# Patient Record
Sex: MALE | Race: OTHER | HISPANIC OR LATINO | Employment: OTHER | URBAN - METROPOLITAN AREA
[De-identification: names, ages, dates, MRNs, and addresses within clinical notes are randomized per-mention and may not be internally consistent; named-entity substitution may affect disease eponyms.]

---

## 2018-05-20 ENCOUNTER — HOSPITAL ENCOUNTER (EMERGENCY)
Facility: HOSPITAL | Age: 38
Discharge: HOME | End: 2018-05-20
Attending: EMERGENCY MEDICINE
Payer: COMMERCIAL

## 2018-05-20 VITALS
DIASTOLIC BLOOD PRESSURE: 113 MMHG | TEMPERATURE: 98.3 F | OXYGEN SATURATION: 98 % | RESPIRATION RATE: 18 BRPM | SYSTOLIC BLOOD PRESSURE: 222 MMHG | HEART RATE: 98 BPM
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
## 2018-05-20 DIAGNOSIS — S16.1XXA ACUTE STRAIN OF NECK MUSCLE, INITIAL ENCOUNTER: ICD-10-CM

## 2018-05-20 DIAGNOSIS — I10 HYPERTENSION: ICD-10-CM

## 2018-05-20 DIAGNOSIS — M54.50 LOW BACK PAIN: ICD-10-CM

## 2018-05-20 PROCEDURE — 99283 EMERGENCY DEPT VISIT LOW MDM: CPT

## 2018-05-20 RX ORDER — METHOCARBAMOL 750 MG/1
750 TABLET, FILM COATED ORAL EVERY 8 HOURS SCHEDULED
Qty: 15 TABLET | Refills: 0 | Status: SHIPPED | OUTPATIENT
Start: 2018-05-20 | End: 2019-12-29 | Stop reason: HOSPADM
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
## 2018-05-20 NOTE — DISCHARGE INSTRUCTIONS
Acute Low Back Pain   WHAT YOU NEED TO KNOW:   Acute low back pain is sudden discomfort in your lower back area that lasts for up to 6 weeks  The discomfort makes it difficult to tolerate activity  DISCHARGE INSTRUCTIONS:   Return to the emergency department if:   · You have severe pain  · You have sudden stiffness and heaviness on both buttocks down to both legs  · You have numbness or weakness in one leg, or pain in both legs  · You have numbness in your genital area or across your lower back  · You cannot control your urine or bowel movements  Contact your healthcare provider if:   · You have a fever  · You have pain at night or when you rest     · Your pain does not get better with treatment  · You have pain that worsens when you cough or sneeze  · You suddenly feel something pop or snap in your back  · You have questions or concerns about your condition or care  Medicines: The following medicines may be ordered by your healthcare provider:  · Acetaminophen  decreases pain  It is available without a doctor's order  Ask how much to take and how often to take it  Follow directions  Acetaminophen can cause liver damage if not taken correctly  · NSAIDs  help decrease swelling and pain  This medicine is available with or without a doctor's order  NSAIDs can cause stomach bleeding or kidney problems in certain people  If you take blood thinner medicine, always ask your healthcare provider if NSAIDs are safe for you  Always read the medicine label and follow directions  · Prescription pain medicine  may be given  Ask your healthcare provider how to take this medicine safely  · Muscle relaxers  decrease pain by relaxing the muscles in your lower spine  · Take your medicine as directed  Contact your healthcare provider if you think your medicine is not helping or if you have side effects  Tell him of her if you are allergic to any medicine   Keep a list of the medicines, vitamins, and herbs you take  Include the amounts, and when and why you take them  Bring the list or the pill bottles to follow-up visits  Carry your medicine list with you in case of an emergency  Self-care:   · Stay active  as much as you can without causing more pain  Bed rest could make your back pain worse  Start with some light exercises such as walking  Avoid heavy lifting until your pain is gone  Ask for more information about the activities or exercises that are right for you  · Ice  helps decrease swelling, pain, and muscle spams  Put crushed ice in a plastic bag  Cover it with a towel  Place it on your lower back for 20 to 30 minutes every 2 hours  Do this for about 2 to 3 days after your pain starts, or as directed  · Heat  helps decrease pain and muscle spasms  Start to use heat after treatment with ice has stopped  Use a small towel dampened with warm water or a heating pad, or sit in a warm bath  Apply heat on the area for 20 to 30 minutes every 2 hours for as many days as directed  Alternate heat and ice  Prevent acute low back pain:   · Use proper body mechanics  ¨ Bend at the hips and knees when you  objects  Do not bend from the waist  Use your leg muscles as you lift the load  Do not use your back  Keep the object close to your chest as you lift it  Try not to twist or lift anything above your waist     ¨ Change your position often when you stand for long periods of time  Rest one foot on a small box or footrest, and then switch to the other foot often  ¨ Try not to sit for long periods of time  When you do, sit in a straight-backed chair with your feet flat on the floor  Never reach, pull, or push while you are sitting  · Do exercises that strengthen your back muscles  Warm up before you exercise  Ask your healthcare provider the best exercises for you  · Maintain a healthy weight  Ask your healthcare provider how much you should weigh   Ask him to help you create a weight loss plan if you are overweight  Follow up with your healthcare provider as directed:  Return for a follow-up visit if you still have pain after 1 to 3 weeks of treatment  You may need to visit an orthopedist if your back pain lasts more than 12 weeks  Write down your questions so you remember to ask them during your visits  © 2017 [address]  Information is for End User's use only and may not be sold, redistributed or otherwise used for commercial purposes  All illustrations and images included in CareNotes® are the copyrighted property of [employer]  or [employer]  The above information is an  only  It is not intended as medical advice for individual conditions or treatments  Talk to your doctor, nurse or pharmacist before following any medical regimen to see if it is safe and effective for you

## 2018-05-20 NOTE — ED NOTES
Patient reports history of hypertension  Patient states he did not take his blood pressure medication today  Patient requesting not to be evaluated for blood pressure        Orma Goltz, RN  05/20/18 9866

## 2018-08-17 ENCOUNTER — APPOINTMENT (EMERGENCY)
Dept: RADIOLOGY | Facility: HOSPITAL | Age: 38
End: 2018-08-17
Payer: COMMERCIAL

## 2018-08-17 ENCOUNTER — HOSPITAL ENCOUNTER (EMERGENCY)
Facility: HOSPITAL | Age: 38
Discharge: HOME | End: 2018-08-17
Attending: EMERGENCY MEDICINE | Admitting: EMERGENCY MEDICINE
Payer: COMMERCIAL

## 2018-08-17 VITALS
RESPIRATION RATE: 18 BRPM | OXYGEN SATURATION: 97 % | HEART RATE: 104 BPM | WEIGHT: 294 LBS | TEMPERATURE: 97.6 F | DIASTOLIC BLOOD PRESSURE: 119 MMHG | SYSTOLIC BLOOD PRESSURE: 206 MMHG

## 2018-08-17 DIAGNOSIS — S91.114A LACERATION OF LESSER TOE OF RIGHT FOOT WITHOUT FOREIGN BODY PRESENT OR DAMAGE TO NAIL, INITIAL ENCOUNTER: Primary | ICD-10-CM

## 2018-08-17 PROCEDURE — 90715 TDAP VACCINE 7 YRS/> IM: CPT | Performed by: FAMILY MEDICINE

## 2018-08-17 PROCEDURE — 73660 X-RAY EXAM OF TOE(S): CPT

## 2018-08-17 PROCEDURE — 99283 EMERGENCY DEPT VISIT LOW MDM: CPT

## 2018-08-17 PROCEDURE — 90471 IMMUNIZATION ADMIN: CPT

## 2018-08-17 RX ADMIN — TETANUS TOXOID, REDUCED DIPHTHERIA TOXOID AND ACELLULAR PERTUSSIS VACCINE, ADSORBED 0.5 ML: 5; 2.5; 8; 8; 2.5 SUSPENSION INTRAMUSCULAR at 11:09

## 2018-08-17 NOTE — DISCHARGE INSTRUCTIONS
Laceration   WHAT YOU NEED TO KNOW:   A laceration is an injury to the skin and the soft tissue underneath it  Lacerations happen when you are cut or hit by something  They can happen anywhere on the body  DISCHARGE INSTRUCTIONS:   Return to the emergency department if:   · You have heavy bleeding or bleeding that does not stop after 10 minutes of holding firm, direct pressure over the wound  · Your wound opens up  Contact your healthcare provider if:   · You have a fever or chills  · Your laceration is red, warm, or swollen  · You have red streaks on your skin coming from your wound  · You have white or yellow drainage from the wound that smells bad  · You have pain that gets worse, even after treatment  · You have questions or concerns about your condition or care  Medicines:   · Prescription pain medicine  may be given  Ask how to take this medicine safely  · Antibiotics  help treat or prevent a bacterial infection  · Take your medicine as directed  Contact your healthcare provider if you think your medicine is not helping or if you have side effects  Tell him or her if you are allergic to any medicine  Keep a list of the medicines, vitamins, and herbs you take  Include the amounts, and when and why you take them  Bring the list or the pill bottles to follow-up visits  Carry your medicine list with you in case of an emergency  Care for your wound as directed:   · Do not get your wound wet  until your healthcare provider says it is okay  Do not soak your wound in water  Do not go swimming until your healthcare provider says it is okay  Carefully wash the wound with soap and water  Gently pat the area dry or allow it to air dry  · Change your bandages  when they get wet, dirty, or after washing  Apply new, clean bandages as directed  Do not apply elastic bandages or tape too tight  Do not put powders or lotions over your incision  · Apply antibiotic ointment as directed  Your healthcare provider may give you antibiotic ointment to put over your wound if you have stitches  If you have strips of tape over your incision, let them dry up and fall off on their own  If they do not fall off within 14 days, gently remove them  If you have glue over your wound, do not remove or pick at it  If your glue comes off, do not replace it with glue that you have at home  · Check your wound every day for signs of infection such as swelling, redness, or pus  Self-care:   · Apply ice  on your wound for 15 to 20 minutes every hour or as directed  Use an ice pack, or put crushed ice in a plastic bag  Cover it with a towel  Ice helps prevent tissue damage and decreases swelling and pain  · Use a splint as directed  A splint will decrease movement and stress on your wound  It may help it heal faster  A splint may be used for lacerations over joints or areas of your body that bend  Ask your healthcare provider how to apply and remove a splint  · Decrease scarring of your wound  by applying ointments as directed  Do not apply ointments until your healthcare provider says it is okay  You may need to wait until your wound is healed  Ask which ointment to buy and how often to use it  After your wound is healed, use sunscreen over the area when you are out in the sun  You should do this for at least 6 months to 1 year after your injury  Follow up with your healthcare provider as directed: You may need to follow up in 24 to 48 hours to have your wound checked for infection  You will need to return in 3 to 14 days if you have stitches or staples so they can be removed  Care for your wound as directed to prevent infection and help it heal  Write down your questions so you remember to ask them during your visits  © 2017 [employer] Information is for End User's use only and may not be sold, redistributed or otherwise used for commercial purposes   All illustrations and images included in [facility] are the copyrighted property of [employer] Inc  or [employer]  The above information is an  only  It is not intended as medical advice for individual conditions or treatments  Talk to your doctor, nurse or pharmacist before following any medical regimen to see if it is safe and effective for you

## 2018-08-17 NOTE — ED NOTES
Are irrigated with nss, pat dry  Bleeding controlled laceration noted to toe        Michelle Leon RN  08/17/18 2181

## 2018-08-17 NOTE — ED ATTENDING ATTESTATION
Lina Sheikh MD, saw and evaluated the patient  I have discussed the patient with the resident/non-physician practitioner and agree with the resident's/non-physician practitioner's findings, Plan of Care, and MDM as documented in the resident's/non-physician practitioner's note, except where noted  All available labs and Radiology studies were reviewed  At this point I agree with the current assessment done in the Emergency Department  I have conducted an independent evaluation of this patient a history and physical is as follows:  46 yo male sustained laceration on his 2nd toe as he was pulling a shower door closed that came off the track  He does not know last tetanus  Laceration closed with skin adhesive  No fracture  Tetanus updated          Critical Care Time  CritCare Time    Procedures

## 2018-08-17 NOTE — ED PROVIDER NOTES
History  Chief Complaint   Patient presents with    Toe Injury     pt injured right 2nd toe in shower this am with shower door  laceration noted with small amount of blood  45year old male with pmh of htn and dm presents with right 2nd toe laceration after the glass door of his shower came off the track and fell onto toes  Patient can't recall last tdap  Denies fever, chills, numbness, tingling, or decreased range of motion of his toes  Patient does not take blood thinners  History provided by:  Patient   used: No        Prior to Admission Medications   Prescriptions Last Dose Informant Patient Reported? Taking? methocarbamol (ROBAXIN) 750 mg tablet   No No   Sig: Take 1 tablet (750 mg total) by mouth every 8 (eight) hours for 5 days      Facility-Administered Medications: None       Past Medical History:   Diagnosis Date    [facility]     Hypertension        History reviewed  No pertinent surgical history  History reviewed  No pertinent family history  I have reviewed and agree with the history as documented  Social History   Substance Use Topics    Smoking status: Never Smoker    Smokeless tobacco: Never Used    Alcohol use No        Review of Systems   Constitutional: Negative for activity change, chills, diaphoresis and fever  Respiratory: Negative for cough, chest tightness and shortness of breath  Cardiovascular: Negative for chest pain, palpitations and leg swelling  Gastrointestinal: Negative for diarrhea, nausea and vomiting  Musculoskeletal: Negative for arthralgias, gait problem, joint swelling, myalgias, neck pain and neck stiffness  Skin: Positive for wound  Negative for pallor and rash  Neurological: Negative for dizziness, syncope, weakness, light-headedness, numbness and headaches  Psychiatric/Behavioral: Negative for confusion  The patient is not nervous/anxious          Physical Exam  ED Triage Vitals [08/17/18 1022] Temperature Pulse Respirations Blood Pressure SpO2   97 6 °F (36 4 °C) 104 18 (!) 206/119 97 %      Temp Source Heart Rate Source Patient Position - Orthostatic VS BP Location FiO2 (%)   Oral Monitor -- -- --      Pain Score       No Pain           Orthostatic Vital Signs  Vitals:    08/17/18 1022   BP: (!) 206/119   Pulse: 104       Physical Exam   Constitutional: He is oriented to person, place, and time  He appears well-developed and well-nourished  No distress  HENT:   Head: Normocephalic and atraumatic  Eyes: EOM are normal  Pupils are equal, round, and reactive to light  Neck: Normal range of motion  Neck supple  Cardiovascular: Normal rate, regular rhythm, normal heart sounds and intact distal pulses  Exam reveals no friction rub  No murmur heard  Pulmonary/Chest: Effort normal and breath sounds normal  No respiratory distress  He has no wheezes  Abdominal: Soft  Bowel sounds are normal  There is no tenderness  Musculoskeletal: Normal range of motion  He exhibits no edema or deformity  Neurological: He is alert and oriented to person, place, and time  He displays normal reflexes  No cranial nerve deficit or sensory deficit  Coordination normal    Skin: Laceration noted  No abrasion, no bruising and no ecchymosis noted  He is not diaphoretic  No erythema  ED Medications  Medications   tetanus-diphtheria-acellular pertussis (BOOSTRIX) IM injection 0 5 mL (0 5 mL Intramuscular Given 8/17/18 1109)       Diagnostic Studies  Results Reviewed     None                 XR toe second min 2 views RIGHT   ED Interpretation by Meir Winchester DO (08/17 1126)   No acute fracture  Final Result by Hermelinda Up MD (08/17 1151)      No fracture  No radiopaque foreign body identified              Workstation performed: [id number]               Procedures  Lac Repair  Date/Time: 8/17/2018 11:55 AM  Performed by: Linda Paniagua by: Howard White   Consent: Verbal consent obtained  Risks and benefits: risks, benefits and alternatives were discussed  Consent given by: patient  Patient understanding: patient states understanding of the procedure being performed  Patient identity confirmed: verbally with patient  Body area: lower extremity  Location details: right second toe  Laceration length: 1 cm  Foreign bodies: no foreign bodies  Tendon involvement: none  Nerve involvement: none  Vascular damage: no    Sedation:  Patient sedated: no    Wound Dehiscence:  Superficial Wound Dehiscence: simple closure      Procedure Details:  Irrigation solution: saline  Skin closure: glue  Patient tolerance: Patient tolerated the procedure well with no immediate complications            Phone Consults  ED Phone Contact    ED Course                               MDM  Number of Diagnoses or Management Options  Laceration of lesser toe of right foot without foreign body present or damage to nail, initial encounter: new and requires workup  Diagnosis management comments: 45year old male presents for evaluation of 1cm laceration to dorsum of the middle phalanx of his right 2nd toe  There is no acute fracture  No sensory loss or decreased range of motion  Laceration was repaired using glue  Continued to bleed following repair but slowly stopped once glue hardened  Counseled patient to return to ED for closure using stitches if glue fails and wound continues to bleed         Amount and/or Complexity of Data Reviewed  Tests in the radiology section of CPT®: ordered and reviewed  Decide to obtain previous medical records or to obtain history from someone other than the patient: yes  Review and summarize past medical records: yes  Discuss the patient with other providers: yes  Independent visualization of images, tracings, or specimens: yes      CritCare Time    Disposition  Final diagnoses:   Laceration of lesser toe of right foot without foreign body present or damage to nail, initial encounter - minor 1cm laceration to dorsal 2nd phalanx  no acute fracture  superglue used for closure  follow up if laceration fails to heal      Time reflects when diagnosis was documented in both MDM as applicable and the Disposition within this note     Time User Action Codes Description Comment    8/17/2018 11:43 AM Danny Beck Add [X44 321A] Laceration of lesser toe of right foot without foreign body present or damage to nail, initial encounter     8/17/2018 11:44 AM Danny Beck Modify [L68 114A] Laceration of lesser toe of right foot without foreign body present or damage to nail, initial encounter minor 1cm laceration to dorsal 2nd phalanx  no acute fracture  superglue used for closure  follow up if laceration fails to heal       ED Disposition     ED Disposition Condition Comment    Discharge  [facility]  discharge to home/self care  Condition at discharge: Stable        Follow-up Information     Follow up With Specialties Details Why [facility] Schedule an appointment as soon as possible for a visit in 3 days If symptoms worsen [address] [place] [address]  [id number] [phone]          Discharge Medication List as of 8/17/2018 11:47 AM      CONTINUE these medications which have NOT CHANGED    Details   methocarbamol (ROBAXIN) 750 mg tablet Take 1 tablet (750 mg total) by mouth every 8 (eight) hours for 5 days, Starting Sun 5/20/2018, Until Fri 5/25/2018, Print           No discharge procedures on file  ED Provider  Attending physically available and evaluated [facility]    I managed the patient along with the ED Attending      Electronically Signed by         Sylvia Kawasaki, DO  08/17/18 4439

## 2019-12-18 ENCOUNTER — HOSPITAL ENCOUNTER (INPATIENT)
Facility: HOSPITAL | Age: 39
LOS: 1 days | DRG: 281 | End: 2019-12-19
Attending: EMERGENCY MEDICINE | Admitting: HOSPITALIST
Payer: COMMERCIAL

## 2019-12-18 ENCOUNTER — APPOINTMENT (EMERGENCY)
Dept: RADIOLOGY | Facility: HOSPITAL | Age: 39
DRG: 281 | End: 2019-12-18
Payer: COMMERCIAL

## 2019-12-18 DIAGNOSIS — I21.4 NSTEMI (NON-ST ELEVATED MYOCARDIAL INFARCTION) (HCC): Primary | ICD-10-CM

## 2019-12-18 PROBLEM — E87.1 HYPONATREMIA: Status: ACTIVE | Noted: 2019-12-18

## 2019-12-18 PROBLEM — I10 ESSENTIAL HYPERTENSION: Status: ACTIVE | Noted: 2019-12-18

## 2019-12-18 PROBLEM — IMO0002 DIABETES MELLITUS TYPE 2, UNCONTROLLED: Status: ACTIVE | Noted: 2019-12-18

## 2019-12-18 PROBLEM — R06.83 SNORING: Status: ACTIVE | Noted: 2019-12-18

## 2019-12-18 LAB
ALBUMIN SERPL BCP-MCNC: 3.6 G/DL (ref 3.5–5)
ALP SERPL-CCNC: 89 U/L (ref 46–116)
ALT SERPL W P-5'-P-CCNC: 30 U/L (ref 12–78)
ANION GAP SERPL CALCULATED.3IONS-SCNC: 9 MMOL/L (ref 4–13)
APTT PPP: 30 SECONDS (ref 23–37)
AST SERPL W P-5'-P-CCNC: 19 U/L (ref 5–45)
BASOPHILS # BLD AUTO: 0.05 THOUSANDS/ΜL (ref 0–0.1)
BASOPHILS NFR BLD AUTO: 1 % (ref 0–1)
BILIRUB SERPL-MCNC: 0.62 MG/DL (ref 0.2–1)
BUN SERPL-MCNC: 15 MG/DL (ref 5–25)
CALCIUM SERPL-MCNC: 9.5 MG/DL (ref 8.3–10.1)
CHLORIDE SERPL-SCNC: 97 MMOL/L (ref 100–108)
CO2 SERPL-SCNC: 29 MMOL/L (ref 21–32)
CREAT SERPL-MCNC: 1.18 MG/DL (ref 0.6–1.3)
D DIMER PPP FEU-MCNC: 0.27 UG/ML FEU
EOSINOPHIL # BLD AUTO: 0.06 THOUSAND/ΜL (ref 0–0.61)
EOSINOPHIL NFR BLD AUTO: 1 % (ref 0–6)
ERYTHROCYTE [DISTWIDTH] IN BLOOD BY AUTOMATED COUNT: 11.3 % (ref 11.6–15.1)
EST. AVERAGE GLUCOSE BLD GHB EST-MCNC: 243 MG/DL
GFR SERPL CREATININE-BSD FRML MDRD: 77 ML/MIN/1.73SQ M
GLUCOSE SERPL-MCNC: 272 MG/DL (ref 65–140)
GLUCOSE SERPL-MCNC: 322 MG/DL (ref 65–140)
GLUCOSE SERPL-MCNC: 404 MG/DL (ref 65–140)
HBA1C MFR BLD: 10.1 % (ref 4.2–6.3)
HCT VFR BLD AUTO: 48.2 % (ref 36.5–49.3)
HGB BLD-MCNC: 16.3 G/DL (ref 12–17)
IMM GRANULOCYTES # BLD AUTO: 0.05 THOUSAND/UL (ref 0–0.2)
IMM GRANULOCYTES NFR BLD AUTO: 1 % (ref 0–2)
INR PPP: 1.03 (ref 0.84–1.19)
LIPASE SERPL-CCNC: 296 U/L (ref 73–393)
LYMPHOCYTES # BLD AUTO: 2.55 THOUSANDS/ΜL (ref 0.6–4.47)
LYMPHOCYTES NFR BLD AUTO: 24 % (ref 14–44)
MCH RBC QN AUTO: 30.2 PG (ref 26.8–34.3)
MCHC RBC AUTO-ENTMCNC: 33.8 G/DL (ref 31.4–37.4)
MCV RBC AUTO: 89 FL (ref 82–98)
MONOCYTES # BLD AUTO: 0.83 THOUSAND/ΜL (ref 0.17–1.22)
MONOCYTES NFR BLD AUTO: 8 % (ref 4–12)
NEUTROPHILS # BLD AUTO: 7.3 THOUSANDS/ΜL (ref 1.85–7.62)
NEUTS SEG NFR BLD AUTO: 65 % (ref 43–75)
NRBC BLD AUTO-RTO: 0 /100 WBCS
PLATELET # BLD AUTO: 273 THOUSANDS/UL (ref 149–390)
PMV BLD AUTO: 10.6 FL (ref 8.9–12.7)
POTASSIUM SERPL-SCNC: 4 MMOL/L (ref 3.5–5.3)
PROT SERPL-MCNC: 8 G/DL (ref 6.4–8.2)
PROTHROMBIN TIME: 13.6 SECONDS (ref 11.6–14.5)
RBC # BLD AUTO: 5.4 MILLION/UL (ref 3.88–5.62)
SODIUM SERPL-SCNC: 135 MMOL/L (ref 136–145)
TROPONIN I SERPL-MCNC: 1.05 NG/ML
TROPONIN I SERPL-MCNC: 2.08 NG/ML
TROPONIN I SERPL-MCNC: 2.57 NG/ML
WBC # BLD AUTO: 10.84 THOUSAND/UL (ref 4.31–10.16)

## 2019-12-18 PROCEDURE — 83036 HEMOGLOBIN GLYCOSYLATED A1C: CPT | Performed by: PHYSICIAN ASSISTANT

## 2019-12-18 PROCEDURE — 85379 FIBRIN DEGRADATION QUANT: CPT | Performed by: EMERGENCY MEDICINE

## 2019-12-18 PROCEDURE — 85610 PROTHROMBIN TIME: CPT | Performed by: EMERGENCY MEDICINE

## 2019-12-18 PROCEDURE — 96360 HYDRATION IV INFUSION INIT: CPT

## 2019-12-18 PROCEDURE — 99285 EMERGENCY DEPT VISIT HI MDM: CPT

## 2019-12-18 PROCEDURE — 83690 ASSAY OF LIPASE: CPT | Performed by: EMERGENCY MEDICINE

## 2019-12-18 PROCEDURE — 99285 EMERGENCY DEPT VISIT HI MDM: CPT | Performed by: EMERGENCY MEDICINE

## 2019-12-18 PROCEDURE — 85025 COMPLETE CBC W/AUTO DIFF WBC: CPT | Performed by: EMERGENCY MEDICINE

## 2019-12-18 PROCEDURE — 82948 REAGENT STRIP/BLOOD GLUCOSE: CPT

## 2019-12-18 PROCEDURE — 93005 ELECTROCARDIOGRAM TRACING: CPT

## 2019-12-18 PROCEDURE — 84484 ASSAY OF TROPONIN QUANT: CPT | Performed by: EMERGENCY MEDICINE

## 2019-12-18 PROCEDURE — 99223 1ST HOSP IP/OBS HIGH 75: CPT | Performed by: HOSPITALIST

## 2019-12-18 PROCEDURE — 85730 THROMBOPLASTIN TIME PARTIAL: CPT | Performed by: EMERGENCY MEDICINE

## 2019-12-18 PROCEDURE — 80053 COMPREHEN METABOLIC PANEL: CPT | Performed by: EMERGENCY MEDICINE

## 2019-12-18 PROCEDURE — 85730 THROMBOPLASTIN TIME PARTIAL: CPT | Performed by: PHYSICIAN ASSISTANT

## 2019-12-18 PROCEDURE — 71046 X-RAY EXAM CHEST 2 VIEWS: CPT

## 2019-12-18 PROCEDURE — 84484 ASSAY OF TROPONIN QUANT: CPT | Performed by: PHYSICIAN ASSISTANT

## 2019-12-18 PROCEDURE — 36415 COLL VENOUS BLD VENIPUNCTURE: CPT | Performed by: EMERGENCY MEDICINE

## 2019-12-18 RX ORDER — HYDROCHLOROTHIAZIDE 50 MG/1
50 TABLET ORAL EVERY 24 HOURS
COMMUNITY
End: 2019-12-29 | Stop reason: HOSPADM

## 2019-12-18 RX ORDER — HEPARIN SODIUM 10000 [USP'U]/100ML
3-20 INJECTION, SOLUTION INTRAVENOUS
Status: DISCONTINUED | OUTPATIENT
Start: 2019-12-18 | End: 2019-12-19

## 2019-12-18 RX ORDER — ASPIRIN 81 MG/1
324 TABLET, CHEWABLE ORAL ONCE
Status: COMPLETED | OUTPATIENT
Start: 2019-12-18 | End: 2019-12-18

## 2019-12-18 RX ORDER — INSULIN GLARGINE 100 [IU]/ML
30 INJECTION, SOLUTION SUBCUTANEOUS
Status: DISCONTINUED | OUTPATIENT
Start: 2019-12-18 | End: 2019-12-19 | Stop reason: HOSPADM

## 2019-12-18 RX ORDER — HEPARIN SODIUM 1000 [USP'U]/ML
4000 INJECTION, SOLUTION INTRAVENOUS; SUBCUTANEOUS ONCE
Status: COMPLETED | OUTPATIENT
Start: 2019-12-18 | End: 2019-12-18

## 2019-12-18 RX ORDER — AMLODIPINE BESYLATE 10 MG/1
10 TABLET ORAL DAILY
Status: DISCONTINUED | OUTPATIENT
Start: 2019-12-19 | End: 2019-12-19 | Stop reason: HOSPADM

## 2019-12-18 RX ORDER — LISINOPRIL 20 MG/1
40 TABLET ORAL DAILY
Status: DISCONTINUED | OUTPATIENT
Start: 2019-12-19 | End: 2019-12-19 | Stop reason: HOSPADM

## 2019-12-18 RX ORDER — ASPIRIN 81 MG/1
81 TABLET, CHEWABLE ORAL DAILY
Status: DISCONTINUED | OUTPATIENT
Start: 2019-12-19 | End: 2019-12-19 | Stop reason: HOSPADM

## 2019-12-18 RX ORDER — AMLODIPINE BESYLATE 10 MG/1
10 TABLET ORAL DAILY
Status: ON HOLD | COMMUNITY
Start: 2016-12-16 | End: 2019-12-29 | Stop reason: SDUPTHER

## 2019-12-18 RX ORDER — LISINOPRIL 40 MG/1
40 TABLET ORAL DAILY
Status: ON HOLD | COMMUNITY
End: 2019-12-29 | Stop reason: SDUPTHER

## 2019-12-18 RX ORDER — HEPARIN SODIUM 1000 [USP'U]/ML
2000 INJECTION, SOLUTION INTRAVENOUS; SUBCUTANEOUS AS NEEDED
Status: DISCONTINUED | OUTPATIENT
Start: 2019-12-18 | End: 2019-12-19

## 2019-12-18 RX ORDER — HYDROCHLOROTHIAZIDE 25 MG/1
50 TABLET ORAL EVERY 24 HOURS
Status: DISCONTINUED | OUTPATIENT
Start: 2019-12-18 | End: 2019-12-19

## 2019-12-18 RX ORDER — HEPARIN SODIUM 1000 [USP'U]/ML
4000 INJECTION, SOLUTION INTRAVENOUS; SUBCUTANEOUS AS NEEDED
Status: DISCONTINUED | OUTPATIENT
Start: 2019-12-18 | End: 2019-12-19

## 2019-12-18 RX ORDER — ONDANSETRON 2 MG/ML
4 INJECTION INTRAMUSCULAR; INTRAVENOUS EVERY 6 HOURS PRN
Status: DISCONTINUED | OUTPATIENT
Start: 2019-12-18 | End: 2019-12-19 | Stop reason: HOSPADM

## 2019-12-18 RX ADMIN — INSULIN GLARGINE 30 UNITS: 100 INJECTION, SOLUTION SUBCUTANEOUS at 22:23

## 2019-12-18 RX ADMIN — HEPARIN SODIUM AND DEXTROSE 11.1 UNITS/KG/HR: 10000; 5 INJECTION INTRAVENOUS at 17:57

## 2019-12-18 RX ADMIN — ASPIRIN 81 MG 324 MG: 81 TABLET ORAL at 17:01

## 2019-12-18 RX ADMIN — HEPARIN SODIUM 4000 UNITS: 1000 INJECTION INTRAVENOUS; SUBCUTANEOUS at 17:51

## 2019-12-18 RX ADMIN — INSULIN LISPRO 5 UNITS: 100 INJECTION, SOLUTION INTRAVENOUS; SUBCUTANEOUS at 19:48

## 2019-12-18 RX ADMIN — HYDROCHLOROTHIAZIDE 50 MG: 25 TABLET ORAL at 19:47

## 2019-12-18 RX ADMIN — SODIUM CHLORIDE 1000 ML: 0.9 INJECTION, SOLUTION INTRAVENOUS at 16:36

## 2019-12-18 NOTE — ASSESSMENT & PLAN NOTE
Elevated on admission    Pt has not been compliant w/his meds over the last several days  Maintained on norvasc, hctz, lisinopril although pt does not recall doses  Restart on oral meds now  Continue to monitor

## 2019-12-18 NOTE — ED PROVIDER NOTES
History  Chief Complaint   Patient presents with    Chest Pain     Intermittent chest pain x2 days  Denies radiation of pain  Denies taking anything for pain  Denies any other symptoms  51-year-old male presenting for evaluation of chest pain  Patient states that symptoms have been intermittent over the past 1 week  He states that he noticed it more so when he was outside and was attributing this to the cold air  He describes this as chest tightness that resolves without any intervention  He currently is pain-free  Never had any symptoms prior to the past week  No associated lightheadedness, dizziness, shortness of breath, abdominal pain, nausea, vomiting, back pain, leg pain or swelling  No known cardiac disease  History of HTN, NIDDM  Denies tobacco use  Admits to marijuana use, denies any other drug use  Social alcohol use  Denies any family history of early cardiac disease  No history of risk factors for DVT/PE  Patient does report chronic left foot swelling that is unchanged  A/P:  51-year-old male- will get cardiac workup          Prior to Admission Medications   Prescriptions Last Dose Informant Patient Reported? Taking?    amLODIPine (NORVASC) 10 mg tablet 12/18/2019 at Unknown time  Yes Yes   Sig: Take 10 mg by mouth daily   hydrochlorothiazide (HYDRODIURIL) 50 mg tablet Not Taking at Unknown time  Yes No   Sig: Take 50 mg by mouth every 24 hours   lisinopril (ZESTRIL) 40 mg tablet 12/18/2019 at Unknown time  Yes Yes   Sig: Take 40 mg by mouth daily   metFORMIN (GLUCOPHAGE) 1000 MG tablet More than a month at Unknown time  Yes No   Sig: Take 1,000 mg by mouth Every 12 hours   methocarbamol (ROBAXIN) 750 mg tablet   No No   Sig: Take 1 tablet (750 mg total) by mouth every 8 (eight) hours for 5 days      Facility-Administered Medications: None       Past Medical History:   Diagnosis Date    Diabetes mellitus ([facility] [address] )     Hypertension        Past Surgical History:   Procedure Laterality Date    ANKLE SURGERY Right        Family History   Problem Relation Age of Onset    Cancer Mother         unknown     I have reviewed and agree with the history as documented  Social History     Tobacco Use    Smoking status: Never Smoker    Smokeless tobacco: Never Used   Substance Use Topics    Alcohol use: No    Drug use: Yes     Types: Marijuana        Review of Systems   Constitutional: Negative for chills, fever and unexpected weight change  HENT: Negative for ear pain, rhinorrhea and sore throat  Eyes: Negative for pain and visual disturbance  Respiratory: Negative for cough and shortness of breath  Cardiovascular: Positive for chest pain  Negative for palpitations and leg swelling  Gastrointestinal: Negative for abdominal pain, constipation, diarrhea, nausea and vomiting  Endocrine: Negative for polydipsia, polyphagia and polyuria  Genitourinary: Negative for dysuria, frequency, hematuria and urgency  Musculoskeletal: Negative for back pain, myalgias and neck pain  Skin: Negative for color change and rash  Allergic/Immunologic: Negative for environmental allergies and immunocompromised state  Neurological: Negative for dizziness, weakness, light-headedness, numbness and headaches  Hematological: Negative for adenopathy  Does not bruise/bleed easily  Psychiatric/Behavioral: Negative for agitation and confusion  All other systems reviewed and are negative  Physical Exam  Physical Exam   Constitutional: He is oriented to person, place, and time  He appears well-developed and well-nourished  HENT:   Head: Normocephalic and atraumatic  Nose: Nose normal    Mouth/Throat: Oropharynx is clear and moist    Eyes: Conjunctivae and EOM are normal    Neck: Normal range of motion  Neck supple  Cardiovascular: Normal rate, regular rhythm, normal heart sounds and intact distal pulses     No chest wall tenderness to palpation, no overlying skin changes/rash   Pulmonary/Chest: Effort normal and breath sounds normal  No stridor  No respiratory distress  He has no wheezes  He has no rales  He exhibits no tenderness  Abdominal: Soft  He exhibits no distension  There is no tenderness  There is no rebound and no guarding  Musculoskeletal: He exhibits no edema or deformity  No calf swelling/tenderness, no peripheral edema   Neurological: He is alert and oriented to person, place, and time  He exhibits normal muscle tone  Coordination normal    Skin: Skin is warm and dry  No rash noted  Psychiatric: He has a normal mood and affect  Judgment and thought content normal    Nursing note and vitals reviewed        Vital Signs  ED Triage Vitals [12/18/19 1529]   Temperature Pulse Respirations Blood Pressure SpO2   98 3 °F (36 8 °C) (!) 109 20 169/97 98 %      Temp Source Heart Rate Source Patient Position - Orthostatic VS BP Location FiO2 (%)   Oral Monitor Sitting Right arm --      Pain Score       5           Vitals:    12/18/19 1529 12/18/19 1606 12/18/19 1807 12/18/19 2335   BP: 169/97 (!) 177/87 166/100 146/74   Pulse: (!) 109 97 98 98   Patient Position - Orthostatic VS: Sitting Lying Lying Lying         Visual Acuity      ED Medications  Medications   heparin (porcine) 25,000 units in 250 mL infusion (premix) (11 1 Units/kg/hr × 90 kg (Order-Specific) Intravenous New Bag 12/18/19 1757)   heparin (porcine) injection 4,000 Units (has no administration in time range)   heparin (porcine) injection 2,000 Units (has no administration in time range)   ondansetron (ZOFRAN) injection 4 mg (has no administration in time range)   aspirin chewable tablet 81 mg (has no administration in time range)   insulin lispro (HumaLOG) 100 units/mL subcutaneous injection 1-6 Units (1 Units Subcutaneous Refused 12/18/19 2206)   amLODIPine (NORVASC) tablet 10 mg (has no administration in time range)   hydrochlorothiazide (HYDRODIURIL) tablet 50 mg (50 mg Oral Given 12/18/19 1947)   lisinopril (ZESTRIL) tablet 40 mg (has no administration in time range)   insulin glargine (LANTUS) subcutaneous injection 30 Units 0 3 mL (30 Units Subcutaneous Given 12/18/19 2223)   sodium chloride 0 9 % bolus 1,000 mL (1,000 mL Intravenous New Bag 12/18/19 1636)   aspirin chewable tablet 324 mg (324 mg Oral Given 12/18/19 1701)   heparin (porcine) injection 4,000 Units (4,000 Units Intravenous Given 12/18/19 1751)       Diagnostic Studies  Results Reviewed     Procedure Component Value Units Date/Time    APTT [[id number]]  (Normal) Collected:  12/18/19 2356    Lab Status:  Final result Specimen:  Blood from Hand, Left Updated:  12/19/19 0023     PTT 25 seconds     Troponin I [[id number]]  (Abnormal) Collected:  12/18/19 2314    Lab Status:  Final result Specimen:  Blood from Arm, Left Updated:  12/18/19 2346     Troponin I 2 57 ng/mL     Hemoglobin A1c w/EAG Estimation (Orders if not completed within the last 90 days) [[id number]]  (Abnormal) Collected:  12/18/19 1629    Lab Status:  Final result Specimen:  Blood from Arm, Right Updated:  12/18/19 2225     Hemoglobin A1C 10 1 %       mg/dl     Troponin I [[id number]]  (Abnormal) Collected:  12/18/19 1958    Lab Status:  Final result Specimen:  Blood from Arm, Left Updated:  12/18/19 2024     Troponin I 2 08 ng/mL     APTT [[id number]]  (Normal) Collected:  12/18/19 1629    Lab Status:  Final result Specimen:  Blood from Arm, Right Updated:  12/18/19 1756     PTT 30 seconds     Protime-INR [[id number]]  (Normal) Collected:  12/18/19 1629    Lab Status:  Final result Specimen:  Blood from Arm, Right Updated:  12/18/19 1756     Protime 13 6 seconds      INR 1 03    APTT six (6) hours after Heparin bolus/drip initiation or dosing change [[id number]]     Lab Status:  No result Specimen:  Blood     Lipase [[id number]]  (Normal) Collected:  12/18/19 1629    Lab Status:  Final result Specimen:  Blood from Arm, Right Updated:  12/18/19 1659     Lipase 296 u/L     Comprehensive metabolic panel [[id number]] (Abnormal) Collected:  12/18/19 1629    Lab Status:  Final result Specimen:  Blood from Arm, Right Updated:  12/18/19 1659     Sodium 135 mmol/L      Potassium 4 0 mmol/L      Chloride 97 mmol/L      CO2 29 mmol/L      ANION GAP 9 mmol/L      BUN 15 mg/dL      Creatinine 1 18 mg/dL      Glucose 404 mg/dL      Calcium 9 5 mg/dL      AST 19 U/L      ALT 30 U/L      Alkaline Phosphatase 89 U/L      Total Protein 8 0 g/dL      Albumin 3 6 g/dL      Total Bilirubin 0 62 mg/dL      eGFR 77 ml/min/1 73sq m     Narrative:       [facility] guidelines for Chronic Kidney Disease (CKD):     Stage 1 with normal or high GFR (GFR > 90 mL/min/1 73 square meters)    Stage 2 Mild CKD (GFR = 60-89 mL/min/1 73 square meters)    Stage 3A Moderate CKD (GFR = 45-59 mL/min/1 73 square meters)    Stage 3B Moderate CKD (GFR = 30-44 mL/min/1 73 square meters)    Stage 4 Severe CKD (GFR = 15-29 mL/min/1 73 square meters)    Stage 5 End Stage CKD (GFR <15 mL/min/1 73 square meters)  Note: GFR calculation is accurate only with a steady state creatinine    Troponin I [[id number]]  (Abnormal) Collected:  12/18/19 1629    Lab Status:  Final result Specimen:  Blood from Arm, Right Updated:  12/18/19 1657     Troponin I 1 05 ng/mL     D-dimer, quantitative [[id number]]  (Normal) Collected:  12/18/19 1629    Lab Status:  Final result Specimen:  Blood from Arm, Right Updated:  12/18/19 1649     D-Dimer, Quant 0 27 ug/ml FEU     CBC and differential [[id number]]  (Abnormal) Collected:  12/18/19 1629    Lab Status:  Final result Specimen:  Blood from Arm, Right Updated:  12/18/19 1638     WBC 10 84 Thousand/uL      RBC 5 40 Million/uL      Hemoglobin 16 3 g/dL      Hematocrit 48 2 %      MCV 89 fL      MCH 30 2 pg      MCHC 33 8 g/dL      RDW 11 3 %      MPV 10 6 fL      Platelets 004 Thousands/uL      nRBC 0 /100 WBCs      Neutrophils Relative 65 %      Immat GRANS % 1 %      Lymphocytes Relative 24 %      Monocytes Relative 8 % Eosinophils Relative 1 %      Basophils Relative 1 %      Neutrophils Absolute 7 30 Thousands/µL      Immature Grans Absolute 0 05 Thousand/uL      Lymphocytes Absolute 2 55 Thousands/µL      Monocytes Absolute 0 83 Thousand/µL      Eosinophils Absolute 0 06 Thousand/µL      Basophils Absolute 0 05 Thousands/µL                  XR chest 2 views   Final Result by Kevin Tafyoa MD (12/18 2116)      No active pulmonary disease  Workstation performed: [id number]                    Procedures  Procedures         ED Course  ED Course as of Dec 19 0057   Wed Dec 18, 2019   1624 EKG: sinus tachycardia @ 109 bpm, RAD, ST depressions with t wave inversions II, III, avF, v5, v6      1658 Troponin I(!): 1 05   1659 Discussed results with pt who is agreeable to admission, messaged SLIM      1703 Glucose, Random(!): 404         HEART Risk Score      Most Recent Value   History  1 Filed at: 12/18/2019 1625   ECG  2 Filed at: 12/18/2019 1625   Age  0 Filed at: 12/18/2019 1625   Risk Factors  1 Filed at: 12/18/2019 1625   Troponin  2 Filed at: 12/18/2019 1625   Heart Score Risk Calculator   History  1 Filed at: 12/18/2019 1625   ECG  2 Filed at: 12/18/2019 1625   Age  0 Filed at: 12/18/2019 1625   Risk Factors  1 Filed at: 12/18/2019 1625   Troponin  2 Filed at: 12/18/2019 1625   HEART Score  6 Filed at: 12/18/2019 1625   HEART Score  6 Filed at: 12/18/2019 1625                            MDM  Number of Diagnoses or Management Options  NSTEMI (non-ST elevated myocardial infarction) [facility]):   Diagnosis management comments: 40-year-old male presenting with intermittent chest pain, found to have abnormal EKG with ST depressions/T-wave inversions inferiorly and laterally with elevated troponin  Patient was chest pain-free throughout ED course    Given aspirin and started on heparin drip, admitted to [facility] for further management       Amount and/or Complexity of Data Reviewed  Clinical lab tests: ordered and reviewed  Tests in the radiology section of CPT®: reviewed  Tests in the medicine section of CPT®: ordered and reviewed  Review and summarize past medical records: yes  Independent visualization of images, tracings, or specimens: yes          Disposition  Final diagnoses:   NSTEMI (non-ST elevated myocardial infarction) ([facility] [address] )     Time reflects when diagnosis was documented in both MDM as applicable and the Disposition within this note     Time User Action Codes Description Comment    12/18/2019  5:14 PM Juni Jackson Add [I21 4] NSTEMI (non-ST elevated myocardial infarction) [facility])       ED Disposition     ED Disposition Condition Date/Time Comment    Admit Stable Wed Dec 18, 2019  5:14 PM Case was discussed with REGGIE and the patient's admission status was agreed to be Admission Status: inpatient status to the service of Dr Mary Mayes   Follow-up Information    None         Current Discharge Medication List      CONTINUE these medications which have NOT CHANGED    Details   amLODIPine (NORVASC) 10 mg tablet Take 10 mg by mouth daily      lisinopril (ZESTRIL) 40 mg tablet Take 40 mg by mouth daily      hydrochlorothiazide (HYDRODIURIL) 50 mg tablet Take 50 mg by mouth every 24 hours      metFORMIN (GLUCOPHAGE) 1000 MG tablet Take 1,000 mg by mouth Every 12 hours      methocarbamol (ROBAXIN) 750 mg tablet Take 1 tablet (750 mg total) by mouth every 8 (eight) hours for 5 days  Qty: 15 tablet, Refills: 0    Associated Diagnoses: Acute strain of neck muscle, initial encounter; Low back pain           No discharge procedures on file      ED Provider  Electronically Signed by           Yudi Malave DO  12/19/19 0724

## 2019-12-18 NOTE — H&P
H&P- Davon Alvareztan  1980, 44 y o  male MRN: [id number]    Unit/Bed#: ED 15 Encounter: [id number]    Primary Care Provider: No primary care provider on file  Date and time admitted to hospital: 12/18/2019  3:33 PM        * NSTEMI (non-ST elevated myocardial infarction) [facility])  Assessment & Plan  W/exertional CP over last week  Pt has morbid obesity, poorly controlled DM/HTN  BP modestly elevated at 160s-170s however EKG demonstrates TWI in lateral leads and ST segment/TWI in inferior leads w/o prior  cxr w/o widened mediastinum or other acute cardiopulmonary disease pending formal read  rec'd asa 325mg in ed, started on heparin gtt per ED  Will admit to IP setting, trend ekgs/troponins, check flp hgb a1c, consult cardiology  Tentative NPO for possible catheterization pending cardiology recommendation    Snoring  Assessment & Plan  Likely w/undiagnosed JANKI  Op referral to ambulatory sleep medicine upon d/c    Hyponatremia  Assessment & Plan  Mild likely 2* pseudohyponatremia  Monitor w/correction of hyperglycemia    Diabetes mellitus type 2, uncontrolled (HCC)  Assessment & Plan  No results found for: HGBA1C    No results for input(s): POCGLU in the last 72 hours  Blood Sugar Average: Last 72 hrs:   remote a1c was >10% one year prior   Random bs at 404  Repeat poc bs after 1L NS  Will likely need one time dose of insulin lispro  Start lantus 30 iu qhs, start ssi and poc bs  If acceptable would benefit from lantus 30 iuqhs and humalog 10 iu tid ac  At least for tight hyperglycemic control      Essential hypertension  Assessment & Plan  Elevated on admission    Pt has not been compliant w/his meds over the last several days  Maintained on norvasc, hctz, lisinopril although pt does not recall doses  Restart on oral meds now  Continue to monitor         VTE Prophylaxis: Heparin Drip  / sequential compression device   Code Status:  fc  POLST: There is no POLST form on file for this patient (pre-hospital)  Discussion with family:     Anticipated Length of Stay:  Patient will be admitted on an Inpatient basis with an anticipated length of stay of  Greater than 2 midnights  Justification for Hospital Stay: nstemi    Total Time for Visit, including Counseling / Coordination of Care: 45 minutes  Greater than 50% of this total time spent on direct patient counseling and coordination of care  Chief Complaint:   Chest pain x 1 week    History of Present Illness:    Garth Bond  is a 44 y o  male who presents with pmh of htn, dm, morbid obesity, medical noncompliance coming to hospital for cp x 1 week  Pt reports over the last week he has intermittent chest pain  This is high and substernal at level of ICS 2/3  It is nonradiating  It is a pressure like/tightness in sensation  It is variable in duration lasting from 5-10 minutes upwards of 10-20  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  It is alleviated when he rests and breaths deeply through his nose  It did happen occasionally w/smoking marijuana at home  It does not happen at rest, after oral intake, or in the middle of the night and is not worse w/lying down or w/palpation  He has no sore throat  He did note 2-3 days into his s/sx a day of vomiting and diarrhea but this was self limiting and started well after onset of his s/sx  He notes no rash/headache, recent travel or hospitalizations  No recent URI or GI s/sx preceding this  Denies ilicit drug use or alcohol use more than 1x/week otherwise  He hasn't been taking his BP meds due to the cost without insurance  In the ED the pt was evaluated and was CP free  He was started on asa/heparin gtt after his ddimer was negative and ekg and troponin resulted  We are asked to admit pt for nstemi  Review of Systems:    Review of Systems   Constitutional: Negative for appetite change, chills and fever  HENT: Negative for sore throat  Respiratory: Positive for chest tightness  Negative for cough and shortness of breath  Cardiovascular: Positive for chest pain  Gastrointestinal: Negative for abdominal pain, diarrhea, nausea and vomiting  Neurological: Negative for light-headedness  All other systems reviewed and are negative  Past Medical and Surgical History:     Past Medical History:   Diagnosis Date    Diabetes mellitus ([address] )     Hypertension        Past Surgical History:   Procedure Laterality Date    ANKLE SURGERY Right        Meds/Allergies:    Prior to Admission medications    Medication Sig Start Date End Date Taking? Authorizing Provider   amLODIPine (NORVASC) 10 mg tablet Take 10 mg by mouth daily 12/16/16  Yes Historical Provider, MD   hydrochlorothiazide (HYDRODIURIL) 50 mg tablet Take 50 mg by mouth every 24 hours    Historical Provider, MD   lisinopril (ZESTRIL) 40 mg tablet Take 40 mg by mouth daily    Historical Provider, MD   metFORMIN (GLUCOPHAGE) 1000 MG tablet Take 1,000 mg by mouth Every 12 hours    Historical Provider, MD   methocarbamol (ROBAXIN) 750 mg tablet Take 1 tablet (750 mg total) by mouth every 8 (eight) hours for 5 days 5/20/18 5/25/18  Brannon Hurley PA-C     I have reviewed home medications with patient personally      Allergies: No Known Allergies    Social History:     Marital Status: Single   Occupation:   Patient Pre-hospital Living Situation:   Patient Pre-hospital Level of Mobility:   Patient Pre-hospital Diet Restrictions:   Substance Use History:   Social History     Substance and Sexual Activity   Alcohol Use No     Social History     Tobacco Use   Smoking Status Never Smoker   Smokeless Tobacco Never Used     Social History     Substance and Sexual Activity   Drug Use Yes    Types: Marijuana       Family History:    no 1* family hx of MI    Physical Exam:     Vitals:   Blood Pressure: (!) 177/87 (12/18/19 1606)  Pulse: 97 (12/18/19 1606)  Temperature: 98 3 °F (36 8 °C) (12/18/19 1529)  Temp Source: Oral (12/18/19 1529)  Respirations: 20 (12/18/19 1606)  Weight - Scale: 127 kg (279 lb 9 6 oz) (12/18/19 1739)  SpO2: 96 % (12/18/19 1606)    Physical Exam    (  Be Sure to Include Physical Exam: Delete this entire line when you have entered your exam)    Additional Data:     Lab Results: I have personally reviewed pertinent reports  Results from last 7 days   Lab Units 12/18/19  1629   WBC Thousand/uL 10 84*   HEMOGLOBIN g/dL 16 3   HEMATOCRIT % 48 2   PLATELETS Thousands/uL 273   NEUTROS PCT % 65   LYMPHS PCT % 24   MONOS PCT % 8   EOS PCT % 1     Results from last 7 days   Lab Units 12/18/19  1629   SODIUM mmol/L 135*   POTASSIUM mmol/L 4 0   CHLORIDE mmol/L 97*   CO2 mmol/L 29   BUN mg/dL 15   CREATININE mg/dL 1 18   ANION GAP mmol/L 9   CALCIUM mg/dL 9 5   ALBUMIN g/dL 3 6   TOTAL BILIRUBIN mg/dL 0 62   ALK PHOS U/L 89   ALT U/L 30   AST U/L 19   GLUCOSE RANDOM mg/dL 404*     Results from last 7 days   Lab Units 12/18/19  1629   INR  1 03                   Imaging: I have personally reviewed pertinent films in PACS  Images reviewed by myself  No widened mediastinum or cardiopulmonary dz or cardiomegaly/    XR chest 2 views    (Results Pending)       EKG, Pathology, and Other Studies Reviewed on Admission:   · EKG: twi and st segment depression in inferolateral leads  No prior comparison  Allscripts / Epic Records Reviewed: Yes     ** Please Note: This note has been constructed using a voice recognition system   **

## 2019-12-18 NOTE — ASSESSMENT & PLAN NOTE
W/exertional CP over last week  Pt has morbid obesity, poorly controlled DM/HTN  BP modestly elevated at 160s-170s however EKG demonstrates TWI in lateral leads and ST segment/TWI in inferior leads w/o prior      cxr w/o widened mediastinum or other acute cardiopulmonary disease pending formal read  rec'd asa 325mg in ed, started on heparin gtt per ED  Will admit to IP setting, trend ekgs/troponins, check flp hgb a1c, consult cardiology  Tentative NPO for possible catheterization pending cardiology recommendation

## 2019-12-18 NOTE — ASSESSMENT & PLAN NOTE
No results found for: HGBA1C    No results for input(s): POCGLU in the last 72 hours  Blood Sugar Average: Last 72 hrs:   remote a1c was >10% one year prior   Random bs at 404  Repeat poc bs after 1L NS  Will likely need one time dose of insulin lispro  Start lantus 30 iu qhs, start ssi and poc bs  If acceptable would benefit from lantus 30 iuqhs and humalog 10 iu tid ac    At least for tight hyperglycemic control

## 2019-12-19 ENCOUNTER — APPOINTMENT (INPATIENT)
Dept: NON INVASIVE DIAGNOSTICS | Facility: HOSPITAL | Age: 39
DRG: 281 | End: 2019-12-19
Payer: COMMERCIAL

## 2019-12-19 ENCOUNTER — APPOINTMENT (INPATIENT)
Dept: NON INVASIVE DIAGNOSTICS | Facility: HOSPITAL | Age: 39
DRG: 281 | End: 2019-12-19
Attending: INTERNAL MEDICINE
Payer: COMMERCIAL

## 2019-12-19 ENCOUNTER — HOSPITAL ENCOUNTER (INPATIENT)
Facility: HOSPITAL | Age: 39
LOS: 10 days | Discharge: HOME HEALTH | DRG: 235 | End: 2019-12-29
Attending: INTERNAL MEDICINE | Admitting: INTERNAL MEDICINE
Payer: COMMERCIAL

## 2019-12-19 VITALS
BODY MASS INDEX: 40.91 KG/M2 | SYSTOLIC BLOOD PRESSURE: 151 MMHG | HEART RATE: 92 BPM | DIASTOLIC BLOOD PRESSURE: 72 MMHG | RESPIRATION RATE: 18 BRPM | OXYGEN SATURATION: 98 % | WEIGHT: 276.24 LBS | HEIGHT: 69 IN | TEMPERATURE: 98.3 F

## 2019-12-19 DIAGNOSIS — I21.4 NSTEMI (NON-ST ELEVATED MYOCARDIAL INFARCTION) (HCC): ICD-10-CM

## 2019-12-19 DIAGNOSIS — I10 ESSENTIAL HYPERTENSION: Primary | ICD-10-CM

## 2019-12-19 DIAGNOSIS — Z95.1 S/P CABG X 3: ICD-10-CM

## 2019-12-19 DIAGNOSIS — E11.65 UNCONTROLLED TYPE 2 DIABETES MELLITUS WITH HYPERGLYCEMIA (HCC): ICD-10-CM

## 2019-12-19 PROBLEM — E78.5 HYPERLIPIDEMIA: Status: ACTIVE | Noted: 2019-12-19

## 2019-12-19 PROBLEM — E87.1 HYPONATREMIA: Status: RESOLVED | Noted: 2019-12-18 | Resolved: 2019-12-19

## 2019-12-19 LAB
APTT PPP: 25 SECONDS (ref 23–37)
APTT PPP: 37 SECONDS (ref 23–37)
ATRIAL RATE: 109 BPM
ATRIAL RATE: 94 BPM
CHOLEST SERPL-MCNC: 166 MG/DL (ref 50–200)
GLUCOSE SERPL-MCNC: 212 MG/DL (ref 65–140)
GLUCOSE SERPL-MCNC: 248 MG/DL (ref 65–140)
GLUCOSE SERPL-MCNC: 252 MG/DL (ref 65–140)
GLUCOSE SERPL-MCNC: 253 MG/DL (ref 65–140)
HDLC SERPL-MCNC: 27 MG/DL
LDLC SERPL CALC-MCNC: 113 MG/DL (ref 0–100)
P AXIS: 48 DEGREES
P AXIS: 51 DEGREES
PR INTERVAL: 142 MS
PR INTERVAL: 146 MS
QRS AXIS: 102 DEGREES
QRS AXIS: 129 DEGREES
QRSD INTERVAL: 102 MS
QRSD INTERVAL: 106 MS
QT INTERVAL: 360 MS
QT INTERVAL: 370 MS
QTC INTERVAL: 462 MS
QTC INTERVAL: 484 MS
T WAVE AXIS: -45 DEGREES
T WAVE AXIS: 100 DEGREES
TRIGL SERPL-MCNC: 132 MG/DL
TROPONIN I SERPL-MCNC: 2.83 NG/ML
TROPONIN I SERPL-MCNC: 3.11 NG/ML
VENTRICULAR RATE: 109 BPM
VENTRICULAR RATE: 94 BPM

## 2019-12-19 PROCEDURE — 93571 IV DOP VEL&/PRESS C FLO 1ST: CPT | Performed by: INTERNAL MEDICINE

## 2019-12-19 PROCEDURE — 87340 HEPATITIS B SURFACE AG IA: CPT | Performed by: STUDENT IN AN ORGANIZED HEALTH CARE EDUCATION/TRAINING PROGRAM

## 2019-12-19 PROCEDURE — 4A033BC MEASUREMENT OF ARTERIAL PRESSURE, CORONARY, PERCUTANEOUS APPROACH: ICD-10-PCS | Performed by: INTERNAL MEDICINE

## 2019-12-19 PROCEDURE — 93458 L HRT ARTERY/VENTRICLE ANGIO: CPT | Performed by: INTERNAL MEDICINE

## 2019-12-19 PROCEDURE — 93306 TTE W/DOPPLER COMPLETE: CPT

## 2019-12-19 PROCEDURE — 99152 MOD SED SAME PHYS/QHP 5/>YRS: CPT | Performed by: INTERNAL MEDICINE

## 2019-12-19 PROCEDURE — 99153 MOD SED SAME PHYS/QHP EA: CPT | Performed by: INTERNAL MEDICINE

## 2019-12-19 PROCEDURE — 93306 TTE W/DOPPLER COMPLETE: CPT | Performed by: INTERNAL MEDICINE

## 2019-12-19 PROCEDURE — 86803 HEPATITIS C AB TEST: CPT | Performed by: STUDENT IN AN ORGANIZED HEALTH CARE EDUCATION/TRAINING PROGRAM

## 2019-12-19 PROCEDURE — 80061 LIPID PANEL: CPT | Performed by: PHYSICIAN ASSISTANT

## 2019-12-19 PROCEDURE — 93010 ELECTROCARDIOGRAM REPORT: CPT | Performed by: INTERNAL MEDICINE

## 2019-12-19 PROCEDURE — 99239 HOSP IP/OBS DSCHRG MGMT >30: CPT | Performed by: PHYSICIAN ASSISTANT

## 2019-12-19 PROCEDURE — 82948 REAGENT STRIP/BLOOD GLUCOSE: CPT

## 2019-12-19 PROCEDURE — 85730 THROMBOPLASTIN TIME PARTIAL: CPT | Performed by: PHYSICIAN ASSISTANT

## 2019-12-19 PROCEDURE — C1894 INTRO/SHEATH, NON-LASER: HCPCS | Performed by: INTERNAL MEDICINE

## 2019-12-19 PROCEDURE — 4A023N7 MEASUREMENT OF CARDIAC SAMPLING AND PRESSURE, LEFT HEART, PERCUTANEOUS APPROACH: ICD-10-PCS | Performed by: INTERNAL MEDICINE

## 2019-12-19 PROCEDURE — C1887 CATHETER, GUIDING: HCPCS | Performed by: INTERNAL MEDICINE

## 2019-12-19 PROCEDURE — B2151ZZ FLUOROSCOPY OF LEFT HEART USING LOW OSMOLAR CONTRAST: ICD-10-PCS | Performed by: INTERNAL MEDICINE

## 2019-12-19 PROCEDURE — 86704 HEP B CORE ANTIBODY TOTAL: CPT | Performed by: STUDENT IN AN ORGANIZED HEALTH CARE EDUCATION/TRAINING PROGRAM

## 2019-12-19 PROCEDURE — C1769 GUIDE WIRE: HCPCS | Performed by: INTERNAL MEDICINE

## 2019-12-19 PROCEDURE — NC001 PR NO CHARGE: Performed by: INTERNAL MEDICINE

## 2019-12-19 PROCEDURE — 84484 ASSAY OF TROPONIN QUANT: CPT | Performed by: PHYSICIAN ASSISTANT

## 2019-12-19 PROCEDURE — 86705 HEP B CORE ANTIBODY IGM: CPT | Performed by: STUDENT IN AN ORGANIZED HEALTH CARE EDUCATION/TRAINING PROGRAM

## 2019-12-19 PROCEDURE — 87389 HIV-1 AG W/HIV-1&-2 AB AG IA: CPT | Performed by: STUDENT IN AN ORGANIZED HEALTH CARE EDUCATION/TRAINING PROGRAM

## 2019-12-19 PROCEDURE — B2111ZZ FLUOROSCOPY OF MULTIPLE CORONARY ARTERIES USING LOW OSMOLAR CONTRAST: ICD-10-PCS | Performed by: INTERNAL MEDICINE

## 2019-12-19 PROCEDURE — 99254 IP/OBS CNSLTJ NEW/EST MOD 60: CPT | Performed by: INTERNAL MEDICINE

## 2019-12-19 RX ORDER — AMLODIPINE BESYLATE 10 MG/1
10 TABLET ORAL DAILY
Status: DISCONTINUED | OUTPATIENT
Start: 2019-12-20 | End: 2019-12-23

## 2019-12-19 RX ORDER — LIDOCAINE HYDROCHLORIDE 10 MG/ML
INJECTION, SOLUTION EPIDURAL; INFILTRATION; INTRACAUDAL; PERINEURAL CODE/TRAUMA/SEDATION MEDICATION
Status: COMPLETED | OUTPATIENT
Start: 2019-12-19 | End: 2019-12-19

## 2019-12-19 RX ORDER — LISINOPRIL 20 MG/1
40 TABLET ORAL DAILY
Status: CANCELLED | OUTPATIENT
Start: 2019-12-20

## 2019-12-19 RX ORDER — INSULIN GLARGINE 100 [IU]/ML
30 INJECTION, SOLUTION SUBCUTANEOUS
Status: CANCELLED | OUTPATIENT
Start: 2019-12-19

## 2019-12-19 RX ORDER — ONDANSETRON 2 MG/ML
4 INJECTION INTRAMUSCULAR; INTRAVENOUS EVERY 6 HOURS PRN
Status: CANCELLED | OUTPATIENT
Start: 2019-12-19

## 2019-12-19 RX ORDER — VERAPAMIL HYDROCHLORIDE 2.5 MG/ML
INJECTION, SOLUTION INTRAVENOUS CODE/TRAUMA/SEDATION MEDICATION
Status: COMPLETED | OUTPATIENT
Start: 2019-12-19 | End: 2019-12-19

## 2019-12-19 RX ORDER — ATORVASTATIN CALCIUM 80 MG/1
80 TABLET, FILM COATED ORAL
Status: DISCONTINUED | OUTPATIENT
Start: 2019-12-19 | End: 2019-12-19 | Stop reason: HOSPADM

## 2019-12-19 RX ORDER — AMLODIPINE BESYLATE 10 MG/1
10 TABLET ORAL DAILY
Status: CANCELLED | OUTPATIENT
Start: 2019-12-20

## 2019-12-19 RX ORDER — NITROGLYCERIN 20 MG/100ML
INJECTION INTRAVENOUS CODE/TRAUMA/SEDATION MEDICATION
Status: COMPLETED | OUTPATIENT
Start: 2019-12-19 | End: 2019-12-19

## 2019-12-19 RX ORDER — LISINOPRIL 20 MG/1
40 TABLET ORAL DAILY
Status: DISCONTINUED | OUTPATIENT
Start: 2019-12-20 | End: 2019-12-23

## 2019-12-19 RX ORDER — ONDANSETRON 2 MG/ML
4 INJECTION INTRAMUSCULAR; INTRAVENOUS EVERY 6 HOURS PRN
Status: DISCONTINUED | OUTPATIENT
Start: 2019-12-19 | End: 2019-12-23 | Stop reason: HOSPADM

## 2019-12-19 RX ORDER — HEPARIN SODIUM 1000 [USP'U]/ML
INJECTION, SOLUTION INTRAVENOUS; SUBCUTANEOUS CODE/TRAUMA/SEDATION MEDICATION
Status: COMPLETED | OUTPATIENT
Start: 2019-12-19 | End: 2019-12-19

## 2019-12-19 RX ORDER — MIDAZOLAM HYDROCHLORIDE 2 MG/2ML
INJECTION, SOLUTION INTRAMUSCULAR; INTRAVENOUS CODE/TRAUMA/SEDATION MEDICATION
Status: COMPLETED | OUTPATIENT
Start: 2019-12-19 | End: 2019-12-19

## 2019-12-19 RX ORDER — ATORVASTATIN CALCIUM 80 MG/1
80 TABLET, FILM COATED ORAL
Status: CANCELLED | OUTPATIENT
Start: 2019-12-20

## 2019-12-19 RX ORDER — INSULIN GLARGINE 100 [IU]/ML
30 INJECTION, SOLUTION SUBCUTANEOUS
Status: DISCONTINUED | OUTPATIENT
Start: 2019-12-19 | End: 2019-12-20

## 2019-12-19 RX ORDER — SODIUM CHLORIDE 9 MG/ML
INJECTION, SOLUTION INTRAVENOUS
Status: COMPLETED | OUTPATIENT
Start: 2019-12-19 | End: 2019-12-19

## 2019-12-19 RX ORDER — ASPIRIN 81 MG/1
81 TABLET, CHEWABLE ORAL DAILY
Status: CANCELLED | OUTPATIENT
Start: 2019-12-20

## 2019-12-19 RX ORDER — ASPIRIN 81 MG/1
81 TABLET, CHEWABLE ORAL DAILY
Status: DISCONTINUED | OUTPATIENT
Start: 2019-12-20 | End: 2019-12-23 | Stop reason: HOSPADM

## 2019-12-19 RX ORDER — ATORVASTATIN CALCIUM 80 MG/1
80 TABLET, FILM COATED ORAL
Status: DISCONTINUED | OUTPATIENT
Start: 2019-12-20 | End: 2019-12-23 | Stop reason: HOSPADM

## 2019-12-19 RX ORDER — FENTANYL CITRATE 50 UG/ML
INJECTION, SOLUTION INTRAMUSCULAR; INTRAVENOUS CODE/TRAUMA/SEDATION MEDICATION
Status: COMPLETED | OUTPATIENT
Start: 2019-12-19 | End: 2019-12-19

## 2019-12-19 RX ADMIN — NITROGLYCERIN 200 MCG: 20 INJECTION INTRAVENOUS at 10:46

## 2019-12-19 RX ADMIN — IOHEXOL 100 ML: 350 INJECTION, SOLUTION INTRAVENOUS at 10:59

## 2019-12-19 RX ADMIN — HEPARIN SODIUM 4000 UNITS: 1000 INJECTION INTRAVENOUS; SUBCUTANEOUS at 01:23

## 2019-12-19 RX ADMIN — HEPARIN SODIUM 3000 UNITS: 1000 INJECTION INTRAVENOUS; SUBCUTANEOUS at 10:54

## 2019-12-19 RX ADMIN — FENTANYL CITRATE 50 MCG: 50 INJECTION, SOLUTION INTRAMUSCULAR; INTRAVENOUS at 10:35

## 2019-12-19 RX ADMIN — INSULIN LISPRO 3 UNITS: 100 INJECTION, SOLUTION INTRAVENOUS; SUBCUTANEOUS at 16:56

## 2019-12-19 RX ADMIN — INSULIN LISPRO 10 UNITS: 100 INJECTION, SOLUTION INTRAVENOUS; SUBCUTANEOUS at 16:57

## 2019-12-19 RX ADMIN — LIDOCAINE HYDROCHLORIDE 1 ML: 10 INJECTION, SOLUTION EPIDURAL; INFILTRATION; INTRACAUDAL; PERINEURAL at 10:45

## 2019-12-19 RX ADMIN — TICAGRELOR 180 MG: 90 TABLET ORAL at 10:34

## 2019-12-19 RX ADMIN — HEPARIN SODIUM 4000 UNITS: 1000 INJECTION INTRAVENOUS; SUBCUTANEOUS at 10:04

## 2019-12-19 RX ADMIN — VERAPAMIL HYDROCHLORIDE 2.5 MG: 2.5 INJECTION INTRAVENOUS at 10:46

## 2019-12-19 RX ADMIN — SODIUM CHLORIDE 100 ML/HR: 0.9 INJECTION, SOLUTION INTRAVENOUS at 10:37

## 2019-12-19 RX ADMIN — MIDAZOLAM 2 MG: 1 INJECTION INTRAMUSCULAR; INTRAVENOUS at 10:34

## 2019-12-19 RX ADMIN — IOHEXOL 30 ML: 350 INJECTION, SOLUTION INTRAVENOUS at 10:53

## 2019-12-19 RX ADMIN — HEPARIN SODIUM 4000 UNITS: 1000 INJECTION INTRAVENOUS; SUBCUTANEOUS at 10:46

## 2019-12-19 RX ADMIN — INSULIN GLARGINE 30 UNITS: 100 INJECTION, SOLUTION SUBCUTANEOUS at 22:59

## 2019-12-19 RX ADMIN — AMLODIPINE BESYLATE 10 MG: 10 TABLET ORAL at 08:09

## 2019-12-19 RX ADMIN — INSULIN LISPRO 10 UNITS: 100 INJECTION, SOLUTION INTRAVENOUS; SUBCUTANEOUS at 12:25

## 2019-12-19 RX ADMIN — ASPIRIN 81 MG 81 MG: 81 TABLET ORAL at 08:09

## 2019-12-19 RX ADMIN — PERFLUTREN 0.6 ML/MIN: 6.52 INJECTION, SUSPENSION INTRAVENOUS at 15:30

## 2019-12-19 RX ADMIN — METOPROLOL TARTRATE 25 MG: 25 TABLET ORAL at 10:06

## 2019-12-19 RX ADMIN — INSULIN LISPRO 3 UNITS: 100 INJECTION, SOLUTION INTRAVENOUS; SUBCUTANEOUS at 08:10

## 2019-12-19 RX ADMIN — METOPROLOL TARTRATE 25 MG: 25 TABLET, FILM COATED ORAL at 21:18

## 2019-12-19 RX ADMIN — ATORVASTATIN CALCIUM 80 MG: 80 TABLET, FILM COATED ORAL at 16:57

## 2019-12-19 RX ADMIN — LISINOPRIL 40 MG: 20 TABLET ORAL at 08:09

## 2019-12-19 RX ADMIN — INSULIN LISPRO 2 UNITS: 100 INJECTION, SOLUTION INTRAVENOUS; SUBCUTANEOUS at 22:59

## 2019-12-19 NOTE — PLAN OF CARE
Problem: CARDIOVASCULAR - ADULT  Goal: Maintains optimal cardiac output and hemodynamic stability  Description  INTERVENTIONS:  - Monitor I/O, vital signs and rhythm  - Monitor for S/S and trends of decreased cardiac output  - Administer and titrate ordered vasoactive medications to optimize hemodynamic stability  - Assess quality of pulses, skin color and temperature  - Assess for signs of decreased coronary artery perfusion  - Instruct patient to report change in severity of symptoms  Outcome: Progressing  Goal: Absence of cardiac dysrhythmias or at baseline rhythm  Description  INTERVENTIONS:  - Continuous cardiac monitoring, vital signs, obtain 12 lead EKG if ordered  - Administer antiarrhythmic and heart rate control medications as ordered  - Monitor electrolytes and administer replacement therapy as ordered  Outcome: Progressing     Problem: METABOLIC, FLUID AND ELECTROLYTES - ADULT  Goal: Glucose maintained within target range  Description  INTERVENTIONS:  - Monitor Blood Glucose as ordered  - Assess for signs and symptoms of hyperglycemia and hypoglycemia  - Administer ordered medications to maintain glucose within target range  - Assess nutritional intake and initiate nutrition service referral as needed  Outcome: Progressing     Problem: PAIN - ADULT  Goal: Verbalizes/displays adequate comfort level or baseline comfort level  Description  Interventions:  - Encourage patient to monitor pain and request assistance  - Assess pain using appropriate pain scale  - Administer analgesics based on type and severity of pain and evaluate response  - Implement non-pharmacological measures as appropriate and evaluate response  - Consider cultural and social influences on pain and pain management  - Notify physician/advanced practitioner if interventions unsuccessful or patient reports new pain  Outcome: Progressing     Problem: DISCHARGE PLANNING  Goal: Discharge to home or other facility with appropriate resources  Description  INTERVENTIONS:  - Identify barriers to discharge w/patient and caregiver  - Arrange for needed discharge resources and transportation as appropriate  - Identify discharge learning needs (meds, wound care, etc )  - Arrange for interpretive services to assist at discharge as needed  - Refer to Case Management Department for coordinating discharge planning if the patient needs post-hospital services based on physician/advanced practitioner order or complex needs related to functional status, cognitive ability, or social support system  Outcome: Progressing     Problem: Knowledge Deficit  Goal: Patient/family/caregiver demonstrates understanding of disease process, treatment plan, medications, and discharge instructions  Description  Complete learning assessment and assess knowledge base    Interventions:  - Provide teaching at level of understanding  - Provide teaching via preferred learning methods  Outcome: Progressing

## 2019-12-19 NOTE — ASSESSMENT & PLAN NOTE
W/exertional CP over last week  Pt has morbid obesity, poorly controlled DM/HTN  BP modestly elevated at 160s-170s however EKG demonstrates -TWI in lateral leads and ST segment/TWI in inferior leads w/o prior  Troponin peak at 3 1 s/p cardiac cathterization significant for triple vessel disease and cardiomyopathy w/ef 30% which was felt to be out of proportion to vascular disease  -rec'd asa 325mg in ed, started on heparin gtt which has been d/c'd by cardiology    No plavix at this time either prior to CT surgery evaluation  -pt to be transferred to [facility] for higher level of care-CT surgery evaluation     -continue asa 81mg, started on lopressor 25mg bid, high dose lipitor 80mg po daily  -f/u echo for cardiomyopathy, tentative npo at midnight

## 2019-12-19 NOTE — PROGRESS NOTES
Report called to Medhat King RN in [facility]  Transport ([facility]) picked up patient for transfer  No drips running  IV intact  On cardiac monitoring

## 2019-12-19 NOTE — DISCHARGE SUMMARY
Discharge- Poly  1980, 44 y o  male MRN: [id number]    Unit/Bed#: E4 -01 Encounter: [id number]    Primary Care Provider: No primary care provider on file  Date and time admitted to hospital: 12/18/2019  3:33 PM      Discharging Physician / Practitioner: Myra Brambila PA-C  PCP: No primary care provider on file  Admission Date:   Admission Orders (From admission, onward)     Ordered        12/18/19 1720  Inpatient Admission  Once                   Discharge Date: 12/19/19    Resolved Problems  Date Reviewed: 12/18/2019          Resolved    Hyponatremia 12/19/2019     Resolved by  Myra Brambila PA-C          Consultations During Hospital Stay:  · cardiology    Procedures Performed:   · Cardiac cath    Significant Findings / Test Results:   Cardiac cath demonstrated LVEF 30-35%, dilated     LMCA: nonobstructive     LAD: 60-70% diffuse mid with abnormal iFR suggesting hemodynamic significance     LCX; Codominant, 75% distal at OM2 which is  90% ostial angulated, lower LPLV branch free of significant disease     · RCA: Codominant, tortuous, diffuse 50-60%, distal 90-95% diffuse into rPDA      CXR pa/lateral no acute cardiopulmonary disease    Troponin peak of 3 11  Hgb a1c 10 1%  Lipid panel   Trigs 132  HDL 27      Incidental Findings:   ·      Test Results Pending at Discharge (will require follow up):   · 2d echo     Outpatient Tests Requested:  · Possible MRI heart pending echo results  · CT surgery evaluation for possible cabg    Complications:  none    Reason for Admission: nstemi    Hospital Course:     Poly  is a 44 y o  male patient who originally presented to the hospital on 12/18/2019  With pmh of htn, dm, morbid obesity, medical noncompliance coming to hospital for cp x 1 week  Pt reports over the last week he has intermittent chest pain  This is high and substernal at level of ICS 2/3  It is nonradiating    It is a pressure like/tightness in sensation  It is variable in duration lasting from 5-10 minutes upwards of 10-20  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  It is alleviated when he rests and breaths deeply through his nose  It did happen occasionally w/smoking marijuana at home  It does not happen at rest, after oral intake, or in the middle of the night and is not worse w/lying down or w/palpation  He has no sore throat  He did note 2-3 days into his s/sx a day of vomiting and diarrhea but this was self limiting and started well after onset of his s/sx  He notes no rash/headache, recent travel or hospitalizations  No recent URI or GI s/sx preceding this  Denies ilicit drug use or alcohol use more than 1x/week otherwise        He hasn't been taking his BP meds due to the cost without insurance      In the ED the pt was evaluated and was CP free  He was started on asa/heparin gtt after his ddimer was negative and ekg and troponin resulted  We are asked to admit pt for nstemi  Hospital stay by problem list below  * NSTEMI (non-ST elevated myocardial infarction) [facility])  Assessment & Plan  W/exertional CP over last week  Pt has morbid obesity, poorly controlled DM/HTN  BP modestly elevated at 160s-170s however EKG demonstrates -TWI in lateral leads and ST segment/TWI in inferior leads w/o prior  Troponin peak at 3 1 s/p cardiac cathterization significant for triple vessel disease and cardiomyopathy w/ef 30% which was felt to be out of proportion to vascular disease  -rec'd asa 325mg in ed, started on heparin gtt which has been d/c'd by cardiology    No plavix at this time either prior to CT surgery evaluation  -pt to be transferred to [facility] for higher level of care-CT surgery evaluation     -continue asa 81mg, started on lopressor 25mg bid, high dose lipitor 80mg po daily  -f/u echo for cardiomyopathy, tentative npo at midnight    Snoring  Assessment & Plan  Likely w/undiagnosed JANKI  Op referral to ambulatory sleep medicine upon d/c    Diabetes mellitus type 2, uncontrolled [facility])  Assessment & Plan  Lab Results   Component Value Date    HGBA1C 10 1 (H) 12/18/2019       Recent Labs     12/18/19  1949 12/18/19  2159 12/19/19  0803 12/19/19  1154   POCGLU 322* 272* 253* 248*       Blood Sugar Average: Last 72 hrs:  (P) 273 75 remote a1c was >10% one year prior   Random bs at 404  Repeat poc bs after 1L NS  Will likely need one time dose of insulin lispro  Start lantus 30 iu qhs, start ssi and poc bs  BS this AM while npo for procedure were still in 200s  Continue lantus 30 iu qhs and add humalog 10 iu tid ac w/ ssi and poc bs  Hold scheduled humalog while npo    Essential hypertension  Assessment & Plan  Elevated on admission  Pt has not been compliant w/his meds over the last several days  Maintained on norvasc, hctz, lisinopril although pt does not recall doses  hctz has been held by  Cardiology in favor of lopressor  bp now is normotensive  Continue to monitor     Hyponatremia-resolved as of 12/19/2019  Assessment & Plan  Mild likely 2* pseudohyponatremia  improved            Please see above list of diagnoses and related plan for additional information  Condition at Discharge: good     Discharge Day Visit / Exam:     Subjective:  Pt seen/examined  No further cp  No sob/lightheadedness/palpitations  Anxious about evaluation for CT surgery  D/w pt goals of treatment and benefits of transfer including evaluation by CT surgery in respect to appropriately managing his CAD  He is acceptable to transfer and evaluation for Ct surgery    Vitals: Blood Pressure: 132/76 (12/19/19 1315)  Pulse: 91 (12/19/19 1315)  Temperature: 98 3 °F (36 8 °C) (12/19/19 0800)  Temp Source: Tympanic (12/19/19 0800)  Respirations: 18 (12/19/19 0800)  Height: 5' 9" (175 3 cm) (12/18/19 2300)  Weight - Scale: 125 kg (276 lb 3 8 oz) (12/18/19 2300)  SpO2: 98 % (12/19/19 1200)  Exam: Physical Exam  (  Be Sure to Include Physical Exam: Delete this entire line when you have entered your exam)  Discussion with Family: dispo w/SO Margaret    Discharge instructions/Information to patient and family:   See after visit summary for information provided to patient and family  Provisions for Follow-Up Care:  See after visit summary for information related to follow-up care and any pertinent home health orders  Disposition:     Home    For Discharges to [facility] SNF:   · Not Applicable to this Patient - Not Applicable to this Patient    Planned Readmission: none     Discharge Statement:  I spent 45 minutes discharging the patient  This time was spent on the day of discharge  I had direct contact with the patient on the day of discharge  Greater than 50% of the total time was spent examining patient, answering all patient questions, arranging and discussing plan of care with patient as well as directly providing post-discharge instructions  Additional time then spent on discharge activities  Discharge Medications:  See after visit summary for reconciled discharge medications provided to patient and family        ** Please Note: This note has been constructed using a voice recognition system **

## 2019-12-19 NOTE — ASSESSMENT & PLAN NOTE
Lab Results   Component Value Date    HGBA1C 10 1 (H) 12/18/2019       Recent Labs     12/18/19  1949 12/18/19  2159 12/19/19  0803 12/19/19  1154   POCGLU 322* 272* 253* 248*       Blood Sugar Average: Last 72 hrs:  (P) 273 75 remote a1c was >10% one year prior   Random bs at 404  Repeat poc bs after 1L NS  Will likely need one time dose of insulin lispro  Start lantus 30 iu qhs, start ssi and poc bs  BS this AM while npo for procedure were still in 200s    Continue lantus 30 iu qhs and add humalog 10 iu tid ac w/ ssi and poc bs  Hold scheduled humalog while npo

## 2019-12-19 NOTE — QUICK NOTE
Repeat ekg troponin reviewed  D/w cardiology on call Dr Anamaria Still who reviewed 2nd EKG  Recommendations at this time are to continue w/asa/heparin  Defer plavix load given poor DM status in case of possible need for CTsx  Will continue to monitor ekg/troponin

## 2019-12-19 NOTE — CONSULTS
Consult - Cardiology   Poly  44 y o  male MRN: [id number]  Unit/Bed#: E4 -01 Encounter: [id number]        Reason For Consult: NSTEMI                 ASSESSMENT:  1  Chest pain/ NSTEMI:   -peak troponin 3 11 (1 05 on arrival)   -EKGs w/ inverted T-waves in inferolateral leads   -slight ST elevation in V1-V3   -CXR appears clear    2  Hypertension:   -poorly controlled on admission, not taking medications    3  Dyslipidemia:   -cholesterol 166, triglycerides 132, HDL 27,     4  Type 2 diabetes mellitus:   -hemoglobin A1c 10 1    5  Obesity    6  Probable sleep apnea    PLAN/ DISCUSSION:     1  History, EKGs, and lab work all suggestive of ACS --> advised early invasive approach with coronary angiography  Patient agreeable and is on the schedule for cardiac catheterization at 10:00 a m     2  Plavix load held last evening on the chance that he has significant multivessel disease requiring surgery --> will d/w attending    3  Continue amlodipine and lisinopril, will discontinue HCTZ prior to dye load  He may be fine on 2 BP drugs    4  Begin Lopressor 25 b i d  and high-dose statin (Lipitor 80)    5  Continue aspirin    6  Check echocardiogram      7  NPO until cath    History Of Present Illness: This is a very kind 77-year-old gentleman with a past medical history of hypertension, type 2 diabetes, obesity, and probable sleep apnea  He has not routinely seen any doctors for probably 2 years  He was previously treated for hypertension but due to insurance reasons stopped taking all of his medications at least 1 year ago  With the exception of high blood pressure he has no problems with his heart that he knows of  He has never had any cardiac testing in the past   He does not have a strong family history of heart disease  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 and does a lot of heavy lifting and moving around with his job    He does not use tobacco or drink heavily  He does occasionally use marijuana but no other street drugs  For at least the last 5 days he has been experiencing exertional chest discomfort multiple times per day  He describes it as a squeezing pressure  It does not radiate  It lasts 5-10 minutes  It is brought on by lifting something heavy or walking up a flight of stairs  Though somewhat unclear on the details it seems that it is relieved with rest   Yesterday he experienced several times at work and after becoming concerned with his symptoms came to the emergency department for evaluation  Since his arrival he has been chest pain-free and with the exception of feeling nervous has no physical complaints  His troponin on arrival was 1 05 and thus far peaked at 3 11  This is the 1st time in his life he has ever experienced chest discomfort like this  Past Medical History:        Past Medical History:   Diagnosis Date    Diabetes mellitus ([facility] [address] )     Hypertension       Past Surgical History:   Procedure Laterality Date    ANKLE SURGERY Right         Allergy:        No Known Allergies    Medications:       Prior to Admission medications    Medication Sig Start Date End Date Taking?  Authorizing Provider   amLODIPine (NORVASC) 10 mg tablet Take 10 mg by mouth daily 12/16/16  Yes Historical Provider, MD   lisinopril (ZESTRIL) 40 mg tablet Take 40 mg by mouth daily   Yes Historical Provider, MD   hydrochlorothiazide (HYDRODIURIL) 50 mg tablet Take 50 mg by mouth every 24 hours    Historical Provider, MD   metFORMIN (GLUCOPHAGE) 1000 MG tablet Take 1,000 mg by mouth Every 12 hours    Historical Provider, MD   methocarbamol (ROBAXIN) 750 mg tablet Take 1 tablet (750 mg total) by mouth every 8 (eight) hours for 5 days 5/20/18 5/25/18  Gigi Key PA-C       Family History:     Family History   Problem Relation Age of Onset    Cancer Mother         unknown        Social History:       Social History     Socioeconomic History    Marital status: Single     Spouse name: None    Number of children: None    Years of education: None    Highest education level: None   Occupational History    None   Social Needs    Financial resource strain: None    Food insecurity:     Worry: None     Inability: None    Transportation needs:     Medical: None     Non-medical: None   Tobacco Use    Smoking status: Never Smoker    Smokeless tobacco: Never Used   Substance and Sexual Activity    Alcohol use: No    Drug use: Yes     Types: Marijuana    Sexual activity: None   Lifestyle    Physical activity:     Days per week: None     Minutes per session: None    Stress: None   Relationships    Social connections:     Talks on phone: None     Gets together: None     Attends [employer]ze service: None     Active member of club or organization: None     Attends meetings of clubs or organizations: None     Relationship status: None    Intimate partner violence:     Fear of current or ex partner: None     Emotionally abused: None     Physically abused: None     Forced sexual activity: None   Other Topics Concern    None   Social History Narrative    None       ROS:  Symptoms per HPI  Remainder review of systems is negative    Exam:  General:  alert, oriented and in no distress, cooperative   Obese  Head: Normocephalic, atraumatic  Eyes:  EOMI  Pupils - equal, round, reactive to accomodation  No icterus  Normal Conjunctiva     Oropharynx: moist and normal-appearing mucosa  Neck: supple, symmetrical, trachea midline and no JVD  Heart:  Tachycardic but regular, no murmurs   Respiratory effort / Chest Inspection: unlabored  Lungs:  normal air entry, lungs clear to auscultation and no rales, rhonchi or wheezing   Abdomen: flat, normal findings: bowel sounds normal and soft, non-tender  Lower Limbs:  no pitting edema  Pulses[de-identified]  RLE - DP: present 2+                 LLE - DP: present 2+  Musculoskeletal: ROM grossly normal      DATA:      ECG:     Sinus tachycardia  Normal axis  Possible inferolateral ischemia with inverted T-waves and mild ST depression  Possible subendocardial injury with minimal ST elevation in V1-V3  No STEMI                  Telemetry: tachycardic  HR           Echocardiogram:  Pending          Ischemic Testing:  Pending         Weights: Wt Readings from Last 3 Encounters:   12/18/19 125 kg (276 lb 3 8 oz)   08/17/18 133 kg (294 lb)   , Body mass index is 40 79 kg/m²           Lab Studies:    Results from last 7 days   Lab Units 12/19/19  0544 12/19/19  0216 12/18/19  2314   TROPONIN I ng/mL 2 83* 3 11* 2 57*        Results from last 7 days   Lab Units 12/19/19  0544   TRIGLYCERIDES mg/dL 132   HDL mg/dL 27*     Results from last 7 days   Lab Units 12/18/19  1629   WBC Thousand/uL 10 84*   HEMOGLOBIN g/dL 16 3   HEMATOCRIT % 48 2   PLATELETS Thousands/uL 273   ,   Results from last 7 days   Lab Units 12/18/19  1629   POTASSIUM mmol/L 4 0   CHLORIDE mmol/L 97*   CO2 mmol/L 29   BUN mg/dL 15   CREATININE mg/dL 1 18   CALCIUM mg/dL 9 5   ALK PHOS U/L 89   ALT U/L 30   AST U/L 19

## 2019-12-19 NOTE — BRIEF OP NOTE (RAD/CATH)
Right Radial Cath:    Findings:    LVEF 30-35%, dilated    LMCA: nonobstructive    LAD: 60-70% diffuse mid with abnormal iFR suggesting hemodynamic significance    LCX; Codominant, 75% distal at OM2 which is  90% ostial angulated, lower LPLV branch free of significant disease    RCA: Codominant, tortuous, diffuse 50-60%, distal 90-95% diffuse into rPDA    Suggest    1) Cardiac MRI, his cardiomyopathy is not explained by CAD    2) Culprit distal RCA likely for elev troponin, however technically with 3V CAD and suggest cardiac sugery eval    From interventional cardiology standpoint percutaneous revascularization of  the RCA and OM branch with PCI are problematic due to tortuousity  The distal RCA is possible for PCI unless upstream tortuousity poses difficulty advancing wires/balloons/stents as it is quite tortuous and moderately diseased  The distal LCX lesion appears stentable  In the distal AV grove, however ostial disease in the OM branch may not be approachable with PCI due to acute right angle takeoff from AV grove at distal lesion  The LAD is small in mid segment, can likely stent with small but lengthy ERICKA      3) will need cardiomyopathy meds, statin etc in meantime, can stop heparin and would avoid plavix/ticagrelor until seen by cardiac surgery    4) would make arrangements transfer to \[facility]\"" for the above

## 2019-12-19 NOTE — ASSESSMENT & PLAN NOTE
Elevated on admission  Pt has not been compliant w/his meds over the last several days  Maintained on norvasc, hctz, lisinopril although pt does not recall doses  hctz has been held by  Cardiology in favor of lopressor    bp now is normotensive  Continue to monitor

## 2019-12-19 NOTE — UTILIZATION REVIEW
Initial Clinical Review    Admission: Date/Time/Statement: Inpatient Admission Orders (From admission, onward)     Ordered        12/18/19 1720  Inpatient Admission  Once                   Orders Placed This Encounter   Procedures    Inpatient Admission     Standing Status:   Standing     Number of Occurrences:   1     Order Specific Question:   Admitting Physician     Answer:   Armani Zazueta [[id number]]     Order Specific Question:   Level of Care     Answer:   Med Surg [[id number]]     Order Specific Question:   Estimated length of stay     Answer:   More than 2 Midnights     Order Specific Question:   Certification     Answer:   I certify that inpatient services are medically necessary for this patient for a duration of greater than two midnights  See H&P and MD Progress Notes for additional information about the patient's course of treatment  ED Arrival Information     Expected Arrival Acuity Means of Arrival Escorted By Service Admission Type    - 12/18/2019 15:23 Urgent Walk-In Self Hospitalist Urgent    Arrival Complaint    chest pain        Chief Complaint   Patient presents with    Chest Pain     Intermittent chest pain x2 days  Denies radiation of pain  Denies taking anything for pain  Denies any other symptoms  Assessment/Plan:  43 YO male presented to er from home as inpatient admission for NSTEMI  Patient that he has has chest pain off and on x 1 week  increased when outside and expose to cold air  He states the pain is like tightness  PMH HTN NIDDM,dyslipidemia  Defer plavix load given poor DM status in case of possible need for CTsx  PLAN  Consult cardiology npo cardiac cath 12-19-19 @ 1000 telemetry and supportive care  * NSTEMI (non-ST elevated myocardial infarction) [facility])  Assessment & Plan  W/exertional CP over last week  Pt has morbid obesity, poorly controlled DM/HTN    BP modestly elevated at 160s-170s however EKG demonstrates TWI in lateral leads and ST segment/TWI in inferior leads w/o prior     cxr w/o widened mediastinum or other acute cardiopulmonary disease pending formal read  rec'd asa 325mg in ed, started on heparin gtt per ED  Will admit to IP setting, trend ekgs/troponins, check flp hgb a1c, consult cardiology  Tentative NPO for possible catheterization pending cardiology recommendation      ED Triage Vitals [12/18/19 1529]   Temperature Pulse Respirations Blood Pressure SpO2   98 3 °F (36 8 °C) (!) 109 20 169/97 98 %      Temp Source Heart Rate Source Patient Position - Orthostatic VS BP Location FiO2 (%)   Oral Monitor Sitting Right arm --      Pain Score       5        Wt Readings from Last 1 Encounters:   12/18/19 125 kg (276 lb 3 8 oz)     Additional Vital Signs:   Date/Time  Temp  Pulse  Resp  BP  SpO2  O2 Device  Patient Position - Orthostatic VS   12/19/19 0800  98 3 °F (36 8 °C)  101  18  152/93  97 %  None (Room air)  Sitting   12/19/19 0307  98 2 °F (36 8 °C)  98  16  170/51  97 %  None (Room air)  Lying   12/18/19 2335  98 6 °F (37 °C)  98  16  146/74  97 %  None (Room air)  Lying   12/18/19 1807  --  98  14  166/100  97 %  None (Room air)  Lying   12/18/19 1608  --  --  --  --  --  None (Room air)  --   12/18/19 1606  --  97  20  177/87Abnormal   96 %  None (Room air)  Lying       Pertinent Labs/Diagnostic Test Results:   Results from last 7 days   Lab Units 12/18/19  1629   WBC Thousand/uL 10 84*   HEMOGLOBIN g/dL 16 3   HEMATOCRIT % 48 2   PLATELETS Thousands/uL 273   NEUTROS ABS Thousands/µL 7 30     Results from last 7 days   Lab Units 12/18/19  1629   SODIUM mmol/L 135*   POTASSIUM mmol/L 4 0   CHLORIDE mmol/L 97*   CO2 mmol/L 29   ANION GAP mmol/L 9   BUN mg/dL 15   CREATININE mg/dL 1 18   EGFR ml/min/1 73sq m 77   CALCIUM mg/dL 9 5     Results from last 7 days   Lab Units 12/18/19  1629   AST U/L 19   ALT U/L 30   ALK PHOS U/L 89   TOTAL PROTEIN g/dL 8 0   ALBUMIN g/dL 3 6   TOTAL BILIRUBIN mg/dL 0 62     Results from last 7 days   Lab Units 12/19/19  2951 12/18/19  2159 12/18/19  1949   POC GLUCOSE mg/dl 253* 272* 322*     Results from last 7 days   Lab Units 12/18/19  1629   GLUCOSE RANDOM mg/dL 404*     Results from last 7 days   Lab Units 12/18/19  1629   HEMOGLOBIN A1C % 10 1*   EAG mg/dl 243       Results from last 7 days   Lab Units 12/19/19  0544 12/19/19  0216 12/18/19  2314 12/18/19  1958 12/18/19  1629   TROPONIN I ng/mL 2 83* 3 11* 2 57* 2 08* 1 05*     Results from last 7 days   Lab Units 12/18/19  1629   D-DIMER QUANTITATIVE ug/ml FEU 0 27     Results from last 7 days   Lab Units 12/19/19  0823 12/18/19  2356 12/18/19  1629   PROTIME seconds  --   --  13 6   INR   --   --  1 03   PTT seconds 37 25 30       Results from last 7 days   Lab Units 12/18/19  1629   LIPASE u/L 296     cxr 12-18-19  nad    Wed Dec 18, 2019   1624 EKG: sinus tachycardia @ 109 bpm, RAD, ST depressions with t wave inversions II, III, avF, v5, v6      EKG 12-18-19 @ 2309  Normal sinus rhythm  Rightward axis  Nonspecific T wave abnormality  Prolonged QT  Abnormal ECG        ED Treatment:   Medication Administration from 12/18/2019 1523 to 12/18/2019 1849       Date/Time Order Dose Route Action Action by Comments     12/18/2019 1636 sodium chloride 0 9 % bolus 1,000 mL 1,000 mL Intravenous New Bag Janine Goltz, RN      12/18/2019 1701 aspirin chewable tablet 324 mg 324 mg Oral Given Janine Goltz, RN      12/18/2019 1751 heparin (porcine) injection 4,000 Units 4,000 Units Intravenous Given Janine Goltz, RN      12/18/2019 1757 heparin (porcine) 25,000 units in 250 mL infusion (premix) 11 1 Units/kg/hr Intravenous New Bag Janine Goltz, RN         Past Medical History:   Diagnosis Date    [facility]     Hypertension      Present on Admission:  **None**      Admitting Diagnosis: Chest pain [R07 9]  NSTEMI (non-ST elevated myocardial infarction) ([facility] [address] ) [I21 4]  Age/Sex: 44 y o  male  Admission Orders:  Scheduled Medications:    Medications:  amLODIPine 10 mg Oral Daily aspirin 81 mg Oral Daily   atorvastatin 80 mg Oral Daily With Dinner   insulin glargine 30 Units Subcutaneous HS   insulin lispro 1-6 Units Subcutaneous 4x Daily (AC & HS)   insulin lispro 10 Units Subcutaneous TID With Meals   lisinopril 40 mg Oral Daily   metoprolol tartrate 25 mg Oral Q12H [address]     Continuous IV Infusions:    heparin (porcine) 3-20 Units/kg/hr (Order-Specific) Intravenous Titrated     PRN Meds:    heparin (porcine) 2,000 Units Intravenous PRN   heparin (porcine) 4,000 Units Intravenous PRN   ondansetron 4 mg Intravenous Q6H PRN       IP CONSULT TO CARDIOLOGY   BLOOD SUGAR AC AND HS  TELEMETRY  SCD  NPO  echo  CARDIAC CATH Right Radial Cath:     Findings:  LVEF 30-35%, dilated  LMCA: nonobstructive  LAD: 60-70% diffuse mid with abnormal iFR suggesting hemodynamic significance  LCX; Codominant, 75% distal at OM2 which is  90% ostial angulated, lower LPLV branch free of significant disease  RCA: Codominant, tortuous, diffuse 50-60%, distal 90-95% diffuse into rPDA     Suggest  1) Cardiac MRI, his cardiomyopathy is not explained by CAD  2) Culprit distal RCA likely for elev troponin, however technically with 3V CAD and suggest cardiac sugery eval    From interventional cardiology standpoint percutaneous revascularization of  the RCA and OM branch with PCI are problematic due to tortuousity  The distal RCA is possible for PCI unless upstream tortuousity poses difficulty advancing wires/balloons/stents as it is quite tortuous and moderately diseased  The distal LCX lesion appears stentable  In the distal AV grove, however ostial disease in the OM branch may not be approachable with PCI due to acute right angle takeoff from AV grove at distal lesion  The LAD is small in mid segment, can likely stent with small but lengthy ERICKA    3) will need cardiomyopathy meds, statin etc in meantime, can stop heparin and would avoid plavix/ticagrelor until seen by cardiac surgery      Network Utilization Review Department  [email or web address]  ATTENTION: Please call with any questions or concerns to [phone] and carefully listen to the prompts so that you are directed to the right person  All voicemails are confidential   Marleny Prado all requests for admission clinical reviews, approved or denied determinations and any other requests to dedicated fax number below belonging to the campus where the patient is receiving treatment   List of dedicated fax numbers for the Facilities:  [address] DENIALS (Administrative/Medical Necessity) [phone]   [address] (Maternity/NICU/Pediatrics) [phone]   Clarisa Comas [phone]   Jessica Poke [phone]   Sarah Evens [phone]   [address] [facility]  [phone]   [address] [place] [address] [phone]   Demar Villalba [phone]   [address] [facility] [address]  [address] [facility] [address] [facility] [phone]

## 2019-12-19 NOTE — SOCIAL WORK
Attempted to compete initial assessment, pt was out for a cath  Met with RN and was informed that cath is positive and pt is being transferred to [facility] for further interventions  Cm completed medical necessity and add it in pt's chart

## 2019-12-19 NOTE — TRANSPORTATION MEDICAL NECESSITY
Section I - General Information    Name of Patient: Liudmila Huizar  : 1980    Medicare #: [id number]  Transport Date: 19 (PCS is valid for round trips on this date and for all repetitive trips in the 60-day range as noted below )  Origin: [address]                                                         Destination: [address]  Is the pt's stay covered under Medicare Part A (PPS/DRG)   []     Closest appropriate facility? If no, why is transport to more distant facility required? Yes  If hospice pt, is this transport related to pt's terminal illness? No       Section II - Medical Necessity Questionnaire  Ambulance transportation is medically necessary only if other means of transport are contraindicated or would be potentially harmful to the patient  To meet this requirement, the patient must either be "bed confined" or suffer from a condition such that transport by means other than ambulance is contraindicated by the patient's condition  The following questions must be answered by the medical professional signing below for this form to be valid:    1)  Describe the MEDICAL CONDITION (physical and/or mental) of this patient AT [address] [place] [address] that requires the patient to be transported in an ambulance and why transport by other means is contraindicated by the patient's condition:NSTEMI    2) Is the patient "bed confined" as defined below? No  To be "be confined" the patient must satisfy all three of the following conditions: (1) unable to get up from bed without Assistance; AND (2) unable to ambulate; AND (3) unable to sit in a chair or wheelchair  3) Can this patient safely be transported by car or wheelchair van (i e , seated during transport without a medical attendant or monitoring)?    No    4) In addition to completing questions 1-3 above, please check any of the following conditions that apply*:   *Note: supporting documentation for any boxes checked must be maintained in the patient's medical records  If hosp-hosp transfer, describe services needed at 2nd facility not available at 1st facility? IV meds/fluids required   Cardiac monitoring required en route       Section III - Signature of Physician or Healthcare Professional  I certify that the above information is true and correct based on my evaluation of this patient, and represent that the patient requires transport by ambulance and that other forms of transport are contraindicated  I understand that this information will be used by the Centers for Medicare and Medicaid Services (CMS) to support the determination of medical necessity for ambulance services, and I represent that I have personal knowledge of the patient's condition at time of transport  []  If this box is checked, I also certify that the patient is physically or mentally incapable of signing the ambulance service's claim and that the institution with which I am affiliated has furnished care, services, or assistance to the patient  My signature below is made on behalf of the patient pursuant to 42 CFR §424 36(b)(4)  In accordance with 42 CFR §424 37, the specific reason(s) that the patient is physically or mentally incapable of signing the claim form is as follows:       Signature of Physician* or Healthcare Professional______________________________________________________________  Signature Date 12/19/19 (For scheduled repetitive transports, this form is not valid for transports performed more than 60 days after this date)    Printed Name & Credentials of Physician or Healthcare Professional (MD, DO, RN, etc )________________________________  *Form must be signed by patient's attending physician for scheduled, repetitive transports   For non-repetitive, unscheduled ambulance transports, if unable to obtain the signature of the attending physician, any of the following may sign (choose appropriate option below)  [] Physician Assistant []  Clinical Nurse Specialist []  Registered Nurse  []  Nurse Practitioner  [x] Discharge Planner

## 2019-12-19 NOTE — LETTER
[address] [facility]  [address] [address]  Dept: [phone]    December 22, 2019     Patient: Edilberto Diaz  YOB: 1980   Date of Visit: 12/19/2019       To Whom it May Concern:    Donna Zamora is under my professional care  He was seen in the hospital starting 12/19/2019  Please excuse any work absences involving the patient or his wife  If you have any questions or concerns, please don't hesitate to call           Sincerely,          Jolene Goodwin MD

## 2019-12-20 ENCOUNTER — APPOINTMENT (INPATIENT)
Dept: RADIOLOGY | Facility: HOSPITAL | Age: 39
DRG: 235 | End: 2019-12-20
Payer: COMMERCIAL

## 2019-12-20 ENCOUNTER — APPOINTMENT (INPATIENT)
Dept: NON INVASIVE DIAGNOSTICS | Facility: HOSPITAL | Age: 39
DRG: 235 | End: 2019-12-20
Payer: COMMERCIAL

## 2019-12-20 LAB
BACTERIA UR QL AUTO: ABNORMAL /HPF
BILIRUB UR QL STRIP: NEGATIVE
CLARITY UR: CLEAR
COLOR UR: ABNORMAL
GLUCOSE SERPL-MCNC: 198 MG/DL (ref 65–140)
GLUCOSE SERPL-MCNC: 219 MG/DL (ref 65–140)
GLUCOSE SERPL-MCNC: 247 MG/DL (ref 65–140)
GLUCOSE SERPL-MCNC: 346 MG/DL (ref 65–140)
GLUCOSE UR STRIP-MCNC: ABNORMAL MG/DL
HBV CORE AB SER QL: NORMAL
HBV CORE IGM SER QL: NORMAL
HBV SURFACE AG SER QL: NORMAL
HCV AB SER QL: NORMAL
HGB UR QL STRIP.AUTO: NEGATIVE
HYALINE CASTS #/AREA URNS LPF: ABNORMAL /LPF
KETONES UR STRIP-MCNC: NEGATIVE MG/DL
LEUKOCYTE ESTERASE UR QL STRIP: ABNORMAL
NITRITE UR QL STRIP: NEGATIVE
NON-SQ EPI CELLS URNS QL MICRO: ABNORMAL /HPF
PH UR STRIP.AUTO: 5.5 [PH]
PROT UR STRIP-MCNC: ABNORMAL MG/DL
RBC #/AREA URNS AUTO: ABNORMAL /HPF
SP GR UR STRIP.AUTO: 1.03 (ref 1–1.03)
UROBILINOGEN UR QL STRIP.AUTO: 0.2 E.U./DL
WBC #/AREA URNS AUTO: ABNORMAL /HPF

## 2019-12-20 PROCEDURE — 75561 CARDIAC MRI FOR MORPH W/DYE: CPT

## 2019-12-20 PROCEDURE — 82948 REAGENT STRIP/BLOOD GLUCOSE: CPT

## 2019-12-20 PROCEDURE — 93971 EXTREMITY STUDY: CPT

## 2019-12-20 PROCEDURE — 99255 IP/OBS CONSLTJ NEW/EST HI 80: CPT | Performed by: INTERNAL MEDICINE

## 2019-12-20 PROCEDURE — 99232 SBSQ HOSP IP/OBS MODERATE 35: CPT | Performed by: INTERNAL MEDICINE

## 2019-12-20 PROCEDURE — 93880 EXTRACRANIAL BILAT STUDY: CPT

## 2019-12-20 PROCEDURE — 99223 1ST HOSP IP/OBS HIGH 75: CPT | Performed by: INTERNAL MEDICINE

## 2019-12-20 PROCEDURE — NC001 PR NO CHARGE: Performed by: INTERNAL MEDICINE

## 2019-12-20 PROCEDURE — 99255 IP/OBS CONSLTJ NEW/EST HI 80: CPT | Performed by: THORACIC SURGERY (CARDIOTHORACIC VASCULAR SURGERY)

## 2019-12-20 PROCEDURE — 81001 URINALYSIS AUTO W/SCOPE: CPT | Performed by: PHYSICIAN ASSISTANT

## 2019-12-20 PROCEDURE — A9585 GADOBUTROL INJECTION: HCPCS | Performed by: INTERNAL MEDICINE

## 2019-12-20 RX ORDER — INSULIN GLARGINE 100 [IU]/ML
40 INJECTION, SOLUTION SUBCUTANEOUS
Status: DISCONTINUED | OUTPATIENT
Start: 2019-12-20 | End: 2019-12-21

## 2019-12-20 RX ORDER — LABETALOL 20 MG/4 ML (5 MG/ML) INTRAVENOUS SYRINGE
10 EVERY 4 HOURS PRN
Status: DISCONTINUED | OUTPATIENT
Start: 2019-12-20 | End: 2019-12-23 | Stop reason: HOSPADM

## 2019-12-20 RX ORDER — METOPROLOL SUCCINATE 25 MG/1
25 TABLET, EXTENDED RELEASE ORAL 2 TIMES DAILY
Status: DISCONTINUED | OUTPATIENT
Start: 2019-12-20 | End: 2019-12-22

## 2019-12-20 RX ORDER — ALPRAZOLAM 0.25 MG/1
0.25 TABLET ORAL 2 TIMES DAILY PRN
Status: DISCONTINUED | OUTPATIENT
Start: 2019-12-20 | End: 2019-12-23 | Stop reason: HOSPADM

## 2019-12-20 RX ORDER — CHLORHEXIDINE GLUCONATE 0.12 MG/ML
15 RINSE ORAL EVERY 12 HOURS SCHEDULED
Status: DISCONTINUED | OUTPATIENT
Start: 2019-12-20 | End: 2019-12-23 | Stop reason: HOSPADM

## 2019-12-20 RX ADMIN — ALPRAZOLAM 0.25 MG: 0.25 TABLET ORAL at 22:54

## 2019-12-20 RX ADMIN — ALPRAZOLAM 0.25 MG: 0.25 TABLET ORAL at 14:15

## 2019-12-20 RX ADMIN — METOPROLOL SUCCINATE 25 MG: 25 TABLET, EXTENDED RELEASE ORAL at 14:12

## 2019-12-20 RX ADMIN — ENOXAPARIN SODIUM 40 MG: 40 INJECTION SUBCUTANEOUS at 21:17

## 2019-12-20 RX ADMIN — INSULIN LISPRO 10 UNITS: 100 INJECTION, SOLUTION INTRAVENOUS; SUBCUTANEOUS at 11:35

## 2019-12-20 RX ADMIN — AMLODIPINE BESYLATE 10 MG: 10 TABLET ORAL at 08:38

## 2019-12-20 RX ADMIN — INSULIN LISPRO 2 UNITS: 100 INJECTION, SOLUTION INTRAVENOUS; SUBCUTANEOUS at 16:39

## 2019-12-20 RX ADMIN — ATORVASTATIN CALCIUM 80 MG: 80 TABLET, FILM COATED ORAL at 16:39

## 2019-12-20 RX ADMIN — Medication 1 APPLICATION: at 21:17

## 2019-12-20 RX ADMIN — INSULIN LISPRO 2 UNITS: 100 INJECTION, SOLUTION INTRAVENOUS; SUBCUTANEOUS at 21:18

## 2019-12-20 RX ADMIN — METOPROLOL TARTRATE 25 MG: 25 TABLET, FILM COATED ORAL at 08:38

## 2019-12-20 RX ADMIN — GADOBUTROL 24 ML: 604.72 INJECTION INTRAVENOUS at 23:34

## 2019-12-20 RX ADMIN — INSULIN LISPRO 10 UNITS: 100 INJECTION, SOLUTION INTRAVENOUS; SUBCUTANEOUS at 16:39

## 2019-12-20 RX ADMIN — ASPIRIN 81 MG 81 MG: 81 TABLET ORAL at 08:38

## 2019-12-20 RX ADMIN — INSULIN LISPRO 5 UNITS: 100 INJECTION, SOLUTION INTRAVENOUS; SUBCUTANEOUS at 11:35

## 2019-12-20 RX ADMIN — LISINOPRIL 40 MG: 20 TABLET ORAL at 08:37

## 2019-12-20 RX ADMIN — INSULIN LISPRO 3 UNITS: 100 INJECTION, SOLUTION INTRAVENOUS; SUBCUTANEOUS at 06:31

## 2019-12-20 RX ADMIN — CHLORHEXIDINE GLUCONATE 0.12% ORAL RINSE 15 ML: 1.2 LIQUID ORAL at 21:17

## 2019-12-20 RX ADMIN — INSULIN GLARGINE 40 UNITS: 100 INJECTION, SOLUTION SUBCUTANEOUS at 21:18

## 2019-12-20 NOTE — UTILIZATION REVIEW
Initial Clinical Review    Admission: Date/Time/Statement: Inpatient Admission Orders (From admission, onward)     Ordered        12/19/19 1917  Inpatient Admission  Once                   Orders Placed This Encounter   Procedures    Inpatient Admission     Standing Status:   Standing     Number of Occurrences:   1     Order Specific Question:   Admitting Physician     Answer:   Frank Dennis [[id number]]     Order Specific Question:   Level of Care     Answer:   Med Surg [16]     Order Specific Question:   Estimated length of stay     Answer:   More than 2 Midnights     Order Specific Question:   Certification     Answer:   I certify that inpatient services are medically necessary for this patient for a duration of greater than two midnights  See H&P and MD Progress Notes for additional information about the patient's course of treatment  Assessment/Plan:     44 Y O male  Initially  Presented to  ED  From home  At [facility] and work up revealed     NSTEMI  Cardiac  Cath  Showed multivessel  Disease   PMH  Is    essential hypertension, noncompliance  and  DM  Patient  Transferred to  [address] [place] [address] for  CABG  Evaluation  Echocardiogram revealed a mildly dilated left ventricular cavity moderate concentric left ventricular hypertrophy, severely reduced left ventricular systolic function with mild global hypokinesis with some regional wall-motion variation  Although there is no definite evidence of left ventricular mural thrombus cannot exclude early Krzysztof thrombus in the distal inferior, inferior lateral wall  Ejection fraction is 29% with grade 2 diastolic dysfunction  Admit   IP  With NSTEMI and  Plan is  Cardiac  And  Cardiothoracic  Consults,  Cardiac  MRI,  And  monitor  Labs  12/20:     IV  Heparin stopped until seen  By surgery  Waiting  Cardiac  MRI  CT/cardiology  Consults  Pending  Wait  Further  Surgery plans          ED Triage Vitals   Temperature Pulse Respirations Blood Pressure SpO2   12/19/19 1954 12/19/19 1954 12/19/19 1954 12/19/19 1954 12/19/19 1954   98 7 °F (37 1 °C) 101 18 140/96 98 %      Temp Source Heart Rate Source Patient Position - Orthostatic VS BP Location FiO2 (%)   12/19/19 1954 12/19/19 1954 12/19/19 1954 12/19/19 2330 --   Oral Monitor Sitting Left arm       Pain Score       12/19/19 2000       No Pain        Wt Readings from Last 1 Encounters:   12/19/19 125 kg (275 lb)     Additional Vital Signs:   12/20/19 1111  --  95  18  --  --  95 %  None (Room air)  --   12/20/19 0800  --  --  --  --  --  --  None (Room air)  --   12/20/19 0730  97 7 °F (36 5 °C)  91  18  139/80  101  96 %  None (Room air)  Lying   12/20/19 0333  98 3 °F (36 8 °C)  97  18  142/90  --  95 %  None (Room air)  Sitting   12/19/19 2330  98 1 °F (36 7 °C)  89  18  143/94  115  99 %  None (Room air)  Sitting   12/19/19 2000  --  --  --  --  --  --  None (Room air)  --   12/19/19 1954  98 7 °F (37 1 °C)  101  18  140/96  --  98 %  None (Room air)  Sitting         Pertinent Labs/Diagnostic Test Results:   CXR  (   12/18)   NAD  Results from last 7 days   Lab Units 12/18/19  1629   WBC Thousand/uL 10 84*   HEMOGLOBIN g/dL 16 3   HEMATOCRIT % 48 2   PLATELETS Thousands/uL 273   NEUTROS ABS Thousands/µL 7 30         Results from last 7 days   Lab Units 12/18/19  1629   SODIUM mmol/L 135*   POTASSIUM mmol/L 4 0   CHLORIDE mmol/L 97*   CO2 mmol/L 29   ANION GAP mmol/L 9   BUN mg/dL 15   CREATININE mg/dL 1 18   EGFR ml/min/1 73sq m 77   CALCIUM mg/dL 9 5     Results from last 7 days   Lab Units 12/18/19  1629   AST U/L 19   ALT U/L 30   ALK PHOS U/L 89   TOTAL PROTEIN g/dL 8 0   ALBUMIN g/dL 3 6   TOTAL BILIRUBIN mg/dL 0 62     Results from last 7 days   Lab Units 12/20/19  1107 12/20/19  0557 12/19/19  2207 12/19/19  1656 12/19/19  1154 12/19/19  0803 12/18/19  2159 12/18/19  1949   POC GLUCOSE mg/dl 346* 247* 212* 252* 248* 253* 272* 322*     Results from last 7 days   Lab Units 12/18/19  1629 GLUCOSE RANDOM mg/dL 404*         Results from last 7 days   Lab Units 12/18/19  1629   HEMOGLOBIN A1C % 10 1*   EAG mg/dl 243       Results from last 7 days   Lab Units 12/19/19  0544 12/19/19  0216 12/18/19  2314 12/18/19  1958 12/18/19  1629   TROPONIN I ng/mL 2 83* 3 11* 2 57* 2 08* 1 05*     Results from last 7 days   Lab Units 12/18/19  1629   D-DIMER QUANTITATIVE ug/ml FEU 0 27     Results from last 7 days   Lab Units 12/19/19  0823 12/18/19  2356 12/18/19  1629   PROTIME seconds  --   --  13 6   INR   --   --  1 03   PTT seconds 37 25 30           Results from last 7 days   Lab Units 12/19/19  2253   HEP B S AG  Non-reactive   HEP C AB  Non-reactive   HEP B C IGM  Non-reactive   HEP B C TOTAL AB  Non-reactive     Results from last 7 days   Lab Units 12/18/19  1629   LIPASE u/L 296           Present on Admission:   Essential hypertension   NSTEMI (non-ST elevated myocardial infarction) ([facility] )   Diabetes mellitus type 2, uncontrolled ([place] [address] )   Snoring      Admitting Diagnosis: NSTEMI (non-ST elevated myocardial infarction) ([place] [address] ) [I21 4]  Age/Sex: 44 y o  male  Admission Orders:  Scheduled Medications:    Medications:  amLODIPine 10 mg Oral Daily   aspirin 81 mg Oral Daily   atorvastatin 80 mg Oral Daily With Dinner   insulin glargine 30 Units Subcutaneous HS   insulin lispro 1-6 Units Subcutaneous 4x Daily (AC & HS)   insulin lispro 10 Units Subcutaneous TID With Meals   lisinopril 40 mg Oral Daily   metoprolol tartrate 25 mg Oral Q12H [address]     Continuous IV Infusions:     PRN Meds:    ondansetron 4 mg Intravenous Q6H PRN       IP CONSULT TO CARDIOTHORACIC SURGERY  IP CONSULT TO CARDIOLOGY    Network Utilization Review Department  [email or web address]  ATTENTION: Please call with any questions or concerns to [phone] and carefully listen to the prompts so that you are directed to the right person   All voicemails are confidential   Mahad Clark all requests for admission clinical reviews, approved or denied determinations and any other requests to dedicated fax number below belonging to the campus where the patient is receiving treatment   List of dedicated fax numbers for the Facilities:  [address] [place] [address] DENIALS (Administrative/Medical Necessity) [phone]   [address]  (Maternity/NICU/Pediatrics) [phone]   Artis Iglesias [phone]   Marlin Moses [phone]   [address] [place] [address] [phone]   [address] [place] [address]  [phone]   [address] [place] [address] [phone]   [facility]  [phone]   [address] [facility], S   [address] [place] [address] [phone]

## 2019-12-20 NOTE — SOCIAL WORK
Pt is transferred to [facility] from [facility] where pt was originally admitted on 12/18/19  CM obtained all the following info about the pt  HOME: Pt resides in a 3-story rowhouse w/ 13 steps between floors and 5 steps to enter house at front door  LIVES W/: Multiple roommates too numerous to list   : Pt's friend Jose Daniel Ross / [phone]  INDEPENDENCE: Pt reported he is normally fully independent at baseline w/ ambulating and performing his ADLS  DME: None reported  HHC: No hx reported  I/P REHAB: No hx reported  MENTAL HEALTH ISSUES: No hx reported  D&A ISSUES: No hx reported  PCP: Pt has no PCP  PHARMACY: [facility] located at corner of [facility] in [facility]  INCOME SOURCE: Full-time employment  INSURANCE: [employer] through his employer  MEDICAL POA: No one is pre-designated  TRANSPORTATION AT D/C: Pt's friend can provide transport  CM reviewed d/c planning process including the following: identifying help at home, patient preference for d/c planning needs, Discharge Lounge, [facility] Meds to Bed program, availability of treatment team to discuss questions or concerns patient and/or family may have regarding understanding medications and recognizing signs and symptoms once discharged  CM also encouraged patient to follow up with all recommended appointments after discharge  Patient advised of importance for patient and family to participate in managing patients medical well being  Patient/caregiver received discharge checklist  Content reviewed  Patient/caregiver encouraged to participate in discharge plan of care prior to discharge home

## 2019-12-20 NOTE — ASSESSMENT & PLAN NOTE
- Initially presented to [facility] with one-week history of chest pain  - Initial EKG inverted T-waves in inferolateral leads    - Troponin peaked at 3 1   - ECHO: Mildly dilated left ventricular cavity, moderate concentric left ventricular hypertrophy, severely reduced left ventricular systolic function with mild global hypokinesis with some regional wall motion variation ejection fraction 17%, grade 2 diastolic dysfunction  Although there is no definite evidence of left ventricular mural thrombus cannot exclude early layering thrombus in the distal inferior, inferior lateral wall  - Cardiac catheterization: Mid LAD with diffuse 70 % stenosis, with   Mid circumflex 75 % stenosis, 2nd obtuse marginal 90 % stenosis, Mid RCA diffuse 50 % stenosis, Distal RCA 90 % stenosis, Right PDA 90 % stenosis  Global left ventricular function was moderately depressed  EF was 35 %  - Was started on aspirin, IV heparin, high-dose atorvastatin, loaded with ticagrelor  Heparin stopped, avoidance of any antiplatelets until seen by CT surgery for evaluation  - Transferred to [facility] for further evaluation for CABG by CT surgery  - Currently chest pain-free  Few episodes of PVC on telemetry  Potassium 3 7, magnesium pending     Plan:  · Cardiac MRI:  Enlarged left ventricle, decreased left ventricular systolic function, EF 11%  Increased wall thickness  Hypokinesis of inferior and inferior septal region  Ischemic cardiomyopathy in RCA distribution  Mitral regurg noted, moderate left atrial enlargement  · Continue Atorvastatin 80 mg q h s  ·  Aspirin 81 mg  · Toprol XL 37 5 mg b i d  · Lisinopril 40 mg daily  · CABG and possible mitral valve replaced today, scheduled for 11:15 a m    · Per Cardiology, possible LifeVest prior to discharge, greatly appreciate recommendations  · Further management per cardiothoracic surgery postop

## 2019-12-20 NOTE — ASSESSMENT & PLAN NOTE
Lab Results   Component Value Date    HGBA1C 10 1 (H) 12/18/2019       Recent Labs     12/23/19  0444 12/23/19  0557 12/23/19  0755 12/23/19  1004   POCGLU 113 114 136 120       Blood Sugar Average: Last 72 hrs:  (P) 237 [id number]     Hemoglobin A1c from 12/18/2019 10 1%  Diagnosed in 2013, was placed on metformin patient admits to noncompliance secondary to financial issues     · Continue to monitor with POCT glucose checks  · Cardiac/carbohydrate level 2 diet  · Currently on insulin drip, 2 units/hour  · Correctional insulin algorithm 3  · Endocrinology following, greatly appreciate recommendations

## 2019-12-20 NOTE — ASSESSMENT & PLAN NOTE
History of snoring, wakes up multiple times throughout the night, sometimes with headache       Stop Bang score 7, high risk for sleep apnea   Overnight pulse oximetry  ·  Recommend outpatient sleep study

## 2019-12-20 NOTE — PROGRESS NOTES
[facility] Admission Note   Unit/Bed # @DBLINK (DARIANA,67073)@ Encounter: [id number]  SOD Team A          Chloe See  44 y o  male [id number]       Patient seen and examined  Reviewed H&P per Dr Consuelo Esteves  Agree with the assessment and plan except NA  Assessment/Plan: Principal Problem:    NSTEMI (non-ST elevated myocardial infarction) ([facility] 75 )  Active Problems:    Essential hypertension    Diabetes mellitus type 2, uncontrolled ([facility] )    Snoring    Hyperlipidemia     Patient with past medical history of hypertension and uncontrolled type 2 diabetes mellitus who was the noncompliant with his medications who initially presented to [facility] with chest pain  Patient underwent cardiac catheterization and was found to have multivessel disease and was transferred to [facility] for CABG evaluation  Echocardiogram revealed a mildly dilated left ventricular cavity moderate concentric left ventricular hypertrophy, severely reduced left ventricular systolic function with mild global hypokinesis with some regional wall-motion variation  Although there is no definite evidence of left ventricular mural thrombus cannot exclude early Krzysztof thrombus in the distal inferior, inferior lateral wall  Ejection fraction is 29% with grade 2 diastolic dysfunction  Per Cardiology patient will need a cardiac MRI as his cardiomyopathy is not explained by coronary artery disease  Will consult Cardiology and Cardiothoracic surgery      Disposition:  INPATIENT     Expected LOS: Palak Morrell MD

## 2019-12-20 NOTE — PROGRESS NOTES
INTERNAL MEDICINE RESIDENCY PROGRESS NOTE     Name: Tahir Alvarez  Age & Sex: 44 y o  male   MRN: [id number]  Unit/Bed#: [facility] 421-01   Encounter: [id number]  Team: SOD Team A    PATIENT INFORMATION     Name: Tahir Alvarez  Age & Sex: 44 y o  male   MRN: [id number] Stay Days: 1    ASSESSMENT/PLAN     Principal Problem:    NSTEMI (non-ST elevated myocardial infarction) [facility])  Active Problems:    Essential hypertension    Diabetes mellitus type 2, uncontrolled (HCC)    Snoring    Hyperlipidemia      * NSTEMI (non-ST elevated myocardial infarction) [facility])  Assessment & Plan  - Initially presented to [address] [place] [address] with one-week history of chest pain  - Initial EKG inverted T-waves in inferolateral leads    - Troponin peaked at 3 1   - ECHO: Mildly dilated left ventricular cavity, moderate concentric left ventricular hypertrophy, severely reduced left ventricular systolic function with mild global hypokinesis with some regional wall motion variation ejection fraction 16%, grade 2 diastolic dysfunction  Although there is no definite evidence of left ventricular mural thrombus cannot exclude early layering thrombus in the distal inferior, inferior lateral wall  - Cardiac catheterization: Mid LAD with diffuse 70 % stenosis, with   Mid circumflex 75 % stenosis, 2nd obtuse marginal 90 % stenosis, Mid RCA diffuse 50 % stenosis, Distal RCA 90 % stenosis, Right PDA 90 % stenosis  Global left ventricular function was moderately depressed  EF was 35 %  - Was started on aspirin, IV heparin, high-dose atorvastatin, loaded with ticagrelor  Heparin stopped, avoidance of any antiplatelets until seen by CT surgery for evaluation  - Transferred to [facility] for further evaluation for CABG by CT surgery  - Currently chest pain-free  No events telemetry  Plan:  - Cardiac MRI recommended as decreased EF not explained from CAD    - Continue Atorvastatin 80 mg q h s   - Aspirin 81 mg  - Metoprolol 25 mg b i d   - Lisinopril 40 mg daily  - Cardiology and CT surgery consult       Hyperlipidemia  Assessment & Plan  - Total cholesterol 166, triglycerides 132, HDL 27,   - Continue atorvastatin 80 mg q h s  Snoring  Assessment & Plan  - History of snoring, wakes up multiple times throughout the night, sometimes with headache     - Stop Bang score 7, high risk for sleep apnea  - Recommend outpatient sleep study      Diabetes mellitus type 2, uncontrolled ([facility] )  Assessment & Plan  Lab Results   Component Value Date    HGBA1C 10 1 (H) 2019       Recent Labs     19  1656 19  2207 19  0557 19  1107   POCGLU 252* 212* 247* 346*       Blood Sugar Average: Last 72 hrs:  (P) 346     - Hemoglobin A1c from 2019 10 1%  - Diagnosed in , was placed on metformin patient admits to noncompliance secondary to financial issues  - Continue to monitor with POCT glucose checks  - Cardiac/carbohydrate level 2 diet  - Lantus 30 units q h s   - Humalog 10 units t i d  With meals  - Correctional insulin algorithm 3    Essential hypertension  Assessment & Plan  - Blood pressure 141'D systolic, somewhat controlled  - Continue amlodipine 10 mg q d   - Lisinopril 40 mg q d   - Lopressor 25 mg b i d  Disposition:  Inpatient, 3 vessel disease for CT surgery evaluation for CABG  SUBJECTIVE     Patient seen and examined  No acute events overnight  Offered no complaints this morning  Denies chest pain since admission, also denies shortness of breath, palpitations, cough, lower limb swelling      OBJECTIVE     Vitals:    19 2330 19 0333 19 0730 19 1111   BP: 143/94 142/90 139/80    BP Location: Left arm Left arm Left arm    Pulse: 89 97 91 95   Resp:    Temp: 98 1 °F (36 7 °C) 98 3 °F (36 8 °C) 97 7 °F (36 5 °C)    TempSrc: Oral Oral Oral    SpO2: 99% 95% 96% 95%   Weight:       Height:          Temperature:   Temp (24hrs), Av 2 °F (36 8 °C), Min:97 7 °F (36 5 °C), Max:98 7 °F (37 1 °C)    Temperature: 97 7 °F (36 5 °C)  Intake & Output:  I/O       12/18 0701 - 12/19 0700 12/19 0701 - 12/20 0700 12/20 0701 - 12/21 0700    P  O   0     Total Intake(mL/kg)  0 (0)     Urine (mL/kg/hr)  500     Total Output  500     Net  -500                Weights:   IBW: 70 7 kg    Body mass index is 40 61 kg/m²  Weight (last 2 days)     Date/Time   Weight    12/19/19 1954   125 (275)            Physical Exam   Constitutional: He is oriented to person, place, and time  He appears well-developed and well-nourished  Obese patient   HENT:   Head: Normocephalic and atraumatic  Eyes: Pupils are equal, round, and reactive to light  EOM are normal    Neck: Normal range of motion  Neck supple  No JVD present  Cardiovascular: Normal rate, regular rhythm and normal heart sounds  Exam reveals no gallop and no friction rub  No murmur heard  Pulmonary/Chest: Effort normal and breath sounds normal  He has no wheezes  He has no rales  Abdominal: Soft  He exhibits no distension  There is no tenderness  Musculoskeletal: Normal range of motion  He exhibits no edema  Neurological: He is alert and oriented to person, place, and time  No cranial nerve deficit or sensory deficit  Skin: Skin is warm  Psychiatric: He has a normal mood and affect  His behavior is normal      LABORATORY DATA     Labs: I have personally reviewed pertinent reports    Results from last 7 days   Lab Units 12/18/19  1629   WBC Thousand/uL 10 84*   HEMOGLOBIN g/dL 16 3   HEMATOCRIT % 48 2   PLATELETS Thousands/uL 273   NEUTROS PCT % 65   MONOS PCT % 8      Results from last 7 days   Lab Units 12/18/19  1629   POTASSIUM mmol/L 4 0   CHLORIDE mmol/L 97*   CO2 mmol/L 29   BUN mg/dL 15   CREATININE mg/dL 1 18   CALCIUM mg/dL 9 5   ALK PHOS U/L 89   ALT U/L 30   AST U/L 19              Results from last 7 days   Lab Units 12/19/19  0823 12/18/19  2356 12/18/19  1629   INR   --   --  1 03   PTT seconds 37 25 30         Results from last 7 days   Lab Units 12/19/19  0544 12/19/19  0216 12/18/19  2314   TROPONIN I ng/mL 2 83* 3 11* 2 57*       IMAGING & DIAGNOSTIC TESTING     Radiology Results: I have personally reviewed pertinent reports  No results found  Other Diagnostic Testing: I have personally reviewed pertinent reports  ACTIVE MEDICATIONS     Current Facility-Administered Medications   Medication Dose Route Frequency    amLODIPine (NORVASC) tablet 10 mg  10 mg Oral Daily    aspirin chewable tablet 81 mg  81 mg Oral Daily    atorvastatin (LIPITOR) tablet 80 mg  80 mg Oral Daily With Dinner    insulin glargine (LANTUS) subcutaneous injection 30 Units 0 3 mL  30 Units Subcutaneous HS    insulin lispro (HumaLOG) 100 units/mL subcutaneous injection 1-6 Units  1-6 Units Subcutaneous 4x Daily (AC & HS)    insulin lispro (HumaLOG) 100 units/mL subcutaneous injection 10 Units  10 Units Subcutaneous TID With Meals    lisinopril (ZESTRIL) tablet 40 mg  40 mg Oral Daily    metoprolol tartrate (LOPRESSOR) tablet 25 mg  25 mg Oral Q12H KENJI    ondansetron (ZOFRAN) injection 4 mg  4 mg Intravenous Q6H PRN       VTE Pharmacologic Prophylaxis: Sequential compression device (Venodyne)   VTE Mechanical Prophylaxis: sequential compression device    Portions of the record may have been created with voice recognition software  Occasional wrong word or "sound a like" substitutions may have occurred due to the inherent limitations of voice recognition software    Read the chart carefully and recognize, using context, where substitutions have occurred   ==  Yosef Erazo MD  [facility] [facility]  Internal Medicine Residency PGY-1

## 2019-12-20 NOTE — PROGRESS NOTES
General Cardiology   Progress Note -  Team One   [facility]  44 y o  male MRN: [id number]    Unit/Bed#: [facility] 421-01 Encounter: [id number]    Assessment:    1  Type 1 NSTEMI/MVCAD:  Presented to [facility] heart has regular with for chest pain on exertion intermittently over the last 5 days  Peak troponin 3 11  EKGs w/ inverted T-waves in inferolateral leads  Cardiac catheterization 12/19/2019 with multi-vessel CAD involving mid LAD, mid circumflex, OM2, distal RCA and right PDA  Global hypokinesis with EF 35%  [facility] transferred [facility]  · Echocardiogram severely reduced LV systolic function EF of 60%, mild global hypokinesis with some regional wall motion variations, grade 2 diastolic dysfunction  · CT surgery consulted for CABG workup  · On aspirin, statin, beta-blocker  · Does not appear he was loaded with Plavix or Brilinta    2  Cardiomyopathy:  EF 29% with mild global hypokinesis with some regional wall motion variations, no definite evidence of LV mural thrombus cannot exclude laying thrombus in the distal inferior, inferolateral wall  Not felt to be ischemic  Cardiac MRI pending      3  Hypertension: poorly controlled on admission as patient was not taking medications at home  · Average /77 amlodipine 10 mg, lisinopril 40 mg, Lopressor 25 mg BID     4  Dyslipidemia: , , HDL 27,   Started on atorvastatin 80 mg initiated      5  Type 2 diabetes mellitus:  Poorly controlled with hemoglobin A1c 10 1  On insulin with management per primary team      6  Obesity     7  Probable sleep apnea    8  Medication noncompliance    Plan/Recommendation:  · Echocardiogram reviewed personally by Dr Amber Titus who does not feel there is an LV thrombus  Will get better evaluation with MRI    · Cardiac MRI further evaluation of cardiomyopathy/potential LV thrombus  · Switch Lopressor to Toprol-XL for GDT  · Eventually recommend initiating spironolactone 25 mg daily  · Continue lisinopril, aspirin and statin  · CABG workup per CT surgery    Subjective    Patient seen and examined  No acute events overnight  He denies any chest pain, shortness breath, palpitation lightheadedness or dizziness  He feels very overwhelmed with everything going on  He admits to not taking any of his medications including antihypertensive and diabetic medications for over 1 year  Review of Systems   Constitution: Negative  Negative for chills  Cardiovascular: Negative for chest pain, dyspnea on exertion, leg swelling, near-syncope, orthopnea, palpitations, paroxysmal nocturnal dyspnea and syncope  Respiratory: Negative  Negative for shortness of breath  Gastrointestinal: Negative for diarrhea, nausea and vomiting  Neurological: Negative for dizziness and weakness  Psychiatric/Behavioral: Negative for altered mental status  The patient is nervous/anxious  All other systems reviewed and are negative  Objective:   Vitals: Blood pressure 139/80, pulse 95, temperature 97 7 °F (36 5 °C), temperature source Oral, resp  rate 18, height 5' 9" (1 753 m), weight 125 kg (275 lb), SpO2 95 %  ,       Body mass index is 40 61 kg/m²  ,     Systolic (89OYR), MFX:973 , Min:115 , BSB:768     Diastolic (99CSH), VF, Min:59, Max:96      Intake/Output Summary (Last 24 hours) at 2019 1143  Last data filed at 2019 0500  Gross per 24 hour   Intake 0 ml   Output 500 ml   Net -500 ml     Weight (last 2 days)     Date/Time   Weight    19 1954   125 (275)            Telemetry Review: No significant arrhythmias seen on telemetry review  Physical Exam   Constitutional: He is oriented to person, place, and time  He appears well-developed and well-nourished  No distress  On room air in NAD   HENT:   Head: Normocephalic and atraumatic  Neck: Normal range of motion  Neck supple  No JVD present  Cardiovascular: Normal rate, regular rhythm and normal heart sounds     No murmur heard  Pulmonary/Chest: Effort normal and breath sounds normal  No respiratory distress  He has no wheezes  He has no rales  Abdominal: Soft  Bowel sounds are normal    Musculoskeletal: Normal range of motion  He exhibits no edema  Neurological: He is alert and oriented to person, place, and time  Skin: Skin is warm and dry  Psychiatric: He has a normal mood and affect  His behavior is normal  Judgment and thought content normal    Nursing note and vitals reviewed        LABORATORY RESULTS  Results from last 7 days   Lab Units 12/19/19  0544 12/19/19  0216 12/18/19  2314   TROPONIN I ng/mL 2 83* 3 11* 2 57*     CBC with diff:   Results from last 7 days   Lab Units 12/18/19  1629   WBC Thousand/uL 10 84*   HEMOGLOBIN g/dL 16 3   HEMATOCRIT % 48 2   MCV fL 89   PLATELETS Thousands/uL 273   MCH pg 30 2   MCHC g/dL 33 8   RDW % 11 3*   MPV fL 10 6   NRBC AUTO /100 WBCs 0       CMP:  Results from last 7 days   Lab Units 12/18/19  1629   POTASSIUM mmol/L 4 0   CHLORIDE mmol/L 97*   CO2 mmol/L 29   BUN mg/dL 15   CREATININE mg/dL 1 18   CALCIUM mg/dL 9 5   AST U/L 19   ALT U/L 30   ALK PHOS U/L 89   EGFR ml/min/1 73sq m 77       BMP:  Results from last 7 days   Lab Units 12/18/19  1629   POTASSIUM mmol/L 4 0   CHLORIDE mmol/L 97*   CO2 mmol/L 29   BUN mg/dL 15   CREATININE mg/dL 1 18   CALCIUM mg/dL 9 5       No results found for: NTBNP                 Results from last 7 days   Lab Units 12/18/19  1629   HEMOGLOBIN A1C % 10 1*              Results from last 7 days   Lab Units 12/18/19  1629   INR  1 03       Lipid Profile:   No results found for: CHOL  Lab Results   Component Value Date    HDL 27 (L) 12/19/2019     Lab Results   Component Value Date    LDLCALC 113 (H) 12/19/2019     Lab Results   Component Value Date    TRIG 132 12/19/2019       Cardiac testing:   Results for orders placed during the hospital encounter of 12/18/19   Echo complete with contrast if indicated    [facility]  [facility]  [place], [address]  [phone]    Transthoracic Echocardiogram  2D, M-mode, Doppler, and Color Doppler    Study date:  19-Dec-2019    Patient: Nabil Bradley  MR number: [id number]  Account number: [de-identified]  : 1980  Age: 44 years  Gender: Male  Status: Inpatient  Location: Bedside  Height: 69 in  Weight: 276 lb  BP: 170/ 51 mmHg    Indications: Acute MI  Diagnoses: I24 9 - Acute ischemic heart disease, unspecified    Sonographer:  Vanesa Hooker RDCS  Referring Physician:  Krupa More MD  Group:  [facility]  Interpreting Physician:  Kevin Seo MD    IMPRESSIONS:  Technical quality: Fair  Technically very difficult study due to body habitus and poor echo penetration  Definity contrast was used  Cardiac rhythm: Sinus with interventricular conduction delay  1  Mildly dilated left ventricular cavity, moderate concentric left ventricular hypertrophy, severely reduced left ventricular systolic function with mild global hypokinesis with some regional wall motion variation  Although there is no  definite evidence of left ventricular mural thrombus cannot exclude early layering thrombus in the distal inferior, inferior lateral wall  Ejection fraction is determined as 29%  Grade 2 diastolic dysfunction  2  Mild left atrial cavity enlargement  3  Aortic valve sclerosis with some focal calcification, trace aortic valve regurgitation  4  Mitral valve leaflet sclerosis with some focal calcification, trace mitral valve regurgitation  5  Trace tricuspid valve regurgitation  6  No obvious pulmonary hypertension  7  No pericardial effusion  There is no previous echocardiogram available for comparison      SUMMARY    LEFT VENTRICLE:  Mildly dilated left ventricular cavity, moderate concentric left ventricular hypertrophy, severely reduced left ventricular systolic function mild global hypokinesis with some regional wall motion variation  Although there is no definite  evidence of left ventricular thrombus, cannot definitively exclude layering thrombus formation in inferior-apical and distal inferior-lateral regions  Grade 2 diastolic dysfunction  Elevated left atrial pressure  RIGHT VENTRICLE:  Normal right ventricular size and systolic function  Right ventricular systolic pressure could not be estimated due to poor Doppler signals across the tricuspid valve  LEFT ATRIUM:  Mild left atrial cavity enlargement  Intact interatrial septum  RIGHT ATRIUM:  Normal right atrial cavity size  MITRAL VALVE:  Mitral valve leaflet sclerosis with some focal calcification, adequate leaflet mobility  Trace mitral valve regurgitation  AORTIC VALVE:  Tricuspid aortic valve sclerosis and focal calcification which is most pronounced on the right coronary cusp there is adequate cuspal separation  Trace aortic valve regurgitation noted  TRICUSPID VALVE:  Trace tricuspid valve regurgitation  PULMONIC VALVE:  No obvious pulmonic valve regurgitation  AORTA:  Normal aortic root size  Proximal ascending aorta was not well visualized  IVC, HEPATIC VEINS:  Tricuspid aortic valve with mild sclerosis  No aortic valve stenosis or regurgitation  PERICARDIUM:  No pericardial effusion  HISTORY: PRIOR HISTORY: Hypertension  DM2  Obesity  PROCEDURE: The procedure was performed at the bedside  This was a routine study  The transthoracic approach was used  The study included complete 2D imaging, M-mode, complete spectral Doppler, and color Doppler  The heart rate was 92 bpm,  at the start of the study  Intravenous contrast (  6 mL of definity in NSS) was administered to opacify the left ventricle  Echocardiographic views were limited due to decreased penetration and lung interference  This was a technically  difficult study      LEFT VENTRICLE: Mildly dilated left ventricular cavity, moderate concentric left ventricular hypertrophy, severely reduced left ventricular systolic function mild global hypokinesis with some regional wall motion variation  Although there  is no definite evidence of left ventricular thrombus, cannot definitively exclude layering thrombus formation in inferior-apical and distal inferior-lateral regions  Grade 2 diastolic dysfunction  Elevated left atrial pressure  RIGHT VENTRICLE: Normal right ventricular size and systolic function  Right ventricular systolic pressure could not be estimated due to poor Doppler signals across the tricuspid valve  LEFT ATRIUM: Mild left atrial cavity enlargement  Intact interatrial septum  RIGHT ATRIUM: Normal right atrial cavity size  MITRAL VALVE: Mitral valve leaflet sclerosis with some focal calcification, adequate leaflet mobility  Trace mitral valve regurgitation  AORTIC VALVE: Tricuspid aortic valve sclerosis and focal calcification which is most pronounced on the right coronary cusp there is adequate cuspal separation  Trace aortic valve regurgitation noted  TRICUSPID VALVE: Trace tricuspid valve regurgitation  PULMONIC VALVE: No obvious pulmonic valve regurgitation  PERICARDIUM: No pericardial effusion  AORTA: Normal aortic root size  Proximal ascending aorta was not well visualized  SYSTEMIC VEINS: IVC: Tricuspid aortic valve with mild sclerosis  No aortic valve stenosis or regurgitation      SYSTEM MEASUREMENT TABLES    2D  %FS: 14 6 %  Ao Diam: 3 2 cm  EDV(Teich): 160 ml  EF Biplane: 28 5 %  EF(Teich): 30 6 %  ESV(Teich): 111 ml  IVSd: 1 6 cm  LA Diam: 4 6 cm  LAAs A4C: 23 9 cm2  LAESV A-L A4C: 86 1 ml  LAESV MOD A4C: 83 5 ml  LALs A4C: 5 6 cm  LVEDV MOD A2C: 176 1 ml  LVEDV MOD A4C: 172 9 ml  LVEDV MOD BP: 180 5 ml  LVEF MOD A2C: 26 %  LVEF MOD A4C: 28 5 %  LVESV MOD A2C: 130 3 ml  LVESV MOD A4C: 123 6 ml  LVESV MOD BP: 129 ml  LVIDd: 5 7 cm  LVIDs: 4 9 cm  LVLd A2C: 9 3 cm  LVLd A4C: 8 7 cm  LVLs A2C: 8 6 cm  LVLs A4C: 8 3 cm  LVPWd: 1 6 cm  RA Area: 12 3 cm2  RVIDd: 3 4 cm  SV MOD A2C: 45 8 ml  SV MOD A4C: 49 3 ml  SV(Teich): 49 ml    MM  TAPSE: 2 1 cm    PW  E': 0 m/s  E/E': 15 6  MV A Milton: 0 7 m/s  MV Dec Bulloch: 5 9 m/s2  MV DecT: 125 4 ms  MV E [place]: 0 7 m/s  MV E/A Ratio: 1 1  MV PHT: 36 4 ms  MVA By PHT: 6 1 cm2    [employer] Accredited Echocardiography Laboratory    Prepared and electronically signed by    Sola Pak MD  Signed 19-Dec-2019 17:30:28       No results found for this or any previous visit  No results found for this or any previous visit  No procedure found  No results found for this or any previous visit  Meds/Allergies   all current active meds have been reviewed, current meds:   Current Facility-Administered Medications   Medication Dose Route Frequency    amLODIPine (NORVASC) tablet 10 mg  10 mg Oral Daily    aspirin chewable tablet 81 mg  81 mg Oral Daily    atorvastatin (LIPITOR) tablet 80 mg  80 mg Oral Daily With Dinner    insulin glargine (LANTUS) subcutaneous injection 30 Units 0 3 mL  30 Units Subcutaneous HS    insulin lispro (HumaLOG) 100 units/mL subcutaneous injection 1-6 Units  1-6 Units Subcutaneous 4x Daily (AC & HS)    insulin lispro (HumaLOG) 100 units/mL subcutaneous injection 10 Units  10 Units Subcutaneous TID With Meals    lisinopril (ZESTRIL) tablet 40 mg  40 mg Oral Daily    metoprolol tartrate (LOPRESSOR) tablet 25 mg  25 mg Oral Q12H [address]    ondansetron (ZOFRAN) injection 4 mg  4 mg Intravenous Q6H PRN    and PTA meds:   Prior to Admission Medications   Prescriptions Last Dose Informant Patient Reported? Taking?    amLODIPine (NORVASC) 10 mg tablet Unknown at Unknown time  Yes No   Sig: Take 10 mg by mouth daily   hydrochlorothiazide (HYDRODIURIL) 50 mg tablet Unknown at Unknown time  Yes No   Sig: Take 50 mg by mouth every 24 hours   lisinopril (ZESTRIL) 40 mg tablet Unknown at Unknown time  Yes No   Sig: Take 40 mg by mouth daily   metFORMIN (GLUCOPHAGE) 1000 MG tablet Unknown at Unknown time  Yes No   Sig: Take 1,000 mg by mouth Every 12 hours   methocarbamol (ROBAXIN) 750 mg tablet   No No   Sig: Take 1 tablet (750 mg total) by mouth every 8 (eight) hours for 5 days      Facility-Administered Medications: None     Medications Prior to Admission   Medication    amLODIPine (NORVASC) 10 mg tablet    hydrochlorothiazide (HYDRODIURIL) 50 mg tablet    lisinopril (ZESTRIL) 40 mg tablet    metFORMIN (GLUCOPHAGE) 1000 MG tablet    methocarbamol (ROBAXIN) 750 mg tablet       Counseling / Coordination of Care  Total floor / unit time spent today 20 minutes  Greater than 50% of total time was spent with the patient and / or family counseling and / or coordination of care  ** Please Note: Dragon 360 Dictation voice to text software may have been used in the creation of this document   **

## 2019-12-20 NOTE — H&P (VIEW-ONLY)
Consultation - Cardiothoracic Surgery   Poly  44 y o  male MRN: [id number]  Unit/Bed#: [facility] 421-01 Encounter: [id number]    Physician Requesting Consult: Miriam Salazar DO    Reason for Consult / Principal Problem: Type 1 NSTEMI, 3vCAD, cardiomyopathy  Inpatient consult to Cardiothoracic Surgery  Consult performed by: Emre Peters PA-C  Consult ordered by: Monika Guido DO        History of Present Illness: Poly  is a 44y o  year old male with a PMH significant for uncontrolled DM2, HTN, HLD, obesity and medical non-compliance  Presented to [facility] ER for exertional chest pain/squeezing for past 5 days  Peak troponin 3 11, EKG with inverted T waves in inferolateral leads and slight ST elevations in V1-V3  Diagnosed with acute NSTEMI  Cardiac cath revealed MV CAD and EF 30-35%  For [facility] to [facility] for cardiac surgical evaluation  TTE performed showed EF 30%, global hypokinesis, grade 2 DD, and possible LV thrombus  As I sit with the patient now, he relates this above story to me   he currently denies and syncope, presyncope, CP, palpitations, SOB, dyspnea, PND, orthopnea or symptoms of fluid retention         Past Medical History:  Past Medical History:   Diagnosis Date    CAD (coronary artery disease)     Diabetes mellitus ([address] )     HLD (hyperlipidemia)     Hypertension          Past Surgical History:   Past Surgical History:   Procedure Laterality Date    ANKLE SURGERY Right     FRACTURE SURGERY           Family History:  Family History   Problem Relation Age of Onset    Cancer Mother         unknown         Social History:  Social History     Substance and Sexual Activity   Alcohol Use Never    Frequency: Never    Binge frequency: Never     Social History     Substance and Sexual Activity   Drug Use Yes    Types: Marijuana     Social History     Tobacco Use   Smoking Status Never Smoker   Smokeless Tobacco Never Used     Marital Status: Single    Currently working as a granite counter-  Home Medications:   Prior to Admission medications    Medication Sig Start Date End Date Taking? Authorizing Provider   amLODIPine (NORVASC) 10 mg tablet Take 10 mg by mouth daily 12/16/16   Historical Provider, MD   hydrochlorothiazide (HYDRODIURIL) 50 mg tablet Take 50 mg by mouth every 24 hours    Historical Provider, MD   lisinopril (ZESTRIL) 40 mg tablet Take 40 mg by mouth daily    Historical Provider, MD   metFORMIN (GLUCOPHAGE) 1000 MG tablet Take 1,000 mg by mouth Every 12 hours    Historical Provider, MD   methocarbamol (ROBAXIN) 750 mg tablet Take 1 tablet (750 mg total) by mouth every 8 (eight) hours for 5 days 5/20/18 5/25/18  Janeth Garcia PA-C       Inpatient Medications:  Scheduled Meds:   Current Facility-Administered Medications:  amLODIPine 10 mg Oral Daily Nikki Arnold MD   aspirin 81 mg Oral Daily Nikki Arnold MD   atorvastatin 80 mg Oral Daily With Courtney Monsivais MD   insulin glargine 30 Units Subcutaneous HS Nikki Arnold MD   insulin lispro 1-6 Units Subcutaneous 4x Daily (AC & HS) Nikki Arnold MD   insulin lispro 10 Units Subcutaneous TID With Meals Nikki Arnold MD   lisinopril 40 mg Oral Daily Nikki Arnold MD   metoprolol tartrate 25 mg Oral Q12H [address] Nikki Arnold MD   ondansetron 4 mg Intravenous Q6H PRN Nikki Arnold MD     Continuous Infusions:    PRN Meds:    ondansetron 4 mg Q6H PRN       Allergies:  No Known Allergies    Review of Systems:  Review of Systems   Constitutional: Negative for activity change, fatigue and fever  HENT: Negative for congestion, dental problem and sore throat  Respiratory: Positive for chest tightness  Negative for apnea, cough, choking, shortness of breath and wheezing  Cardiovascular: Positive for chest pain  Negative for palpitations and leg swelling  Gastrointestinal: Negative for abdominal distention, abdominal pain, anal bleeding, blood in stool, constipation, diarrhea and nausea  Genitourinary: Negative for difficulty urinating, dysuria and hematuria  Musculoskeletal: Negative for arthralgias, back pain, joint swelling and myalgias  Skin: Negative for color change, pallor, rash and wound  Neurological: Negative for dizziness, seizures, syncope, speech difficulty and weakness  Psychiatric/Behavioral: Negative for agitation and behavioral problems  The patient is not nervous/anxious  Vital Signs:     Vitals:    12/19/19 1954 12/19/19 2330 12/20/19 0333 12/20/19 0730   BP: 140/96 143/94 142/90 139/80   BP Location:  Left arm Left arm Left arm   Pulse: 101 89 97 91   Resp: 18 18 18 18   Temp: 98 7 °F (37 1 °C) 98 1 °F (36 7 °C) 98 3 °F (36 8 °C) 97 7 °F (36 5 °C)   TempSrc: Oral Oral Oral Oral   SpO2: 98% 99% 95% 96%   Weight: 125 kg (275 lb)      Height: 5' 9" (1 753 m)        Invasive Devices     Peripheral Intravenous Line            Peripheral IV 12/18/19 Right Antecubital 1 day                Physical Exam:  Physical Exam   Constitutional: He is oriented to person, place, and time  He appears well-developed and well-nourished  No distress  HENT:   Head: Normocephalic and atraumatic  Mouth/Throat: No oropharyngeal exudate  Eyes: Pupils are equal, round, and reactive to light  EOM are normal  Right eye exhibits no discharge  Left eye exhibits no discharge  No scleral icterus  Neck: Normal range of motion  Neck supple  No JVD present  Cardiovascular: Normal rate, regular rhythm, normal heart sounds and intact distal pulses  Exam reveals no friction rub  No murmur heard  Pulmonary/Chest: Effort normal and breath sounds normal  No respiratory distress  He has no wheezes  He has no rales  Abdominal: Soft  Bowel sounds are normal  He exhibits no distension and no mass  There is no guarding  Musculoskeletal: Normal range of motion  He exhibits no edema or deformity  Lymphadenopathy:     He has no cervical adenopathy     Neurological: He is alert and oriented to person, place, and time  No cranial nerve deficit  He exhibits normal muscle tone  Coordination normal    Skin: Skin is warm and dry  No rash noted  He is not diaphoretic  No erythema  No pallor  Psychiatric: He has a normal mood and affect  His behavior is normal  Judgment and thought content normal        Lab Results:     Results from last 7 days   Lab Units 12/18/19  1629   WBC Thousand/uL 10 84*   HEMOGLOBIN g/dL 16 3   HEMATOCRIT % 48 2   PLATELETS Thousands/uL 273     Results from last 7 days   Lab Units 12/18/19  1629   POTASSIUM mmol/L 4 0   CHLORIDE mmol/L 97*   CO2 mmol/L 29   BUN mg/dL 15   CREATININE mg/dL 1 18   CALCIUM mg/dL 9 5     Results from last 7 days   Lab Units 12/19/19  0823 12/18/19  2356 12/18/19  1629   INR   --   --  1 03   PTT seconds 37 25 30     Lab Results   Component Value Date    HGBA1C 10 1 (H) 12/18/2019     Lab Results   Component Value Date    TROPONINI 2 83 (H) 12/19/2019       Imaging Studies:     Cardiac Catheterization:EF 30-35%, dilated  LAD: 60-70%; Cx: OM2 90%; RCA: 50-60% diffuse, 90% RPDA    Echocardiogram: EF 30%, global hypokinesis, Possible LV thrombus  Mild AI, MR, TR  Grade 2 DD  I have personally reviewed pertinent reports  and I have shared these findings with the patient  Assessment:  Principal Diagnosis:   Severe coronary artery disease; Ongoing CABG workup  Severe Cardiomyopathy, cause undetermined  Hospital Problems:  Present on Admission:   Essential hypertension   [facility]STEMI (non-ST elevated myocardial infarction) [facility] [address] )   Diabetes mellitus type 2, uncontrolled ([facility] )   Snoring      Plan: Will need eventual surgical revascularization  Concerned with degree of CM compared to disease  ? LV thrombus  Consider consulting HF Cardiology  Will need cardiac MRI  Surgical plan TBD based on studies       SIGNATURE: Mary Tran PA-C  DATE: December 20, 2019  TIME: 10:16 AM

## 2019-12-20 NOTE — CONSULTS
Consultation - Poly  44 y o  male MRN: [id number]    Unit/Bed#: [facility] 421-01 Encounter: [id number]      Assessment/Plan     Assessment: This is a 44y o -year-old male with diabetes with hyperglycemia, non ST elevation myocardial infarction, coronary disease and obstructive sleep apnea  Plan:  1  Type 2 diabetes with hyperglycemia-since his diabetes is poorly controlled, increase Lantus to 40 units  Continue to monitor blood sugars over time and make adjustments to his regimen if necessary  2  Non ST elevation myocardial infarction with three vessel coronary disease-he is being evaluated for cardiac surgery  3  Obstructive sleep apnea-recommend doing nocturnal pulse oximetry  If he has desaturation, he will need CPAP while he is hospitalized  CC: Diabetes Consult    History of Present Illness     HPI: Poly  is a 44y o  year old male with type 2 diabetes for 4 years  He is on oral agents at home which he has not been taking  He admits to polyuria, polydipsia, nocturia and blurry vision  He denies neuropathy, nephropathy, retinopathy, stroke and claudication but does admit to heart attack  He denies any hypoglycemia  Inpatient consult to Endocrinology  Consult performed by: Marlon Goldberg, MD  Consult ordered by: Isaias Paredes DO          Review of Systems   Constitutional: Negative for chills and fever  Respiratory: Negative for shortness of breath  Cardiovascular: Negative for chest pain  Gastrointestinal: Negative for constipation, diarrhea, nausea and vomiting  Endocrine: Positive for polydipsia and polyuria  Neurological: Negative for numbness  All other systems reviewed and are negative        Historical Information   Past Medical History:   Diagnosis Date    CAD (coronary artery disease)     Diabetes mellitus ([address] )     HLD (hyperlipidemia)     Hypertension      Past Surgical History:   Procedure Laterality Date    ANKLE SURGERY Right     FRACTURE SURGERY       Social History   Social History     Substance and Sexual Activity   Alcohol Use Never    Frequency: Never    Binge frequency: Never     Social History     Substance and Sexual Activity   Drug Use Yes    Types: Marijuana     Social History     Tobacco Use   Smoking Status Never Smoker   Smokeless Tobacco Never Used     Family History:   Family History   Problem Relation Age of Onset    Cancer Mother         unknown       Meds/Allergies   Current Facility-Administered Medications   Medication Dose Route Frequency Provider Last Rate Last Dose    ALPRAZolam (XANAX) tablet 0 25 mg  0 25 mg Oral BID PRN Sherlyn De Los Santos DO   0 25 mg at 12/20/19 1415    amLODIPine (NORVASC) tablet 10 mg  10 mg Oral Daily Waqas Monk MD   10 mg at 12/20/19 5237    aspirin chewable tablet 81 mg  81 mg Oral Daily Waqas Monk MD   81 mg at 12/20/19 0838    atorvastatin (LIPITOR) tablet 80 mg  80 mg Oral Daily With Lucian Rodriguez MD        enoxaparin (LOVENOX) subcutaneous injection 40 mg  40 mg Subcutaneous Q12H [address] Kvng oChen MD        insulin glargine (LANTUS) subcutaneous injection 30 Units 0 3 mL  30 Units Subcutaneous HS Waqas Monk MD   30 Units at 12/19/19 2259    insulin lispro (HumaLOG) 100 units/mL subcutaneous injection 1-6 Units  1-6 Units Subcutaneous 4x Daily (AC & HS) Waqas Monk MD   5 Units at 12/20/19 1135    insulin lispro (HumaLOG) 100 units/mL subcutaneous injection 10 Units  10 Units Subcutaneous TID With Meals Waqas Monk MD   10 Units at 12/20/19 1135    Labetalol HCl (NORMODYNE) injection 10 mg  10 mg Intravenous Q4H PRN Kvng Cohen MD        lisinopril (ZESTRIL) tablet 40 mg  40 mg Oral Daily Waqas Monk MD   40 mg at 12/20/19 0837    metoprolol succinate (TOPROL-XL) 24 hr tablet 25 mg  25 mg Oral BID Arnold Dial PA-C   25 mg at 12/20/19 1412    ondansetron (ZOFRAN) injection 4 mg  4 mg Intravenous Q6H PRN Waqas Monk MD         No Known Allergies    Objective   Vitals: Blood pressure 123/62, pulse 85, temperature 97 9 °F (36 6 °C), temperature source Oral, resp  rate 18, height 5' 9" (1 753 m), weight 125 kg (275 lb), SpO2 97 %  Intake/Output Summary (Last 24 hours) at 12/20/2019 1626  Last data filed at 12/20/2019 1515  Gross per 24 hour   Intake 480 ml   Output 500 ml   Net -20 ml     Invasive Devices     Peripheral Intravenous Line            Peripheral IV 12/18/19 Right Antecubital 1 day                Physical Exam   Constitutional: He is oriented to person, place, and time  He appears well-developed and well-nourished  No distress  HENT:   Head: Normocephalic and atraumatic  Mouth/Throat: Oropharynx is clear and moist and mucous membranes are normal  No oropharyngeal exudate  Eyes: Conjunctivae, EOM and lids are normal  Right eye exhibits no discharge  Left eye exhibits no discharge  No scleral icterus  Neck: Neck supple  No thyromegaly present  Cardiovascular: Normal rate, regular rhythm and normal heart sounds  Exam reveals no gallop and no friction rub  No murmur heard  Pulmonary/Chest: Effort normal and breath sounds normal  No respiratory distress  He has no wheezes  Abdominal: Soft  Bowel sounds are normal  He exhibits no distension  There is no tenderness  Musculoskeletal: Normal range of motion  He exhibits no edema, tenderness or deformity  Lymphadenopathy:        Head (right side): No occipital adenopathy present  Head (left side): No occipital adenopathy present  Right: No supraclavicular adenopathy present  Left: No supraclavicular adenopathy present  Neurological: He is alert and oriented to person, place, and time  No cranial nerve deficit  Coordination normal    Skin: Skin is warm and intact  No rash noted  He is not diaphoretic  No erythema  Psychiatric: He has a normal mood and affect  His behavior is normal    Vitals reviewed        The history was obtained from the review of the chart, patient  Lab Results:   Results from last 7 days   Lab Units 12/18/19  1629   HEMOGLOBIN A1C % 10 1*     Lab Results   Component Value Date    WBC 10 84 (H) 12/18/2019    HGB 16 3 12/18/2019    HCT 48 2 12/18/2019    MCV 89 12/18/2019     12/18/2019     Lab Results   Component Value Date/Time    BUN 15 12/18/2019 04:29 PM    K 4 0 12/18/2019 04:29 PM    CL 97 (L) 12/18/2019 04:29 PM    CO2 29 12/18/2019 04:29 PM    CREATININE 1 18 12/18/2019 04:29 PM    AST 19 12/18/2019 04:29 PM    ALT 30 12/18/2019 04:29 PM    ALB 3 6 12/18/2019 04:29 PM     Recent Labs     12/19/19  0544   HDL 27*   TRIG 132     No results found for: MICROALBUR, [id number]  POC Glucose (mg/dl)   Date Value   12/20/2019 198 (H)   12/20/2019 346 (H)   12/20/2019 247 (H)   12/19/2019 212 (H)   12/19/2019 252 (H)   12/19/2019 248 (H)   12/19/2019 253 (H)   12/18/2019 272 (H)   12/18/2019 322 (H)       Imaging Studies: I have personally reviewed pertinent reports  Portions of the record may have been created with voice recognition software

## 2019-12-20 NOTE — ASSESSMENT & PLAN NOTE
·  Blood pressure 925-677 systolic, somewhat controlled  · Continue amlodipine 10 mg q d  · Lisinopril 40 mg q d    · Toprol XL 37 5 mg b i d   · Continue to monitor vitals closely

## 2019-12-20 NOTE — SOCIAL WORK
Pt has no PCP  CM provided [facility] miranda[facility] for purpose of calling and establishing himself w/ a PCP  CM to follow

## 2019-12-21 ENCOUNTER — APPOINTMENT (INPATIENT)
Dept: RADIOLOGY | Facility: HOSPITAL | Age: 39
DRG: 235 | End: 2019-12-21
Payer: COMMERCIAL

## 2019-12-21 LAB
ANION GAP SERPL CALCULATED.3IONS-SCNC: 3 MMOL/L (ref 4–13)
BASOPHILS # BLD AUTO: 0.06 THOUSANDS/ΜL (ref 0–0.1)
BASOPHILS NFR BLD AUTO: 1 % (ref 0–1)
BUN SERPL-MCNC: 21 MG/DL (ref 5–25)
CALCIUM SERPL-MCNC: 9.5 MG/DL (ref 8.3–10.1)
CHLORIDE SERPL-SCNC: 104 MMOL/L (ref 100–108)
CO2 SERPL-SCNC: 29 MMOL/L (ref 21–32)
CREAT SERPL-MCNC: 0.91 MG/DL (ref 0.6–1.3)
EOSINOPHIL # BLD AUTO: 0.19 THOUSAND/ΜL (ref 0–0.61)
EOSINOPHIL NFR BLD AUTO: 2 % (ref 0–6)
ERYTHROCYTE [DISTWIDTH] IN BLOOD BY AUTOMATED COUNT: 11.3 % (ref 11.6–15.1)
GFR SERPL CREATININE-BSD FRML MDRD: 106 ML/MIN/1.73SQ M
GLUCOSE SERPL-MCNC: 171 MG/DL (ref 65–140)
GLUCOSE SERPL-MCNC: 232 MG/DL (ref 65–140)
GLUCOSE SERPL-MCNC: 236 MG/DL (ref 65–140)
GLUCOSE SERPL-MCNC: 256 MG/DL (ref 65–140)
GLUCOSE SERPL-MCNC: 272 MG/DL (ref 65–140)
HCT VFR BLD AUTO: 49.9 % (ref 36.5–49.3)
HGB BLD-MCNC: 16.3 G/DL (ref 12–17)
IMM GRANULOCYTES # BLD AUTO: 0.06 THOUSAND/UL (ref 0–0.2)
IMM GRANULOCYTES NFR BLD AUTO: 1 % (ref 0–2)
LYMPHOCYTES # BLD AUTO: 3.12 THOUSANDS/ΜL (ref 0.6–4.47)
LYMPHOCYTES NFR BLD AUTO: 30 % (ref 14–44)
MCH RBC QN AUTO: 29.6 PG (ref 26.8–34.3)
MCHC RBC AUTO-ENTMCNC: 32.7 G/DL (ref 31.4–37.4)
MCV RBC AUTO: 91 FL (ref 82–98)
MONOCYTES # BLD AUTO: 0.9 THOUSAND/ΜL (ref 0.17–1.22)
MONOCYTES NFR BLD AUTO: 9 % (ref 4–12)
NEUTROPHILS # BLD AUTO: 5.99 THOUSANDS/ΜL (ref 1.85–7.62)
NEUTS SEG NFR BLD AUTO: 57 % (ref 43–75)
NRBC BLD AUTO-RTO: 0 /100 WBCS
PLATELET # BLD AUTO: 283 THOUSANDS/UL (ref 149–390)
PMV BLD AUTO: 11.1 FL (ref 8.9–12.7)
POTASSIUM SERPL-SCNC: 3.9 MMOL/L (ref 3.5–5.3)
RBC # BLD AUTO: 5.51 MILLION/UL (ref 3.88–5.62)
SODIUM SERPL-SCNC: 136 MMOL/L (ref 136–145)
WBC # BLD AUTO: 10.32 THOUSAND/UL (ref 4.31–10.16)

## 2019-12-21 PROCEDURE — 93971 EXTREMITY STUDY: CPT | Performed by: SURGERY

## 2019-12-21 PROCEDURE — 93880 EXTRACRANIAL BILAT STUDY: CPT | Performed by: SURGERY

## 2019-12-21 PROCEDURE — 74176 CT ABD & PELVIS W/O CONTRAST: CPT

## 2019-12-21 PROCEDURE — 71250 CT THORAX DX C-: CPT

## 2019-12-21 PROCEDURE — 82948 REAGENT STRIP/BLOOD GLUCOSE: CPT

## 2019-12-21 PROCEDURE — 99232 SBSQ HOSP IP/OBS MODERATE 35: CPT | Performed by: INTERNAL MEDICINE

## 2019-12-21 PROCEDURE — 99232 SBSQ HOSP IP/OBS MODERATE 35: CPT | Performed by: THORACIC SURGERY (CARDIOTHORACIC VASCULAR SURGERY)

## 2019-12-21 PROCEDURE — 80048 BASIC METABOLIC PNL TOTAL CA: CPT | Performed by: STUDENT IN AN ORGANIZED HEALTH CARE EDUCATION/TRAINING PROGRAM

## 2019-12-21 PROCEDURE — 85025 COMPLETE CBC W/AUTO DIFF WBC: CPT | Performed by: STUDENT IN AN ORGANIZED HEALTH CARE EDUCATION/TRAINING PROGRAM

## 2019-12-21 PROCEDURE — 87081 CULTURE SCREEN ONLY: CPT | Performed by: PHYSICIAN ASSISTANT

## 2019-12-21 PROCEDURE — 93005 ELECTROCARDIOGRAM TRACING: CPT

## 2019-12-21 PROCEDURE — 94762 N-INVAS EAR/PLS OXIMTRY CONT: CPT

## 2019-12-21 RX ORDER — INSULIN GLARGINE 100 [IU]/ML
50 INJECTION, SOLUTION SUBCUTANEOUS
Status: DISCONTINUED | OUTPATIENT
Start: 2019-12-21 | End: 2019-12-22

## 2019-12-21 RX ORDER — INSULIN GLARGINE 100 [IU]/ML
45 INJECTION, SOLUTION SUBCUTANEOUS
Status: DISCONTINUED | OUTPATIENT
Start: 2019-12-21 | End: 2019-12-21

## 2019-12-21 RX ADMIN — METOPROLOL SUCCINATE 25 MG: 25 TABLET, EXTENDED RELEASE ORAL at 08:22

## 2019-12-21 RX ADMIN — INSULIN LISPRO 10 UNITS: 100 INJECTION, SOLUTION INTRAVENOUS; SUBCUTANEOUS at 08:00

## 2019-12-21 RX ADMIN — INSULIN LISPRO 3 UNITS: 100 INJECTION, SOLUTION INTRAVENOUS; SUBCUTANEOUS at 11:53

## 2019-12-21 RX ADMIN — INSULIN LISPRO 3 UNITS: 100 INJECTION, SOLUTION INTRAVENOUS; SUBCUTANEOUS at 06:16

## 2019-12-21 RX ADMIN — ENOXAPARIN SODIUM 40 MG: 40 INJECTION SUBCUTANEOUS at 21:02

## 2019-12-21 RX ADMIN — INSULIN GLARGINE 50 UNITS: 100 INJECTION, SOLUTION SUBCUTANEOUS at 21:32

## 2019-12-21 RX ADMIN — ATORVASTATIN CALCIUM 80 MG: 80 TABLET, FILM COATED ORAL at 17:25

## 2019-12-21 RX ADMIN — INSULIN LISPRO 4 UNITS: 100 INJECTION, SOLUTION INTRAVENOUS; SUBCUTANEOUS at 21:33

## 2019-12-21 RX ADMIN — AMLODIPINE BESYLATE 10 MG: 10 TABLET ORAL at 08:22

## 2019-12-21 RX ADMIN — Medication 1 APPLICATION: at 21:02

## 2019-12-21 RX ADMIN — INSULIN LISPRO 2 UNITS: 100 INJECTION, SOLUTION INTRAVENOUS; SUBCUTANEOUS at 17:38

## 2019-12-21 RX ADMIN — METOPROLOL SUCCINATE 25 MG: 25 TABLET, EXTENDED RELEASE ORAL at 17:25

## 2019-12-21 RX ADMIN — CHLORHEXIDINE GLUCONATE 0.12% ORAL RINSE 15 ML: 1.2 LIQUID ORAL at 08:22

## 2019-12-21 RX ADMIN — CHLORHEXIDINE GLUCONATE 0.12% ORAL RINSE 15 ML: 1.2 LIQUID ORAL at 21:02

## 2019-12-21 RX ADMIN — Medication 1 APPLICATION: at 08:22

## 2019-12-21 RX ADMIN — ENOXAPARIN SODIUM 40 MG: 40 INJECTION SUBCUTANEOUS at 08:22

## 2019-12-21 RX ADMIN — LISINOPRIL 40 MG: 20 TABLET ORAL at 08:22

## 2019-12-21 RX ADMIN — LABETALOL HYDROCHLORIDE 10 MG: 5 INJECTION INTRAVENOUS at 19:29

## 2019-12-21 RX ADMIN — ASPIRIN 81 MG 81 MG: 81 TABLET ORAL at 08:22

## 2019-12-21 NOTE — PROGRESS NOTES
This evening pt was scheduled for cardiac MRI and CT scan of abdomen and pelvis  Pt was adament to walk down with staff  No events occurred  Pt safe back in bed  Will continue to monitor

## 2019-12-21 NOTE — PROGRESS NOTES
INTERNAL MEDICINE RESIDENCY PROGRESS NOTE     Name: Denise Jiang  Age & Sex: 44 y o  male   MRN: [id number]  Unit/Bed#: [facility] 421-01   Encounter: [id number]  Team: SOD Team A    PATIENT INFORMATION     Name: Denise Jiang  Age & Sex: 44 y o  male   MRN: [id number] Stay Days: 2    ASSESSMENT/PLAN     Principal Problem:    NSTEMI (non-ST elevated myocardial infarction) [facility])  Active Problems:    Essential hypertension    Diabetes mellitus type 2, uncontrolled (HCC)    Snoring    Hyperlipidemia      * NSTEMI (non-ST elevated myocardial infarction) [facility])  Assessment & Plan  - Initially presented to [address] [place] [address] with one-week history of chest pain  - Initial EKG inverted T-waves in inferolateral leads    - Troponin peaked at 3 1   - ECHO: Mildly dilated left ventricular cavity, moderate concentric left ventricular hypertrophy, severely reduced left ventricular systolic function with mild global hypokinesis with some regional wall motion variation ejection fraction 45%, grade 2 diastolic dysfunction  Although there is no definite evidence of left ventricular mural thrombus cannot exclude early layering thrombus in the distal inferior, inferior lateral wall  - Cardiac catheterization: Mid LAD with diffuse 70 % stenosis, with   Mid circumflex 75 % stenosis, 2nd obtuse marginal 90 % stenosis, Mid RCA diffuse 50 % stenosis, Distal RCA 90 % stenosis, Right PDA 90 % stenosis  Global left ventricular function was moderately depressed  EF was 35 %  - Was started on aspirin, IV heparin, high-dose atorvastatin, loaded with ticagrelor  Heparin stopped, avoidance of any antiplatelets until seen by CT surgery for evaluation  - Transferred to [facility] for further evaluation for CABG by CT surgery  - Currently chest pain-free  No events telemetry  Plan:  - Cardiac MRI pending official read    - Continue Atorvastatin 80 mg q h s   - Aspirin 81 mg  - Toprol XL 25 mg b i d   - Lisinopril 40 mg daily  - Undergoing evaluation for surgical revascularization per CT surgery, likely Monday 12/23         Hyperlipidemia  Assessment & Plan  - Total cholesterol 166, triglycerides 132, HDL 27,   - Continue atorvastatin 80 mg q h s  Snoring  Assessment & Plan  - History of snoring, wakes up multiple times throughout the night, sometimes with headache     - Stop Bang score 7, high risk for sleep apnea  - Overnight pulse oximetry  - Recommend outpatient sleep study      Diabetes mellitus type 2, uncontrolled (HCC)  Assessment & Plan  Lab Results   Component Value Date    HGBA1C 10 1 (H) 12/18/2019       Recent Labs     12/19/19  1656 12/19/19  2207 12/20/19  0557 12/20/19  1107   POCGLU 252* 212* 247* 346*       Blood Sugar Average: Last 72 hrs:  (P) 346     - Hemoglobin A1c from 12/18/2019 10 1%  - Diagnosed in 2013, was placed on metformin patient admits to noncompliance secondary to financial issues  - Continue to monitor with POCT glucose checks  - Cardiac/carbohydrate level 2 diet  - Lantus 40 units q h s   - Humalog 10 units t i d  With meals  - Correctional insulin algorithm 3    Essential hypertension  Assessment & Plan  - Blood pressure 962'Y systolic, somewhat controlled  - Continue amlodipine 10 mg q d   - Lisinopril 40 mg q d   - Toprol XL 25 mg b i d  Disposition:  Inpatient undergoing evaluation for surgical revascularization  Cardiac MRI to rule out LV thrombus pending official read  SUBJECTIVE     Patient seen and examined  No acute events overnight  Denies any chest pain, shortness of breath, palpitations, cough, lightheadedness or dizziness  Patient is anxious, p r n  Xanax was added yesterday       OBJECTIVE     Vitals:    12/21/19 0045 12/21/19 0440 12/21/19 0736 12/21/19 1026   BP:  138/99 137/93 160/98   BP Location:  Left arm Left arm Left arm   Pulse:  89 85 87   Resp:  18 18 18   Temp:  98 3 °F (36 8 °C) 98 °F (36 7 °C) 97 6 °F (36 4 °C) TempSrc:  Oral Oral Oral   SpO2: 98% 98% 97% 97%   Weight:       Height:          Temperature:   Temp (24hrs), Av 9 °F (36 6 °C), Min:97 6 °F (36 4 °C), Max:98 3 °F (36 8 °C)    Temperature: 97 6 °F (36 4 °C)  Intake & Output:  I/O        07 -  0700  07 -  0700  07 -  0700    P  O  0 880 120    Total Intake(mL/kg) 0 (0) 880 (7) 120 (1)    Urine (mL/kg/hr) 500 350 (0 1)     Total Output 500 350     Net -500 +530 +120               Weights:   IBW: 70 7 kg    Body mass index is 40 61 kg/m²  Weight (last 2 days)     Date/Time   Weight    19   125 (275)            Physical Exam   Constitutional: He is oriented to person, place, and time  He appears well-developed and well-nourished  HENT:   Head: Normocephalic and atraumatic  Eyes: Pupils are equal, round, and reactive to light  EOM are normal    Neck: Normal range of motion  Neck supple  No JVD present  Cardiovascular: Normal rate and regular rhythm  Exam reveals no friction rub  No murmur heard  Pulmonary/Chest: Effort normal  No respiratory distress  He has no wheezes  He has no rales  Abdominal: Soft  He exhibits no distension  There is no tenderness  Musculoskeletal: Normal range of motion  He exhibits no edema  Neurological: He is alert and oriented to person, place, and time  Skin: Skin is warm  LABORATORY DATA     Labs: I have personally reviewed pertinent reports    Results from last 7 days   Lab Units 19  0440 19  1629   WBC Thousand/uL 10 32* 10 84*   HEMOGLOBIN g/dL 16 3 16 3   HEMATOCRIT % 49 9* 48 2   PLATELETS Thousands/uL 283 273   NEUTROS PCT % 57 65   MONOS PCT % 9 8      Results from last 7 days   Lab Units 19  0440 19  1629   POTASSIUM mmol/L 3 9 4 0   CHLORIDE mmol/L 104 97*   CO2 mmol/L 29 29   BUN mg/dL 21 15   CREATININE mg/dL 0 91 1 18   CALCIUM mg/dL 9 5 9 5   ALK PHOS U/L  --  89   ALT U/L  --  30   AST U/L  --  19              Results from last 7 days   Lab Units 12/19/19  0823 12/18/19  2356 12/18/19  1629   INR   --   --  1 03   PTT seconds 37 25 30         Results from last 7 days   Lab Units 12/19/19  0544 12/19/19  0216 12/18/19  2314   TROPONIN I ng/mL 2 83* 3 11* 2 57*       IMAGING & DIAGNOSTIC TESTING     Radiology Results: I have personally reviewed pertinent reports  Ct Chest Abdomen Pelvis Wo Contrast    Result Date: 12/21/2019  Impression: 1  No acute intrathoracic abnormality  2   Mild hepatic steatosis  3   Thick-walled bladder likely sequela of underdistention  No perivesical inflammation  Workstation performed: [id number]     Other Diagnostic Testing: I have personally reviewed pertinent reports      ACTIVE MEDICATIONS     Current Facility-Administered Medications   Medication Dose Route Frequency    ALPRAZolam (XANAX) tablet 0 25 mg  0 25 mg Oral BID PRN    amLODIPine (NORVASC) tablet 10 mg  10 mg Oral Daily    aspirin chewable tablet 81 mg  81 mg Oral Daily    atorvastatin (LIPITOR) tablet 80 mg  80 mg Oral Daily With Dinner    chlorhexidine (PERIDEX) 0 12 % oral rinse 15 mL  15 mL Swish & Spit Q12H [address]    enoxaparin (LOVENOX) subcutaneous injection 40 mg  40 mg Subcutaneous Q12H KENJI    insulin glargine (LANTUS) subcutaneous injection 45 Units 0 45 mL  45 Units Subcutaneous HS    insulin lispro (HumaLOG) 100 units/mL subcutaneous injection 1-6 Units  1-6 Units Subcutaneous 4x Daily (AC & HS)    insulin lispro (HumaLOG) 100 units/mL subcutaneous injection 15 Units  15 Units Subcutaneous TID With Meals    Labetalol HCl (NORMODYNE) injection 10 mg  10 mg Intravenous Q4H PRN    lisinopril (ZESTRIL) tablet 40 mg  40 mg Oral Daily    metoprolol succinate (TOPROL-XL) 24 hr tablet 25 mg  25 mg Oral BID    mupirocin (BACTROBAN) 2 % nasal ointment 1 application  1 application Nasal E17O [address]    ondansetron (ZOFRAN) injection 4 mg  4 mg Intravenous Q6H PRN       VTE Pharmacologic Prophylaxis: Enoxaparin (Lovenox)  VTE Mechanical Prophylaxis: sequential compression device    Portions of the record may have been created with voice recognition software  Occasional wrong word or "sound a like" substitutions may have occurred due to the inherent limitations of voice recognition software    Read the chart carefully and recognize, using context, where substitutions have occurred   ==  Lance Aviles MD  [address]  Internal Medicine Residency PGY-1

## 2019-12-21 NOTE — PROGRESS NOTES
Progress Note - Cardiothoracic Surgery   [facility]  44 y o  male MRN: [id number]  Unit/Bed#: [facility] 421-01 Encounter: [id number]      24 Hour Events: Preop imaging complete  Cardiac MRI results pending  Medications:   Scheduled Meds:  Current Facility-Administered Medications:  ALPRAZolam 0 25 mg Oral BID PRN Ольга Matthews DO   amLODIPine 10 mg Oral Daily Paul Zaragoza MD   aspirin 81 mg Oral Daily Paul Zaragoza MD   atorvastatin 80 mg Oral Daily With Charlette Mata MD   chlorhexidine 15 mL Swish & Spit Q12H [address] Taz Atkins PA-C   enoxaparin 40 mg Subcutaneous Q12H [address] Latesha Sims MD   insulin glargine 45 Units Subcutaneous HS Italo Dumas MD   insulin lispro 1-6 Units Subcutaneous 4x Daily (AC & HS) Paul Zaragoza MD   insulin lispro 15 Units Subcutaneous TID With Meals Italo Dumas MD   Labetalol HCl 10 mg Intravenous Q4H PRN Latesha Sims MD   lisinopril 40 mg Oral Daily Paul Zaragoza MD   metoprolol succinate 25 mg Oral BID Analy Walker PA-C   mupirocin 1 application Nasal P40D [address] Taz Atkins PA-C   ondansetron 4 mg Intravenous Q6H PRN Paul Zaragoza MD     Continuous Infusions:     Results:   Results from last 7 days   Lab Units 12/21/19  0440 12/18/19  1629   WBC Thousand/uL 10 32* 10 84*   HEMOGLOBIN g/dL 16 3 16 3   HEMATOCRIT % 49 9* 48 2   PLATELETS Thousands/uL 283 273     Results from last 7 days   Lab Units 12/21/19  0440 12/18/19  1629   POTASSIUM mmol/L 3 9 4 0   CHLORIDE mmol/L 104 97*   CO2 mmol/L 29 29   BUN mg/dL 21 15   CREATININE mg/dL 0 91 1 18   CALCIUM mg/dL 9 5 9 5     Results from last 7 days   Lab Units 12/19/19  0823 12/18/19  2356 12/18/19  1629   INR   --   --  1 03   PTT seconds 37 25 30       Studies:     Cardiac Catheterization:   CORONARY CIRCULATION:  Mid LAD: There was a diffuse 70 % stenosis, with hemodynamically significant iFR  Mid circumflex: There was a 75 % stenosis  2nd obtuse marginal: There was a 90 % stenosis    Mid RCA: There was a diffuse 50 % stenosis  Distal RCA: There was a 90 % stenosis  Right PDA: There was a 90 % stenosis  Echocardiogram:   SUMMARY     LEFT VENTRICLE:  Mildly dilated left ventricular cavity, moderate concentric left ventricular hypertrophy, severely reduced left ventricular systolic function mild global hypokinesis with some regional wall motion variation  Although there is no definite  evidence of left ventricular thrombus, cannot definitively exclude layering thrombus formation in inferior-apical and distal inferior-lateral regions  Grade 2 diastolic dysfunction  Elevated left atrial pressure      RIGHT VENTRICLE:  Normal right ventricular size and systolic function  Right ventricular systolic pressure could not be estimated due to poor Doppler signals across the tricuspid valve      LEFT ATRIUM:  Mild left atrial cavity enlargement  Intact interatrial septum      RIGHT ATRIUM:  Normal right atrial cavity size      MITRAL VALVE:  Mitral valve leaflet sclerosis with some focal calcification, adequate leaflet mobility  Trace mitral valve regurgitation      AORTIC VALVE:  Tricuspid aortic valve sclerosis and focal calcification which is most pronounced on the right coronary cusp there is adequate cuspal separation  Trace aortic valve regurgitation noted      TRICUSPID VALVE:  Trace tricuspid valve regurgitation      PULMONIC VALVE:  No obvious pulmonic valve regurgitation      AORTA:  Normal aortic root size  Proximal ascending aorta was not well visualized      IVC, HEPATIC VEINS:  Tricuspid aortic valve with mild sclerosis  No aortic valve stenosis or regurgitation      PERICARDIUM:  No pericardial effusion  Carotid artery duplex:   < 50% right carotid stenosis  Vertebral artery flow is antegrade and There is no significant subclavian artery   < 50% left carotid stenosis   Vertebral artery flow is antegrade and There is no significant subclavian artery    Greater saphenous vein mapping: Adequate conduit for CABG, bilaterally    CT c/a/p:  IMPRESSION:  1  No acute intrathoracic abnormality  2   Mild hepatic steatosis  3   Thick-walled bladder likely sequela of underdistention  No perivesical inflammation  Vitals:   Vitals:    12/21/19 0045 12/21/19 0440 12/21/19 0736 12/21/19 1026   BP:  138/99 137/93 160/98   BP Location:  Left arm Left arm Left arm   Pulse:  89 85 87   Resp:  18 18 18   Temp:  98 3 °F (36 8 °C) 98 °F (36 7 °C) 97 6 °F (36 4 °C)   TempSrc:  Oral Oral Oral   SpO2: 98% 98% 97% 97%   Weight:       Height:           Physical Exam:    HEENT/NECK:  PERRLA  No jugular venous distention  Cardiac: Regular rate and rhythm and No murmurs/rubs/gallops  Pulmonary:  Breath sounds clear bilaterally and No rales/rhonchi/wheezes  Abdomen:  Non-tender, Non-distended and Normal bowel sounds  Extremities: Extremities warm/dry and No edema B/L  Neuro: Alert and oriented X 3, Sensation is grossly intact and No focal deficits  Skin: Warm/Dry, without rashes or lesions  Assessment:    Severe coronary artery disease;  Ongoing CABG workup    Plan:  Pre-op work up underway   Cardiac MRI results pending  Continue Mupirocin 2% nasal ointment q 12 hrs  Continue topical chlorhexidine bath and mouth rise  coronary artery bypass grafting timing TBD with ISI Groves Josh: Errol Gleason PA-C  DATE: December 21, 2019  TIME: 2:19 PM

## 2019-12-21 NOTE — PROGRESS NOTES
Progress Note - Nelson Holman  44 y o  male MRN: [id number]    Unit/Bed#: [facility] 421-01 Encounter: [id number]      CC: diabetes f/u    Subjective:   Nelson Holman  is a 44y o  year old male with type 2 diabetes  Feels well  No complaints  No hypoglycemia  Objective:     Vitals: Blood pressure 160/98, pulse 87, temperature 97 6 °F (36 4 °C), temperature source Oral, resp  rate 18, height 5' 9" (1 753 m), weight 125 kg (275 lb), SpO2 97 %  ,Body mass index is 40 61 kg/m²  Intake/Output Summary (Last 24 hours) at 12/21/2019 1104  Last data filed at 12/21/2019 0806  Gross per 24 hour   Intake 1000 ml   Output 350 ml   Net 650 ml       Physical Exam:  General Appearance: awake, appears stated age and cooperative  Head: Normocephalic, without obvious abnormality, atraumatic  Extremities: moves all extremities  Skin: Skin color and temperature normal    Pulm: no labored breathing    Lab, Imaging and other studies: I have personally reviewed pertinent reports  POC Glucose (mg/dl)   Date Value   12/21/2019 236 (H)   12/21/2019 232 (H)   12/20/2019 219 (H)   12/20/2019 198 (H)   12/20/2019 346 (H)   12/20/2019 247 (H)   12/19/2019 212 (H)   12/19/2019 252 (H)   12/19/2019 248 (H)   12/19/2019 253 (H)       Assessment:  diabetes with hyperglycemia    Plan:  Type 2 diabetes with hyperglycemia - most recent A1c 10 1  I will increase Lantus to 45 units in the evening and Humalog to 15 units with meals  I will continue insulin sliding scale and diabetic diet  Non STEMI MI - patient is undergoing evaluation by cardiac surgery  Portions of the record may have been created with voice recognition software

## 2019-12-22 ENCOUNTER — ANESTHESIA EVENT (INPATIENT)
Dept: PERIOP | Facility: HOSPITAL | Age: 39
DRG: 235 | End: 2019-12-22
Payer: COMMERCIAL

## 2019-12-22 LAB
ABO GROUP BLD: NORMAL
ATRIAL RATE: 91 BPM
BLD GP AB SCN SERPL QL: NEGATIVE
GLUCOSE SERPL-MCNC: 128 MG/DL (ref 65–140)
GLUCOSE SERPL-MCNC: 130 MG/DL (ref 65–140)
GLUCOSE SERPL-MCNC: 146 MG/DL (ref 65–140)
GLUCOSE SERPL-MCNC: 200 MG/DL (ref 65–140)
GLUCOSE SERPL-MCNC: 219 MG/DL (ref 65–140)
HIV 1+2 AB+HIV1 P24 AG SERPL QL IA: NORMAL
MRSA NOSE QL CULT: NORMAL
P AXIS: -2 DEGREES
PR INTERVAL: 134 MS
QRS AXIS: 92 DEGREES
QRSD INTERVAL: 104 MS
QT INTERVAL: 370 MS
QTC INTERVAL: 455 MS
RH BLD: POSITIVE
SPECIMEN EXPIRATION DATE: NORMAL
T WAVE AXIS: 223 DEGREES
VENTRICULAR RATE: 91 BPM

## 2019-12-22 PROCEDURE — 93010 ELECTROCARDIOGRAM REPORT: CPT | Performed by: INTERNAL MEDICINE

## 2019-12-22 PROCEDURE — 86850 RBC ANTIBODY SCREEN: CPT | Performed by: PHYSICIAN ASSISTANT

## 2019-12-22 PROCEDURE — NC001 PR NO CHARGE: Performed by: PHYSICIAN ASSISTANT

## 2019-12-22 PROCEDURE — 86920 COMPATIBILITY TEST SPIN: CPT

## 2019-12-22 PROCEDURE — 86900 BLOOD TYPING SEROLOGIC ABO: CPT | Performed by: PHYSICIAN ASSISTANT

## 2019-12-22 PROCEDURE — 99232 SBSQ HOSP IP/OBS MODERATE 35: CPT | Performed by: INTERNAL MEDICINE

## 2019-12-22 PROCEDURE — 86901 BLOOD TYPING SEROLOGIC RH(D): CPT | Performed by: PHYSICIAN ASSISTANT

## 2019-12-22 PROCEDURE — 82948 REAGENT STRIP/BLOOD GLUCOSE: CPT

## 2019-12-22 PROCEDURE — 99231 SBSQ HOSP IP/OBS SF/LOW 25: CPT | Performed by: INTERNAL MEDICINE

## 2019-12-22 RX ORDER — HEPARIN SODIUM 1000 [USP'U]/ML
400 INJECTION, SOLUTION INTRAVENOUS; SUBCUTANEOUS ONCE
Status: CANCELLED | OUTPATIENT
Start: 2019-12-23

## 2019-12-22 RX ORDER — HEPARIN SODIUM 5000 [USP'U]/ML
5000 INJECTION, SOLUTION INTRAVENOUS; SUBCUTANEOUS EVERY 12 HOURS SCHEDULED
Status: DISCONTINUED | OUTPATIENT
Start: 2019-12-22 | End: 2019-12-23 | Stop reason: HOSPADM

## 2019-12-22 RX ORDER — METOPROLOL SUCCINATE 25 MG/1
37.5 TABLET, EXTENDED RELEASE ORAL 2 TIMES DAILY
Status: DISCONTINUED | OUTPATIENT
Start: 2019-12-22 | End: 2019-12-23 | Stop reason: HOSPADM

## 2019-12-22 RX ORDER — HEPARIN SODIUM 1000 [USP'U]/ML
10000 INJECTION, SOLUTION INTRAVENOUS; SUBCUTANEOUS ONCE
Status: CANCELLED | OUTPATIENT
Start: 2019-12-23

## 2019-12-22 RX ORDER — CHLORHEXIDINE GLUCONATE 0.12 MG/ML
15 RINSE ORAL EVERY 12 HOURS SCHEDULED
Status: DISCONTINUED | OUTPATIENT
Start: 2019-12-22 | End: 2019-12-23 | Stop reason: HOSPADM

## 2019-12-22 RX ADMIN — Medication 1 APPLICATION: at 22:04

## 2019-12-22 RX ADMIN — ASPIRIN 81 MG 81 MG: 81 TABLET ORAL at 08:40

## 2019-12-22 RX ADMIN — METOPROLOL SUCCINATE 37.5 MG: 25 TABLET, EXTENDED RELEASE ORAL at 17:04

## 2019-12-22 RX ADMIN — INSULIN LISPRO 4 UNITS: 100 INJECTION, SOLUTION INTRAVENOUS; SUBCUTANEOUS at 11:32

## 2019-12-22 RX ADMIN — CHLORHEXIDINE GLUCONATE 0.12% ORAL RINSE 15 ML: 1.2 LIQUID ORAL at 22:04

## 2019-12-22 RX ADMIN — Medication 1 APPLICATION: at 08:42

## 2019-12-22 RX ADMIN — HEPARIN SODIUM 5000 UNITS: 5000 INJECTION INTRAVENOUS; SUBCUTANEOUS at 22:04

## 2019-12-22 RX ADMIN — CHLORHEXIDINE GLUCONATE 0.12% ORAL RINSE 15 ML: 1.2 LIQUID ORAL at 08:39

## 2019-12-22 RX ADMIN — METOPROLOL SUCCINATE 25 MG: 25 TABLET, EXTENDED RELEASE ORAL at 08:41

## 2019-12-22 RX ADMIN — ATORVASTATIN CALCIUM 80 MG: 80 TABLET, FILM COATED ORAL at 16:56

## 2019-12-22 RX ADMIN — LISINOPRIL 40 MG: 20 TABLET ORAL at 08:40

## 2019-12-22 RX ADMIN — INSULIN LISPRO 4 UNITS: 100 INJECTION, SOLUTION INTRAVENOUS; SUBCUTANEOUS at 06:00

## 2019-12-22 RX ADMIN — AMLODIPINE BESYLATE 10 MG: 10 TABLET ORAL at 08:40

## 2019-12-22 RX ADMIN — ALPRAZOLAM 0.25 MG: 0.25 TABLET ORAL at 22:04

## 2019-12-22 RX ADMIN — ENOXAPARIN SODIUM 40 MG: 40 INJECTION SUBCUTANEOUS at 08:40

## 2019-12-22 RX ADMIN — SODIUM CHLORIDE 2 UNITS/HR: 9 INJECTION, SOLUTION INTRAVENOUS at 22:29

## 2019-12-22 NOTE — PROGRESS NOTES
Progress Note - Chloe See  44 y o  male MRN: [id number]    Unit/Bed#: [facility] 421-01 Encounter: [id number]      CC: diabetes f/u    Subjective:   Chloe See  is a 44y o  year old male with type 2 diabetes  Feels well  No complaints  No hypoglycemia  Objective:     Vitals: Blood pressure 141/90, pulse 89, temperature 98 °F (36 7 °C), temperature source Oral, resp  rate 18, height 5' 9" (1 753 m), weight 125 kg (275 lb), SpO2 98 %  ,Body mass index is 40 61 kg/m²  Intake/Output Summary (Last 24 hours) at 12/22/2019 1142  Last data filed at 12/22/2019 5144  Gross per 24 hour   Intake 360 ml   Output --   Net 360 ml       Physical Exam:  General Appearance: awake, appears stated age and cooperative  Head: Normocephalic, without obvious abnormality, atraumatic  Extremities: moves all extremities  Skin: Skin color and temperature normal    Pulm: no labored breathing    Lab, Imaging and other studies: I have personally reviewed pertinent reports  POC Glucose (mg/dl)   Date Value   12/22/2019 219 (H)   12/22/2019 200 (H)   12/21/2019 272 (H)   12/21/2019 171 (H)   12/21/2019 236 (H)   12/21/2019 232 (H)   12/20/2019 219 (H)   12/20/2019 198 (H)   12/20/2019 346 (H)   12/20/2019 247 (H)       Assessment:  diabetes with hyperglycemia    Plan:  Type 2 diabetes with hyperglycemia - most recent A1c 10 1  I will continue Lantus 50 units and Humalog 12 units with meals  Patient is scheduled to have coronary artery bypass graft surgery tomorrow  He will be started on insulin drip perioperatively  I will follow up with patient after his surgery  Non STEMI MI - patient is scheduled for CABG with possible valve repair on 12/23      Portions of the record may have been created with voice recognition software

## 2019-12-22 NOTE — PROGRESS NOTES
Heart Failure/ Pulmonary Hypertension Progress Note - Artur Henry  44 y o  male MRN: [id number]    Unit/Bed#: [facility] 421-01 Encounter: [id number]      Assessment:    Principal Problem:    NSTEMI (non-ST elevated myocardial infarction) ([facility] [address] )  Active Problems:    Essential hypertension    Diabetes mellitus type 2, uncontrolled (HCC)    Snoring    Hyperlipidemia      Subjective:   Patient seen and examined  No significant events overnight  Objective:   CMR- no thrombus seen  Intake/ Output:  Not done  Weight: 275 lbs  Tele:     # NSTEMI type 1  pk trop 3 1  [facility] 12/19/19 - mLAD 70%, mLCx 75%, OM2 90%, mRCA 50%, dRCA 90%,rPDA 90%  - Plavix and Ticagrelor on hold in anticipation of surgery    # New HFrEF, Stage C, NYHA  Etiology: presumably iCM mixed with NICM- HTN on admit    Echocardiogram 12/19/19  LVEF: 30%, no thrombus on my view- apex contracts  LVIDd: 5 7 cm  RV: normal  MR:    Neurohormonal Blockade:  --Beta-Blocker: metoprolol succinate 25 mg BID  --ACEi, ARB or ARNi: lisinopril 40 mg daily    (or SVR reduction)  --Aldosterone Receptor Blocker:  --Diuretic:    Sudden Cardiac Death Risk Reduction:  --ICD: eventual LifeVest    Electrical Resynchronization:  Narrow QRS    # HTN- poorly controlled on admit  Meds: amlodipine 10 mg, lisinopril 40 mg, lopressor 25 mg BID    # Dyslipidemia  , , HDL 27,   Started on atorvastatin 80 mg initiated  # DM2- HgA1C 10 1%  # Obesity  # probable JANKI  # hx of med non compliance    Plan:  Recommend CABG  Up titrate metoprolol    Central Line (day, reason): Santamaria catheter (day, reason):    Vitals: Blood pressure 141/90, pulse 89, temperature 98 °F (36 7 °C), temperature source Oral, resp  rate 18, height 5' 9" (1 753 m), weight 125 kg (275 lb), SpO2 98 %  , Body mass index is 40 61 kg/m² , I/O last 3 completed shifts:   In: 36 [P O :760]  Out: 350 [Urine:350]  I/O this shift:  In: 240 [P O :240]  Out: -   Wt Readings from Last 3 Encounters: 12/19/19 125 kg (275 lb)   12/20/19 125 kg (275 lb)   12/18/19 125 kg (276 lb 3 8 oz)       Intake/Output Summary (Last 24 hours) at 12/22/2019 1225  Last data filed at 12/22/2019 1207  Gross per 24 hour   Intake 360 ml   Output --   Net 360 ml     I/O last 3 completed shifts: In: 36 [P O :760]  Out: 350 [Urine:350]    No significant arrhythmias seen on telemetry review  Physical Exam:  Vitals:    12/21/19 2229 12/22/19 0244 12/22/19 0700 12/22/19 1042   BP: 140/91 131/82 142/92 141/90   BP Location: Right arm Right arm Left arm Left arm   Pulse: 87 93 94 89   Resp: 18 18 18 18   Temp: 97 5 °F (36 4 °C) 97 6 °F (36 4 °C) 98 °F (36 7 °C) 98 °F (36 7 °C)   TempSrc: Oral Oral Oral Oral   SpO2: 97% 98% 98% 98%   Weight:       Height:           GEN: Vernon Mckeon   appears well, alert and oriented x 3, pleasant and cooperative   HEENT: pupils equal, round, and reactive to light; extraocular muscles intact  NECK: supple, no carotid bruits   HEART: regular rhythm, normal S1 and S2, no murmurs, clicks, gallops or rubs, JVP is    LUNGS: clear to auscultation bilaterally; no wheezes, rales, or rhonchi   ABDOMEN: normal bowel sounds, soft, no tenderness, no distention  EXTREMITIES: peripheral pulses normal; no clubbing, cyanosis, or edema  NEURO: no focal findings   SKIN: normal without suspicious lesions on exposed skin      Current Facility-Administered Medications:     ALPRAZolam (XANAX) tablet 0 25 mg, 0 25 mg, Oral, BID PRN, Oly Quiroz DO, 0 25 mg at 12/20/19 2974    amLODIPine (NORVASC) tablet 10 mg, 10 mg, Oral, Daily, Karis Fierro MD, 10 mg at 12/22/19 0840    aspirin chewable tablet 81 mg, 81 mg, Oral, Daily, Karis Fierro MD, 81 mg at 12/22/19 0840    atorvastatin (LIPITOR) tablet 80 mg, 80 mg, Oral, Daily With Xi Odilon, MD, 80 mg at 12/21/19 6628    chlorhexidine (PERIDEX) 0 12 % oral rinse 15 mL, 15 mL, [employer] [place], Q12H [address], Debbie Rollins PA-C, 15 mL at 12/22/19 9533   chlorhexidine (PERIDEX) 0 12 % oral rinse 15 mL, 15 mL, [employer], Q12H [address], Sabina Denis PA-C    heparin (porcine) subcutaneous injection 5,000 Units, 5,000 Units, Subcutaneous, Q12H KENJI, Sabina Denis PA-C    insulin glargine (LANTUS) subcutaneous injection 50 Units 0 5 mL, 50 Units, Subcutaneous, HS, Laureano Aleman MD, 50 Units at 12/21/19 2132    insulin lispro (HumaLOG) 100 units/mL subcutaneous injection 1-6 Units, 1-6 Units, Subcutaneous, HS, Laureano Aleman MD, 4 Units at 12/21/19 2133    insulin lispro (HumaLOG) 100 units/mL subcutaneous injection 12 Units, 12 Units, Subcutaneous, TID With Meals, Laureano Aleman MD, 12 Units at 12/22/19 1133    insulin lispro (HumaLOG) 100 units/mL subcutaneous injection 2-12 Units, 2-12 Units, Subcutaneous, TID AC, 4 Units at 12/22/19 1132 **AND** Fingerstick Glucose (POCT), , , TID AC, Laureano Aleman MD    Labetalol HCl (NORMODYNE) injection 10 mg, 10 mg, Intravenous, Q4H PRN, Kapil Ham MD, 10 mg at 12/21/19 1929    lisinopril (ZESTRIL) tablet 40 mg, 40 mg, Oral, Daily, Kerwin Bateman MD, 40 mg at 12/22/19 0840    metoprolol succinate (TOPROL-XL) 24 hr tablet 25 mg, 25 mg, Oral, BID, Jero Donaldson PA-C, 25 mg at 12/22/19 0841    [START ON 12/23/2019] metoprolol tartrate (LOPRESSOR) partial tablet 12 5 mg, 12 5 mg, Oral, Once, Crystal Lam PA-C    mupirocin (BACTROBAN) 2 % nasal ointment 1 application, 1 application, Nasal, D[address]G [address], Kristopher Teddy Atkins PA-C, 1 application at 71/88/15 0842    mupirocin (BACTROBAN) 2 % nasal ointment 1 application, 1 application, Nasal, Once, Sabina Denis PA-C    ondansetron (ZOFRAN) injection 4 mg, 4 mg, Intravenous, Q6H PRN, Kerwin Bateman MD      Labs & Results:    Results from last 7 days   Lab Units 12/19/19  0544 12/19/19  0216 12/18/19  2314   TROPONIN I ng/mL 2 83* 3 11* 2 57*     Results from last 7 days   Lab Units 12/21/19  0440 12/18/19  1629   WBC Thousand/uL 10 32* 10 84*   HEMOGLOBIN g/dL 16 3 16 3   HEMATOCRIT % 49 9* 48 2   PLATELETS Thousands/uL 283 273         Results from last 7 days   Lab Units 12/21/19  0440 12/18/19  1629   POTASSIUM mmol/L 3 9 4 0   CHLORIDE mmol/L 104 97*   CO2 mmol/L 29 29   BUN mg/dL 21 15   CREATININE mg/dL 0 91 1 18   CALCIUM mg/dL 9 5 9 5   ALK PHOS U/L  --  89   ALT U/L  --  30   AST U/L  --  19     Results from last 7 days   Lab Units 12/18/19  1629   INR  1 03       Counseling / Coordination of Care  Total floor / unit time spent today 20 minutes  Greater than 50% of total time was spent with the patient and / or family counseling and / or coordination of care  A description of the counseling / coordination of care: 10  Thank you for the opportunity to participate in the care of this patient  Joseph ROSARIO    Director Heart Failure/ Medical Director [facility]

## 2019-12-22 NOTE — RESPIRATORY THERAPY NOTE
RT Protocol Note  Gladys Polanco  44 y o  male MRN: [id number]  Unit/Bed#: [facility] 421-01 Encounter: [id number]    Assessment    Principal Problem:    NSTEMI (non-ST elevated myocardial infarction) ([place] [address] )  Active Problems:    Essential hypertension    Diabetes mellitus type 2, uncontrolled ([place] [address] )    Snoring    Hyperlipidemia      Home Pulmonary Medications:  none       Past Medical History:   Diagnosis Date    CAD (coronary artery disease)     Diabetes mellitus ([place] [address] )     HLD (hyperlipidemia)     Hypertension      Social History     Socioeconomic History    Marital status: Single     Spouse name: None    Number of children: None    Years of education: None    Highest education level: None   Occupational History    None   Social Needs    Financial resource strain: None    Food insecurity:     Worry: None     Inability: None    Transportation needs:     Medical: None     Non-medical: None   Tobacco Use    Smoking status: Never Smoker    Smokeless tobacco: Never Used   Substance and Sexual Activity    Alcohol use: Never     Frequency: Never     Binge frequency: Never    Drug use: Yes     Types: Marijuana    Sexual activity: Yes     Partners: Female   Lifestyle    Physical activity:     Days per week: None     Minutes per session: None    Stress: None   Relationships    Social connections:     Talks on phone: None     Gets together: None     Attends Spiritism service: None     Active member of club or organization: None     Attends meetings of clubs or organizations: None     Relationship status: None    Intimate partner violence:     Fear of current or ex partner: None     Emotionally abused: None     Physically abused: None     Forced sexual activity: None   Other Topics Concern    None   Social History Narrative    None       Subjective         Objective    Physical Exam:   Assessment Type: (P) Assess only  General Appearance: (P) Awake  Respiratory Pattern: (P) Normal  Chest Assessment: (P) Chest expansion symmetrical  Bilateral Breath Sounds: (P) Clear    Vitals:  Blood pressure 150/85, pulse 86, temperature 98 2 °F (36 8 °C), temperature source Oral, resp  rate 18, height 5' 9" (1 753 m), weight 125 kg (275 lb), SpO2 98 %  Imaging and other studies: I have personally reviewed pertinent reports  Plan    Respiratory Plan: (P) No distress/Pulmonary history        Resp Comments: (P) Pt admitted for bypass surgery tomorrow  Pt denies any pulmonary diseases nor takes any breathing medications at home  BS clear   Will remain pt on resp protocol for surgery in am

## 2019-12-22 NOTE — PROGRESS NOTES
Progress Note - Cardiothoracic Surgery   [facility]  44 y o  male MRN: [id number]  Unit/Bed#: [facility] 421-01 Encounter: [id number]      24 Hour Events: Cardiac MRI shows viability  Ready for OR tomorrow      Medications:   Scheduled Meds:    Current Facility-Administered Medications:  ALPRAZolam 0 25 mg Oral BID PRN Jose De Jseus Galindo DO   amLODIPine 10 mg Oral Daily Army Elisabeth MD   aspirin 81 mg Oral Daily Army Elisabeth MD   atorvastatin 80 mg Oral Daily With Tenzin Vasquez MD   chlorhexidine 15 mL Swish [employer] Spit Q12H [address] Poonam Granado   chlorhexidine 15 mL Swish [employer] Spit Q12H [address] Sabina Denis PA-C   heparin (porcine) 5,000 Units Subcutaneous Q12H [address] Sabina Denis PA-C   insulin glargine 50 Units Subcutaneous HS Casey Rocha MD   insulin lispro 1-6 Units Subcutaneous HS Casey Rocha MD   insulin lispro 12 Units Subcutaneous TID With Meals Casey Rocha MD   insulin lispro 2-12 Units Subcutaneous TID AC Casey Rocha MD   Labetalol HCl 10 mg Intravenous Q4H PRN Saundra Jama MD   lisinopril 40 mg Oral Daily Army Elisabeth MD   metoprolol succinate 37 5 mg Oral BID Rusty Delarosa DO   [START ON 12/23/2019] metoprolol tartrate 12 5 mg Oral Once M D MAX Wood PA-C   mupirocin 1 application Nasal K[address]M [address] Jed Atkins PA-C   mupirocin 1 application Nasal Once Sabina Denis PA-C   ondansetron 4 mg Intravenous Q6H PRN Army Elisabeth MD     Continuous Infusions:     Results:   Results from last 7 days   Lab Units 12/21/19  0440 12/18/19  1629   WBC Thousand/uL 10 32* 10 84*   HEMOGLOBIN g/dL 16 3 16 3   HEMATOCRIT % 49 9* 48 2   PLATELETS Thousands/uL 283 273     Results from last 7 days   Lab Units 12/21/19  0440 12/18/19  1629   POTASSIUM mmol/L 3 9 4 0   CHLORIDE mmol/L 104 97*   CO2 mmol/L 29 29   BUN mg/dL 21 15   CREATININE mg/dL 0 91 1 18   CALCIUM mg/dL 9 5 9 5     Results from last 7 days   Lab Units 12/19/19  0823 12/18/19  6566 12/18/19  1629   INR   --   -- 1 03   PTT seconds 37 25 30       Studies:     Cardiac Catheterization:   CORONARY CIRCULATION:  Mid LAD: There was a diffuse 70 % stenosis, with hemodynamically significant iFR  Mid circumflex: There was a 75 % stenosis  2nd obtuse marginal: There was a 90 % stenosis  Mid RCA: There was a diffuse 50 % stenosis  Distal RCA: There was a 90 % stenosis  Right PDA: There was a 90 % stenosis  Echocardiogram:   SUMMARY     LEFT VENTRICLE:  Mildly dilated left ventricular cavity, moderate concentric left ventricular hypertrophy, severely reduced left ventricular systolic function mild global hypokinesis with some regional wall motion variation  Although there is no definite  evidence of left ventricular thrombus, cannot definitively exclude layering thrombus formation in inferior-apical and distal inferior-lateral regions  Grade 2 diastolic dysfunction  Elevated left atrial pressure      RIGHT VENTRICLE:  Normal right ventricular size and systolic function  Right ventricular systolic pressure could not be estimated due to poor Doppler signals across the tricuspid valve      LEFT ATRIUM:  Mild left atrial cavity enlargement  Intact interatrial septum      RIGHT ATRIUM:  Normal right atrial cavity size      MITRAL VALVE:  Mitral valve leaflet sclerosis with some focal calcification, adequate leaflet mobility  Trace mitral valve regurgitation      AORTIC VALVE:  Tricuspid aortic valve sclerosis and focal calcification which is most pronounced on the right coronary cusp there is adequate cuspal separation  Trace aortic valve regurgitation noted      TRICUSPID VALVE:  Trace tricuspid valve regurgitation      PULMONIC VALVE:  No obvious pulmonic valve regurgitation      AORTA:  Normal aortic root size  Proximal ascending aorta was not well visualized      IVC, HEPATIC VEINS:  Tricuspid aortic valve with mild sclerosis  No aortic valve stenosis or regurgitation      PERICARDIUM:  No pericardial effusion      Carotid artery duplex:   < 50% right carotid stenosis  Vertebral artery flow is antegrade and There is no significant subclavian artery   < 50% left carotid stenosis  Vertebral artery flow is antegrade and There is no significant subclavian artery    Greater saphenous vein mapping: Adequate conduit for CABG, bilaterally    CT c/a/p:  IMPRESSION:  1  No acute intrathoracic abnormality  2   Mild hepatic steatosis  3   Thick-walled bladder likely sequela of underdistention  No perivesical inflammation  Vitals:   Vitals:    12/21/19 2229 12/22/19 0244 12/22/19 0700 12/22/19 1042   BP: 140/91 131/82 142/92 141/90   BP Location: Right arm Right arm Left arm Left arm   Pulse: 87 93 94 89   Resp: 18 18 18 18   Temp: 97 5 °F (36 4 °C) 97 6 °F (36 4 °C) 98 °F (36 7 °C) 98 °F (36 7 °C)   TempSrc: Oral Oral Oral Oral   SpO2: 97% 98% 98% 98%   Weight:       Height:           Physical Exam:    HEENT/NECK:  PERRLA  No jugular venous distention  Cardiac: Regular rate and rhythm  Pulmonary:  Breath sounds clear bilaterally  Abdomen:  Normal bowel sounds  Extremities: Extremities warm/dry and No edema B/L  Neuro: Alert and oriented X 3, Sensation is grossly intact and No focal deficits  Skin: Warm/Dry, without rashes or lesions  Assessment:    Severe coronary artery disease; Ongoing CABG workup    Plan:  Pre-op work up complete, consent signed and on chart  Continue Mupirocin 2% nasal ointment q 12 hrs  Continue topical chlorhexidine bath and mouth rise  coronary artery bypass grafting and possible MVr tomorrow with ISI Miramontes  Pre-op orders done      SIGNATURE: Maliha Hui PA-C  DATE: December 22, 2019  TIME: 1:04 PM

## 2019-12-23 ENCOUNTER — ANESTHESIA (INPATIENT)
Dept: PERIOP | Facility: HOSPITAL | Age: 39
DRG: 235 | End: 2019-12-23
Payer: COMMERCIAL

## 2019-12-23 ENCOUNTER — APPOINTMENT (INPATIENT)
Dept: RADIOLOGY | Facility: HOSPITAL | Age: 39
DRG: 235 | End: 2019-12-23
Payer: COMMERCIAL

## 2019-12-23 ENCOUNTER — APPOINTMENT (INPATIENT)
Dept: NON INVASIVE DIAGNOSTICS | Facility: HOSPITAL | Age: 39
DRG: 235 | End: 2019-12-23
Attending: THORACIC SURGERY (CARDIOTHORACIC VASCULAR SURGERY)
Payer: COMMERCIAL

## 2019-12-23 PROBLEM — N17.9 AKI (ACUTE KIDNEY INJURY) (HCC): Status: ACTIVE | Noted: 2019-12-23

## 2019-12-23 PROBLEM — E87.8 HYPERCHLOREMIA: Status: ACTIVE | Noted: 2019-12-23

## 2019-12-23 LAB
ABO GROUP BLD: NORMAL
ANION GAP SERPL CALCULATED.3IONS-SCNC: 5 MMOL/L (ref 4–13)
ANION GAP SERPL CALCULATED.3IONS-SCNC: 7 MMOL/L (ref 4–13)
ARTERIAL PATENCY WRIST A: NO
ATRIAL RATE: 96 BPM
BASE EXCESS BLDA CALC-SCNC: -1 MMOL/L (ref -2–3)
BASE EXCESS BLDA CALC-SCNC: -2 MMOL/L (ref -2–3)
BASE EXCESS BLDA CALC-SCNC: -2 MMOL/L (ref -2–3)
BASE EXCESS BLDA CALC-SCNC: -2.2 MMOL/L
BASE EXCESS BLDA CALC-SCNC: -3 MMOL/L (ref -2–3)
BASE EXCESS BLDA CALC-SCNC: 1 MMOL/L (ref -2–3)
BASE EXCESS BLDA CALC-SCNC: 1 MMOL/L (ref -2–3)
BASE EXCESS BLDA CALC-SCNC: 2 MMOL/L (ref -2–3)
BUN SERPL-MCNC: 18 MG/DL (ref 5–25)
BUN SERPL-MCNC: 19 MG/DL (ref 5–25)
CA-I BLD-SCNC: 1.03 MMOL/L (ref 1.12–1.32)
CA-I BLD-SCNC: 1.04 MMOL/L (ref 1.12–1.32)
CA-I BLD-SCNC: 1.05 MMOL/L (ref 1.12–1.32)
CA-I BLD-SCNC: 1.17 MMOL/L (ref 1.12–1.32)
CA-I BLD-SCNC: 1.24 MMOL/L (ref 1.12–1.32)
CA-I BLD-SCNC: 1.26 MMOL/L (ref 1.12–1.32)
CA-I BLD-SCNC: 1.26 MMOL/L (ref 1.12–1.32)
CALCIUM SERPL-MCNC: 8.9 MG/DL (ref 8.3–10.1)
CALCIUM SERPL-MCNC: 9.5 MG/DL (ref 8.3–10.1)
CHLORIDE SERPL-SCNC: 109 MMOL/L (ref 100–108)
CHLORIDE SERPL-SCNC: 112 MMOL/L (ref 100–108)
CO2 SERPL-SCNC: 23 MMOL/L (ref 21–32)
CO2 SERPL-SCNC: 25 MMOL/L (ref 21–32)
CREAT SERPL-MCNC: 0.82 MG/DL (ref 0.6–1.3)
CREAT SERPL-MCNC: 1.32 MG/DL (ref 0.6–1.3)
DS:DELIVERY SYSTEM: AC
ERYTHROCYTE [DISTWIDTH] IN BLOOD BY AUTOMATED COUNT: 11.4 % (ref 11.6–15.1)
FIO2 GAS DIL.REBREATH: 60 L
GFR SERPL CREATININE-BSD FRML MDRD: 111 ML/MIN/1.73SQ M
GFR SERPL CREATININE-BSD FRML MDRD: 67 ML/MIN/1.73SQ M
GLUCOSE SERPL-MCNC: 113 MG/DL (ref 65–140)
GLUCOSE SERPL-MCNC: 113 MG/DL (ref 65–140)
GLUCOSE SERPL-MCNC: 114 MG/DL (ref 65–140)
GLUCOSE SERPL-MCNC: 120 MG/DL (ref 65–140)
GLUCOSE SERPL-MCNC: 122 MG/DL (ref 65–140)
GLUCOSE SERPL-MCNC: 136 MG/DL (ref 65–140)
GLUCOSE SERPL-MCNC: 146 MG/DL (ref 65–140)
GLUCOSE SERPL-MCNC: 166 MG/DL (ref 65–140)
GLUCOSE SERPL-MCNC: 181 MG/DL (ref 65–140)
GLUCOSE SERPL-MCNC: 182 MG/DL (ref 65–140)
GLUCOSE SERPL-MCNC: 187 MG/DL (ref 65–140)
GLUCOSE SERPL-MCNC: 188 MG/DL (ref 65–140)
GLUCOSE SERPL-MCNC: 191 MG/DL (ref 65–140)
GLUCOSE SERPL-MCNC: 193 MG/DL (ref 65–140)
GLUCOSE SERPL-MCNC: 193 MG/DL (ref 65–140)
GLUCOSE SERPL-MCNC: 195 MG/DL (ref 65–140)
GLUCOSE SERPL-MCNC: 196 MG/DL (ref 65–140)
GLUCOSE SERPL-MCNC: 197 MG/DL (ref 65–140)
GLUCOSE SERPL-MCNC: 198 MG/DL (ref 65–140)
GLUCOSE SERPL-MCNC: 207 MG/DL (ref 65–140)
HCO3 BLDA-SCNC: 22.6 MMOL/L (ref 22–28)
HCO3 BLDA-SCNC: 22.7 MMOL/L (ref 22–28)
HCO3 BLDA-SCNC: 23.2 MMOL/L (ref 22–28)
HCO3 BLDA-SCNC: 24.9 MMOL/L (ref 22–28)
HCO3 BLDA-SCNC: 26 MMOL/L (ref 22–28)
HCO3 BLDA-SCNC: 27.1 MMOL/L (ref 22–28)
HCO3 BLDA-SCNC: 27.3 MMOL/L (ref 24–30)
HCO3 BLDA-SCNC: 28.4 MMOL/L (ref 24–30)
HCT VFR BLD AUTO: 42.9 % (ref 36.5–49.3)
HCT VFR BLD AUTO: 47.2 % (ref 36.5–49.3)
HCT VFR BLD CALC: 32 % (ref 36.5–49.3)
HCT VFR BLD CALC: 33 % (ref 36.5–49.3)
HCT VFR BLD CALC: 33 % (ref 36.5–49.3)
HCT VFR BLD CALC: 37 % (ref 36.5–49.3)
HCT VFR BLD CALC: 40 % (ref 36.5–49.3)
HCT VFR BLD CALC: 43 % (ref 36.5–49.3)
HCT VFR BLD CALC: 45 % (ref 36.5–49.3)
HGB BLD-MCNC: 14.3 G/DL (ref 12–17)
HGB BLD-MCNC: 15.6 G/DL (ref 12–17)
HGB BLDA-MCNC: 10.9 G/DL (ref 12–17)
HGB BLDA-MCNC: 11.2 G/DL (ref 12–17)
HGB BLDA-MCNC: 11.2 G/DL (ref 12–17)
HGB BLDA-MCNC: 12.6 G/DL (ref 12–17)
HGB BLDA-MCNC: 13.6 G/DL (ref 12–17)
HGB BLDA-MCNC: 14.6 G/DL (ref 12–17)
HGB BLDA-MCNC: 15.3 G/DL (ref 12–17)
KCT BLD-ACNC: 115 SEC (ref 89–137)
KCT BLD-ACNC: 115 SEC (ref 89–137)
KCT BLD-ACNC: 479 SEC (ref 89–137)
KCT BLD-ACNC: 503 SEC (ref 89–137)
KCT BLD-ACNC: 508 SEC (ref 89–137)
KCT BLD-ACNC: 632 SEC (ref 89–137)
KCT BLD-ACNC: 633 SEC (ref 89–137)
MAGNESIUM SERPL-MCNC: 2 MG/DL (ref 1.6–2.6)
MCH RBC QN AUTO: 29.7 PG (ref 26.8–34.3)
MCHC RBC AUTO-ENTMCNC: 33.1 G/DL (ref 31.4–37.4)
MCV RBC AUTO: 90 FL (ref 82–98)
O2 CT BLDA-SCNC: 20.9 ML/DL (ref 16–23)
OXYHGB MFR BLDA: 97.5 % (ref 94–97)
P AXIS: 64 DEGREES
PCO2 BLD: 24 MMOL/L (ref 21–32)
PCO2 BLD: 24 MMOL/L (ref 21–32)
PCO2 BLD: 26 MMOL/L (ref 21–32)
PCO2 BLD: 28 MMOL/L (ref 21–32)
PCO2 BLD: 28 MMOL/L (ref 21–32)
PCO2 BLD: 29 MMOL/L (ref 21–32)
PCO2 BLD: 30 MMOL/L (ref 21–32)
PCO2 BLD: 41.9 MM HG (ref 36–44)
PCO2 BLD: 42.2 MM HG (ref 36–44)
PCO2 BLD: 44.1 MM HG (ref 36–44)
PCO2 BLD: 48.9 MM HG (ref 42–50)
PCO2 BLD: 49.7 MM HG (ref 36–44)
PCO2 BLD: 52.1 MM HG (ref 36–44)
PCO2 BLD: 55.7 MM HG (ref 42–50)
PCO2 BLDA: 39.2 MM HG (ref 36–44)
PH BLD: 7.31 [PH] (ref 7.35–7.45)
PH BLD: 7.31 [PH] (ref 7.35–7.45)
PH BLD: 7.32 [PH] (ref 7.3–7.4)
PH BLD: 7.34 [PH] (ref 7.35–7.45)
PH BLD: 7.35 [PH] (ref 7.35–7.45)
PH BLD: 7.35 [PH] (ref 7.3–7.4)
PH BLD: 7.4 [PH] (ref 7.35–7.45)
PH BLDA: 7.38 [PH] (ref 7.35–7.45)
PLATELET # BLD AUTO: 238 THOUSANDS/UL (ref 149–390)
PLATELET # BLD AUTO: 240 THOUSANDS/UL (ref 149–390)
PLATELET # BLD AUTO: 305 THOUSANDS/UL (ref 149–390)
PMV BLD AUTO: 10.6 FL (ref 8.9–12.7)
PMV BLD AUTO: 10.7 FL (ref 8.9–12.7)
PMV BLD AUTO: 11.1 FL (ref 8.9–12.7)
PO2 BLD: 185 MM HG (ref 75–129)
PO2 BLD: 30 MM HG (ref 35–45)
PO2 BLD: 320 MM HG (ref 75–129)
PO2 BLD: 329 MM HG (ref 75–129)
PO2 BLD: 49 MM HG (ref 35–45)
PO2 BLD: 79 MM HG (ref 75–129)
PO2 BLD: 86 MM HG (ref 75–129)
PO2 BLDA: 133.7 MM HG (ref 75–129)
POTASSIUM BLD-SCNC: 3.6 MMOL/L (ref 3.5–5.3)
POTASSIUM BLD-SCNC: 4.1 MMOL/L (ref 3.5–5.3)
POTASSIUM BLD-SCNC: 5 MMOL/L (ref 3.5–5.3)
POTASSIUM BLD-SCNC: 5.5 MMOL/L (ref 3.5–5.3)
POTASSIUM BLD-SCNC: 5.5 MMOL/L (ref 3.5–5.3)
POTASSIUM BLD-SCNC: 5.6 MMOL/L (ref 3.5–5.3)
POTASSIUM BLD-SCNC: 5.8 MMOL/L (ref 3.5–5.3)
POTASSIUM SERPL-SCNC: 3.7 MMOL/L (ref 3.5–5.3)
POTASSIUM SERPL-SCNC: 3.7 MMOL/L (ref 3.5–5.3)
POTASSIUM SERPL-SCNC: 4.8 MMOL/L (ref 3.5–5.3)
PR INTERVAL: 150 MS
PS VENT FIO2: 40
PS VENT PEEP: ABNORMAL
QRS AXIS: 134 DEGREES
QRSD INTERVAL: 113 MS
QT INTERVAL: 321 MS
QTC INTERVAL: 406 MS
RBC # BLD AUTO: 5.26 MILLION/UL (ref 3.88–5.62)
RESPIRATORY RATE: 140
RH BLD: POSITIVE
SAO2 % BLD FROM PO2: 100 % (ref 60–85)
SAO2 % BLD FROM PO2: 100 % (ref 60–85)
SAO2 % BLD FROM PO2: 51 % (ref 60–85)
SAO2 % BLD FROM PO2: 82 % (ref 60–85)
SAO2 % BLD FROM PO2: 95 % (ref 60–85)
SAO2 % BLD FROM PO2: 96 % (ref 60–85)
SAO2 % BLD FROM PO2: 99 % (ref 60–85)
SODIUM BLD-SCNC: 134 MMOL/L (ref 136–145)
SODIUM BLD-SCNC: 136 MMOL/L (ref 136–145)
SODIUM BLD-SCNC: 137 MMOL/L (ref 136–145)
SODIUM BLD-SCNC: 139 MMOL/L (ref 136–145)
SODIUM BLD-SCNC: 140 MMOL/L (ref 136–145)
SODIUM BLD-SCNC: 141 MMOL/L (ref 136–145)
SODIUM BLD-SCNC: 141 MMOL/L (ref 136–145)
SODIUM SERPL-SCNC: 139 MMOL/L (ref 136–145)
SODIUM SERPL-SCNC: 142 MMOL/L (ref 136–145)
SPECIMEN SOURCE: ABNORMAL
SPECIMEN SOURCE: NORMAL
SPECIMEN SOURCE: NORMAL
T WAVE AXIS: -21 DEGREES
VENT - PS: ABNORMAL
VENT TYPE: 8
VENTRICULAR RATE: 96 BPM
WBC # BLD AUTO: 10.07 THOUSAND/UL (ref 4.31–10.16)

## 2019-12-23 PROCEDURE — 82330 ASSAY OF CALCIUM: CPT

## 2019-12-23 PROCEDURE — 99232 SBSQ HOSP IP/OBS MODERATE 35: CPT | Performed by: HOSPITALIST

## 2019-12-23 PROCEDURE — 5A1223Z PERFORMANCE OF CARDIAC PACING, CONTINUOUS: ICD-10-PCS | Performed by: THORACIC SURGERY (CARDIOTHORACIC VASCULAR SURGERY)

## 2019-12-23 PROCEDURE — 84295 ASSAY OF SERUM SODIUM: CPT

## 2019-12-23 PROCEDURE — 85014 HEMATOCRIT: CPT | Performed by: PHYSICIAN ASSISTANT

## 2019-12-23 PROCEDURE — 82948 REAGENT STRIP/BLOOD GLUCOSE: CPT

## 2019-12-23 PROCEDURE — 94760 N-INVAS EAR/PLS OXIMETRY 1: CPT

## 2019-12-23 PROCEDURE — 85027 COMPLETE CBC AUTOMATED: CPT | Performed by: INTERNAL MEDICINE

## 2019-12-23 PROCEDURE — 33508 ENDOSCOPIC VEIN HARVEST: CPT | Performed by: PHYSICIAN ASSISTANT

## 2019-12-23 PROCEDURE — 99255 IP/OBS CONSLTJ NEW/EST HI 80: CPT | Performed by: INTERNAL MEDICINE

## 2019-12-23 PROCEDURE — 33518 CABG ARTERY-VEIN TWO: CPT | Performed by: PHYSICIAN ASSISTANT

## 2019-12-23 PROCEDURE — 5A1221Z PERFORMANCE OF CARDIAC OUTPUT, CONTINUOUS: ICD-10-PCS | Performed by: THORACIC SURGERY (CARDIOTHORACIC VASCULAR SURGERY)

## 2019-12-23 PROCEDURE — 94002 VENT MGMT INPAT INIT DAY: CPT

## 2019-12-23 PROCEDURE — 021109W BYPASS CORONARY ARTERY, TWO ARTERIES FROM AORTA WITH AUTOLOGOUS VENOUS TISSUE, OPEN APPROACH: ICD-10-PCS | Performed by: THORACIC SURGERY (CARDIOTHORACIC VASCULAR SURGERY)

## 2019-12-23 PROCEDURE — 85049 AUTOMATED PLATELET COUNT: CPT | Performed by: THORACIC SURGERY (CARDIOTHORACIC VASCULAR SURGERY)

## 2019-12-23 PROCEDURE — 84132 ASSAY OF SERUM POTASSIUM: CPT

## 2019-12-23 PROCEDURE — 33518 CABG ARTERY-VEIN TWO: CPT | Performed by: THORACIC SURGERY (CARDIOTHORACIC VASCULAR SURGERY)

## 2019-12-23 PROCEDURE — 85347 COAGULATION TIME ACTIVATED: CPT

## 2019-12-23 PROCEDURE — 82947 ASSAY GLUCOSE BLOOD QUANT: CPT

## 2019-12-23 PROCEDURE — 83735 ASSAY OF MAGNESIUM: CPT | Performed by: STUDENT IN AN ORGANIZED HEALTH CARE EDUCATION/TRAINING PROGRAM

## 2019-12-23 PROCEDURE — 99232 SBSQ HOSP IP/OBS MODERATE 35: CPT | Performed by: INTERNAL MEDICINE

## 2019-12-23 PROCEDURE — 33533 CABG ARTERIAL SINGLE: CPT | Performed by: PHYSICIAN ASSISTANT

## 2019-12-23 PROCEDURE — 82805 BLOOD GASES W/O2 SATURATION: CPT | Performed by: PHYSICIAN ASSISTANT

## 2019-12-23 PROCEDURE — 06BM4ZZ EXCISION OF RIGHT FEMORAL VEIN, PERCUTANEOUS ENDOSCOPIC APPROACH: ICD-10-PCS | Performed by: THORACIC SURGERY (CARDIOTHORACIC VASCULAR SURGERY)

## 2019-12-23 PROCEDURE — 71045 X-RAY EXAM CHEST 1 VIEW: CPT

## 2019-12-23 PROCEDURE — 80048 BASIC METABOLIC PNL TOTAL CA: CPT | Performed by: INTERNAL MEDICINE

## 2019-12-23 PROCEDURE — 93005 ELECTROCARDIOGRAM TRACING: CPT

## 2019-12-23 PROCEDURE — 30233N0 TRANSFUSION OF AUTOLOGOUS RED BLOOD CELLS INTO PERIPHERAL VEIN, PERCUTANEOUS APPROACH: ICD-10-PCS | Performed by: THORACIC SURGERY (CARDIOTHORACIC VASCULAR SURGERY)

## 2019-12-23 PROCEDURE — 02100Z9 BYPASS CORONARY ARTERY, ONE ARTERY FROM LEFT INTERNAL MAMMARY, OPEN APPROACH: ICD-10-PCS | Performed by: THORACIC SURGERY (CARDIOTHORACIC VASCULAR SURGERY)

## 2019-12-23 PROCEDURE — 93010 ELECTROCARDIOGRAM REPORT: CPT | Performed by: INTERNAL MEDICINE

## 2019-12-23 PROCEDURE — 84132 ASSAY OF SERUM POTASSIUM: CPT | Performed by: PHYSICIAN ASSISTANT

## 2019-12-23 PROCEDURE — 80048 BASIC METABOLIC PNL TOTAL CA: CPT | Performed by: PHYSICIAN ASSISTANT

## 2019-12-23 PROCEDURE — 93312 ECHO TRANSESOPHAGEAL: CPT

## 2019-12-23 PROCEDURE — 85014 HEMATOCRIT: CPT

## 2019-12-23 PROCEDURE — 85049 AUTOMATED PLATELET COUNT: CPT | Performed by: PHYSICIAN ASSISTANT

## 2019-12-23 PROCEDURE — 02H633Z INSERTION OF INFUSION DEVICE INTO RIGHT ATRIUM, PERCUTANEOUS APPROACH: ICD-10-PCS | Performed by: ANESTHESIOLOGY

## 2019-12-23 PROCEDURE — 82803 BLOOD GASES ANY COMBINATION: CPT

## 2019-12-23 PROCEDURE — 33533 CABG ARTERIAL SINGLE: CPT | Performed by: THORACIC SURGERY (CARDIOTHORACIC VASCULAR SURGERY)

## 2019-12-23 PROCEDURE — 33508 ENDOSCOPIC VEIN HARVEST: CPT | Performed by: THORACIC SURGERY (CARDIOTHORACIC VASCULAR SURGERY)

## 2019-12-23 PROCEDURE — 85018 HEMOGLOBIN: CPT | Performed by: PHYSICIAN ASSISTANT

## 2019-12-23 DEVICE — MARKER CORONARY BYPASS VOSS GRAFT: Type: IMPLANTABLE DEVICE | Site: AORTA | Status: FUNCTIONAL

## 2019-12-23 RX ORDER — EPHEDRINE SULFATE 50 MG/ML
INJECTION INTRAVENOUS AS NEEDED
Status: DISCONTINUED | OUTPATIENT
Start: 2019-12-23 | End: 2019-12-23 | Stop reason: SURG

## 2019-12-23 RX ORDER — HEPARIN SODIUM 1000 [USP'U]/ML
INJECTION, SOLUTION INTRAVENOUS; SUBCUTANEOUS AS NEEDED
Status: DISCONTINUED | OUTPATIENT
Start: 2019-12-23 | End: 2019-12-23 | Stop reason: SURG

## 2019-12-23 RX ORDER — MAGNESIUM HYDROXIDE 1200 MG/15ML
LIQUID ORAL AS NEEDED
Status: DISCONTINUED | OUTPATIENT
Start: 2019-12-23 | End: 2019-12-23 | Stop reason: HOSPADM

## 2019-12-23 RX ORDER — FENTANYL CITRATE 50 UG/ML
50 INJECTION, SOLUTION INTRAMUSCULAR; INTRAVENOUS
Status: DISCONTINUED | OUTPATIENT
Start: 2019-12-23 | End: 2019-12-24

## 2019-12-23 RX ORDER — KETAMINE HCL IN NACL, ISO-OSM 100MG/10ML
SYRINGE (ML) INJECTION AS NEEDED
Status: DISCONTINUED | OUTPATIENT
Start: 2019-12-23 | End: 2019-12-23 | Stop reason: SURG

## 2019-12-23 RX ORDER — CEFAZOLIN SODIUM 2 G/50ML
2000 SOLUTION INTRAVENOUS ONCE
Status: DISCONTINUED | OUTPATIENT
Start: 2019-12-23 | End: 2019-12-23 | Stop reason: HOSPADM

## 2019-12-23 RX ORDER — BISACODYL 10 MG
10 SUPPOSITORY, RECTAL RECTAL DAILY PRN
Status: DISCONTINUED | OUTPATIENT
Start: 2019-12-23 | End: 2019-12-29 | Stop reason: HOSPADM

## 2019-12-23 RX ORDER — NEOSTIGMINE METHYLSULFATE 1 MG/ML
INJECTION INTRAVENOUS AS NEEDED
Status: DISCONTINUED | OUTPATIENT
Start: 2019-12-23 | End: 2019-12-23 | Stop reason: SURG

## 2019-12-23 RX ORDER — ONDANSETRON 2 MG/ML
4 INJECTION INTRAMUSCULAR; INTRAVENOUS EVERY 6 HOURS PRN
Status: DISCONTINUED | OUTPATIENT
Start: 2019-12-23 | End: 2019-12-29 | Stop reason: HOSPADM

## 2019-12-23 RX ORDER — POLYETHYLENE GLYCOL 3350 17 G/17G
17 POWDER, FOR SOLUTION ORAL DAILY
Status: DISCONTINUED | OUTPATIENT
Start: 2019-12-23 | End: 2019-12-29 | Stop reason: HOSPADM

## 2019-12-23 RX ORDER — ACETAMINOPHEN 325 MG/1
650 TABLET ORAL EVERY 4 HOURS PRN
Status: DISCONTINUED | OUTPATIENT
Start: 2019-12-23 | End: 2019-12-24

## 2019-12-23 RX ORDER — FENTANYL CITRATE 50 UG/ML
INJECTION, SOLUTION INTRAMUSCULAR; INTRAVENOUS AS NEEDED
Status: DISCONTINUED | OUTPATIENT
Start: 2019-12-23 | End: 2019-12-23 | Stop reason: SURG

## 2019-12-23 RX ORDER — ALBUMIN, HUMAN INJ 5% 5 %
12.5 SOLUTION INTRAVENOUS ONCE
Status: COMPLETED | OUTPATIENT
Start: 2019-12-23 | End: 2019-12-23

## 2019-12-23 RX ORDER — GABAPENTIN 300 MG/1
300 CAPSULE ORAL ONCE
Status: COMPLETED | OUTPATIENT
Start: 2019-12-23 | End: 2019-12-23

## 2019-12-23 RX ORDER — GLYCOPYRROLATE 0.2 MG/ML
INJECTION INTRAMUSCULAR; INTRAVENOUS AS NEEDED
Status: DISCONTINUED | OUTPATIENT
Start: 2019-12-23 | End: 2019-12-23 | Stop reason: SURG

## 2019-12-23 RX ORDER — HYDROMORPHONE HCL/PF 1 MG/ML
0.5 SYRINGE (ML) INJECTION
Status: DISCONTINUED | OUTPATIENT
Start: 2019-12-23 | End: 2019-12-24

## 2019-12-23 RX ORDER — POTASSIUM CHLORIDE 14.9 MG/ML
20 INJECTION INTRAVENOUS ONCE AS NEEDED
Status: DISCONTINUED | OUTPATIENT
Start: 2019-12-23 | End: 2019-12-24

## 2019-12-23 RX ORDER — ACETAMINOPHEN 325 MG/1
975 TABLET ORAL ONCE
Status: COMPLETED | OUTPATIENT
Start: 2019-12-23 | End: 2019-12-23

## 2019-12-23 RX ORDER — MIDAZOLAM HYDROCHLORIDE 2 MG/2ML
INJECTION, SOLUTION INTRAMUSCULAR; INTRAVENOUS AS NEEDED
Status: DISCONTINUED | OUTPATIENT
Start: 2019-12-23 | End: 2019-12-23 | Stop reason: SURG

## 2019-12-23 RX ORDER — CALCIUM CHLORIDE 100 MG/ML
1 INJECTION INTRAVENOUS; INTRAVENTRICULAR ONCE
Status: DISCONTINUED | OUTPATIENT
Start: 2019-12-23 | End: 2019-12-24

## 2019-12-23 RX ORDER — VANCOMYCIN HYDROCHLORIDE 1 G/20ML
INJECTION, POWDER, LYOPHILIZED, FOR SOLUTION INTRAVENOUS AS NEEDED
Status: DISCONTINUED | OUTPATIENT
Start: 2019-12-23 | End: 2019-12-23 | Stop reason: HOSPADM

## 2019-12-23 RX ORDER — SODIUM CHLORIDE 9 MG/ML
INJECTION, SOLUTION INTRAVENOUS CONTINUOUS PRN
Status: DISCONTINUED | OUTPATIENT
Start: 2019-12-23 | End: 2019-12-23 | Stop reason: SURG

## 2019-12-23 RX ORDER — FENTANYL CITRATE 50 UG/ML
50 INJECTION, SOLUTION INTRAMUSCULAR; INTRAVENOUS ONCE
Status: DISCONTINUED | OUTPATIENT
Start: 2019-12-23 | End: 2019-12-24

## 2019-12-23 RX ORDER — MILRINONE LACTATE 0.2 MG/ML
INJECTION, SOLUTION INTRAVENOUS CONTINUOUS PRN
Status: DISCONTINUED | OUTPATIENT
Start: 2019-12-23 | End: 2019-12-23 | Stop reason: SURG

## 2019-12-23 RX ORDER — ACETAMINOPHEN 325 MG/1
975 TABLET ORAL EVERY 8 HOURS
Status: DISCONTINUED | OUTPATIENT
Start: 2019-12-23 | End: 2019-12-29 | Stop reason: HOSPADM

## 2019-12-23 RX ORDER — CEFAZOLIN SODIUM 2 G/50ML
2000 SOLUTION INTRAVENOUS ONCE
Status: DISCONTINUED | OUTPATIENT
Start: 2019-12-23 | End: 2019-12-23

## 2019-12-23 RX ORDER — POTASSIUM CHLORIDE 14.9 MG/ML
20 INJECTION INTRAVENOUS
Status: DISCONTINUED | OUTPATIENT
Start: 2019-12-23 | End: 2019-12-24

## 2019-12-23 RX ORDER — CHLORHEXIDINE GLUCONATE 0.12 MG/ML
15 RINSE ORAL 2 TIMES DAILY
Status: DISCONTINUED | OUTPATIENT
Start: 2019-12-23 | End: 2019-12-24

## 2019-12-23 RX ORDER — ASPIRIN 325 MG
325 TABLET ORAL DAILY
Status: DISCONTINUED | OUTPATIENT
Start: 2019-12-24 | End: 2019-12-29 | Stop reason: HOSPADM

## 2019-12-23 RX ORDER — FONDAPARINUX SODIUM 2.5 MG/.5ML
2.5 INJECTION SUBCUTANEOUS DAILY
Status: DISCONTINUED | OUTPATIENT
Start: 2019-12-24 | End: 2019-12-29 | Stop reason: HOSPADM

## 2019-12-23 RX ORDER — DEXAMETHASONE SODIUM PHOSPHATE 10 MG/ML
INJECTION, SOLUTION INTRAMUSCULAR; INTRAVENOUS AS NEEDED
Status: DISCONTINUED | OUTPATIENT
Start: 2019-12-23 | End: 2019-12-23 | Stop reason: SURG

## 2019-12-23 RX ORDER — SODIUM CHLORIDE 450 MG/100ML
20 INJECTION, SOLUTION INTRAVENOUS CONTINUOUS
Status: DISCONTINUED | OUTPATIENT
Start: 2019-12-23 | End: 2019-12-28

## 2019-12-23 RX ORDER — HYDROMORPHONE HCL/PF 1 MG/ML
1 SYRINGE (ML) INJECTION
Status: DISCONTINUED | OUTPATIENT
Start: 2019-12-23 | End: 2019-12-24

## 2019-12-23 RX ORDER — PROTAMINE SULFATE 10 MG/ML
INJECTION, SOLUTION INTRAVENOUS AS NEEDED
Status: DISCONTINUED | OUTPATIENT
Start: 2019-12-23 | End: 2019-12-23 | Stop reason: SURG

## 2019-12-23 RX ORDER — LIDOCAINE HYDROCHLORIDE 10 MG/ML
INJECTION, SOLUTION EPIDURAL; INFILTRATION; INTRACAUDAL; PERINEURAL AS NEEDED
Status: DISCONTINUED | OUTPATIENT
Start: 2019-12-23 | End: 2019-12-23 | Stop reason: SURG

## 2019-12-23 RX ORDER — PANTOPRAZOLE SODIUM 40 MG/1
40 TABLET, DELAYED RELEASE ORAL
Status: DISCONTINUED | OUTPATIENT
Start: 2019-12-24 | End: 2019-12-29 | Stop reason: HOSPADM

## 2019-12-23 RX ORDER — OXYCODONE HYDROCHLORIDE 5 MG/1
5 TABLET ORAL EVERY 4 HOURS PRN
Status: DISCONTINUED | OUTPATIENT
Start: 2019-12-23 | End: 2019-12-29 | Stop reason: HOSPADM

## 2019-12-23 RX ORDER — FUROSEMIDE 10 MG/ML
40 INJECTION INTRAMUSCULAR; INTRAVENOUS ONCE
Status: COMPLETED | OUTPATIENT
Start: 2019-12-23 | End: 2019-12-23

## 2019-12-23 RX ORDER — SUCCINYLCHOLINE/SOD CL,ISO/PF 100 MG/5ML
SYRINGE (ML) INTRAVENOUS AS NEEDED
Status: DISCONTINUED | OUTPATIENT
Start: 2019-12-23 | End: 2019-12-23 | Stop reason: SURG

## 2019-12-23 RX ORDER — ATORVASTATIN CALCIUM 80 MG/1
80 TABLET, FILM COATED ORAL
Status: DISCONTINUED | OUTPATIENT
Start: 2019-12-23 | End: 2019-12-29 | Stop reason: HOSPADM

## 2019-12-23 RX ORDER — LIDOCAINE HYDROCHLORIDE 10 MG/ML
INJECTION, SOLUTION EPIDURAL; INFILTRATION; INTRACAUDAL; PERINEURAL
Status: COMPLETED | OUTPATIENT
Start: 2019-12-23 | End: 2019-12-23

## 2019-12-23 RX ORDER — OXYCODONE HYDROCHLORIDE 10 MG/1
10 TABLET ORAL EVERY 6 HOURS PRN
Status: DISCONTINUED | OUTPATIENT
Start: 2019-12-23 | End: 2019-12-29 | Stop reason: HOSPADM

## 2019-12-23 RX ORDER — MAGNESIUM SULFATE HEPTAHYDRATE 40 MG/ML
2 INJECTION, SOLUTION INTRAVENOUS ONCE
Status: COMPLETED | OUTPATIENT
Start: 2019-12-23 | End: 2019-12-23

## 2019-12-23 RX ORDER — AMIODARONE HYDROCHLORIDE 200 MG/1
200 TABLET ORAL EVERY 8 HOURS SCHEDULED
Status: DISCONTINUED | OUTPATIENT
Start: 2019-12-23 | End: 2019-12-29 | Stop reason: HOSPADM

## 2019-12-23 RX ORDER — ROCURONIUM BROMIDE 10 MG/ML
INJECTION, SOLUTION INTRAVENOUS AS NEEDED
Status: DISCONTINUED | OUTPATIENT
Start: 2019-12-23 | End: 2019-12-23 | Stop reason: SURG

## 2019-12-23 RX ORDER — CEFAZOLIN SODIUM 1 G/3ML
INJECTION, POWDER, FOR SOLUTION INTRAMUSCULAR; INTRAVENOUS AS NEEDED
Status: DISCONTINUED | OUTPATIENT
Start: 2019-12-23 | End: 2019-12-23 | Stop reason: SURG

## 2019-12-23 RX ADMIN — NOREPINEPHRINE BITARTRATE 2 MCG/MIN: 1 INJECTION INTRAVENOUS at 11:40

## 2019-12-23 RX ADMIN — HEPARIN SODIUM 50000 UNITS: 1000 INJECTION INTRAVENOUS; SUBCUTANEOUS at 13:06

## 2019-12-23 RX ADMIN — Medication 1 APPLICATION: at 09:42

## 2019-12-23 RX ADMIN — MAGNESIUM SULFATE HEPTAHYDRATE 2 G: 40 INJECTION, SOLUTION INTRAVENOUS at 16:12

## 2019-12-23 RX ADMIN — SODIUM CHLORIDE 4 UNITS/HR: 9 INJECTION, SOLUTION INTRAVENOUS at 15:45

## 2019-12-23 RX ADMIN — LIDOCAINE HYDROCHLORIDE 0.5 ML: 10 INJECTION, SOLUTION EPIDURAL; INFILTRATION; INTRACAUDAL; PERINEURAL at 11:06

## 2019-12-23 RX ADMIN — MIDAZOLAM 4 MG: 1 INJECTION INTRAMUSCULAR; INTRAVENOUS at 14:36

## 2019-12-23 RX ADMIN — ROCURONIUM BROMIDE 100 MG: 50 INJECTION, SOLUTION INTRAVENOUS at 11:18

## 2019-12-23 RX ADMIN — PROTAMINE SULFATE 360 MG: 10 INJECTION, SOLUTION INTRAVENOUS at 14:44

## 2019-12-23 RX ADMIN — NEOSTIGMINE METHYLSULFATE 4 MG: 1 INJECTION INTRAVENOUS at 15:51

## 2019-12-23 RX ADMIN — SODIUM CHLORIDE 1 UNITS/HR: 9 INJECTION, SOLUTION INTRAVENOUS at 11:40

## 2019-12-23 RX ADMIN — DEXMEDETOMIDINE 0.3 MCG/KG/HR: 100 INJECTION, SOLUTION, CONCENTRATE INTRAVENOUS at 11:40

## 2019-12-23 RX ADMIN — GABAPENTIN 300 MG: 300 CAPSULE ORAL at 10:56

## 2019-12-23 RX ADMIN — MIDAZOLAM 4 MG: 1 INJECTION INTRAMUSCULAR; INTRAVENOUS at 13:47

## 2019-12-23 RX ADMIN — ASPIRIN 81 MG 81 MG: 81 TABLET ORAL at 09:41

## 2019-12-23 RX ADMIN — DEXAMETHASONE SODIUM PHOSPHATE 5 MG: 10 INJECTION, SOLUTION INTRAMUSCULAR; INTRAVENOUS at 12:46

## 2019-12-23 RX ADMIN — AMINOCAPROIC ACID 5 G: 250 INJECTION, SOLUTION INTRAVENOUS at 11:40

## 2019-12-23 RX ADMIN — PHENYLEPHRINE HYDROCHLORIDE 200 MCG: 10 INJECTION INTRAVENOUS at 11:41

## 2019-12-23 RX ADMIN — ALBUMIN (HUMAN) 12.5 G: 12.5 INJECTION, SOLUTION INTRAVENOUS at 17:27

## 2019-12-23 RX ADMIN — FENTANYL CITRATE 1000 MCG: 50 INJECTION, SOLUTION INTRAMUSCULAR; INTRAVENOUS at 11:10

## 2019-12-23 RX ADMIN — EPINEPHRINE 4 MCG/MIN: 1 INJECTION, SOLUTION, CONCENTRATE INTRAVENOUS at 16:00

## 2019-12-23 RX ADMIN — ROCURONIUM BROMIDE 50 MG: 50 INJECTION, SOLUTION INTRAVENOUS at 12:55

## 2019-12-23 RX ADMIN — OXYCODONE HYDROCHLORIDE 10 MG: 10 TABLET ORAL at 23:52

## 2019-12-23 RX ADMIN — EPHEDRINE SULFATE 5 MG: 50 INJECTION, SOLUTION INTRAVENOUS at 11:16

## 2019-12-23 RX ADMIN — Medication 50 MG: at 13:47

## 2019-12-23 RX ADMIN — EPHEDRINE SULFATE 5 MG: 50 INJECTION, SOLUTION INTRAVENOUS at 11:39

## 2019-12-23 RX ADMIN — CEFAZOLIN 3000 MG: 1 INJECTION, POWDER, FOR SOLUTION INTRAVENOUS at 11:40

## 2019-12-23 RX ADMIN — Medication 50 MG: at 11:10

## 2019-12-23 RX ADMIN — GLYCOPYRROLATE 0.4 MG: 0.2 INJECTION, SOLUTION INTRAMUSCULAR; INTRAVENOUS at 15:51

## 2019-12-23 RX ADMIN — Medication 180 MG: at 11:10

## 2019-12-23 RX ADMIN — LIDOCAINE HYDROCHLORIDE 10 ML: 10 INJECTION, SOLUTION EPIDURAL; INFILTRATION; INTRACAUDAL; PERINEURAL at 11:10

## 2019-12-23 RX ADMIN — SODIUM CHLORIDE: 9 INJECTION, SOLUTION INTRAVENOUS at 11:00

## 2019-12-23 RX ADMIN — CEFAZOLIN 3000 MG: 1 INJECTION, POWDER, FOR SOLUTION INTRAVENOUS at 14:36

## 2019-12-23 RX ADMIN — ROCURONIUM BROMIDE 50 MG: 50 INJECTION, SOLUTION INTRAVENOUS at 13:48

## 2019-12-23 RX ADMIN — NOREPINEPHRINE BITARTRATE 2 MCG/MIN: 1 INJECTION INTRAVENOUS at 15:55

## 2019-12-23 RX ADMIN — SODIUM CHLORIDE 20 ML/HR: 0.45 INJECTION, SOLUTION INTRAVENOUS at 16:15

## 2019-12-23 RX ADMIN — PHENYLEPHRINE HYDROCHLORIDE 200 MCG: 10 INJECTION INTRAVENOUS at 11:18

## 2019-12-23 RX ADMIN — POTASSIUM CHLORIDE 20 MEQ: 14.9 INJECTION, SOLUTION INTRAVENOUS at 16:53

## 2019-12-23 RX ADMIN — SODIUM CHLORIDE: 0.9 INJECTION, SOLUTION INTRAVENOUS at 11:40

## 2019-12-23 RX ADMIN — Medication 1 APPLICATION: at 21:57

## 2019-12-23 RX ADMIN — METOPROLOL TARTRATE 12.5 MG: 25 TABLET ORAL at 09:41

## 2019-12-23 RX ADMIN — SODIUM CHLORIDE 2.5 MG/HR: 0.9 INJECTION, SOLUTION INTRAVENOUS at 22:00

## 2019-12-23 RX ADMIN — MILRINONE LACTATE IN DEXTROSE 0.25 MCG/KG/MIN: 200 INJECTION, SOLUTION INTRAVENOUS at 11:40

## 2019-12-23 RX ADMIN — AMINOCAPROIC ACID 1000 MG/HR: 250 INJECTION, SOLUTION INTRAVENOUS at 11:55

## 2019-12-23 RX ADMIN — Medication 2 MCG/MIN: at 14:36

## 2019-12-23 RX ADMIN — CEFAZOLIN 3000 MG: 1 INJECTION, POWDER, FOR SOLUTION INTRAMUSCULAR; INTRAVENOUS at 21:57

## 2019-12-23 RX ADMIN — MIDAZOLAM 4 MG: 1 INJECTION INTRAMUSCULAR; INTRAVENOUS at 11:04

## 2019-12-23 RX ADMIN — FUROSEMIDE 40 MG: 10 INJECTION, SOLUTION INTRAMUSCULAR; INTRAVENOUS at 23:49

## 2019-12-23 RX ADMIN — PHENYLEPHRINE HYDROCHLORIDE 200 MCG: 10 INJECTION INTRAVENOUS at 12:18

## 2019-12-23 RX ADMIN — HYDROMORPHONE HYDROCHLORIDE 0.5 MG: 1 INJECTION, SOLUTION INTRAMUSCULAR; INTRAVENOUS; SUBCUTANEOUS at 21:57

## 2019-12-23 RX ADMIN — PHENYLEPHRINE HYDROCHLORIDE 40 MCG/MIN: 10 INJECTION INTRAVENOUS at 17:27

## 2019-12-23 RX ADMIN — CHLORHEXIDINE GLUCONATE 0.12% ORAL RINSE 15 ML: 1.2 LIQUID ORAL at 09:42

## 2019-12-23 RX ADMIN — ACETAMINOPHEN 975 MG: 325 TABLET ORAL at 10:55

## 2019-12-23 NOTE — INTERVAL H&P NOTE
Vitals:    12/23/19 0652   BP: 141/94   Pulse: 87   Resp: 18   Temp: 98 3 °F (36 8 °C)   SpO2: 96%         H&P reviewed  After examining the patient I find no changes in the patients condition since the H&P was completed  Plan for CABG  Preoperative Beta Blocker: indicated  Anticipated Length of Stay: Patient will be admitted on an inpatient basis with an anticipated length of stay of grater than 2 midnights  Justification for Hospital StaY: Post surgical recovery following open heart surgery        Delano Zafar MD  12/23/19  7:27 AM

## 2019-12-23 NOTE — ANESTHESIA PROCEDURE NOTES
Central Line Insertion  Performed by: Shawn Lackey MD  Authorized by: Shawn Lackey MD   Date/Time: 12/23/2019 11:35 AM  Catheter Type:  triple lumen  Consent: Verbal consent obtained  Written consent obtained  Risks and benefits: risks, benefits and alternatives were discussed  Consent given by: patient  Patient understanding: patient states understanding of the procedure being performed  Patient consent: the patient's understanding of the procedure matches consent given  Required items: required blood products, implants, devices, and special equipment available  Patient identity confirmed: verbally with patient and arm band  Time out: Immediately prior to procedure a "time out" was called to verify the correct patient, procedure, equipment, support staff and site/side marked as required    Indications: vascular access and central pressure monitoring  Catheter size: 7 Fr  Patient position: Trendelenburg  Assessment: blood return through all ports and free fluid flow  Preparation: skin prepped with 2% chlorhexidine and Chloraprep  Skin prep agent dried: skin prep agent completely dried prior to procedure  Sterile barriers: all five maximum sterile barriers used - cap, mask, sterile gown, sterile gloves, and large sterile sheet  Hand hygiene: hand hygiene performed prior to central venous catheter insertion  Ultrasound guidance: yes  sterile gel and probe cover used in ultrasound-guided central venous catheter insertionultrasound permanent image saved  Vessel of Catheter Tip End: svc  Number of attempts: 1  Successful placement: yes  Post-procedure: line sutured,  dressing applied and chlorhexidine patch applied  Patient tolerance: Patient tolerated the procedure well with no immediate complications

## 2019-12-23 NOTE — CONSULTS
Sarika Lima 61  44 y o  male MRN: [id number]  Unit/Bed#: [facility] 413-01 Encounter: [id number]      Physician Requesting Consult: Shirley Santiago MD    Reason for Consult / Principal Problem: NSTEMI (non-ST elevated myocardial infarction) [facility])    HPI: Mohamud Ruby  is a 44 y o  male who presented to [address] [place] [id number] with exertional chest pain for 5 days  EKG showed inverted T waves in inferolateral leads and slight ST elevations in V1-V3 with troponin elevation as high as 3  Echo revealed EF of 30% with global hypokinesis and grade 2 diastolic dysfunction  He was transferred to [place] [address] for CT surgery evaluation  PMHx: Uncontrolled DM2 (A1C 10 1), HTN, HLD, medical non-compliance    History obtained from chart review due to patient being intubated and sedated  ---------------------------------------------------------------------------------------------------------------------------------------------------------------------  Impressions:  1  CAD s/p CABG x 3  2  Uncontrolled DM2 (A1C 10 1)  3  HTN  4  HLD  5  Medical non-compliance  6  Marijuana use    Plan:    Neuro: Discontinue continuous sedation  Tylenol/percocet prn for pain  Trend neuro exam   Delirium precautions  CV: MAP goal >65  SBP goal <130  CI>2 2  Post-op medications: Epinephrine, 4 mcg/min, Levophed, 2 mcg/min  Wean Levophed as able  Volume resuscitation as needed  Monitor rhythm on telemetry  Epicardial pacing wires present  Intra-op PAOLA LVEF 20-25%  Lung: Check STAT post-op ABG and CXR  Wean vent with spontaneous breathing trial with goal to extubate  GI: GI prophylaxis with PPI  Bowel regimen  Zofran PRN for nausea  FEN: NPO  Replete K >4 0, mag >2 0 and calcium >7 0  : Check STAT post-op BMP  Santamaria in place  Monitor UOP with goal >0 5cc/kg/hour  Lasix versus volume resuscitate as needed depending on hemodynamics and volume status  ID: Prophylactic post-op abx  Maintain normothermia  Trend temps  Heme: Check STAT post-op H/H and platelets  Monitor incision site, invasive lines, and chest tube outputs for bleeding  Send coag panel if needed  Endo: Insulin gtt for blood sugar control  Results from last 6 Months   Lab Units 12/18/19  1629   HEMOGLOBIN A1C % 10 1*     Disposition: ICU Care   ---------------------------------------------------------------------------------------------------------------------------------------------------------------------  Historical Information   Past Medical History:   Diagnosis Date    JELENA (acute kidney injury) ([facility] ) 12/23/2019    CAD (coronary artery disease)     Diabetes mellitus ([facility] )     HLD (hyperlipidemia)     Hypertension      Past Surgical History:   Procedure Laterality Date    ANKLE SURGERY Right     FRACTURE SURGERY       Social History   Social History     Substance and Sexual Activity   Alcohol Use Never    Frequency: Never    Binge frequency: Never     Social History     Substance and Sexual Activity   Drug Use Yes    Types: Marijuana     Social History     Tobacco Use   Smoking Status Never Smoker   Smokeless Tobacco Never Used     Family History   Problem Relation Age of Onset    Cancer Mother         unknown     I have reviewed this patient's family history and commented on sigificant items within the HPI    ROS: ROS unable to be obtained due to patient being intubated and sedated  Allergies: No Known Allergies    Home Medications:   Prior to Admission medications    Medication Sig Start Date End Date Taking?  Authorizing Provider   amLODIPine (NORVASC) 10 mg tablet Take 10 mg by mouth daily 12/16/16   Historical Provider, MD   hydrochlorothiazide (HYDRODIURIL) 50 mg tablet Take 50 mg by mouth every 24 hours    Historical Provider, MD   lisinopril (ZESTRIL) 40 mg tablet Take 40 mg by mouth daily    Historical Provider, MD   metFORMIN (GLUCOPHAGE) 1000 MG tablet Take 1,000 mg by mouth Every 12 hours Historical Provider, MD   methocarbamol (ROBAXIN) 750 mg tablet Take 1 tablet (750 mg total) by mouth every 8 (eight) hours for 5 days 5/20/18 5/25/18  Benjamin Kevin PA-C     Inpatient Medications:  Scheduled Meds:    Current Facility-Administered Medications:  acetaminophen 650 mg Rectal Q4H PRN Janeth Lino PA-C    acetaminophen 650 mg Oral Q4H PRN Janeth Lino PA-C    acetaminophen 975 mg Oral Q8H Janeth Lino PA-C    amiodarone 200 mg Oral Q8H Re Hodges PA-C    [START ON 12/24/2019] aspirin 325 mg Oral Daily Janeth Lino PA-C    atorvastatin 80 mg Oral Daily With Dinner Janeth Lino PA-C    bisacodyl 10 mg Rectal Daily PRN Janeth Lino PA-C    calcium chloride 1 g Intravenous Once Janeth Lino PA-C    cefazolin 3,000 mg Intravenous Q8H Janeth Lino PA-C    chlorhexidine 15 mL Mouth/Throat BID Janeth Lino PA-C    epinephrine 1-10 mcg/min Intravenous Titrated Janeth Lino PA-C Last Rate: 4 mcg/min (12/23/19 1600)   fentanyl citrate (PF) 50 mcg Intravenous Once Janeth Lino PA-C    fentanyl citrate (PF) 50 mcg Intravenous Q1H PRN Janeth Lino PA-C    [START ON 12/24/2019] fondaparinux 2 5 mg Subcutaneous Daily Janeth Lino PA-C    HYDROmorphone 0 5 mg Intravenous Q1H PRN Janeth Lino PA-C    HYDROmorphone 1 mg Intravenous Q1H PRN Janeth Lino PA-C    insulin regular (HumuLIN R,NovoLIN R) infusion 0 3-21 Units/hr Intravenous Titrated Janeth Lino PA-C Last Rate: 4 Units/hr (12/23/19 1545)   lidocaine (cardiac) 100 mg Intravenous Q30 Min PRN Janeth Lino PA-C    magnesium sulfate 2 g Intravenous Once Janeth Lino PA-C    mupirocin 1 application Nasal C[address]H [address] Kaylyn Finn Chino PA-C    niCARdipine 2 5-15 mg/hr Intravenous Titrated Janeth Lino PA-C    norepinephrine 1-30 mcg/min Intravenous Titrated Janeth Lino PA-C Last Rate: 4 mcg/min (12/23/19 1624)   ondansetron 4 mg Intravenous Q6H PRN Janeth Lino PA-C oxyCODONE 10 mg Oral Q6H PRN Victorina Hopper, PA-C    oxyCODONE 5 mg Oral Q4H PRN Victorina Hopper, PA-C    [START ON 2019] pantoprazole 40 mg Oral Early Morning Victorina Hopper, PA-C    polyethylene glycol 17 g Oral Daily Victorina Hopper, PA-C    potassium chloride 20 mEq Intravenous Once PRN Victorina Hopper, PA-C    potassium chloride 20 mEq Intravenous Q1H PRN Victorina Hopper, PA-C    potassium chloride 20 mEq Intravenous Q30 Min PRN Victorina Hopper, PA-C    sodium chloride 20 mL/hr Intravenous Continuous Victorina Hopper, PA-C Last Rate: 20 mL/hr (19 1615)     Continuous Infusions:    epinephrine 1-10 mcg/min Last Rate: 4 mcg/min (19 1600)   insulin regular (HumuLIN R,NovoLIN R) infusion 0 3-21 Units/hr Last Rate: 4 Units/hr (19 1545)   niCARdipine 2 5-15 mg/hr    norepinephrine 1-30 mcg/min Last Rate: 4 mcg/min (19 1624)   sodium chloride 20 mL/hr Last Rate: 20 mL/hr (19 161)     PRN Meds:    acetaminophen 650 mg Q4H PRN   acetaminophen 650 mg Q4H PRN   bisacodyl 10 mg Daily PRN   fentanyl citrate (PF) 50 mcg Q1H PRN   HYDROmorphone 0 5 mg Q1H PRN   HYDROmorphone 1 mg Q1H PRN   lidocaine (cardiac) 100 mg Q30 Min PRN   ondansetron 4 mg Q6H PRN   oxyCODONE 10 mg Q6H PRN   oxyCODONE 5 mg Q4H PRN   potassium chloride 20 mEq Once PRN   potassium chloride 20 mEq Q1H PRN   potassium chloride 20 mEq Q30 Min PRN     ---------------------------------------------------------------------------------------------------------------------------------------------------------------------  Vitals:   Vitals:    19 0235 19 0451 19 0652 19 1550   BP: 124/79 129/74 141/94    BP Location: Left arm Right arm Left arm    Pulse: 86 81 87    Resp: 18  18    Temp: 98 °F (36 7 °C)  98 3 °F (36 8 °C)    TempSrc: Oral  Oral    SpO2: 97%  96% 97%   Weight:       Height:         Arterial Line:          Temperature: Temp (24hrs), Av °F (36 7 °C), Min:97 8 °F (36 6 °C), Max:98 3 °F (36 8 °C)  Current: Temperature: 98 3 °F (36 8 °C)    Weights: IBW: 70 7 kg  Body mass index is 40 61 kg/m²  Hemodynamic Monitoring:  PAP:  , CVP:  , CO:  , CI:  , SVR:      Ventilator Settings:  Respiratory    Lab Data (Last 4 hours)    None         O2/Vent Data (Last 4 hours)      12/23 1550 12/23 1555         Vent Mode AC/VC       Resp Rate (BPM) (BPM) 14       Vt (mL) (mL) 600       FIO2 (%) (%) 60       PEEP (cmH2O) (cmH2O) 5 8      MV 14 9                 Labs:   Results from last 7 days   Lab Units 12/23/19  1559 12/23/19  1557 12/23/19  1508  12/23/19  1412  12/23/19  0444 12/21/19 0440 12/18/19  1629   WBC Thousand/uL  --   --   --   --   --   --  10 07 10 32* 10 84*   HEMOGLOBIN g/dL  --  14 3  --   --   --   --  15 6 16 3 16 3   I STAT HEMOGLOBIN g/dl 13 6  --  12 6   < >  --    < >  --   --   --    HEMATOCRIT %  --  42 9  --   --   --   --  47 2 49 9* 48 2   HEMATOCRIT, ISTAT % 40  --  37   < >  --    < >  --   --   --    PLATELETS Thousands/uL  --  238  --   --  240  --  305 283 273   NEUTROS PCT %  --   --   --   --   --   --   --  57 65   MONOS PCT %  --   --   --   --   --   --   --  9 8    < > = values in this interval not displayed       Results from last 7 days   Lab Units 12/23/19  1559 12/23/19  1557 12/23/19  1508 12/23/19  1420  12/23/19  0444 12/21/19  0440 12/18/19  1629   SODIUM mmol/L  --  142  --   --   --  139 136 135*   POTASSIUM mmol/L  --  3 7  --   --   --  3 7 3 9 4 0   CHLORIDE mmol/L  --  112*  --   --   --  109* 104 97*   CO2 mmol/L  --  23  --   --   --  25 29 29   CO2, I-STAT mmol/L 24  --  24 28   < >  --   --   --    BUN mg/dL  --  19  --   --   --  18 21 15   CREATININE mg/dL  --  1 32*  --   --   --  0 82 0 91 1 18   CALCIUM mg/dL  --  8 9  --   --   --  9 5 9 5 9 5   ALK PHOS U/L  --   --   --   --   --   --   --  89   ALT U/L  --   --   --   --   --   --   --  30   AST U/L  --   --   --   --   --   --   --  19   GLUCOSE, ISTAT mg/dl 181*  --  188* 198*   < >  -- --   --     < > = values in this interval not displayed  Post-op AB 33/42 2/86 0/22 7/-3/96%  Post-op CXR: Low lung volumes  Cardiomegaly  Lines and tubes intact  I have personally reviewed pertinent films in PACS  Post-op EKG: NSR rate 96 This was personally reviewed by myself  Physical Exam   Constitutional: No distress  He is sedated, intubated and restrained  Jacquelyn Cowden, obese male  HENT:   Head: Normocephalic and atraumatic  Eyes: Pupils are equal, round, and reactive to light  Neck: Neck supple  Cardiovascular: Normal rate and regular rhythm  Exam reveals friction rub  Exam reveals no gallop and no distant heart sounds  No murmur heard  Pulses:       Radial pulses are 2+ on the right side, and 2+ on the left side  Dorsalis pedis pulses are 2+ on the right side, and 2+ on the left side  Sternum with dressing C/D/I   Pulmonary/Chest: No stridor  No tachypnea  He is intubated  No respiratory distress  He has no decreased breath sounds  He has no wheezes  He has no rhonchi  He has no rales  Abdominal: Soft  Normal appearance and bowel sounds are normal  He exhibits no distension  Genitourinary:   Genitourinary Comments: Santamaria present   Skin: Skin is warm, dry and intact  He is not diaphoretic  Invasive lines and devices:   Invasive Devices     Central Venous Catheter Line            CVC Central Lines 19 Triple less than 1 day    Introducer 19 less than 1 day          Peripheral Intravenous Line            Peripheral IV 19 Left Forearm 2 days    Peripheral IV 19 Right Wrist less than 1 day          Arterial Line            Arterial Line 19 less than 1 day          Line            Pacer Wires less than 1 day    Pacer Wires less than 1 day          Drain            Chest Tube 1 Left Pleural 32 Fr  less than 1 day    Chest Tube 2 Posterior Mediastinal 32 Fr  less than 1 day    Chest Tube 3 Anterior Mediastinal 32 Fr  less than 1 day    Urethral Catheter Temperature probe;Non-latex 16 Fr  less than 1 day          Airway            ETT  Hi-Lo; Cuffed;Oral 8 mm less than 1 day              ---------------------------------------------------------------------------------------------------------------------------------------------------------------------  Counseling / Coordination of Care  Total Critical Care time spent 0 minutes excluding procedures, teaching and family updates        SIGNATURE: Sj Jasso PA-C  DATE: December 23, 2019  TIME: 4:34 PM

## 2019-12-23 NOTE — ANESTHESIA PREPROCEDURE EVALUATION
Review of Systems/Medical History  Patient summary reviewed  Chart reviewed  No history of anesthetic complications (No family h/o anesthetic complications)     Cardiovascular  EKG reviewed, Exercise tolerance (METS): <4,  Hyperlipidemia, Hypertension on > 1 medication, Past MI 0-3 months, CAD , CAD status: 3VD and obstructive, No orthopnea, No PND, ANTONIO,   Comment: 12/19/2019  1  Mildly dilated left ventricular cavity, moderate concentric left ventricular hypertrophy, severely reduced left ventricular systolic function with mild global hypokinesis with some regional wall motion variation  Although there is no  definite evidence of left ventricular mural thrombus cannot exclude early layering thrombus in the distal inferior, inferior lateral wall  Ejection fraction is determined as 29%  Grade 2 diastolic dysfunction  2  Mild left atrial cavity enlargement  3  Aortic valve sclerosis with some focal calcification, trace aortic valve regurgitation  4  Mitral valve leaflet sclerosis with some focal calcification, trace mitral valve regurgitation  5  Trace tricuspid valve regurgitation  6  No obvious pulmonary hypertension  7  No pericardial effusion  CORONARY CIRCULATION:  Mid LAD: There was a diffuse 70 % stenosis, with hemodynamically significant iFR  Mid circumflex: There was a 75 % stenosis  2nd obtuse marginal: There was a 90 % stenosis  Mid RCA: There was a diffuse 50 % stenosis  Distal RCA: There was a 90 % stenosis    Right PDA: There was a 90 % stenosis    ,  Pulmonary  Sleep apnea ,        GI/Hepatic  No dysphagia,   GERD well controlled,        Negative  ROS        Endo/Other  Diabetes poorly controlled type 2 Insulin,   Obesity  morbid obesity   GYN       Hematology  Negative hematology ROS      Musculoskeletal  Negative musculoskeletal ROS        Neurology  Negative neurology ROS      Psychology   Anxiety,              Physical Exam    Airway    Mallampati score: II  TM Distance: >3 FB  Neck ROM: full     Dental       Cardiovascular  Rhythm: regular, Rate: normal, Murmur, Cardiovascular exam normal    Pulmonary  Pulmonary exam normal Breath sounds clear to auscultation,     Other Findings        Anesthesia Plan  ASA Score- 4     Anesthesia Type- general with ASA Monitors  Additional Monitors: arterial line, central venous line and pulmonary artery catheter  Airway Plan: ETT  Comment: PAOLA  Plan Factors-    Induction- intravenous  Postoperative Plan-     Informed Consent- Anesthetic plan and risks discussed with patient

## 2019-12-23 NOTE — PROGRESS NOTES
INTERNAL MEDICINE RESIDENCY PROGRESS NOTE     Name: Mohamud Ruby  Age & Sex: 44 y o  male   MRN: [id number]  Unit/Bed#: OR POOL   Encounter: [id number]  Team: SOD Team A    PATIENT INFORMATION     Name: Mohamud Ruby  Age & Sex: 44 y o  male   MRN: [id number] Stay Days: 4    ASSESSMENT/PLAN     Principal Problem:    NSTEMI (non-ST elevated myocardial infarction) [facility])  Active Problems:    Essential hypertension    Diabetes mellitus type 2, uncontrolled ([facility])    Snoring    Hyperlipidemia      Hyperlipidemia  Assessment & Plan  Total cholesterol 166, triglycerides 132, HDL 27,   ·  Continue atorvastatin 80 mg q h s  Snoring  Assessment & Plan  History of snoring, wakes up multiple times throughout the night, sometimes with headache  Stop Bang score 7, high risk for sleep apnea   Overnight pulse oximetry  ·  Recommend outpatient sleep study      Diabetes mellitus type 2, uncontrolled ([facility] [address] )  Assessment & Plan  Lab Results   Component Value Date    HGBA1C 10 1 (H) 12/18/2019       Recent Labs     12/23/19  0444 12/23/19  0557 12/23/19  0755 12/23/19  1004   POCGLU 113 114 136 120       Blood Sugar Average: Last 72 hrs:  (P) 617 [id number]     Hemoglobin A1c from 12/18/2019 10 1%  Diagnosed in 2013, was placed on metformin patient admits to noncompliance secondary to financial issues  · Continue to monitor with POCT glucose checks  · Cardiac/carbohydrate level 2 diet  · Currently on insulin drip, 2 units/hour  · Correctional insulin algorithm 3  · Endocrinology following, greatly appreciate recommendations    Essential hypertension  Assessment & Plan  ·  Blood pressure 987-018 systolic, somewhat controlled  · Continue amlodipine 10 mg q d  · Lisinopril 40 mg q d    · Toprol XL 37 5 mg b i d   · Continue to monitor vitals closely      * NSTEMI (non-ST elevated myocardial infarction) [facility])  Assessment & Plan  - Initially presented to [address] [place] [address] with one-week history of chest pain  - Initial EKG inverted T-waves in inferolateral leads    - Troponin peaked at 3 1   - ECHO: Mildly dilated left ventricular cavity, moderate concentric left ventricular hypertrophy, severely reduced left ventricular systolic function with mild global hypokinesis with some regional wall motion variation ejection fraction 75%, grade 2 diastolic dysfunction  Although there is no definite evidence of left ventricular mural thrombus cannot exclude early layering thrombus in the distal inferior, inferior lateral wall  - Cardiac catheterization: Mid LAD with diffuse 70 % stenosis, with   Mid circumflex 75 % stenosis, 2nd obtuse marginal 90 % stenosis, Mid RCA diffuse 50 % stenosis, Distal RCA 90 % stenosis, Right PDA 90 % stenosis  Global left ventricular function was moderately depressed  EF was 35 %  - Was started on aspirin, IV heparin, high-dose atorvastatin, loaded with ticagrelor  Heparin stopped, avoidance of any antiplatelets until seen by CT surgery for evaluation  - Transferred to [facility] for further evaluation for CABG by CT surgery  - Currently chest pain-free  Few episodes of PVC on telemetry  Potassium 3 7, magnesium pending     Plan:  · Cardiac MRI:  Enlarged left ventricle, decreased left ventricular systolic function, EF 13%  Increased wall thickness  Hypokinesis of inferior and inferior septal region  Ischemic cardiomyopathy in RCA distribution  Mitral regurg noted, moderate left atrial enlargement  · Continue Atorvastatin 80 mg q h s  ·  Aspirin 81 mg  · Toprol XL 37 5 mg b i d  · Lisinopril 40 mg daily  · CABG and possible mitral valve replaced today, scheduled for 11:15 a m  · Further management per cardiothoracic surgery postop       Disposition: CABG Today, further management per cardiothoracic surgery     SUBJECTIVE     Patient seen and examined  No acute events overnight  Patient had a few episodes of PVCs overnight, magnesium and a m   BMP checked  States he is feeling well overall  Denies any chest pain, shortness of breath, dyspnea on exertion currently overnight  Verbalizes understanding of procedure scheduled for today  All questions and concerns were answered at this time  Patient denies any nausea, vomiting, diarrhea, constipation fevers or chills overnight  All other review of systems negative at this time  OBJECTIVE     Vitals:    19 0009 19 0235 19 0451 19 0652   BP: 127/78 124/79 129/74 141/94   BP Location: Left arm Left arm Right arm Left arm   Pulse: 88 86 81 87   Resp:    Temp: 97 9 °F (36 6 °C) 98 °F (36 7 °C)  98 3 °F (36 8 °C)   TempSrc: Oral Oral  Oral   SpO2: 98% 97%  96%   Weight:       Height:          Temperature:   Temp (24hrs), Av °F (36 7 °C), Min:97 8 °F (36 6 °C), Max:98 3 °F (36 8 °C)    Temperature: 98 3 °F (36 8 °C)  Intake & Output:  I/O        07 -  0700  07 -  0700  07 -  0700    P  O  360 480     I V  (mL/kg)  25 8 (0 2)     Total Intake(mL/kg) 360 (2 9) 505 8 (4)     Urine (mL/kg/hr)       Total Output       Net +360 +505 8            Unmeasured Urine Occurrence 5 x          Weights:   IBW: 70 7 kg    Body mass index is 40 61 kg/m²  Weight (last 2 days)     None        Physical Exam   Constitutional: He is oriented to person, place, and time  No distress  Obese   HENT:   Head: Normocephalic and atraumatic  Mouth/Throat: No oropharyngeal exudate  Eyes: No scleral icterus  Neck: No JVD present  Cardiovascular: Normal rate and regular rhythm  Exam reveals no gallop and no friction rub  No murmur heard  Pulmonary/Chest: Effort normal  He has no wheezes  He exhibits no tenderness  Abdominal: There is no tenderness  Obese   Musculoskeletal: Normal range of motion  He exhibits no edema or tenderness  Neurological: He is alert and oriented to person, place, and time  Skin: No rash noted  No pallor     Psychiatric: Thought content normal    Vitals reviewed  LABORATORY DATA     Labs: I have personally reviewed pertinent reports  Results from last 7 days   Lab Units 12/23/19  0444 12/21/19  0440 12/18/19  1629   WBC Thousand/uL 10 07 10 32* 10 84*   HEMOGLOBIN g/dL 15 6 16 3 16 3   HEMATOCRIT % 47 2 49 9* 48 2   PLATELETS Thousands/uL 305 283 273   NEUTROS PCT %  --  57 65   MONOS PCT %  --  9 8      Results from last 7 days   Lab Units 12/23/19  0444 12/21/19  0440 12/18/19  1629   POTASSIUM mmol/L 3 7 3 9 4 0   CHLORIDE mmol/L 109* 104 97*   CO2 mmol/L 25 29 29   BUN mg/dL 18 21 15   CREATININE mg/dL 0 82 0 91 1 18   CALCIUM mg/dL 9 5 9 5 9 5   ALK PHOS U/L  --   --  89   ALT U/L  --   --  30   AST U/L  --   --  19     Results from last 7 days   Lab Units 12/23/19  0444   MAGNESIUM mg/dL 2 0          Results from last 7 days   Lab Units 12/19/19  0823 12/18/19  2356 12/18/19  1629   INR   --   --  1 03   PTT seconds 37 25 30         Results from last 7 days   Lab Units 12/19/19  0544 12/19/19  0216 12/18/19  2314   TROPONIN I ng/mL 2 83* 3 11* 2 57*       IMAGING & DIAGNOSTIC TESTING     Radiology Results: I have personally reviewed pertinent reports  Ct Chest Abdomen Pelvis Wo Contrast    Result Date: 12/21/2019  Impression: 1  No acute intrathoracic abnormality  2   Mild hepatic steatosis  3   Thick-walled bladder likely sequela of underdistention  No perivesical inflammation  Workstation performed: [id number]     Mri Cardiac  W Wo Contrast    Result Date: 12/22/2019  Impression: Impression: 1  Moderately enlarged left ventricle size with moderately reduced left ventricle systolic function  Ejection fraction measures 33%  Mild diffuse increased myocardial wall thickness  Severe hypokinesis/akinesis at the inferior and inferoseptal wall    Subendocardial delayed myocardial enhancement up to 50% transmural thickness at the basal inferior and inferoseptal wall and mid inferior wall, consistent with ischemic cardiomyopathy in the RCA/PDA distribution, with preserved viability  2  Normal right ventricle size  Mildly reduced right ventricle systolic function with ejection fraction measuring 41%  3  Mitral regurgitation 4  Moderate left atrial enlargement Workstation performed: [id number]     Other Diagnostic Testing: I have personally reviewed pertinent reports  ACTIVE MEDICATIONS     Current Facility-Administered Medications   Medication Dose Route Frequency    [MAR Hold] ALPRAZolam (XANAX) tablet 0 25 mg  0 25 mg Oral BID PRN    [MAR Hold] aspirin chewable tablet 81 mg  81 mg Oral Daily    [MAR Hold] atorvastatin (LIPITOR) tablet 80 mg  80 mg Oral Daily With Dinner    [MAR Hold] chlorhexidine (PERIDEX) 0 12 % oral rinse 15 mL  15 mL Swish & Spit Q12H [address]    [MAR Hold] heparin (porcine) subcutaneous injection 5,000 Units  5,000 Units Subcutaneous Q12H [address]    [MAR Hold] insulin lispro (HumaLOG) 100 units/mL subcutaneous injection 1-6 Units  1-6 Units Subcutaneous HS    insulin regular (HumuLIN R,NovoLIN R) 1 Units/mL in sodium chloride 0 9 % 100 mL infusion  0 3-21 Units/hr Intravenous Titrated    [MAR Hold] Labetalol HCl (NORMODYNE) injection 10 mg  10 mg Intravenous Q4H PRN    [MAR Hold] metoprolol succinate (TOPROL-XL) 24 hr tablet 37 5 mg  37 5 mg Oral BID    [MAR Hold] mupirocin (BACTROBAN) 2 % nasal ointment 1 application  1 application Nasal M25S [address]    [MAR Hold] ondansetron (ZOFRAN) injection 4 mg  4 mg Intravenous Q6H PRN       VTE Pharmacologic Prophylaxis: Sequential compression device (Venodyne)   VTE Mechanical Prophylaxis: sequential compression device    Portions of the record may have been created with voice recognition software  Occasional wrong word or "sound a like" substitutions may have occurred due to the inherent limitations of voice recognition software    Read the chart carefully and recognize, using context, where substitutions have occurred   ==  Puneet Love, [address] [place] [address] [facility]  Internal Medicine Residency PGY-1

## 2019-12-23 NOTE — PROGRESS NOTES
Progress Note - Cardiology   [facility]  44 y o  male MRN: [id number]  Unit/Bed#: [facility] 421-01 Encounter: [id number]        Principal Problem:    NSTEMI (non-ST elevated myocardial infarction) [facility])  Active Problems:    Essential hypertension    Diabetes mellitus type 2, uncontrolled ([address] )    Snoring    Hyperlipidemia      Assessment/Plan    1  NSTEMI type 1  Peak troponin 3 1  2D echo mildly dilated LV moderate concentric LVH and EF 66%, grade 2 diastolic dysfunction  Cardiac catheterization findings-triple-vessel CAD with significant IFR to the LAD    2  Cardiomyopathy-not all explained by CAD thus cardiac MRI ordered  Evaluated by Dr Gabriele Machado, presumed within on mixed ischemic with nonischemic  Cardiac MRI EF 33% with subendocardial delayed myocardial enhancement of to 50% transient thickness at the basal inferior and inferoseptal wall and inferior wall, consistent with ischemic cardiomyopathy in the RCA/PDA distribution with preserved viability  Severe hypokinesis/akinesis of the inferior and inferoseptal wall  Life vest prior to d/c  Toprol 37 5 mg daily  Ace or entresto post OHS  2  Poorly-controlled diabetes mellitus type 2  Hemoglobin A1c 10 1%  On IV insulin   Endo following    3  JANKI suspected- outpatient sleep study    3  HTN-poorly controlled on admission, not taking the medication  Stress importance of compliance    4  HLD- high intensity statin      Subjective/Objective   Chief Complaint/Subjective  Patient going for surgery today  Denies chest pain shortness of breath    Importance of medication compliance and  follow-up stressed with the patient        Vitals: /94 (BP Location: Left arm)   Pulse 87   Temp 98 3 °F (36 8 °C) (Oral)   Resp 18   Ht 5' 9" (1 753 m)   Wt 125 kg (275 lb)   SpO2 96%   BMI 40 61 kg/m²     Vitals:    12/19/19 1954   Weight: 125 kg (275 lb)     Orthostatic Blood Pressures      Most Recent Value   Blood Pressure  141/94 filed at 12/23/2019 8306 Patient Position - Orthostatic VS  Lying filed at 12/23/2019 6815            Intake/Output Summary (Last 24 hours) at 12/23/2019 1018  Last data filed at 12/23/2019 0651  Gross per 24 hour   Intake 385 8 ml   Output --   Net 385 8 ml       Invasive Devices     Peripheral Intravenous Line            Peripheral IV 12/21/19 Left Forearm 1 day                Current Facility-Administered Medications   Medication Dose Route Frequency    ALPRAZolam (XANAX) tablet 0 25 mg  0 25 mg Oral BID PRN    aspirin chewable tablet 81 mg  81 mg Oral Daily    atorvastatin (LIPITOR) tablet 80 mg  80 mg Oral Daily With Dinner    ceFAZolin (ANCEF) IVPB (premix) 2,000 mg  2,000 mg Intravenous Once    chlorhexidine (PERIDEX) 0 12 % oral rinse 15 mL  15 mL Swish & Spit Q12H [address]    chlorhexidine (PERIDEX) 0 12 % oral rinse 15 mL  15 mL Swish & Spit Q12H [address]    heparin (porcine) subcutaneous injection 5,000 Units  5,000 Units Subcutaneous Q12H KENJI    insulin lispro (HumaLOG) 100 units/mL subcutaneous injection 1-6 Units  1-6 Units Subcutaneous HS    insulin regular (HumuLIN R,NovoLIN R) 1 Units/mL in sodium chloride 0 9 % 100 mL infusion  0 3-21 Units/hr Intravenous Titrated    Labetalol HCl (NORMODYNE) injection 10 mg  10 mg Intravenous Q4H PRN    metoprolol succinate (TOPROL-XL) 24 hr tablet 37 5 mg  37 5 mg Oral BID    metoprolol tartrate (LOPRESSOR) partial tablet 12 5 mg  12 5 mg Oral Once    mupirocin (BACTROBAN) 2 % nasal ointment 1 application  1 application Nasal M59B KENJI    mupirocin (BACTROBAN) 2 % nasal ointment 1 application  1 application Nasal Once    ondansetron (ZOFRAN) injection 4 mg  4 mg Intravenous Q6H PRN         Physical Exam: /94 (BP Location: Left arm)   Pulse 87   Temp 98 3 °F (36 8 °C) (Oral)   Resp 18   Ht 5' 9" (1 753 m)   Wt 125 kg (275 lb)   SpO2 96%   BMI 40 61 kg/m²     General Appearance:    Alert, cooperative, no distress, appears stated age   Head:    Normocephalic, no scleral icterus   Eyes:    PERRL   Nose:   Nares normal, septum midline, no drainage    Throat:   Lips, mucosa, and tongue normal   Neck:   Supple, symmetrical, trachea midline,       no JVD       Lungs:     Clear to auscultation bilaterally, respirations unlabored   Chest Wall:    No tenderness or deformity    Heart:    Regular rate and rhythm, S1 and S2 normal, no murmur, rub   or gallop   Abdomen:     Soft, non-tender, bowel sounds active all four quadrants,     no masses, no organomegaly   Extremities:   Extremities normal, atraumatic, no cyanosis or edema       Skin:   Skin color, texture, turgor normal, no rashes or lesions   Neurologic:   Alert and oriented to person place and time, no focal deficits                 Lab Results:   Recent Results (from the past 72 hour(s))   Fingerstick Glucose (POCT)    Collection Time: 12/20/19 11:07 AM   Result Value Ref Range    POC Glucose 346 (H) 65 - 140 mg/dl   Fingerstick Glucose (POCT)    Collection Time: 12/20/19  4:12 PM   Result Value Ref Range    POC Glucose 198 (H) 65 - 140 mg/dl   UA w Reflex to Microscopic w Reflex to Culture    Collection Time: 12/20/19  7:30 PM   Result Value Ref Range    Color, UA Dk Yellow     Clarity, UA Clear     Specific Gravity, UA 1 035 (H) 1 003 - 1 030    pH, UA 5 5 4 5, 5 0, 5 5, 6 0, 6 5, 7 0, 7 5, 8 0    Leukocytes, UA (A) Negative     Elevated glucose may cause decreased leukocyte values   See urine microscopic for [facility] result/    Nitrite, UA Negative Negative    Protein, UA Trace (A) Negative mg/dl    Glucose, UA >=1000 (1%) (A) Negative mg/dl    Ketones, UA Negative Negative mg/dl    Urobilinogen, UA 0 2 0 2, 1 0 E U /dl E U /dl    Bilirubin, UA Negative Negative    Blood, UA Negative Negative   Urine Microscopic    Collection Time: 12/20/19  7:30 PM   Result Value Ref Range    RBC, UA None Seen None Seen, 0-5 /hpf    WBC, UA 4-10 (A) None Seen, 0-5, 5-55, 5-65 /hpf    Epithelial Cells Moderate (A) None Seen, Occasional /hpf Bacteria, UA Occasional None Seen, Occasional /hpf    Hyaline Casts, UA 3-5 (A) None Seen /lpf   Fingerstick Glucose (POCT)    Collection Time: 12/20/19  9:03 PM   Result Value Ref Range    POC Glucose 219 (H) 65 - 140 mg/dl   Basic metabolic panel    Collection Time: 12/21/19  4:40 AM   Result Value Ref Range    Sodium 136 136 - 145 mmol/L    Potassium 3 9 3 5 - 5 3 mmol/L    Chloride 104 100 - 108 mmol/L    CO2 29 21 - 32 mmol/L    ANION GAP 3 (L) 4 - 13 mmol/L    BUN 21 5 - 25 mg/dL    Creatinine 0 91 0 60 - 1 30 mg/dL    Glucose 256 (H) 65 - 140 mg/dL    Calcium 9 5 8 3 - 10 1 mg/dL    eGFR 106 ml/min/1 73sq m   CBC and differential    Collection Time: 12/21/19  4:40 AM   Result Value Ref Range    WBC 10 32 (H) 4 31 - 10 16 Thousand/uL    RBC 5 51 3 88 - 5 62 Million/uL    Hemoglobin 16 3 12 0 - 17 0 g/dL    Hematocrit 49 9 (H) 36 5 - 49 3 %    MCV 91 82 - 98 fL    MCH 29 6 26 8 - 34 3 pg    MCHC 32 7 31 4 - 37 4 g/dL    RDW 11 3 (L) 11 6 - 15 1 %    MPV 11 1 8 9 - 12 7 fL    Platelets 401 450 - 119 Thousands/uL    nRBC 0 /100 WBCs    Neutrophils Relative 57 43 - 75 %    Immat GRANS % 1 0 - 2 %    Lymphocytes Relative 30 14 - 44 %    Monocytes Relative 9 4 - 12 %    Eosinophils Relative 2 0 - 6 %    Basophils Relative 1 0 - 1 %    Neutrophils Absolute 5 99 1 85 - 7 62 Thousands/µL    Immature Grans Absolute 0 06 0 00 - 0 20 Thousand/uL    Lymphocytes Absolute 3 12 0 60 - 4 47 Thousands/µL    Monocytes Absolute 0 90 0 17 - 1 22 Thousand/µL    Eosinophils Absolute 0 19 0 00 - 0 61 Thousand/µL    Basophils Absolute 0 06 0 00 - 0 10 Thousands/µL   Fingerstick Glucose (POCT)    Collection Time: 12/21/19  6:12 AM   Result Value Ref Range    POC Glucose 232 (H) 65 - 140 mg/dl   MRSA culture    Collection Time: 12/21/19  8:25 AM   Result Value Ref Range    MRSA Culture Only       No Methicillin Resistant Stapylococcus aureus (MRSA) after 24 hours   Fingerstick Glucose (POCT)    Collection Time: 12/21/19 10:55 AM Result Value Ref Range    POC Glucose 236 (H) 65 - 140 mg/dl   Fingerstick Glucose (POCT)    Collection Time: 12/21/19  3:46 PM   Result Value Ref Range    POC Glucose 171 (H) 65 - 140 mg/dl   ECG 12 lead    Collection Time: 12/21/19  7:37 PM   Result Value Ref Range    Ventricular Rate 91 BPM    Atrial Rate 91 BPM    MT Interval 134 ms    QRSD Interval 104 ms    QT Interval 370 ms    QTC Interval 455 ms    P Axis -2 degrees    QRS Axis 92 degrees    T Wave Axis 223 degrees   Fingerstick Glucose (POCT)    Collection Time: 12/21/19  9:19 PM   Result Value Ref Range    POC Glucose 272 (H) 65 - 140 mg/dl   Fingerstick Glucose (POCT)    Collection Time: 12/22/19  5:54 AM   Result Value Ref Range    POC Glucose 200 (H) 65 - 140 mg/dl   Fingerstick Glucose (POCT)    Collection Time: 12/22/19 11:06 AM   Result Value Ref Range    POC Glucose 219 (H) 65 - 140 mg/dl   Type and screen    Collection Time: 12/22/19 11:12 AM   Result Value Ref Range    ABO Grouping A     Rh Factor Positive     Antibody Screen Negative     Specimen Expiration Date [id number]    ABO/Rh    Collection Time: 12/22/19 11:12 AM   Result Value Ref Range    ABO Grouping A     Rh Factor Positive    Fingerstick Glucose (POCT)    Collection Time: 12/22/19  4:00 PM   Result Value Ref Range    POC Glucose 146 (H) 65 - 140 mg/dl   Fingerstick Glucose (POCT)    Collection Time: 12/22/19  8:36 PM   Result Value Ref Range    POC Glucose 128 65 - 140 mg/dl   Fingerstick Glucose (POCT)    Collection Time: 12/22/19  9:56 PM   Result Value Ref Range    POC Glucose 130 65 - 140 mg/dl   Fingerstick Glucose (POCT)    Collection Time: 12/23/19 12:11 AM   Result Value Ref Range    POC Glucose 166 (H) 65 - 140 mg/dl   Fingerstick Glucose (POCT)    Collection Time: 12/23/19  2:04 AM   Result Value Ref Range    POC Glucose 146 (H) 65 - 140 mg/dl   CBC    Collection Time: 12/23/19  4:44 AM   Result Value Ref Range    WBC 10 07 4 31 - 10 16 Thousand/uL    RBC 5 26 3 88 - 5 62 Million/uL    Hemoglobin 15 6 12 0 - 17 0 g/dL    Hematocrit 47 2 36 5 - 49 3 %    MCV 90 82 - 98 fL    MCH 29 7 26 8 - 34 3 pg    MCHC 33 1 31 4 - 37 4 g/dL    RDW 11 4 (L) 11 6 - 15 1 %    Platelets 904 263 - 038 Thousands/uL    MPV 11 1 8 9 - 12 7 fL   Basic metabolic panel    Collection Time: 12/23/19  4:44 AM   Result Value Ref Range    Sodium 139 136 - 145 mmol/L    Potassium 3 7 3 5 - 5 3 mmol/L    Chloride 109 (H) 100 - 108 mmol/L    CO2 25 21 - 32 mmol/L    ANION GAP 5 4 - 13 mmol/L    BUN 18 5 - 25 mg/dL    Creatinine 0 82 0 60 - 1 30 mg/dL    Glucose 113 65 - 140 mg/dL    Calcium 9 5 8 3 - 10 1 mg/dL    eGFR 111 ml/min/1 73sq m   Fingerstick Glucose (POCT)    Collection Time: 12/23/19  4:44 AM   Result Value Ref Range    POC Glucose 113 65 - 140 mg/dl   Magnesium    Collection Time: 12/23/19  4:44 AM   Result Value Ref Range    Magnesium 2 0 1 6 - 2 6 mg/dL   Fingerstick Glucose (POCT)    Collection Time: 12/23/19  5:57 AM   Result Value Ref Range    POC Glucose 114 65 - 140 mg/dl   Prepare Leukoreduced RBC:Has consent been obtained?  Yes, 4 Units, Leukoreduced    Collection Time: 12/23/19  7:38 AM   Result Value Ref Range    Unit Product Code T1826H90     Unit Number [id number]     Unit ABO A     Unit [facility] POS     Crossmatch Compatible     Unit Dispense Status Crossmatched     Unit Product Code W4676H50     Unit Number [id number]     Unit ABO A     Unit [facility] POS     Crossmatch Compatible     Unit Dispense Status Crossmatched     Unit Product Code O0269M66     Unit Number [id number]     Unit ABO A     Unit [facility] POS     Crossmatch Compatible     Unit Dispense Status Crossmatched     Unit Product Code N0488V74     Unit Number [id number]     Unit ABO A     Unit [facility] POS     Crossmatch Compatible     Unit Dispense Status Crossmatched    Fingerstick Glucose (POCT)    Collection Time: 12/23/19  7:55 AM   Result Value Ref Range    POC Glucose 136 65 - 140 mg/dl   Fingerstick Glucose (POCT) Collection Time: 19 10:04 AM   Result Value Ref Range    POC Glucose 120 65 - 140 mg/dl     [address]  [address]  [facility], [address]  [phone]     Transthoracic Echocardiogram  2D, M-mode, Doppler, and Color Doppler     Study date:  19-Dec-2019     Patient: Ezio Segura  MR number: [id number]  Account number: [de-identified]  : 1980  Age: 44 years  Gender: Male  Status: Inpatient  Location: Bedside  Height: 69 in  Weight: 276 lb  BP: 170/ 51 mmHg     Indications: Acute MI      Diagnoses: I24 9 - Acute ischemic heart disease, unspecified     Sonographer:  Ghassan Horvath RDCS  Referring Physician:  Goldie Walker MD  Group:  [facility]  Interpreting Physician:  Wally Wyatt MD     IMPRESSIONS:  Technical quality: Fair  Technically very difficult study due to body habitus and poor echo penetration  Definity contrast was used  Cardiac rhythm: Sinus with interventricular conduction delay      1  Mildly dilated left ventricular cavity, moderate concentric left ventricular hypertrophy, severely reduced left ventricular systolic function with mild global hypokinesis with some regional wall motion variation  Although there is no  definite evidence of left ventricular mural thrombus cannot exclude early layering thrombus in the distal inferior, inferior lateral wall  Ejection fraction is determined as 29%  Grade 2 diastolic dysfunction  2  Mild left atrial cavity enlargement  3  Aortic valve sclerosis with some focal calcification, trace aortic valve regurgitation  4  Mitral valve leaflet sclerosis with some focal calcification, trace mitral valve regurgitation  5  Trace tricuspid valve regurgitation  6  No obvious pulmonary hypertension    7  No pericardial effusion      There is no previous echocardiogram available for comparison      SUMMARY     LEFT VENTRICLE:  Mildly dilated left ventricular cavity, moderate concentric left ventricular hypertrophy, severely reduced left ventricular systolic function mild global hypokinesis with some regional wall motion variation  Although there is no definite  evidence of left ventricular thrombus, cannot definitively exclude layering thrombus formation in inferior-apical and distal inferior-lateral regions  Grade 2 diastolic dysfunction  Elevated left atrial pressure      RIGHT VENTRICLE:  Normal right ventricular size and systolic function  Right ventricular systolic pressure could not be estimated due to poor Doppler signals across the tricuspid valve      LEFT ATRIUM:  Mild left atrial cavity enlargement  Intact interatrial septum      RIGHT ATRIUM:  Normal right atrial cavity size      MITRAL VALVE:  Mitral valve leaflet sclerosis with some focal calcification, adequate leaflet mobility  Trace mitral valve regurgitation      AORTIC VALVE:  Tricuspid aortic valve sclerosis and focal calcification which is most pronounced on the right coronary cusp there is adequate cuspal separation  Trace aortic valve regurgitation noted      TRICUSPID VALVE:  Trace tricuspid valve regurgitation      PULMONIC VALVE:  No obvious pulmonic valve regurgitation      AORTA:  Normal aortic root size  Proximal ascending aorta was not well visualized      IVC, HEPATIC VEINS:  Tricuspid aortic valve with mild sclerosis  No aortic valve stenosis or regurgitation      PERICARDIUM:  No pericardial effusion      HISTORY: PRIOR HISTORY: Hypertension  DM2  Obesity      PROCEDURE: The procedure was performed at the bedside  This was a routine study  The transthoracic approach was used  The study included complete 2D imaging, M-mode, complete spectral Doppler, and color Doppler  The heart rate was 92 bpm,  at the start of the study  Intravenous contrast (  6 mL of definity in NSS) was administered to opacify the left ventricle  Echocardiographic views were limited due to decreased penetration and lung interference   This was a technically  difficult study      LEFT VENTRICLE: Mildly dilated left ventricular cavity, moderate concentric left ventricular hypertrophy, severely reduced left ventricular systolic function mild global hypokinesis with some regional wall motion variation  Although there  is no definite evidence of left ventricular thrombus, cannot definitively exclude layering thrombus formation in inferior-apical and distal inferior-lateral regions  Grade 2 diastolic dysfunction  Elevated left atrial pressure      RIGHT VENTRICLE: Normal right ventricular size and systolic function  Right ventricular systolic pressure could not be estimated due to poor Doppler signals across the tricuspid valve      LEFT ATRIUM: Mild left atrial cavity enlargement  Intact interatrial septum      RIGHT ATRIUM: Normal right atrial cavity size      MITRAL VALVE: Mitral valve leaflet sclerosis with some focal calcification, adequate leaflet mobility  Trace mitral valve regurgitation      AORTIC VALVE: Tricuspid aortic valve sclerosis and focal calcification which is most pronounced on the right coronary cusp there is adequate cuspal separation  Trace aortic valve regurgitation noted      TRICUSPID VALVE: Trace tricuspid valve regurgitation      PULMONIC VALVE: No obvious pulmonic valve regurgitation      PERICARDIUM: No pericardial effusion      AORTA: Normal aortic root size  Proximal ascending aorta was not well visualized      SYSTEMIC VEINS: IVC: Tricuspid aortic valve with mild sclerosis  No aortic valve stenosis or regurgitation      SYSTEM MEASUREMENT TABLES    Imaging: I have personally reviewed pertinent reports  Tele- nsr      Counseling / Coordination of Care  Total time spent today 20 minutes  Greater than 50% of total time was spent with the patient and / or family counseling and / or coordination of care

## 2019-12-23 NOTE — OP NOTE
OPERATIVE REPORT  PATIENT NAME: Davon Case  :  1980  MRN: [id number]  Pt Location: BE OR ROOM 16    SURGERY DATE: 2019    SURGEON: Heath Carvajal MD    ASSISTANT: Halle Hsieh PA-C    ADDITIONAL ASSISTANT: None    PREOPERATIVE DIAGNOSIS:  Multivessel coronary artery disease, s/p Acute NSTEMI, Ischemic Cardiomyopathy    POSTOPERATIVE DIAGNOSIS:  Multivessel coronary artery disease, s/p Acute NSTEMI, Ischemic Cardiomyopathy    NYHA Class: 4    CCS Class: 4    PROCEDURE: Coronary artery bypass grafting x 3 with left internal mammary artery to left anterior descending, saphenous vein graft to obtuse marginal 3, saphenous vein graft to right posterior descending artery  ANESTHESIA: General endotracheal anesthesia with transesophageal echocardiogram guidance, Dr Dominick Power: 56 minutes  CROSSCLAMP TIME: 47 minutes  PACKS/TUBES/DRAINS: Chest tubes x 3  MATERIALS: Pacing wires: A x1  V x1  TRANSFUSION: None  SPECIMENS: None  ESTIMATED BLOOD LOSS: 200 mL    OPERATIVE TECHNIQUE:    The patient was taken to the operating room and placed supine on the operating table  Following the satisfactory induction of general anesthesia and placement of monitoring lines, the patient was prepped and draped in the usual sterile fashion  A time-out procedure was performed  The patient underwent median sternotomy, LIMA harvest, endoscopic right greater saphenous vein harvest, systemic heparinization and conduit preparation  The patient underwent pericardiotomy and epiaortic ultrasound was used to evaluate the ascending aorta, which was found to be free of significant atheromatous disease  The patient underwent aortic and right atrial cannulation and was initiated on bypass  The ascending aorta was crossclamped   Antegrade [place] cardioplegia was delivered with an excellent arrest     The saphenous vein was anastomosed to the obtuse marginal 3 in end-to-side fashion using running 7-0 Prolene suture  The saphenous vein was anastomosed to the  right posterior descending arteryin end-to-side fashion using running 7-0 Prolene suture  The left internal mammary artery was anastomosed to the left anterior descending in end-to-side fashion using running 7-0 Prolene suture  The quality of all three grafts was excellent  A total of 2 proximal anastomoses were completed on the ascending aorta in end-to-side fashion using running 5-0 Prolene suture  The heart was de-aired and the crossclamp was removed  Atrial and ventricular pacing wires were placed  Following a period of reperfusion, the patient was weaned from cardiopulmonary bypass and decannulated  Protamine was administered with normalization of the ACT  Hemostasis was confirmed in all fields  Thoracostomy tubes were placed  The sternum was closed with stainless steel wires  The fascia, subdermis and skin were closed with multiple layers of running absorbable suture  As the attending surgeon I was present and scrubbed for all critical portions of this procedure  There was no qualified surgical resident available  Sponge, needle and instrument counts were reported as correct by the nursing staff  Final transesophageal echocardiogram demonstrated no change in the LVEF at 20-25%  The assistance of a PA was required to complete this case, specifically for assistance with endoscopic saphenous vein harvesting, cannulation, decannulation, and construction of the bypass grafts         Yoav Cm MD  DATE: December 23, 2019  TIME: 3:25 PM

## 2019-12-23 NOTE — ANESTHESIA PROCEDURE NOTES
Kim/Donna  Performed by: Miguel Johnson MD  Authorized by: Miguel Johnson MD   Date/Time: 12/23/2019 11:35 AM  Consent: Verbal consent obtained  Written consent obtained  Risks and benefits: risks, benefits and alternatives were discussed  Consent given by: patient  Patient understanding: patient states understanding of the procedure being performed  Patient consent: the patient's understanding of the procedure matches consent given  Required items: required blood products, implants, devices, and special equipment available  Patient identity confirmed: verbally with patient and arm band  Time out: Immediately prior to procedure a "time out" was called to verify the correct patient, procedure, equipment, support staff and site/side marked as required    Indications: vascular access and central pressure monitoring  Location details: right internal jugular  Catheter size: 9 Fr  Patient position: Trendelenburg  Assessment: blood return through all ports and free fluid flow  Preparation: skin prepped with 2% chlorhexidine and Chloraprep  Skin prep agent dried: skin prep agent completely dried prior to procedure  Sterile barriers: all five maximum sterile barriers used - cap, mask, sterile gown, sterile gloves, and large sterile sheet  Hand hygiene: hand hygiene performed prior to central venous catheter insertion  Ultrasound guidance: yes  ultrasound permanent image saved  Number of attempts: 1  Successful placement: yes  Post-procedure: line sutured and dressing applied  Patient tolerance: Patient tolerated the procedure well with no immediate complications

## 2019-12-23 NOTE — ANESTHESIA POSTPROCEDURE EVALUATION
Post-Op Assessment Note    CV Status:  Stable    Pain management: adequate     Mental Status:  Sleepy and somnolent   Hydration Status:  Stable   PONV Controlled:  None   Airway Patency:  Patent  Airway: intubated   Post Op Vitals Reviewed: Yes      Staff: Anesthesiologist           BP   106/60   Temp   98 4F   Pulse  95   Resp   14   SpO2 97 % (12/23/19 1550)

## 2019-12-23 NOTE — ANESTHESIA PROCEDURE NOTES
Arterial Line Insertion  Date/Time: 12/23/2019 11:06 AM  Performed by: Shelley Morales MD  Authorized by: Shelley Morales MD   Consent: Verbal consent obtained  Written consent obtained  Risks and benefits: risks, benefits and alternatives were discussed  Consent given by: patient  Patient understanding: patient states understanding of the procedure being performed  Patient consent: the patient's understanding of the procedure matches consent given  Required items: required blood products, implants, devices, and special equipment available  Patient identity confirmed: arm band and verbally with patient  Time out: Immediately prior to procedure a "time out" was called to verify the correct patient, procedure, equipment, support staff and site/side marked as required  Preparation: Patient was prepped and draped in the usual sterile fashion  Indications: hemodynamic monitoring  lidocaine (PF) (XYLOCAINE-MPF) 1 % infiltration, 0 5 mL  Sedation:  Patient sedated: yes  Sedation type: anxiolysis  Sedatives: midazolam and see MAR for details  Vitals: Vital signs were monitored during sedation      Procedure Details:  Needle gauge: 20  Seldinger technique: Seldinger technique used  Number of attempts: 1    Post-procedure:  Post-procedure: dressing applied  Waveform: good waveform  Post-procedure CNS: unchanged  Patient tolerance: Patient tolerated the procedure well with no immediate complications

## 2019-12-23 NOTE — RESPIRATORY THERAPY NOTE
resp care      12/23/19 1550   Respiratory Protocol   Protocol Initiated? Yes   Protocol Selection Respiratory   Language Barrier? No   Medical & Social History Reviewed? Yes   Diagnostic Studies Reviewed? Yes   Physical Assessment Performed? Yes   Respiratory Plan Vent/NIV/HFNC   Respiratory Assessment   Assessment Type Assess only   General Appearance Sedated   Respiratory Pattern Assisted   Chest Assessment Chest expansion symmetrical   Bilateral Breath Sounds Clear   Resp Comments Pt arrived post CABG and placed on pb840 ventilator  Pt on 14/600/60/5  Pt tolerating current settings, no resp distress noted  Bs clear  Will cont to follow via ventilator management   ETT  Hi-Lo; Cuffed;Oral 8 mm   Placement Date/Time: 12/23/19 1112   Mask Ventilation: Mask ventilation not attempted (0)  Preoxygenated: Yes  Technique: Direct laryngoscopy  Type: Hi-Lo; Cuffed;Oral  Tube Size: 8 mm  Laryngoscope: Mac  Blade Size: 3  Location: Oral  Grade View: Part       Secured at (cm) 25   Measured from Lips   Secured Location Right   Secured by Pink tape   Additional Assessments   SpO2 97 %

## 2019-12-23 NOTE — NURSING NOTE
Pt currently on insulin gtt alg 3, per endocrinology order  Checked signed and held orders for pre-op CABG and noticed CT Surgery placed an order (still held) for insulin gtt to start on alg 2  Paged SOD, discussed with Dr Kevan Vanegas if insulin gtt should stay on alg 3, or change to alg 2  He recommended staying with endocrinology's original order  Left CT Surgery order in the held orders  Pt currently on 4U insulin on alg 3

## 2019-12-23 NOTE — PROGRESS NOTES
SOD paged by RN at roughly 4:50 a m  For PVCs noted on tele  On review of telemetry monitoring, patient experienced episodes of increased frequency of PVCs at roughly 1045 last night and 315 a m  This morning  Frequency as high as 12 per minute  At time of my review, roughly 1 PVC every 10 seconds  Patient asleep  Vital signs stable  Follow up a m  BMP  Continue to monitor

## 2019-12-23 NOTE — ANESTHESIA PROCEDURE NOTES
Procedure Performed: PAOLA Anesthesia  Start Time:  12/23/2019 11:38 AM        Preanesthesia Checklist    Patient identified, IV assessed, risks and benefits discussed, monitors and equipment assessed, procedure being performed at surgeon's request and anesthesia consent obtained  Procedure    Type of PAOLA: complete PAOLA with interpretation  Images Saved: ultrasound permanent image saved  Physician Requesting Echo: Stella Samuel MD  Location performed: OR  Intubated  Heart visualized  Insertion of PAOLA Probe:  Easy  Probe Type:  Epiaortic and multiplane  Modalities:  Color flow mapping, 3D, pulse wave Doppler and continuous wave Doppler  Echocardiographic and Doppler Measurements    PREPROCEDURE    LEFT VENTRICLE:  Systolic Function: severely impaired  Ejection Fraction: 15-20%  Cavity size: dilated  Regional Wall Motion Abnormalities: global hypokinesis  RIGHT VENTRICLE:  Systolic Function: mildly impaired  Cavity size mildly dilated  No hypertrophy              AORTIC VALVE:  Leaflets: normal and trileaflet  Leaflet motions normal and normal  Stenosis: none  Regurgitation: trace  MITRAL VALVE:  Leaflets: normal  Leaflet Motions: normal  Regurgitation: trace  Stenosis: none  TRICUSPID VALVE:  Leaflets: normal  Leaflet Motions: normal  Stenosis: none  Regurgitation: mild  PULMONIC VALVE:  Leaflets: normal  Regurgitation: trace  Stenosis: none  ASCENDING AORTA:  Size:  normal  Dissection not present  AORTIC ARCH:  Size:  normal  dissection not present  Grade 2: severe intimal thickening without protruding atheroma  DESCENDING AORTA:  Size: normal  Dissection not present  Grade 2: severe intimal thickening without protruding atheroma          RIGHT ATRIUM:  Size:  normal  No spontaneous echo contrast     LEFT ATRIUM:  Size: normal  No spontaneous echo contrast     LEFT ATRIAL APPENDAGE:  Size: normal  No spontaneous echo contrast         ATRIAL SEPTUM:  Intra-atrial septal morphology: normal          VENTRICULAR SEPTUM:  Intra-ventricular septum morphology: normal          EPIAORTIC:  Plaque Thickness: 0-5 mm  OTHER FINDINGS:  Pericardium:  pericardial effusion and Trace  Pleural Effusion:  none  POSTPROCEDURE    LEFT VENTRICLE:   Systolic Function: severely depressed  Ejection Fraction: 20-25 %  Cavity Size: dilated  Regional Wall Motion Abnormalities: global hypokinesis  RIGHT VENTRICLE: Unchanged   AORTIC VALVE: Unchanged   MITRAL VALVE: Unchanged   TRICUSPID VALVE: Unchanged   PULMONIC VALVE: Unchanged           ATRIA: Unchanged   AORTA: Unchanged   REMOVAL:  Probe Removal: atraumatic

## 2019-12-24 ENCOUNTER — APPOINTMENT (INPATIENT)
Dept: RADIOLOGY | Facility: HOSPITAL | Age: 39
DRG: 235 | End: 2019-12-24
Payer: COMMERCIAL

## 2019-12-24 PROBLEM — N17.9 AKI (ACUTE KIDNEY INJURY) (HCC): Status: RESOLVED | Noted: 2019-12-23 | Resolved: 2019-12-24

## 2019-12-24 PROBLEM — Z95.1 S/P CABG X 3: Status: ACTIVE | Noted: 2019-12-24

## 2019-12-24 PROBLEM — E87.8 HYPERCHLOREMIA: Status: RESOLVED | Noted: 2019-12-23 | Resolved: 2019-12-24

## 2019-12-24 LAB
ABO GROUP BLD BPU: NORMAL
ANION GAP SERPL CALCULATED.3IONS-SCNC: 5 MMOL/L (ref 4–13)
ARTERIAL PATENCY WRIST A: NO
ATRIAL RATE: 105 BPM
ATRIAL RATE: 106 BPM
ATRIAL RATE: 108 BPM
ATRIAL RATE: 85 BPM
BASE EXCESS BLDA CALC-SCNC: -1 MMOL/L
BPU ID: NORMAL
BUN SERPL-MCNC: 18 MG/DL (ref 5–25)
CALCIUM SERPL-MCNC: 8.4 MG/DL (ref 8.3–10.1)
CHLORIDE SERPL-SCNC: 112 MMOL/L (ref 100–108)
CO2 SERPL-SCNC: 25 MMOL/L (ref 21–32)
CREAT SERPL-MCNC: 0.92 MG/DL (ref 0.6–1.3)
CROSSMATCH: NORMAL
ERYTHROCYTE [DISTWIDTH] IN BLOOD BY AUTOMATED COUNT: 11.5 % (ref 11.6–15.1)
GFR SERPL CREATININE-BSD FRML MDRD: 104 ML/MIN/1.73SQ M
GLUCOSE SERPL-MCNC: 139 MG/DL (ref 65–140)
GLUCOSE SERPL-MCNC: 142 MG/DL (ref 65–140)
GLUCOSE SERPL-MCNC: 146 MG/DL (ref 65–140)
GLUCOSE SERPL-MCNC: 152 MG/DL (ref 65–140)
GLUCOSE SERPL-MCNC: 163 MG/DL (ref 65–140)
GLUCOSE SERPL-MCNC: 180 MG/DL (ref 65–140)
GLUCOSE SERPL-MCNC: 191 MG/DL (ref 65–140)
GLUCOSE SERPL-MCNC: 200 MG/DL (ref 65–140)
GLUCOSE SERPL-MCNC: 225 MG/DL (ref 65–140)
GLUCOSE SERPL-MCNC: 237 MG/DL (ref 65–140)
GLUCOSE SERPL-MCNC: 283 MG/DL (ref 65–140)
GLUCOSE SERPL-MCNC: 292 MG/DL (ref 65–140)
GLUCOSE SERPL-MCNC: 350 MG/DL (ref 65–140)
GLUCOSE SERPL-MCNC: 353 MG/DL (ref 65–140)
GLUCOSE SERPL-MCNC: 372 MG/DL (ref 65–140)
GLUCOSE SERPL-MCNC: 405 MG/DL (ref 65–140)
HCO3 BLDA-SCNC: 24.2 MMOL/L (ref 22–28)
HCT VFR BLD AUTO: 43.5 % (ref 36.5–49.3)
HCT VFR BLD AUTO: 43.8 % (ref 36.5–49.3)
HGB BLD-MCNC: 14.3 G/DL (ref 12–17)
HGB BLD-MCNC: 14.4 G/DL (ref 12–17)
MAGNESIUM SERPL-MCNC: 2 MG/DL (ref 1.6–2.6)
MCH RBC QN AUTO: 29.9 PG (ref 26.8–34.3)
MCHC RBC AUTO-ENTMCNC: 32.9 G/DL (ref 31.4–37.4)
MCV RBC AUTO: 91 FL (ref 82–98)
NASAL CANNULA: 6
O2 CT BLDA-SCNC: 19.6 ML/DL (ref 16–23)
OXYHGB MFR BLDA: 91.6 % (ref 94–97)
P AXIS: 49 DEGREES
P AXIS: 57 DEGREES
P AXIS: 59 DEGREES
P AXIS: 61 DEGREES
PCO2 BLDA: 42.3 MM HG (ref 36–44)
PH BLDA: 7.38 [PH] (ref 7.35–7.45)
PLATELET # BLD AUTO: 269 THOUSANDS/UL (ref 149–390)
PMV BLD AUTO: 11.7 FL (ref 8.9–12.7)
PO2 BLDA: 65.2 MM HG (ref 75–129)
POTASSIUM SERPL-SCNC: 4.1 MMOL/L (ref 3.5–5.3)
PR INTERVAL: 138 MS
PR INTERVAL: 138 MS
PR INTERVAL: 146 MS
PR INTERVAL: 146 MS
QRS AXIS: -51 DEGREES
QRS AXIS: 130 DEGREES
QRS AXIS: 19 DEGREES
QRS AXIS: 81 DEGREES
QRSD INTERVAL: 104 MS
QRSD INTERVAL: 104 MS
QRSD INTERVAL: 108 MS
QRSD INTERVAL: 117 MS
QT INTERVAL: 350 MS
QT INTERVAL: 354 MS
QT INTERVAL: 367 MS
QT INTERVAL: 404 MS
QTC INTERVAL: 421 MS
QTC INTERVAL: 463 MS
QTC INTERVAL: 492 MS
QTC INTERVAL: 537 MS
RBC # BLD AUTO: 4.81 MILLION/UL (ref 3.88–5.62)
SODIUM SERPL-SCNC: 142 MMOL/L (ref 136–145)
T WAVE AXIS: -19 DEGREES
T WAVE AXIS: 11 DEGREES
T WAVE AXIS: 19 DEGREES
T WAVE AXIS: 57 DEGREES
UNIT DISPENSE STATUS: NORMAL
UNIT PRODUCT CODE: NORMAL
UNIT RH: NORMAL
VENTRICULAR RATE: 105 BPM
VENTRICULAR RATE: 106 BPM
VENTRICULAR RATE: 108 BPM
VENTRICULAR RATE: 85 BPM
WBC # BLD AUTO: 26.15 THOUSAND/UL (ref 4.31–10.16)

## 2019-12-24 PROCEDURE — 99233 SBSQ HOSP IP/OBS HIGH 50: CPT | Performed by: INTERNAL MEDICINE

## 2019-12-24 PROCEDURE — 93010 ELECTROCARDIOGRAM REPORT: CPT | Performed by: INTERNAL MEDICINE

## 2019-12-24 PROCEDURE — 99024 POSTOP FOLLOW-UP VISIT: CPT | Performed by: THORACIC SURGERY (CARDIOTHORACIC VASCULAR SURGERY)

## 2019-12-24 PROCEDURE — 93005 ELECTROCARDIOGRAM TRACING: CPT

## 2019-12-24 PROCEDURE — 80048 BASIC METABOLIC PNL TOTAL CA: CPT | Performed by: PHYSICIAN ASSISTANT

## 2019-12-24 PROCEDURE — G8979 MOBILITY GOAL STATUS: HCPCS

## 2019-12-24 PROCEDURE — 97530 THERAPEUTIC ACTIVITIES: CPT

## 2019-12-24 PROCEDURE — 99232 SBSQ HOSP IP/OBS MODERATE 35: CPT | Performed by: INTERNAL MEDICINE

## 2019-12-24 PROCEDURE — G8988 SELF CARE GOAL STATUS: HCPCS

## 2019-12-24 PROCEDURE — 82948 REAGENT STRIP/BLOOD GLUCOSE: CPT

## 2019-12-24 PROCEDURE — NC001 PR NO CHARGE: Performed by: INTERNAL MEDICINE

## 2019-12-24 PROCEDURE — 94660 CPAP INITIATION&MGMT: CPT

## 2019-12-24 PROCEDURE — 97167 OT EVAL HIGH COMPLEX 60 MIN: CPT

## 2019-12-24 PROCEDURE — 85014 HEMATOCRIT: CPT | Performed by: PHYSICIAN ASSISTANT

## 2019-12-24 PROCEDURE — 85027 COMPLETE CBC AUTOMATED: CPT | Performed by: PHYSICIAN ASSISTANT

## 2019-12-24 PROCEDURE — 71045 X-RAY EXAM CHEST 1 VIEW: CPT

## 2019-12-24 PROCEDURE — G8978 MOBILITY CURRENT STATUS: HCPCS

## 2019-12-24 PROCEDURE — 94668 MNPJ CHEST WALL SBSQ: CPT

## 2019-12-24 PROCEDURE — 94760 N-INVAS EAR/PLS OXIMETRY 1: CPT

## 2019-12-24 PROCEDURE — 97163 PT EVAL HIGH COMPLEX 45 MIN: CPT

## 2019-12-24 PROCEDURE — 83735 ASSAY OF MAGNESIUM: CPT | Performed by: PHYSICIAN ASSISTANT

## 2019-12-24 PROCEDURE — 85018 HEMOGLOBIN: CPT | Performed by: PHYSICIAN ASSISTANT

## 2019-12-24 PROCEDURE — 82805 BLOOD GASES W/O2 SATURATION: CPT | Performed by: THORACIC SURGERY (CARDIOTHORACIC VASCULAR SURGERY)

## 2019-12-24 PROCEDURE — G8987 SELF CARE CURRENT STATUS: HCPCS

## 2019-12-24 RX ORDER — METOPROLOL TARTRATE 50 MG/1
50 TABLET, FILM COATED ORAL EVERY 12 HOURS SCHEDULED
Status: DISCONTINUED | OUTPATIENT
Start: 2019-12-24 | End: 2019-12-25

## 2019-12-24 RX ORDER — FUROSEMIDE 10 MG/ML
40 INJECTION INTRAMUSCULAR; INTRAVENOUS
Status: DISCONTINUED | OUTPATIENT
Start: 2019-12-24 | End: 2019-12-24

## 2019-12-24 RX ORDER — LISINOPRIL 5 MG/1
5 TABLET ORAL ONCE
Status: COMPLETED | OUTPATIENT
Start: 2019-12-24 | End: 2019-12-24

## 2019-12-24 RX ORDER — DOCUSATE SODIUM 100 MG/1
100 CAPSULE, LIQUID FILLED ORAL 2 TIMES DAILY
Status: DISCONTINUED | OUTPATIENT
Start: 2019-12-24 | End: 2019-12-27

## 2019-12-24 RX ORDER — LISINOPRIL 10 MG/1
10 TABLET ORAL DAILY
Status: DISCONTINUED | OUTPATIENT
Start: 2019-12-25 | End: 2019-12-26

## 2019-12-24 RX ORDER — KETOROLAC TROMETHAMINE 30 MG/ML
15 INJECTION, SOLUTION INTRAMUSCULAR; INTRAVENOUS EVERY 8 HOURS
Status: COMPLETED | OUTPATIENT
Start: 2019-12-24 | End: 2019-12-25

## 2019-12-24 RX ORDER — LISINOPRIL 5 MG/1
5 TABLET ORAL DAILY
Status: DISCONTINUED | OUTPATIENT
Start: 2019-12-24 | End: 2019-12-24

## 2019-12-24 RX ORDER — METOPROLOL TARTRATE 5 MG/5ML
5 INJECTION INTRAVENOUS ONCE
Status: COMPLETED | OUTPATIENT
Start: 2019-12-24 | End: 2019-12-24

## 2019-12-24 RX ORDER — TEMAZEPAM 15 MG/1
15 CAPSULE ORAL
Status: DISCONTINUED | OUTPATIENT
Start: 2019-12-24 | End: 2019-12-29 | Stop reason: HOSPADM

## 2019-12-24 RX ORDER — FUROSEMIDE 10 MG/ML
40 INJECTION INTRAMUSCULAR; INTRAVENOUS
Status: DISCONTINUED | OUTPATIENT
Start: 2019-12-24 | End: 2019-12-25

## 2019-12-24 RX ORDER — KETOROLAC TROMETHAMINE 30 MG/ML
15 INJECTION, SOLUTION INTRAMUSCULAR; INTRAVENOUS EVERY 6 HOURS SCHEDULED
Status: DISCONTINUED | OUTPATIENT
Start: 2019-12-24 | End: 2019-12-24

## 2019-12-24 RX ORDER — CEFAZOLIN SODIUM 1 G/50ML
1000 SOLUTION INTRAVENOUS EVERY 8 HOURS
Status: COMPLETED | OUTPATIENT
Start: 2019-12-24 | End: 2019-12-24

## 2019-12-24 RX ORDER — POTASSIUM CHLORIDE 20 MEQ/1
20 TABLET, EXTENDED RELEASE ORAL DAILY
Status: DISCONTINUED | OUTPATIENT
Start: 2019-12-24 | End: 2019-12-28

## 2019-12-24 RX ADMIN — HYDROMORPHONE HYDROCHLORIDE 1 MG: 1 INJECTION, SOLUTION INTRAMUSCULAR; INTRAVENOUS; SUBCUTANEOUS at 01:08

## 2019-12-24 RX ADMIN — POLYETHYLENE GLYCOL 3350 17 G: 17 POWDER, FOR SOLUTION ORAL at 08:55

## 2019-12-24 RX ADMIN — Medication 1 APPLICATION: at 21:14

## 2019-12-24 RX ADMIN — SODIUM CHLORIDE 4 UNITS/HR: 9 INJECTION, SOLUTION INTRAVENOUS at 23:52

## 2019-12-24 RX ADMIN — POTASSIUM CHLORIDE 20 MEQ: 1500 TABLET, EXTENDED RELEASE ORAL at 08:55

## 2019-12-24 RX ADMIN — ACETAMINOPHEN 975 MG: 325 TABLET ORAL at 06:22

## 2019-12-24 RX ADMIN — OXYCODONE HYDROCHLORIDE 10 MG: 10 TABLET ORAL at 07:50

## 2019-12-24 RX ADMIN — SODIUM CHLORIDE 18 UNITS/HR: 9 INJECTION, SOLUTION INTRAVENOUS at 14:08

## 2019-12-24 RX ADMIN — SODIUM CHLORIDE 13 MG/HR: 0.9 INJECTION, SOLUTION INTRAVENOUS at 00:25

## 2019-12-24 RX ADMIN — DOCUSATE SODIUM 100 MG: 100 CAPSULE, LIQUID FILLED ORAL at 08:53

## 2019-12-24 RX ADMIN — HYDROMORPHONE HYDROCHLORIDE 1 MG: 1 INJECTION, SOLUTION INTRAMUSCULAR; INTRAVENOUS; SUBCUTANEOUS at 04:29

## 2019-12-24 RX ADMIN — TEMAZEPAM 15 MG: 15 CAPSULE ORAL at 23:55

## 2019-12-24 RX ADMIN — CEFAZOLIN 3000 MG: 1 INJECTION, POWDER, FOR SOLUTION INTRAMUSCULAR; INTRAVENOUS at 06:24

## 2019-12-24 RX ADMIN — CEFAZOLIN SODIUM 1000 MG: 1 SOLUTION INTRAVENOUS at 12:05

## 2019-12-24 RX ADMIN — METOPROLOL TARTRATE 25 MG: 25 TABLET ORAL at 08:53

## 2019-12-24 RX ADMIN — ASPIRIN 325 MG ORAL TABLET 325 MG: 325 PILL ORAL at 08:53

## 2019-12-24 RX ADMIN — SODIUM CHLORIDE 15 MG/HR: 0.9 INJECTION, SOLUTION INTRAVENOUS at 02:23

## 2019-12-24 RX ADMIN — KETOROLAC TROMETHAMINE 15 MG: 30 INJECTION, SOLUTION INTRAMUSCULAR at 17:09

## 2019-12-24 RX ADMIN — KETOROLAC TROMETHAMINE 15 MG: 30 INJECTION, SOLUTION INTRAMUSCULAR at 08:53

## 2019-12-24 RX ADMIN — ATORVASTATIN CALCIUM 80 MG: 80 TABLET, FILM COATED ORAL at 17:09

## 2019-12-24 RX ADMIN — Medication 1 APPLICATION: at 08:55

## 2019-12-24 RX ADMIN — SODIUM CHLORIDE 9 UNITS/HR: 9 INJECTION, SOLUTION INTRAVENOUS at 00:24

## 2019-12-24 RX ADMIN — METOPROLOL TARTRATE 50 MG: 50 TABLET, FILM COATED ORAL at 21:14

## 2019-12-24 RX ADMIN — METOPROLOL TARTRATE 5 MG: 5 INJECTION INTRAVENOUS at 01:32

## 2019-12-24 RX ADMIN — AMIODARONE HYDROCHLORIDE 200 MG: 200 TABLET ORAL at 13:11

## 2019-12-24 RX ADMIN — ACETAMINOPHEN 975 MG: 325 TABLET ORAL at 13:13

## 2019-12-24 RX ADMIN — ACETAMINOPHEN 975 MG: 325 TABLET ORAL at 21:14

## 2019-12-24 RX ADMIN — METOPROLOL TARTRATE 25 MG: 25 TABLET ORAL at 13:10

## 2019-12-24 RX ADMIN — LISINOPRIL 5 MG: 5 TABLET ORAL at 13:12

## 2019-12-24 RX ADMIN — LISINOPRIL 5 MG: 5 TABLET ORAL at 08:53

## 2019-12-24 RX ADMIN — HYDROMORPHONE HYDROCHLORIDE 1 MG: 1 INJECTION, SOLUTION INTRAMUSCULAR; INTRAVENOUS; SUBCUTANEOUS at 02:22

## 2019-12-24 RX ADMIN — FONDAPARINUX SODIUM 2.5 MG: 2.5 INJECTION, SOLUTION SUBCUTANEOUS at 08:55

## 2019-12-24 RX ADMIN — SODIUM CHLORIDE 15 MG/HR: 0.9 INJECTION, SOLUTION INTRAVENOUS at 04:10

## 2019-12-24 RX ADMIN — AMIODARONE HYDROCHLORIDE 200 MG: 200 TABLET ORAL at 21:14

## 2019-12-24 RX ADMIN — PANTOPRAZOLE SODIUM 40 MG: 40 TABLET, DELAYED RELEASE ORAL at 06:23

## 2019-12-24 RX ADMIN — FUROSEMIDE 40 MG: 10 INJECTION, SOLUTION INTRAMUSCULAR; INTRAVENOUS at 12:04

## 2019-12-24 RX ADMIN — AMIODARONE HYDROCHLORIDE 200 MG: 200 TABLET ORAL at 06:22

## 2019-12-24 RX ADMIN — DOCUSATE SODIUM 100 MG: 100 CAPSULE, LIQUID FILLED ORAL at 17:09

## 2019-12-24 NOTE — PROGRESS NOTES
Pastoral Care Progress Note    2019  Patient: Nolvia Lucas  : 1980  Admission Date & Time: 2019 1907  MRN: [id number] CSN: [id number]                     Chaplaincy Interventions Utilized:   Empowerment: Encouraged self-care and Provided chaplaincy education    Exploration: Explored emotional needs & resources, Explored relational needs & resources, Explored spiritual needs & resources and Facilitated story telling    Collaboration:     Relationship Building: Cultivated a relationship of care and support    Ritual: Provided Orthodox resources    Pt requested prayer cards and prayer book  Provided prayer book and card and rosary beads  Chaplaincy Outcomes Achieved:  Expressed gratitude, Expressed peace, Identified meaningful connections and Improved communication    Spiritual Coping Strategies Utilized:   Spiritual practices, Spiritual comfort and Spiritual struggle       19 1100   Spiritual Beliefs/Perceptions   Support Systems Children   Stress Factors   Patient Stress Factors Health changes   Coping Responses   Patient Coping Accepting; Sadness   Plan of Care   Assessment Completed by: Unit visit

## 2019-12-24 NOTE — PROGRESS NOTES
Progress Note - Carolina Wilburn  44 y o  male MRN: [id number]    Unit/Bed#: [facility] 413-01 Encounter: [id number]      CC: diabetes f/u    Subjective:   Carolina Wilburn  is a 44y o  year old male with type 2 diabetes  Patient admits to chest pain and nausea  No hypoglycemia  Objective:     Vitals: Blood pressure 137/70, pulse (!) 117, temperature 97 9 °F (36 6 °C), temperature source Probe, resp  rate 19, height 5' 9" (1 753 m), weight 125 kg (275 lb), SpO2 91 %  ,Body mass index is 40 61 kg/m²  Intake/Output Summary (Last 24 hours) at 12/24/2019 1000  Last data filed at 12/24/2019 0400  Gross per 24 hour   Intake 3555 33 ml   Output 3295 ml   Net 260 33 ml       Physical Exam:  General Appearance: awake, appears stated age and cooperative  Head: Normocephalic, without obvious abnormality, atraumatic  Extremities: moves all extremities  Skin: Skin color and temperature normal    Pulm: no labored breathing    Lab, Imaging and other studies: I have personally reviewed pertinent reports  POC Glucose (mg/dl)   Date Value   12/24/2019 292 (H)   12/24/2019 237 (H)   12/24/2019 152 (H)   12/24/2019 139   12/24/2019 146 (H)   12/23/2019 182 (H)   12/23/2019 187 (H)   12/23/2019 197 (H)   12/23/2019 207 (H)   12/23/2019 196 (H)       Assessment:  diabetes with hyperglycemia    Plan:  Type 2 diabetes with hyperglycemia - most recent A1c 10 1  Patient has minimal food intake  I will continue patient on insulin drip  Coronary artery disease - patient is currently postop day #1 after CABG      Portions of the record may have been created with voice recognition software

## 2019-12-24 NOTE — UTILIZATION REVIEW
Continued Stay Review    Date: 12/24/2019                        POD # 1 s/p CABG x 3   Current Patient Class: Inpatient  Current Level of Care: :leno; 1 stepdown    HPI:39 y o  male initially admitted on 12/19/2019   SURGERY DATE: 12/23/2019  PROCEDURE: Coronary artery bypass grafting x 3 with left internal mammary artery to left anterior descending, saphenous vein graft to obtuse marginal 3, saphenous vein graft to right posterior descending artery  ANESTHESIA: General endotracheal anesthesia with transesophageal echocardiogram guidance    Assessment/Plan: Triple CVC line  A Line   Epicardial pacing wires A/V  Chest Tubes #1,2,3 (-20cm suction)  Continue chest tubes - output remains persistently high  Incentive spirometry  Diuresis  PO ASA/Statin/B blocker  Pertinent Labs/Diagnostic Results:   Results from last 7 days   Lab Units 12/24/19  0623 12/24/19  0351 12/23/19  1559 12/23/19  1557 12/23/19  1508  12/23/19  1412  12/23/19  0444 12/21/19  0440 12/18/19  1629   WBC Thousand/uL  --  26 15*  --   --   --   --   --   --  10 07 10 32* 10 84*   HEMOGLOBIN g/dL 14 3 14 4  --  14 3  --   --   --   --  15 6 16 3 16 3   I STAT HEMOGLOBIN g/dl  --   --  13 6  --  12 6   < >  --    < >  --   --   --    HEMATOCRIT % 43 5 43 8  --  42 9  --   --   --   --  47 2 49 9* 48 2   HEMATOCRIT, ISTAT %  --   --  40  --  37   < >  --    < >  --   --   --    PLATELETS Thousands/uL  --  269  --  238  --   --  240  --  305 283 273   NEUTROS ABS Thousands/µL  --   --   --   --   --   --   --   --   --  5 99 7 30    < > = values in this interval not displayed       Results from last 7 days   Lab Units 12/24/19  0351 12/23/19 2014 12/23/19  1559 12/23/19  1557 12/23/19  1508 12/23/19  1420 12/23/19  1405 12/23/19  1355  12/23/19  0444 12/21/19  0440 12/18/19  1629   SODIUM mmol/L 142  --   --  142  --   --   --   --   --  139 136 135*   POTASSIUM mmol/L 4 1 4 8  --  3 7  --   --   --   --   --  3 7 3 9 4 0   CHLORIDE mmol/L 112*  --   --  112*  --   --   --   --   --  109* 104 97*   CO2 mmol/L 25  --   --  23  --   --   --   --   --  25 29 29   CO2, I-STAT mmol/L  --   --  24  --  24 28 29 26   < >  --   --   --    ANION GAP mmol/L 5  --   --  7  --   --   --   --   --  5 3* 9   BUN mg/dL 18  --   --  19  --   --   --   --   --  18 21 15   CREATININE mg/dL 0 92  --   --  1 32*  --   --   --   --   --  0 82 0 91 1 18   EGFR ml/min/1 73sq m 104  --   --  67  --   --   --   --   --  111 106 77   CALCIUM mg/dL 8 4  --   --  8 9  --   --   --   --   --  9 5 9 5 9 5   CALCIUM, IONIZED, ISTAT mmol/L  --   --  1 24  --  1 26 1 05* 1 03* 1 04*   < >  --   --   --    MAGNESIUM mg/dL 2 0  --   --   --   --   --   --   --   --  2 0  --   --     < > = values in this interval not displayed       Results from last 7 days   Lab Units 12/18/19  1629   AST U/L 19   ALT U/L 30   ALK PHOS U/L 89   TOTAL PROTEIN g/dL 8 0   ALBUMIN g/dL 3 6   TOTAL BILIRUBIN mg/dL 0 62     Results from last 7 days   Lab Units 12/24/19  1234 12/24/19  0957 12/24/19  0755 12/24/19  0609 12/24/19  0418 12/24/19  0156 12/23/19  2356 12/23/19  2215 12/23/19  2016 12/23/19  1803   POC GLUCOSE mg/dl 350* 292* 237* 152* 139 146* 182* 187* 197* 207*     Results from last 7 days   Lab Units 12/24/19  0351 12/23/19  1557 12/23/19  0444 12/21/19  0440 12/18/19  1629   GLUCOSE RANDOM mg/dL 142* 191* 113 256* 404*     Results from last 7 days   Lab Units 12/18/19  1629   HEMOGLOBIN A1C % 10 1*   EAG mg/dl 243      Results from last 7 days   Lab Units 12/24/19  0436 12/23/19 2016   PH ART  7 376 7 379   PCO2 ART mm Hg 42 3 39 2   PO2 ART mm Hg 65 2* 133 7*   HCO3 ART mmol/L 24 2 22 6   BASE EXC ART mmol/L -1 0 -2 2   O2 CONTENT ART mL/dL 19 6 20 9   O2 HGB, ARTERIAL % 91 6* 97 5*   ABG SOURCE   --  Line, Arterial     Results from last 7 days   Lab Units 12/23/19  1559 12/23/19  1508 12/23/19  1420 12/23/19  1405  12/23/19  1129   PH, FREDERICK I-STAT   --   --   --  7 354  --  7 315 PCO2, FREDERICK ISTAT mm HG  --   --   --  48 9  --  55 7*   PO2, FREDERICK ISTAT mm HG  --   --   --  49 0*  --  30 0*   HCO3, FREDERICK ISTAT mmol/L  --   --   --  27 3  --  28 4   I STAT BASE EXC mmol/L -3* -2 2 1   < > 1   I STAT O2 SAT % 96* 95* 100* 82   < > 51*   ISTAT PH ART  7 339* 7 352 7 397  --    < >  --    I STAT ART PCO2 mm HG 42 2 41 9 44 1*  --    < >  --    I STAT ART PO2 mm HG 86 0 79 0 320 0*  --    < >  --    I STAT ART HCO3 mmol/L 22 7 23 2 27 1  --    < >  --     < > = values in this interval not displayed  Results from last 7 days   Lab Units 12/19/19  0544 12/19/19  0216 12/18/19  2314 12/18/19  1958 12/18/19  1629   TROPONIN I ng/mL 2 83* 3 11* 2 57* 2 08* 1 05*     Results from last 7 days   Lab Units 12/18/19  1629   D-DIMER QUANTITATIVE ug/ml FEU 0 27     Results from last 7 days   Lab Units 12/19/19  0823 12/18/19  2356 12/18/19  1629   PROTIME seconds  --   --  13 6   INR   --   --  1 03   PTT seconds 37 25 30     Results from last 7 days   Lab Units 12/19/19  2253   HEP B S AG  Non-reactive   HEP C AB  Non-reactive   HEP B C IGM  Non-reactive   HEP B C TOTAL AB  Non-reactive     Results from last 7 days   Lab Units 12/18/19  1629   LIPASE u/L 296     Results from last 7 days   Lab Units 12/20/19  1930   CLARITY UA  Clear   COLOR UA  Dk Yellow   SPEC GRAV UA  1 035*   PH UA  5 5   GLUCOSE UA mg/dl >=1000 (1%)*   KETONES UA mg/dl Negative   BLOOD UA  Negative   PROTEIN UA mg/dl Trace*   NITRITE UA  Negative   BILIRUBIN UA  Negative   UROBILINOGEN UA E U /dl 0 2   LEUKOCYTES UA  Elevated glucose may cause decreased leukocyte values   See urine microscopic for [facility] result/*   WBC UA /hpf 4-10*   RBC UA /hpf None Seen   BACTERIA UA /hpf Occasional   EPITHELIAL CELLS WET PREP /hpf Moderate*     Vital Signs: BP 98/77   Pulse 89   Temp 97 6 °F (36 4 °C) (Probe)   Resp (!) 26   Ht 5' 9" (1 753 m)   Wt 125 kg (275 lb)   SpO2 93%   BMI 40 61 kg/m²     Medications:   Scheduled Medications:  Medications:  acetaminophen 975 mg Oral Q8H   amiodarone 200 mg Oral Q8H [address]   aspirin 325 mg Oral Daily   atorvastatin 80 mg Oral Daily With Dinner   docusate sodium 100 mg Oral BID   fondaparinux 2 5 mg Subcutaneous Daily   furosemide 40 mg Intravenous BID (diuretic)   ketorolac 15 mg Intravenous Q8H   [START ON 12/25/2019] lisinopril 10 mg Oral Daily   metoprolol tartrate 50 mg Oral Q12H [address]   mupirocin 1 application Nasal Z75B [address]   pantoprazole 40 mg Oral Early Morning   polyethylene glycol 17 g Oral Daily   potassium chloride 20 mEq Oral Daily   Continuous IV Infusions:  insulin regular (HumuLIN R,NovoLIN R) infusion 0 3-21 Units/hr Intravenous Titrated   niCARdipine 1-15 mg/hr Intravenous Titrated   sodium chloride 20 mL/hr Intravenous Continuous   PRN Meds:  bisacodyl 10 mg Rectal Daily PRN   ondansetron 4 mg Intravenous Q6H PRN   oxyCODONE 10 mg Oral Q6H PRN   oxyCODONE 5 mg Oral Q4H PRN   temazepam 15 mg Oral HS PRN     Discharge Plan: TBD    Network Utilization Review Department  [email or web address]  ATTENTION: Please call with any questions or concerns to [phone] and carefully listen to the prompts so that you are directed to the right person  All voicemails are confidential   Maira Hurst all requests for admission clinical reviews, approved or denied determinations and any other requests to dedicated fax number below belonging to the campus where the patient is receiving treatment   List of dedicated fax numbers for the Facilities:  [address] DENIALS (Administrative/Medical Necessity) [phone]   [address] (Maternity/NICU/Pediatrics) [phone]   Luz Nielsen [phone]   Bora Samuel [phone]   [facility] [phone]   [address] [facility] [phone]   [address] [facility]  [phone] [address] [address] [address] [facility] [phone]

## 2019-12-24 NOTE — PROGRESS NOTES
Nurse paged that patient is running 19 units on algorithm 5 - blood glucose 372  I have calculated ISF: to be 0 0818    I have asked nurse to use this equation in case patient is above glucose 389  (Current blood glucose - 140) / 0 0818 = amount of units of insulin/hour  I will continue to monitor patient's blood sugars

## 2019-12-24 NOTE — RESPIRATORY THERAPY NOTE
RT Ventilator Management Note  Chelo Coles  44 y o  male MRN: [id number]  Unit/Bed#: [facility] 413-01 Encounter: [id number]      Daily Screen     No data found in the last 10 encounters  Physical Exam:   Assessment Type: Assess only  General Appearance: Awake, Eyes open/responds to stimulus  Respiratory Pattern: Assisted, Spontaneous  Chest Assessment: Chest expansion symmetrical  Bilateral Breath Sounds: Clear      Resp Comments: (P) pt placed on cpap ps at this time, pt tolerating well  will continue to monitor per respiratory protocol  fio2 and peep decreased at this time

## 2019-12-24 NOTE — PLAN OF CARE
Problem: PHYSICAL THERAPY ADULT  Goal: Performs mobility at highest level of function for planned discharge setting  See evaluation for individualized goals  Description  Treatment/Interventions: Functional transfer training, LE strengthening/ROM, Elevations, Therapeutic exercise, Endurance training, Patient/family training, Equipment eval/education, Bed mobility, Gait training, Spoke to nursing, OT(PT spoke to CM)  Equipment Recommended: Walker(At the moment - will monitor progress to [facility] or no AD)       See flowsheet documentation for full assessment, interventions and recommendations  Note:   Prognosis: Good  Problem List: Decreased strength, Decreased endurance, Impaired balance, Decreased mobility, Obesity, Pain  Assessment: Pt is a 45 y/o male who presented to [address]  with exertional chest pain for 5 days w/ dx of NSTEMI  Pt transferred to [facility] for CT surgery evaluation on 12/18/19  Pt is now 1 day s/p CABG x3  PT now consulted for assessment of mobility and d/c needs  Pt's PMHx/co-morbidities affecting current course include uncontrolled DM, HTN, sleep apnea, obesity and anxiety w/ personal factors of steps to enter multilevel home and working PTA  Patient's clinical presentation is currenly unstable/unpredictable due to a-line w/ telemetry monitoring, CT present, supplemental O2 needs, abnormal labs and ongoing medical management and monitoring in the ICU  Pt presents with generalized weakness of the LEs, decreased functional endurance and activity tolerance compared to baseline w/ O2 sats down to 80s% during amb while on 6 L NC, and impaired balance and gait deviations requiring the use of a RW w/ mod(A)x1 for transfers/amb during today's evaluation  Will cont to follow pt in PT as medical status allows to progress tx targeting pt's mobility, functional endurance, level of (I), as well as pt confidence and safety in return to pt's PLOF   Otherwise, anticipate pt will return home w/ available support upon D/C pending progress and provided he cont improving w/ mobility, safety, and endurance and when medically cleared; home PT follow up is recommended at this time; will follow  Barriers to Discharge: Inaccessible home environment(? level of support available)     Recommendation: Home PT, Home with family support          See flowsheet documentation for full assessment

## 2019-12-24 NOTE — PROGRESS NOTES
Progress Note - Critical Care   [facility]  44 y o  male MRN: [id number]  Unit/Bed#: [facility] 413-01 Encounter: [id number]      Attending Physician: Claudia Crum MD    24 Hour Events/HPI: POD # 1 s/p CABG x 3  Post operatively received 1 L LR 250cc albumin for hypotension  Epi, levo and harvey weaned to off  Cardene initiated requiring 15mg/hr, became further hypertensive and was given lopressor 5mg x 1 IV with slight improvement  Also received lasix 40mg x 1 with good UOP  Delined this AM      Patient reports pain all over  Difficulty taking a deep breath  ROS: Review of Systems  Review of systems was reviewed and negative unless stated above in HPI/24-hour events   ---------------------------------------------------------------------------------------------------------------------------------------------------------------------  Impressions:  1  CAD s/p CABG x 3  2  Ischemic cardiomyopathy   3  DM2 uncontrolled  4  HTN  5  HLD  6  Medical non-compliance  7  Marijuana use    Plan:    Neuro:   · Pain controlled with: PRN percocet   · Regulate sleep/wake cycle  · Delirium precautions  · CAM-ICU daily  · Trend neuro exam      CV:   · Cardiac infusions: Cardene, 15 mg/hour  · Wean as able  · MAP goal > 65 and CI >2 2  · D/C [place] Sera catheter  · Keep Arterial line  · Rhythm: Sinus Tachycardia  · Follow rhythm on telemetry  · Lopressor 25 mg PO BID  · Maintain epicardial pacing wires for pacing needs  · Initiate Norvasc and lisinopril if needed for continued hypertension    Lung:   · Acute post-op pulmonary insufficiency; Requiring 6 liters via nasal cannula, secondary to pulmonary vascular congestion and poor inspiratory effort secondary to pain  Continue incentive spirometry/coughing/deep breathing exercises    Wean supplemental oxygen as tolerated for saturation > 90%  · Chest tube output remains persistently high; Continue chest tubes to suction today      GI:   · Continue PPI for stress ulcer prophylaxis  · Continue bowel regimen  · Trend abdominal exam and bowel function    FEN:   · Diuretic plan: Lasix 40 mg IV q BID  · K-dur 20 mEQ PO q BID  · Nutrition/diet plan: Cardiac/Dm diet   · Replete electrolytes with goals: K >4 0, Mag >2 0, and Phos >3 0    :   · Indwelling Santamaria present: yes   · D/C Santamaria  · Trend UOP and BUN/creat  · Strict I and O    ID:   · Trend temps and WBC count  · Maintain normothermia    Heme:   · Trend hgb and plts    Endo:   · Glycemic control plan: History of diabetes: Continue insulin infusion today   Consult Endocrinology for management    MSK/Skin:  · Mobility goal: OOB to chair   · PT consult: yes  · OT consult: yes  · Frequent turning and pressure off-loading  · Local wound care as needed    Disposition:  Transfer to SD level 1  ---------------------------------------------------------------------------------------------------------------------------------------------------------------------  Allergies: No Known Allergies  Medications:   Scheduled Meds:  Current Facility-Administered Medications:  acetaminophen 650 mg Rectal Q4H PRN Kathrine Vincent PA-C    acetaminophen 650 mg Oral Q4H PRN Kathrine Vincent PA-C    acetaminophen 975 mg Oral Q8H Kaylyn Hays PA-C    amiodarone 200 mg Oral Q8H [address] Kathrine Vincent PA-C    aspirin 325 mg Oral Daily Kathrine Vincent PA-C    atorvastatin 80 mg Oral Daily With Dinner Kathrine Vincent PA-C    bisacodyl 10 mg Rectal Daily PRN Kathrine Vincent PA-C    calcium chloride 1 g Intravenous Once Kathrine Vincent PA-C    cefazolin 3,000 mg Intravenous Q8H Kathrine Vincent PA-C Last Rate: 3,000 mg (12/24/19 0624)   chlorhexidine 15 mL Mouth/Throat BID Kathrine Vincent PA-C    epinephrine 1-10 mcg/min Intravenous Titrated Kathrine Vincent PA-C Last Rate: Stopped (12/23/19 1910)   fentanyl citrate (PF) 50 mcg Intravenous Once Kathrine Vincent PA-C    fentanyl citrate (PF) 50 mcg Intravenous Q1H PRN Kathrine Vincent PA-C    fondaparinux 2 5 mg Subcutaneous Daily Gaynelle Rist, PA-C    HYDROmorphone 0 5 mg Intravenous Q1H PRN Gaynelle Rist, PA-C    HYDROmorphone 1 mg Intravenous Q1H PRN Gaynelle Rist, PA-C    insulin regular (HumuLIN R,NovoLIN R) infusion 0 3-21 Units/hr Intravenous Titrated Gaynelle Rist, PA-C Last Rate: 6 Units/hr (12/24/19 0159)   lidocaine (cardiac) 100 mg Intravenous Q30 Min PRN Gaynelle Rist, PA-C    mupirocin 1 application Nasal R17W [address] Gaynelle Rist, PA-C    niCARdipine 2 5-15 mg/hr Intravenous Titrated Gaynelle Rist, PA-C Last Rate: 15 mg/hr (12/24/19 0410)   norepinephrine 1-30 mcg/min Intravenous Titrated Gaynelle Rist, PA-C Last Rate: Stopped (12/23/19 1730)   ondansetron 4 mg Intravenous Q6H PRN Gaynelle Rist, PA-C    oxyCODONE 10 mg Oral Q6H PRN Gaynelle Rist, PA-C    oxyCODONE 5 mg Oral Q4H PRN Gaynelle Rist, PA-C    pantoprazole 40 mg Oral Early Morning Gaynelle Rist, PA-C    phenylephine  mcg/min Intravenous Titrated Alray Faroese, PA-C Last Rate: Stopped (12/23/19 2155)   polyethylene glycol 17 g Oral Daily Gaynelle Rist, PA-C    potassium chloride 20 mEq Intravenous Once PRN Gaynelle Rist, PA-C    potassium chloride 20 mEq Intravenous Q1H PRN Gaynelle Rist, PA-C Last Rate: 20 mEq (12/23/19 1653)   potassium chloride 20 mEq Intravenous Q30 Min PRN Gaynelle Rist, PA-C    sodium chloride 20 mL/hr Intravenous Continuous Gaynelle Rist, PA-C Last Rate: 20 mL/hr (12/24/19 0100)     VTE Pharmacologic Prophylaxis: Fondaparinux (Arixtra)  VTE Mechanical Prophylaxis: sequential compression device    Continuous Infusions:  epinephrine 1-10 mcg/min Last Rate: Stopped (12/23/19 1910)   insulin regular (HumuLIN R,NovoLIN R) infusion 0 3-21 Units/hr Last Rate: 6 Units/hr (12/24/19 1101)   niCARdipine 2 5-15 mg/hr Last Rate: 15 mg/hr (12/24/19 7058)   norepinephrine 1-30 mcg/min Last Rate: Stopped (12/23/19 4977)   phenylephine  mcg/min Last Rate: Stopped (12/23/19 4504)   sodium chloride 20 mL/hr Last Rate: 20 mL/hr (12/24/19 0100)     PRN Meds:    acetaminophen 650 mg Q4H PRN   acetaminophen 650 mg Q4H PRN   bisacodyl 10 mg Daily PRN   fentanyl citrate (PF) 50 mcg Q1H PRN   HYDROmorphone 0 5 mg Q1H PRN   HYDROmorphone 1 mg Q1H PRN   lidocaine (cardiac) 100 mg Q30 Min PRN   ondansetron 4 mg Q6H PRN   oxyCODONE 10 mg Q6H PRN   oxyCODONE 5 mg Q4H PRN   potassium chloride 20 mEq Once PRN   potassium chloride 20 mEq Q1H PRN   potassium chloride 20 mEq Q30 Min PRN     Home Medications:   Prior to Admission medications    Medication Sig Start Date End Date Taking? Authorizing Provider   amLODIPine (NORVASC) 10 mg tablet Take 10 mg by mouth daily 12/16/16   Historical Provider, MD   hydrochlorothiazide (HYDRODIURIL) 50 mg tablet Take 50 mg by mouth every 24 hours    Historical Provider, MD   lisinopril (ZESTRIL) 40 mg tablet Take 40 mg by mouth daily    Historical Provider, MD   metFORMIN (GLUCOPHAGE) 1000 MG tablet Take 1,000 mg by mouth Every 12 hours    Historical Provider, MD   methocarbamol (ROBAXIN) 750 mg tablet Take 1 tablet (750 mg total) by mouth every 8 (eight) hours for 5 days 5/20/18 5/25/18  Vlad Hurley PA-C     ---------------------------------------------------------------------------------------------------------------------------------------------------------------------  Vitals:   Vitals:    12/24/19 0300 12/24/19 0400 12/24/19 0500 12/24/19 0600   BP: 129/74 134/77  156/94   BP Location:  Right arm     Pulse: 102 (!) 106 (!) 108 (!) 108   Resp: (!) 9 (!) 30 (!) 34 (!) 27   Temp:  97 9 °F (36 6 °C)     TempSrc:  Probe     SpO2: 91% 93% 91% 94%   Weight:       Height:         Arterial Line:  Arterial Line BP: 126/64  Arterial Line MAP (mmHg): 82 mmHg    Tele Rhythm: Sinus Tachycardia This was personally reviewed by myself      Respiratory:  SpO2: SpO2: 94 %  O2 Flow Rate (L/min): 6 L/min    Ventilator:  Respiratory    Lab Data (Last 4 hours)      12/24 0436            pH, Arterial       7 376           pCO2, Arterial       42 3           pO2, Arterial       65 2           HCO3, Arterial       24 2           Base Excess, Arterial       -1 0                O2/Vent Data       315   Most Recent        Non-Invasive Ventilation Mode CPAP  CPAP                Temperature: Temp (24hrs), Av °F (36 7 °C), Min:97 5 °F (36 4 °C), Max:99 °F (37 2 °C)  Current: Temperature: 97 9 °F (36 6 °C)    Weights:   Weight (last 2 days)     None        Body mass index is 40 61 kg/m²  Hemodynamic Monitoring:  PAP: PAP: , CVP:  , CO: CO (L/min): 9 L/min, CI: CI (L/min/m2): 3 7 L/min/m2, SVR: SVR (dyne*sec)/cm5: 710 (dyne*sec)/cm5  PAP: (21-29)/(5-17)   CVP:  [4 mmHg-9 mmHg] 9 mmHg  CO:  [1 3 L/min-9 3 L/min] 9 L/min  CI:  [2 8 L/min/m2-759 L/min/m2] 3 7 L/min/m2    Intake and Outputs:    Intake/Output Summary (Last 24 hours) at 2019 0633  Last data filed at 2019 0400  Gross per 24 hour   Intake 3581 13 ml   Output 3295 ml   Net 286 13 ml     I/O last 24 hours: In: 3581 1 [I V :2331 1;  IV Piggyback:500]  Out: 0976 [Urine:2320; Blood:750; Chest Tube:225]    UOP: 50-200cc/hour   BM: 0 in the last 24 hours    Chest tube Output:  Mediastinal tubes: 25 mL/8 hours  165 mL/24 hours   Pleural tubes: 50 mL/8 hours  60 mL/24 hours     Labs:  Results from last 7 days   Lab Units 19  0351 19  1559 19  1557  19  1412  19  0444 19  0440 19  1629   WBC Thousand/uL 26 15*  --   --   --   --   --  10 07 10 32* 10 84*   HEMOGLOBIN g/dL 14 4  --  14 3  --   --   --  15 6 16 3 16 3   I STAT HEMOGLOBIN g/dl  --  13 6  --    < >  --    < >  --   --   --    HEMATOCRIT % 43 8  --  42 9  --   --   --  47 2 49 9* 48 2   HEMATOCRIT, ISTAT %  --  40  --    < >  --    < >  --   --   --    PLATELETS Thousands/uL 269  --  238  --  240  --  305 283 273   NEUTROS PCT %  --   --   --   --   --   --   --  57 65   MONOS PCT %  --   --   --   --   --   --   --  9 8    < > = values in this interval not displayed  Results from last 7 days   Lab Units 12/24/19 0351 12/23/19 2014 12/23/19  1559 12/23/19  1557 12/23/19  1508 12/23/19  1420  12/23/19  0444  12/18/19  1629   SODIUM mmol/L 142  --   --  142  --   --   --  139   < > 135*   POTASSIUM mmol/L 4 1 4 8  --  3 7  --   --   --  3 7   < > 4 0   CHLORIDE mmol/L 112*  --   --  112*  --   --   --  109*   < > 97*   CO2 mmol/L 25  --   --  23  --   --   --  25   < > 29   CO2, I-STAT mmol/L  --   --  24  --  24 28   < >  --   --   --    BUN mg/dL 18  --   --  19  --   --   --  18   < > 15   CREATININE mg/dL 0 92  --   --  1 32*  --   --   --  0 82   < > 1 18   CALCIUM mg/dL 8 4  --   --  8 9  --   --   --  9 5   < > 9 5   ALK PHOS U/L  --   --   --   --   --   --   --   --   --  89   ALT U/L  --   --   --   --   --   --   --   --   --  30   AST U/L  --   --   --   --   --   --   --   --   --  19   GLUCOSE, ISTAT mg/dl  --   --  181*  --  188* 198*   < >  --   --   --     < > = values in this interval not displayed  Baseline Creat: 0 8-1 0    Results from last 7 days   Lab Units 12/24/19 0351 12/23/19 0444   MAGNESIUM mg/dL 2 0 2 0          Results from last 7 days   Lab Units 12/19/19  0823 12/18/19  2356 12/18/19  1629   INR   --   --  1 03   PTT seconds 37 25 30                  Results from last 7 days   Lab Units 12/24/19  0436 12/23/19  2016   PH ART  7 376 7 379   PCO2 ART mm Hg 42 3 39 2   PO2 ART mm Hg 65 2* 133 7*   HCO3 ART mmol/L 24 2 22 6   BASE EXC ART mmol/L -1 0 -2 2   ABG SOURCE   --  Line, Arterial             Micro:   Blood Culture: No results found for: BLOODCX  Urine Culture: No results found for: URINECX  Sputum Culture: No components found for: SPUTUMCX  Wound Culure: No results found for: WOUNDCULT    Diagnositic Studies:  12/24/19 CXR: Poor inspiratory effort, CVC slightly deep, pulmonary vascular congestion     This was personally reviewed by myself in PACS   12/24/19 EKG: Sinus tachycardia, ST elevation in V1/V2  This was personally reviewed by myself  Nutrition:        Diet Orders   (From admission, onward)             Start     Ordered    12/24/19 0000  Diet Cardiovascular; Cardiac; Fluid Restriction 1800 ML, Consistent Carbohydrate Diet Level 2 (5 carb servings/75 grams CHO/meal)  Diet effective 0500     Question Answer Comment   Diet Type Cardiovascular    Cardiac Cardiac    Other Restriction(s): Fluid Restriction 1800 ML    Other Restriction(s): Consistent Carbohydrate Diet Level 2 (5 carb servings/75 grams CHO/meal)    RD to adjust diet per protocol? No        12/23/19 8223              Physical Exam   Constitutional: He is oriented to person, place, and time  He appears well-developed and well-nourished  No distress  HENT:   Head: Normocephalic and atraumatic  Eyes: Pupils are equal, round, and reactive to light  Cardiovascular: Regular rhythm and intact distal pulses  Exam reveals no gallop  No murmur heard  Incision C/D/I  Sinus tachycardia    Pulmonary/Chest: No respiratory distress  He has no wheezes  He has no rales  Poor inspiratory effort  Diminished breath sounds bilaterally    Abdominal: Soft  Bowel sounds are normal  He exhibits no distension  There is no tenderness  Musculoskeletal: He exhibits edema  Neurological: He is alert and oriented to person, place, and time  Nonfocal    Skin: Skin is warm and dry  He is not diaphoretic  Vitals reviewed  Invasive lines and devices:   Invasive Devices     Central Venous Catheter Line            CVC Central Lines 12/23/19 Triple less than 1 day          Peripheral Intravenous Line            Peripheral IV 12/21/19 Left Forearm 2 days    Peripheral IV 12/23/19 Right Wrist less than 1 day          Arterial Line            Arterial Line 12/23/19 less than 1 day          Line            Pacer Wires less than 1 day    Pacer Wires less than 1 day          Drain            Chest Tube 1 Left Pleural 32 Fr  less than 1 day Chest Tube 2 Posterior Mediastinal 32 Fr  less than 1 day    Chest Tube 3 Anterior Mediastinal 32 Fr  less than 1 day    Urethral Catheter Temperature probe;Non-latex 16 Fr  less than 1 day              ---------------------------------------------------------------------------------------------------------------------------------------------------------------------  Code Status: Level 1 - Full Code    Counseling / Coordination of Care  Total Critical Care time spent 0 minutes excluding procedures, teaching and family updates  Collaborative bedside rounds performed with cardiac surgery attending and bedside RN      SIGNATURE: Carmine Cote PA-C  DATE: December 24, 2019  TIME: 6:33 AM

## 2019-12-24 NOTE — PHYSICAL THERAPY NOTE
Physical Therapy Evaluation     Patient's Name: Li Martin  Admitting Diagnosis  NSTEMI (non-ST elevated myocardial infarction) [facility]) [I21 4]    Problem List  Patient Active Problem List   Diagnosis    Essential hypertension    NSTEMI (non-ST elevated myocardial infarction) ([place] [address] )    Diabetes mellitus type 2, uncontrolled ([place] [address] )    Snoring    Hyperlipidemia    S/P CABG x 3       Past Medical History  Past Medical History:   Diagnosis Date    JELENA (acute kidney injury) ([place] [address] ) 12/23/2019    CAD (coronary artery disease)     Diabetes mellitus ([place] [address] )     HLD (hyperlipidemia)     Hypertension        Past Surgical History  Past Surgical History:   Procedure Laterality Date    ANKLE SURGERY Right     CORONARY ARTERY BYPASS GRAFT N/A 12/23/2019    Procedure: CORONARY ARTERY BYPASS GRAFT (CABG) x 3; LIMA to LAD; Right EVH/SVG to OM3 and PDA; Surgeon: Shayla Mulligan MD;  [facility];  Service: Cardiac Surgery    FRACTURE SURGERY          12/24/19 0830   Note Type   Note type Eval/Treat   Pain Assessment   Pain Assessment 0-10   Pain Score 9   Pain Type Acute pain;Surgical pain   Pain Location Chest;Incision   Pain Orientation Mid   Pain Descriptors Discomfort;Pressure; Aching   Pain Frequency Constant/continuous   Pain Onset Ongoing   Clinical Progression Not changed   Effect of Pain on Daily Activities guarding w/ movement & shallow breaths    Patient's Stated Pain Goal No pain   Hospital Pain Intervention(s) Repositioned; Ambulation/increased activity; Distraction; Emotional support;Relaxation technique   Response to Interventions tolerated   Multiple Pain Sites No   Home Living   Type of [address] Multi-level;1/2 bath on main level;Bed/bath upstairs;Stairs to enter with rails  (5 CRISSY w/ rail; 13 Steps to 2nd floor bedroom w/ rail)   Bathroom Shower/Tub Tub/shower unit   Bathroom Toilet Standard   Home Equipment Other (Comment)  (denies)   Prior Function   Level of [place] Independent with ADLs and functional mobility  ((I) w/ no AD PTA)   Lives With Family;Friend(s)  (Somewhat unclear who pt lives w/ (states "kids" "family"))   Receives Help From Family   Falls in the last 6 months 0   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 )   Comments may need additional clarification on support system and who pt lives with    Restrictions/Precautions   Weight Bearing Precautions Per Order No   Braces or Orthoses Other (Comment)  (pt denies)   Other Precautions Cardiac/sternal;Multiple lines;O2;Telemetry; Fall Risk;Pain  (A-line, CT, long, 6L NC)   General   Additional Pertinent History cleared for assessment (spoke w/ nsg)   Family/Caregiver Present No   Cognition   Overall Cognitive Status WFL   Arousal/Participation Responsive  (Somewhat somnolent )   Orientation Level Oriented to person;Oriented to place;Oriented to situation   Memory Decreased recall of biographical information;Decreased recall of precautions   Following Commands Follows one step commands without difficulty   Comments pt greeted in chair, in high levels of pain and somewhat somnolent,; answers most questions w/ few words and is unclear about who he lives w/ currently; otherwise pt is agreeable to work w/ therapy    RUE Assessment   RUE Assessment WFL  (AROM)   LUE Assessment   LUE Assessment WFL  (AROM)   RLE Assessment   RLE Assessment WFL  (AROM)   Strength RLE   RLE Overall Strength 3+/5  (grossly)   LLE Assessment   LLE Assessment WFL  (AROM)   Strength LLE   LLE Overall Strength 3+/5  (grossly)   Transfers   Sit to Stand 3  Moderate assistance   Additional items Assist x 1; Increased time required;Verbal cues   Stand to Sit 3  Moderate assistance   Additional items Assist x 1; Increased time required;Verbal cues   Ambulation/Elevation   Gait pattern Excessively slow; Short stride; Inconsistent vikram   Gait Assistance 3  Moderate assist   Additional items Assist x 1;Verbal cues  (SBA x 3 for multiple line management and chair follow )   Assistive Device Rolling walker   Distance 30 ft HR in 120s bpm; 02 sat in 80s % --> seated rest provided  (HR return to 110s bpm and O2 sat = 91% following rest)   Balance   Static Sitting Fair -   Static Standing Poor +   Ambulatory Poor   Endurance Deficit   Endurance Deficit Yes   Endurance Deficit Description pain, fatigue, and medical complexities   Activity Tolerance   Activity Tolerance Patient limited by fatigue;Patient limited by pain;Treatment limited secondary to medical complications (Comment)  (O2 sat in 80s% during mobilization, HR in 110s-120s bpm)   Nurse Made Aware Yes Marco Bauer RN aware and present during session   Assessment   Prognosis Good   Problem List Decreased strength;Decreased endurance; Impaired balance;Decreased mobility;Obesity;Pain   Assessment Pt is a 45 y/o male who presented to [facility] with exertional chest pain for 5 days w/ dx of NSTEMI  Pt [facility] to [facility] surgery evaluation on 12/18/19  Pt is now 1 day s/p CABG x3  PT now consulted for assessment of mobility and d/c needs  Pt's PMHx/co-morbidities affecting current course include uncontrolled DM, HTN, sleep apnea, obesity and anxiety w/ personal factors of steps to enter multilevel home and working PTA  Patient's clinical presentation is currenly unstable/unpredictable due to a-line w/ telemetry monitoring, CT present, supplemental O2 needs, abnormal labs and ongoing medical management and monitoring in the ICU  Pt presents with generalized weakness of the LEs, decreased functional endurance and activity tolerance compared to baseline w/ O2 sats down to 80s% during amb while on 6 L NC, and impaired balance and gait deviations requiring the use of a RW w/ mod(A)x1 for transfers/amb during today's evaluation   Will cont to follow pt in PT as medical status allows to progress tx targeting pt's mobility, functional endurance, level of (I), as well as pt confidence and safety in return to pt's PLOF  Otherwise, anticipate pt will return home w/ available support upon D/C pending progress and provided he cont improving w/ mobility, safety, and endurance and when medically cleared; home PT follow up is recommended at this time; will follow  Barriers to Discharge Inaccessible home environment  (? level of support available)   Goals   Patient Goals less pain   STG Expiration Date 01/03/20   Short Term Goal #1 7-10 days  Pt will ambulate 250 ft with least restrictive device PRN mod (I) to facilitate safe return to home environment and community ambulatory distances  Pt will navigate 5 steps plus an additional 13 steps w/ railing(s) and SPC PRN mod (I) to allow pt to enter/exit home environment safely and allow access to 2nd fl bedroom  Pt will be (I) for transfers to allow pt increased (I), ability to navigate home set up, and safely transfer to and from desired locations in home environment  Pt will be (I) for bed mobility to allow pt to transition into bed and OOB safely  Pt will progress to balance and therapeutic exercises in order to continue building upon pt's strength, mobility, safety and (I)  PT Treatment Day 1  (follow up tx)   Plan   Treatment/Interventions Functional transfer training;LE strengthening/ROM; Elevations; Therapeutic exercise; Endurance training;Patient/family training;Equipment eval/education; Bed mobility;Gait training;Spoke to nursing;OT  (PT spoke to CM)   PT Frequency Other (Comment)  (4-6x/week)   Recommendation   Recommendation Home PT; Home with family support   Equipment Recommended Walker  (At the moment - will monitor progress to [facility] or no AD)   Modified [place] Scale   Modified [place] Scale 4   Barthel Index   Feeding 10   Bathing 0   Grooming Score 0   Dressing Score 0   Bladder Score 0   Bowels Score 10   Toilet Use Score 5   Transfers (Bed/Chair) Score 5   Mobility (Level Surface) Score 0   Stairs Score 0   Barthel Index Score 30 [place]

## 2019-12-24 NOTE — PHYSICAL THERAPY NOTE
PHYSICAL THERAPY NOTE          Patient Name: Gustabo Iglesias  Today's Date: 12/24/2019      Time In: 08:30  Time Out: 08:45  Total Time: 15 min      S:  Pt is in the chair; agreeable to try further amb after regaining 90 % O2 sat after a period of seated rest period; denies dizziness  O:  Additional functional mobility training performed as following: sit <--> stand transfers w/ min (A)x1; amb 100 ft w/ rw, min (A)x1 and stand by (A) of 2 more staff for lines and chair follow; call bell placed w/in reach at the end of session;     A:  Additional follow up consecutive session performed to progress further w/ mobilization/ambulation distance to max overall strength and endurance and to facilitate progression w/ functional mobility skills and overall level of (I)  Pt was able to amb further distance w/ rw w/ (A)x1 and chair follow; decreased O2 sat to 80s % noted during amb while remaining on 6 L of O2; nsg aware; pt remained in NAD and regained 90s % O2 sat while sitting/resting post amb; overall, cont to anticipate pt will return home upon D/C pending progress (incl on the steps) and when medically cleared; home PT follow up may be considered depending on progress; will follow  P:  Cont to follow pt 4-6x/week for LE therex, functional mobility training, endurance training and pt education per POC to max functional (I) and safety          [place], PT

## 2019-12-24 NOTE — CONSULTS
Please see full consult note by Abigail Alcantara PA-C and Dr Charis Pallas on 12/19/19    [id number]

## 2019-12-24 NOTE — PROGRESS NOTES
Progress Note - Cardiothoracic Surgery   [facility]  44 y o  male MRN: [id number]  Unit/Bed#: [facility] 413-01 Encounter: [id number]      POD # 1 s/p CABG x 3     Pt seen/examined  Interval history and data reviewed with critical care team   Pt doing well  No specific complaints          Medications:   Scheduled Meds:  Current Facility-Administered Medications:  acetaminophen 650 mg Rectal Q4H PRN Jose Elias Oropeza PA-C    acetaminophen 650 mg Oral Q4H PRN Jose Elias Oropeza PA-C    acetaminophen 975 mg Oral Q8H Jose Elias Oropeza PA-C    amiodarone 200 mg Oral Q8H [facility] Jose Elias Oropeza PA-C    aspirin 325 mg Oral Daily Jose Elias Oropeza PA-C    atorvastatin 80 mg Oral Daily With Dinner Jose Elias Oropeza PA-C    bisacodyl 10 mg Rectal Daily PRN Jose Elias Oropeza PA-C    calcium chloride 1 g Intravenous Once Jose Elias Oropeza PA-C    cefazolin 3,000 mg Intravenous Q8H Jose Elias Oropzea PA-C Last Rate: 3,000 mg (12/24/19 0624)   chlorhexidine 15 mL Mouth/Throat BID Jose Elias Oropeza PA-C    epinephrine 1-10 mcg/min Intravenous Titrated Jose Elias Oropeza PA-C Last Rate: Stopped (12/23/19 1910)   fentanyl citrate (PF) 50 mcg Intravenous Once Jose Elias Oropeza PA-C    fentanyl citrate (PF) 50 mcg Intravenous Q1H PRN Jose Elias Oropeza PA-C    fondaparinux 2 5 mg Subcutaneous Daily Jose Elias Oropeza PA-C    HYDROmorphone 0 5 mg Intravenous Q1H PRN Jose Elias Oropeza PA-C    HYDROmorphone 1 mg Intravenous Q1H PRN Jose Elias Oropeza PA-C    insulin regular (HumuLIN R,NovoLIN R) infusion 0 3-21 Units/hr Intravenous Titrated Jose Elias Oropeza PA-C Last Rate: 6 Units/hr (12/24/19 0159)   lidocaine (cardiac) 100 mg Intravenous Q30 Min PRN Jose Elias Oropeza PA-C    mupirocin 1 application Nasal B25Y [facility] Jose Elias Oropeza PA-C    niCARdipine 2 5-15 mg/hr Intravenous Titrated Olam Johnna, PA-C Last Rate: 15 mg/hr (12/24/19 3354)   norepinephrine 1-30 mcg/min Intravenous Titrated Jose Elias Oropeza PA-C Last Rate: Stopped (12/23/19 6600) ondansetron 4 mg Intravenous Q6H PRN Mary Kay Hands, PA-C    oxyCODONE 10 mg Oral Q6H PRN Mary Kay Hands, PA-C    oxyCODONE 5 mg Oral Q4H PRN Mary Kay Hands, PA-C    pantoprazole 40 mg Oral Early Morning Mary Kay Hands, PA-C    phenylephine  mcg/min Intravenous Titrated Kavon Salt, PA-C Last Rate: Stopped (12/23/19 2155)   polyethylene glycol 17 g Oral Daily Mary Kay Hands, PA-C    potassium chloride 20 mEq Intravenous Once PRN Mary Kay Hands, PA-C    potassium chloride 20 mEq Intravenous Q1H PRN Mary Kay Hands, PA-C Last Rate: 20 mEq (12/23/19 1653)   potassium chloride 20 mEq Intravenous Q30 Min PRN Mary Kay Hands, PA-C    sodium chloride 20 mL/hr Intravenous Continuous Mary Kay Hands, PA-C Last Rate: 20 mL/hr (12/24/19 0100)     Continuous Infusions:  epinephrine 1-10 mcg/min Last Rate: Stopped (12/23/19 1910)   insulin regular (HumuLIN R,NovoLIN R) infusion 0 3-21 Units/hr Last Rate: 6 Units/hr (12/24/19 0159)   niCARdipine 2 5-15 mg/hr Last Rate: 15 mg/hr (12/24/19 0410)   norepinephrine 1-30 mcg/min Last Rate: Stopped (12/23/19 1730)   phenylephine  mcg/min Last Rate: Stopped (12/23/19 2155)   sodium chloride 20 mL/hr Last Rate: 20 mL/hr (12/24/19 0100)     PRN Meds:   acetaminophen    acetaminophen    bisacodyl    fentanyl citrate (PF)    HYDROmorphone    HYDROmorphone    lidocaine (cardiac)    ondansetron    oxyCODONE    oxyCODONE    potassium chloride    potassium chloride    potassium chloride    Vitals: Blood pressure 149/83, pulse (!) 108, temperature 97 9 °F (36 6 °C), temperature source Probe, resp  rate 19, height 5' 9" (1 753 m), weight 125 kg (275 lb), SpO2 93 %  ,Body mass index is 40 61 kg/m²  I/O last 24 hours:   In: 3555 3 [I V :2305 3; IV Piggyback:500]  Out: 7123 [Urine:2320; Blood:750; Chest Tube:225]  Invasive Devices     Central Venous Catheter Line            CVC Central Lines 12/23/19 Triple less than 1 day          Peripheral Intravenous Line Peripheral IV 12/21/19 Left Forearm 2 days    Peripheral IV 12/23/19 Right Wrist less than 1 day          Arterial Line            Arterial Line 12/23/19 less than 1 day          Line            Pacer Wires less than 1 day    Pacer Wires less than 1 day          Drain            Chest Tube 1 Left Pleural 32 Fr  less than 1 day    Chest Tube 2 Posterior Mediastinal 32 Fr  less than 1 day    Chest Tube 3 Anterior Mediastinal 32 Fr  less than 1 day    Urethral Catheter Temperature probe;Non-latex 16 Fr  less than 1 day                  Lab, Imaging and other studies:   Results from last 7 days   Lab Units 12/24/19 0623 12/24/19 0351 12/23/19 1559 12/23/19  1557  12/23/19  1412  12/23/19  0444 12/21/19  0440   WBC Thousand/uL  --  26 15*  --   --   --   --   --  10 07 10 32*   HEMOGLOBIN g/dL 14 3 14 4  --  14 3  --   --   --  15 6 16 3   I STAT HEMOGLOBIN g/dl  --   --  13 6  --    < >  --    < >  --   --    HEMATOCRIT % 43 5 43 8  --  42 9  --   --   --  47 2 49 9*   HEMATOCRIT, ISTAT %  --   --  40  --    < >  --    < >  --   --    PLATELETS Thousands/uL  --  269  --  238  --  240  --  305 283    < > = values in this interval not displayed  Results from last 7 days   Lab Units 12/24/19  0351 12/23/19 2014 12/23/19  1559 12/23/19  1557  12/23/19  0444   POTASSIUM mmol/L 4 1 4 8  --  3 7  --  3 7   CHLORIDE mmol/L 112*  --   --  112*  --  109*   CO2 mmol/L 25  --   --  23  --  25   CO2, I-STAT mmol/L  --   --  24  --    < >  --    BUN mg/dL 18  --   --  19  --  18   CREATININE mg/dL 0 92  --   --  1 32*  --  0 82   GLUCOSE, ISTAT mg/dl  --   --  181*  --    < >  --    CALCIUM mg/dL 8 4  --   --  8 9  --  9 5    < > = values in this interval not displayed       Results from last 7 days   Lab Units 12/19/19  0823 12/18/19  2356 12/18/19  1629   INR   --   --  1 03   PTT seconds 37 25 30     Recent Labs     12/24/19  0436   PHART 7 376   WEW5QZW 24 2   PO2ART 65 2*   GOZ0INO 42 3   BEART -1 0 Plan:    DC [place]  Continue chest tubes, pacing wires  Transfer to floor  Ambulate  Incentive spirometry  Diuresis  PO ASA/Statin/B blocker          SIGNATURE: Alcira Mohamud DO  DATE: December 24, 2019  TIME: 7:49 AM

## 2019-12-24 NOTE — OCCUPATIONAL THERAPY NOTE
[address] Evaluation     Patient Name: Denise Jiang  Today's Date: 12/24/2019  Problem List  Principal Problem:    NSTEMI (non-ST elevated myocardial infarction) [facility])  Active Problems:    Essential hypertension    Diabetes mellitus type 2, uncontrolled ([facility] )    Snoring    Hyperlipidemia    S/P CABG x 3    Past Medical History  Past Medical History:   Diagnosis Date    [facility] 12/23/2019    CAD (coronary artery disease)     Diabetes mellitus ([facility] )     HLD (hyperlipidemia)     Hypertension      Past Surgical History  Past Surgical History:   Procedure Laterality Date    ANKLE SURGERY Right     CORONARY ARTERY BYPASS GRAFT N/A 12/23/2019    Procedure: CORONARY ARTERY BYPASS GRAFT (CABG) x 3; LIMA to LAD; Right EVH/SVG to OM3 and PDA; Surgeon: Alessandra Moy MD;  [facility]: [facility];  Service: Cardiac Surgery    FRACTURE SURGERY             12/24/19 0846   Note Type   Note type Eval only   Restrictions/Precautions   Weight Bearing Precautions Per Order No   Braces or Orthoses   (denies)   Other Precautions Cardiac/sternal;Multiple lines;O2;Telemetry; Fall Risk;Pain  (a-line, 6 L NC)   Pain Assessment   Pain Assessment 0-10   Pain Score 9   Pain Type Acute pain;Surgical pain   Pain Location Chest;Incision   Hospital Pain Intervention(s) Repositioned; Ambulation/increased activity; Emotional support   Response to Interventions Tolerated    Home Living   Type of [address] Multi-level;Bed/bath upstairs;Stairs to enter with rails   Bathroom Shower/Tub Tub/shower unit   Bathroom Toilet Standard   Bathroom Equipment   (denies)   Home Equipment   (denies)   Additional Comments Pt lives in a 2 story home with 5 CRISSY with bed/bath on 2nd floor    Prior Function   Level of [place] Independent with ADLs and functional mobility   Lives With Family;Friend(s)   Receives Help From Family   ADL Assistance Independent   IADLs Independent   Falls in the last 6 months 0   Vocational Full time employment   Lifestyle   Autonomy Pt reports being I with ADLS, IADLS and functional mobility without device PTA   Reciprocal Relationships Further clarification required about available support  Per EMR, pt lives with several roommates, but when asked pt reports he lives with "kids" and some family  Service to Others Pt works installing kitchen countertops   Psychosocial   Psychosocial (WDL) WDL   ADL   Where Assessed Chair   Eating Assistance 7  Independent   Grooming Assistance 5  Supervision/Setup   UB [address] 3  Moderate Assistance   LB [address] 2  Maximal Assistance   [address] 3  Moderate Assistance   LB Dressing Assistance 2  Maximal [address]  3  Moderate Assistance   Bed Mobility   Additional Comments Unable to assess, pt seated OOB in chair upon OT arrival  After OT session pt seated in chair with all needs within reach  Transfers   Sit to Stand 4  Minimal assistance   Additional items Assist x 1; Increased time required;Verbal cues   Stand to Sit 4  Minimal assistance   Additional items Assist x 1; Increased time required;Verbal cues   Additional Comments Pt progressed from mod to min A throughout session  Functional Mobility   Functional Mobility 4  Minimal assistance   Additional Comments Pt demonstrated household mobility with 1 seated rest break  O2 stats dropping to the 80s during mobilization on 6 L of 02  Pt was educated about deep breathing strategies  RN aware and present throughout session      Additional items Rolling walker   Balance   Static Sitting Fair -   Static Standing Poor +   Ambulatory Poor   Activity Tolerance   Activity Tolerance Patient limited by fatigue;Patient limited by pain;Treatment limited secondary to medical complications (Comment)  (O2 stats in 80s during mobilization on 6 L)   Medical Staff Made Aware PT Dl    Nurse Made Aware RN confirmed okay to see pt and present throughout    Cognition   Overall Cognitive Status WFL   Arousal/Participation Cooperative;Lethargic;Arousable   Attention Within functional limits   Orientation Level Oriented X4   Memory Decreased recall of precautions   Following Commands Follows one step commands without difficulty   Comments Pt is cooperative to work with therapy but is somewhat somnolent and only answers with 1-2 word answers, likely due to extreme pain  Pt declining cardiac packet at this time 2* pain  RN aware of pain and present throughout session  Assessment   Limitation Decreased ADL status; Decreased endurance;Decreased high-level ADLs   Prognosis Good   Assessment Pt is a 44 y o  male admitted to [facility] on 12/19/2019 w/ NSTEMI s/p CABG x3 on 12/23/2019  Comorbidities include a h/o essential hypertension, uncontrolled type 2 DM, and hyperlipidemia  Pt with active OT orders and ambulate  orders  Pt lives in a 3 story home with 5 CRISSY with bed/bath on 2nd floor  Clarification required regarding available support upon d/c  Per EMR, pt lives with multiple roommates but when asked pt reports he lives with "kids"  Pt was I w/  ADLS and IADLS & required no use of DME PTA  Currently pt is min A for functional transfers and functional mobility, mod A for UB ADLS and max A for LB ADLS  Pt is limited at this time 2*: pain, endurance, activity tolerance, functional mobility, forward functional reach, functional standing tolerance, unsupportive home environment and decreased I w/ ADLS/IADLS  The following Occupational Performance Areas to address include: bathing/shower, toilet hygiene, dressing, functional mobility and clothing management  Pt scored overall  40/100 on the Barthel Index  Based on the aforementioned OT evaluation, functional performance deficits, and assessments, pt has been identified as a high complexity evaluation  From OT standpoint, anticipate d/c home with family support pending progress and clarification of available support   Pt to continue to benefit from acute immediate OT services to address the following goals 3-5x/week to  w/in 7-10 days: Venkata Espinoza Goals   Patient Goals To have less pain    LTG Time Frame 7-10   Long Term Goal #1 See goals below    Plan   Treatment Interventions ADL retraining;Functional transfer training; Endurance training;Patient/family training;Equipment evaluation/education; Compensatory technique education;Continued evaluation;Cardiac education; Energy conservation; Activityengagement   Goal Expiration Date 20   OT Frequency 3-5x/wk   Recommendation   OT Discharge Recommendation Home with family support  (pending clarification of support and progress)   OT - OK to Discharge Yes  (When medically appropriate )   Barthel Index   Feeding 10   Bathing 0   Grooming Score 5   Dressing Score 5   Bladder Score 0   Bowels Score 10   Toilet Use Score 5   Transfers (Bed/Chair) Score 5   Mobility (Level Surface) Score 0   Stairs Score 0   Barthel Index Score 40   Modified Olvin Scale   Modified Olvin Scale 4     GOALS    1) Pt will increase activity tolerance to G for 30 min txment sessions    2) Pt will complete UB/LB dressing/self care w/ mod I using adaptive device and DME as needed    3) Pt will complete bathing w/ Mod I w/ use of AE and DME as needed    4) Pt will complete toileting w/ mod I w/ G hygiene/thoroughness using DME as needed    5) Pt will improve functional transfers to Mod I on/off all surfaces using DME as needed w/ G balance/safety     6) Pt will improve functional mobility during ADL/IADL/leisure tasks to Mod I using DME as needed w/ G balance/safety     7) Pt will participate in simulated IADL management task with DME as needed to increase independence to  w/ G safety and endurance    8) Pt will demonstrate G carryover of pt/caregiver education and training as appropriate w/ mod I     9) Pt will engage in cardiac education using the Recovering After Cardiac Surgery packet w/ G participation and G carryover      10) Pt will demonstrate 100% carryover of precautions s/p review w/ mod I w/ G tolerance/participation t/o functional ADL/IADL/leisure tasks         Luz Rhodes, SAMM, OTR/L

## 2019-12-24 NOTE — PROGRESS NOTES
Cardiology Progress Note - William Levi  44 y o  male MRN: [id number]    Unit/Bed#: [facility] 413-01 Encounter: [id number]      Assessment:  Principal Problem:    NSTEMI (non-ST elevated myocardial infarction) [facility])  Active Problems:    Essential hypertension    Diabetes mellitus type 2, uncontrolled ([facility] [address] )    Snoring    Hyperlipidemia      Plan:  Patient is postop day one after coronary artery bypass graft surgery  He is out of bed to a chair  He is in sinus rhythm on telemetry  His postop course has been uneventful  Anticipation is transfer to the floor  He will continue on standard medical therapy  BMP today with potassium of 4 1 and creatinine of 0 92  Subjective:   Patient seen and examined  No significant events overnight   negative  Objective:     Vitals: Blood pressure 137/70, pulse (!) 117, temperature 97 9 °F (36 6 °C), temperature source Probe, resp  rate 19, height 5' 9" (1 753 m), weight 125 kg (275 lb), SpO2 91 % , Body mass index is 40 61 kg/m² ,   Orthostatic Blood Pressures      Most Recent Value   Blood Pressure  137/70 filed at 12/24/2019 0853   Patient Position - Orthostatic VS  Lying filed at 12/23/2019 0652      ,      Intake/Output Summary (Last 24 hours) at 12/24/2019 0907  Last data filed at 12/24/2019 0400  Gross per 24 hour   Intake 3555 33 ml   Output 3295 ml   Net 260 33 ml       No significant arrhythmias seen on telemetry review  Physical Exam:    GEN: William Levi   appears well, alert and oriented x 3, pleasant and cooperative   NECK: supple, no carotid bruits, no JVD or HJR  HEART: normal rate, regular rhythm, normal S1 and S2, no murmurs, clicks, gallops or rubs   LUNGS: clear to auscultation bilaterally; no wheezes, rales, or rhonchi   ABDOMEN: normal bowel sounds, soft, no tenderness, no distention  EXTREMITIES: peripheral pulses normal; no clubbing, cyanosis, or edema  SKIN: warm and well perfused, no suspicious lesions on exposed skin    Labs & Results:    No results displayed because visit has over 200 results  Ct Chest Abdomen Pelvis Wo Contrast    Result Date: 12/21/2019  Narrative: CT CHEST, ABDOMEN AND PELVIS WITHOUT IV CONTRAST INDICATION:   cardiomyopathy  COMPARISON:  Chest radiographic series 12/18/2019 TECHNIQUE: CT examination of the chest, abdomen and pelvis was performed without intravenous contrast   Axial, sagittal, and coronal 2D reformatted images were created from the source data and submitted for interpretation  Radiation dose length product (DLP) for this visit:  1615 9 mGy-cm   This examination, like all CT scans performed in the [facility], was performed utilizing techniques to minimize radiation dose exposure, including the use of iterative  reconstruction and automated exposure control  Enteric contrast was not administered  FINDINGS: CHEST LUNGS:  Lungs are clear  There is no tracheal or endobronchial lesion  PLEURA:  Unremarkable  HEART/GREAT VESSELS:  Unremarkable for patient's age  MEDIASTINUM AND CHEVY:  Unremarkable  CHEST WALL AND LOWER NECK:   Asymmetric gynecomastia, left greater than right  No axillary lymphadenopathy  ABDOMEN LIVER/BILIARY TREE:  Liver is mildly decreased in density consistent with fatty change  No CT evidence of suspicious hepatic mass  Normal hepatic contours  No biliary dilatation  GALLBLADDER:  No calcified gallstones  No pericholecystic inflammatory change  SPLEEN:  Unremarkable  Small splenule noted  PANCREAS:  Unremarkable  ADRENAL GLANDS:  Unremarkable  KIDNEYS/URETERS:  Residual intravenous contrast in the collecting systems from recent cardiac MRI  No hydronephrosis or perinephric collections  STOMACH AND BOWEL:  Unremarkable  APPENDIX:  No findings to suggest appendicitis  ABDOMINOPELVIC CAVITY:  No ascites or free intraperitoneal air  No lymphadenopathy  VESSELS:  Unremarkable for patient's age  PELVIS REPRODUCTIVE ORGANS:  Unremarkable for patient's age  URINARY BLADDER:  The bladder is diffusely thick-walled likely sequela of underdistention  No perivesical inflammation  ABDOMINAL WALL/INGUINAL REGIONS:  Focal soft tissue emphysema in the left ventral abdominal wall, likely iatrogenic  OSSEOUS STRUCTURES:  No acute fracture or destructive osseous lesion  Impression: 1  No acute intrathoracic abnormality  2   Mild hepatic steatosis  3   Thick-walled bladder likely sequela of underdistention  No perivesical inflammation  Workstation performed: [id number]     Xr Chest Portable    Result Date: 12/24/2019  Narrative: CHEST INDICATION:   Post Open Heart Surgey  COMPARISON:  12/23/2019 EXAM PERFORMED/VIEWS:  XR CHEST PORTABLE FINDINGS:  Median sternotomy wires are present  Right jugular central venous catheter tip overlies the right atrium  Mediastinal drains are present  Cardiomediastinal silhouette is stable  The lungs are clear  No pneumothorax or pleural effusion  Osseous structures appear within normal limits for patient age  Impression: No acute cardiopulmonary disease  Workstation performed: [id number]     Xr Chest 2 Views    Result Date: 12/18/2019  Narrative: CHEST INDICATION:   Chest pain  COMPARISON:  None EXAM PERFORMED/VIEWS:  XR CHEST PA & LATERAL FINDINGS: Cardiomediastinal silhouette appears unremarkable  The lungs are clear  No pneumothorax or pleural effusion  Osseous structures appear within normal limits for patient age  Impression: No active pulmonary disease  Workstation performed: [id number]     Mri Cardiac  W Wo Contrast    Result Date: 12/22/2019  Narrative: MRI CARDIAC  W WO CONTRAST cardiomyopathy Technique: 1  3 plane SSFP localizers  2  SSFP cine imaging in long and short axis planes  3  T2 weighted DIR FSE in short axis plane  4  24 ml gadolinium DTPA power injected  5  2D inversion recovery FGRE for delayed myocardial enhancement  6  The patient tolerated the procedure well without complication   Measurements: Mikey-septal wall 10 mm Postero-lateral wall 10 mm Global LV Assessment ----------------------------------------------------------------------------------------------------                Value                        Value / Height               Value / BSA                ---------------------------------------------------------------------------------------------------- LVEDV          269 ml                       153 24 ml/m (increased)      109 01 ml/m^2  (increased)                                               [60 - 120]                   [53 - 97]                  LVESV          181 ml                       103 33 ml/m (increased)      73 51 ml/m^2  (increased)                                                [12 - 44]                    [10 - 34]                  LVSV           87 ml                        49 9 ml/m                    35 5 ml/m^2  (reduced)                                                                                [37 - 69]                  LVEF           33 %                                                                                                                                                                                     LVCO           8 5 l/min                                                 3 4 l/min/m^2  (Normal)                                                                               [>= 2 5]                   LV Mass        285 g                        162 7 g/m (increased)        115 74 g/m^2  (increased)                                                [47 - 93]                    [42 - 78]                  Global RV Assessment ----------------------------------------------------------------------------------------------------                Value                        Value / Height               Value / BSA                ---------------------------------------------------------------------------------------------------- RVEDV          133 ml 75 77 ml/m (normal)          53 91 ml/m^2  (reduced)                                                  [73 - 141]                   [67 - 111]                 RVESV          78 ml                        44 46 ml/m (normal)          31 63 ml/m^2  (normal)                                                   [22 - 66]                    [20 - 48]                  RVSV           55 ml                        31 31 ml/m                   22 27 ml/m^2  (reduced)                                                                               [39 - 71]                  RVEF           41 %                                                                                                                                                                                     RVCO           5 3 l/min                                                 2 2 l/min/m^2        Left atrium 32 cm^2 Aortic Root 22 mm Findings:  1  Mildly enlarged left ventricle end-diastolic volume  Mildly reduced left ventricle systolic function with ejection fraction measuring 33%  Mild diffusely increased myocardial wall thickness  Severe hypokinesis/akinesis of the inferior and inferoseptal wall  2  Normal right ventricle end-diastolic volume  Mildly reduced right ventricle systolic function with ejection fraction measuring 41%  3  The aortic, mitral, and tricuspid valves open without restriction  There is mitral regurgitation, however cine MRI is inaccurate in the qualitative assessment of valvular regurgitation  4  The left atrium is moderately enlarged  The aortic root is normal in size  5  There is no evidence of myocardial edema  6  Delayed post-gadolinium imaging demonstrates subendocardial delayed myocardial enhancement up to 50% transmural thickness at the basal inferior and inferoseptal wall and mid inferior wall  Impression: Impression: 1  Moderately enlarged left ventricle size with moderately reduced left ventricle systolic function  Ejection fraction measures 33%  Mild diffuse increased myocardial wall thickness  Severe hypokinesis/akinesis at the inferior and inferoseptal wall  Subendocardial delayed myocardial enhancement up to 50% transmural thickness at the basal inferior and inferoseptal wall and mid inferior wall, consistent with ischemic cardiomyopathy in the RCA/PDA distribution, with preserved viability  2  Normal right ventricle size  Mildly reduced right ventricle systolic function with ejection fraction measuring 41%  3  Mitral regurgitation 4  Moderate left atrial enlargement Workstation performed: [id number]     Vas Carotid Complete Study    Result Date: 12/21/2019  Narrative:  [facility] REPORT CLINICAL: Indications: Patient presents for a general health evaluation secondary to future open heart surgery  Patient is asymptomatic at this time  Operative History: No prior cardiovascular surgeries Risk Factors The patient has history of HTN, Diabetes, HLD, CAD, and MI  The patient's current BMI is 40 61, Weight is 275 lb and height is 69 in  Clinical Right Pressure: 123/62 mm Hg, Left Pressure: IV site  FINDINGS:  Right        Impression  PSV  EDV (cm/s)  Direction of Flow  Ratio  Dist  ICA                 42          11                      0 42  Mid  ICA                  62          19                      0 63  Prox   ICA    1 - 49%      57          16                      0 58  Dist CCA                  64          18                            Mid CCA                   98          24                      1 64  Prox CCA                  60          15                            Ext Carotid               75          15                      0 76  Prox Vert                 40          16  Antegrade                 Subclavian               132          10                             Left         Impression  PSV  EDV (cm/s)  Direction of Flow  Ratio  Dist  ICA                 57          29                      0 73 Mid  ICA                  61          28                      0 78  Prox  ICA    1 - 49%      45          21                      0 59  Dist CCA                  72          21                            Mid CCA                   78          22                      1 00  Prox CCA                  78          21                            Ext Carotid               60          12                      0 78  Prox Vert                 20           9  Antegrade                 Subclavian                81           0                               CONCLUSION:  Impression  RIGHT: There is <50% stenosis noted in the internal carotid artery  Plaque is homogenous and smooth  Vertebral artery flow is antegrade  There is no significant subclavian artery disease  LEFT: There is <50% stenosis noted in the internal carotid artery  Plaque is homogenous and smooth  Vertebral artery flow is antegrade  There is no significant subclavian artery disease  There a no prior studies for comparison  Technical findings were faxed to chart  Tech Note: Very minimal plaque identified in the bilateral internal carotid arteries  Internal carotid artery stenosis determination by consensus criteria from: Jacqueline Rojas et al  Carotid Artery Stenosis: Gray-Scale and Doppler US Diagnosis - Society of Radiologists in [address] [place] [address], Radiology 2003; [phone]  SIGNATURE: Electronically Signed by: Marleny Martin MD, [address] on 2019-12-21 01:50:00 PM    Vas Lower Limb Vein Mapping Bypass Graft    Result Date: 12/21/2019  Narrative:  [facility] REPORT CLINICAL: Indications: Pre-op Vein Mapping for Future Open Heart Surgery  Physician wants to evaluate for suitable conduit  Operative History: No prior cardiovascular surgeries Risk Factors The patient has history of HTN, Diabetes, HLD, CAD, and MI  The patient's current BMI is 40 61, Weight is 275 lb and height is 69 in    FINDINGS:  Segment         Right        Left Diameter AP  Diameter AP  GSV Inguinal            7 6          6 1  GSV Prox Thigh          2 6          2 2  GSV Mid Thigh           2 8          1 1  GSV Dist Thigh          2 9          1 6  GSV Knee                2 7          1 1  GSV Prox Calf           2 4          2 1  GSV Mid Calf            2 0          2 7  GSV Dist Calf           2 2          2 5  GSV Ankle               2 3          2 9     CONCLUSION:  Impression:  RIGHT LOWER LIMB: The great saphenous vein is patent  from the groin to the ankle  The vein is of adequate caliber and quality for graft conduit with intraluminal diameters ranging from 2 0 mm to 7 6 mm from ankle to the saphenofemoral junction  LEFT LOWER LIMB: The great saphenous vein is patent  from the groin to the ankle  The vein is of adequate caliber and quality for graft conduit with intraluminal diameters ranging from 2 1 mm to 2 9 mm from the ankle to the proximal calf  Tech note: Technical findings were faxed to chart  SIGNATURE: Electronically Signed by: Michael Jolley MD, [address] on 2019-12-21 01:49:47 PM    Xr Chest Portable Icu    Result Date: 12/24/2019  Narrative: CHEST INDICATION:   S/P open heart  COMPARISON:  None EXAM PERFORMED/VIEWS:  XR CHEST PORTABLE ICU FINDINGS:  ET tube tip likely within 1 cm of the rossi, rossi not well demonstrated  Right IJ central line tip over the base of the right heart and should be retracted approximately 6 cm  Kila-Sera catheter tip projecting over the right main pulmonary artery  Mediastinal and left thoracic drains present  Cardiomediastinal silhouette appears unremarkable  The lungs are grossly clear  Limited inspiratory volume  No pneumothorax or pleural effusion  Osseous structures appear within normal limits for patient age  Impression: Suggest retracting right IJ central line approximate 6 cm and ET tube 1-2 cm  The study was marked in [facility] for immediate notification   Workstation performed: [id number]       EKG personally reviewed by Roni Oneill MD      Counseling / Coordination of Care  Total floor / unit time spent today 30 minutes  Greater than 50% of total time was spent with the patient and / or family counseling and / or coordination of care

## 2019-12-24 NOTE — PROGRESS NOTES
12/24/19 1100   Clinical Encounter Type   Visited With Patient   Routine Visit Introduction   Surgical Visit Post-op   Crisis Visit Critical Care   Jehovah's witness Encounters   Jehovah's witness Needs [facility] Text;Prayer

## 2019-12-24 NOTE — PLAN OF CARE
Problem: OCCUPATIONAL THERAPY ADULT  Goal: Performs self-care activities at highest level of function for planned discharge setting  See evaluation for individualized goals  Description  Treatment Interventions: ADL retraining, Functional transfer training, Endurance training, Patient/family training, Equipment evaluation/education, Compensatory technique education, Continued evaluation, Cardiac education, Energy conservation, Activityengagement          See flowsheet documentation for full assessment, interventions and recommendations  Note:   Limitation: Decreased ADL status, Decreased endurance, Decreased high-level ADLs  Prognosis: Good  Assessment: Pt is a 44 y o  male admitted to [facility] on 12/19/2019 w/ NSTEMI s/p CABG x3 on 12/23/2019  Comorbidities include a h/o essential hypertension, uncontrolled type 2 DM, and hyperlipidemia  Pt with active OT orders and ambulate  orders  Pt lives in a 3 story home with 5 CRISSY with bed/bath on 2nd floor  Clarification required regarding available support upon d/c  Per EMR, pt lives with multiple roommates but when asked pt reports he lives with "kids"  Pt was I w/  ADLS and IADLS & required no use of DME PTA  Currently pt is min A for functional transfers and functional mobility, mod A for UB ADLS and max A for LB ADLS  Pt is limited at this time 2*: pain, endurance, activity tolerance, functional mobility, forward functional reach, functional standing tolerance, unsupportive home environment and decreased I w/ ADLS/IADLS  The following Occupational Performance Areas to address include: bathing/shower, toilet hygiene, dressing, functional mobility and clothing management  Pt scored overall  40/100 on the Barthel Index  Based on the aforementioned OT evaluation, functional performance deficits, and assessments, pt has been identified as a high complexity evaluation  From OT standpoint, anticipate d/c home with family support pending progress   Pt to continue to benefit from acute immediate OT services to address the following goals 3-5x/week to  w/in 7-10 days:         OT Discharge Recommendation: Home with family support(pending clarification of support and progress)  OT - OK to Discharge: Yes(When medically appropriate )

## 2019-12-25 ENCOUNTER — APPOINTMENT (INPATIENT)
Dept: NON INVASIVE DIAGNOSTICS | Facility: HOSPITAL | Age: 39
DRG: 235 | End: 2019-12-25
Payer: COMMERCIAL

## 2019-12-25 PROBLEM — R06.89 ACUTE RESPIRATORY INSUFFICIENCY: Status: ACTIVE | Noted: 2019-12-25

## 2019-12-25 LAB
ANION GAP SERPL CALCULATED.3IONS-SCNC: 7 MMOL/L (ref 4–13)
ATRIAL RATE: 98 BPM
BUN SERPL-MCNC: 28 MG/DL (ref 5–25)
CALCIUM SERPL-MCNC: 8.4 MG/DL (ref 8.3–10.1)
CHLORIDE SERPL-SCNC: 116 MMOL/L (ref 100–108)
CO2 SERPL-SCNC: 24 MMOL/L (ref 21–32)
CREAT SERPL-MCNC: 0.92 MG/DL (ref 0.6–1.3)
ERYTHROCYTE [DISTWIDTH] IN BLOOD BY AUTOMATED COUNT: 11.9 % (ref 11.6–15.1)
GFR SERPL CREATININE-BSD FRML MDRD: 104 ML/MIN/1.73SQ M
GLUCOSE SERPL-MCNC: 151 MG/DL (ref 65–140)
GLUCOSE SERPL-MCNC: 154 MG/DL (ref 65–140)
GLUCOSE SERPL-MCNC: 154 MG/DL (ref 65–140)
GLUCOSE SERPL-MCNC: 155 MG/DL (ref 65–140)
GLUCOSE SERPL-MCNC: 158 MG/DL (ref 65–140)
GLUCOSE SERPL-MCNC: 159 MG/DL (ref 65–140)
GLUCOSE SERPL-MCNC: 159 MG/DL (ref 65–140)
GLUCOSE SERPL-MCNC: 168 MG/DL (ref 65–140)
GLUCOSE SERPL-MCNC: 170 MG/DL (ref 65–140)
GLUCOSE SERPL-MCNC: 174 MG/DL (ref 65–140)
GLUCOSE SERPL-MCNC: 247 MG/DL (ref 65–140)
GLUCOSE SERPL-MCNC: 264 MG/DL (ref 65–140)
HCT VFR BLD AUTO: 39.1 % (ref 36.5–49.3)
HGB BLD-MCNC: 12.5 G/DL (ref 12–17)
MAGNESIUM SERPL-MCNC: 2.2 MG/DL (ref 1.6–2.6)
MCH RBC QN AUTO: 30 PG (ref 26.8–34.3)
MCHC RBC AUTO-ENTMCNC: 32 G/DL (ref 31.4–37.4)
MCV RBC AUTO: 94 FL (ref 82–98)
P AXIS: 50 DEGREES
PLATELET # BLD AUTO: 216 THOUSANDS/UL (ref 149–390)
PMV BLD AUTO: 11.5 FL (ref 8.9–12.7)
POTASSIUM SERPL-SCNC: 4.5 MMOL/L (ref 3.5–5.3)
PR INTERVAL: 146 MS
QRS AXIS: 29 DEGREES
QRSD INTERVAL: 104 MS
QT INTERVAL: 358 MS
QTC INTERVAL: 458 MS
RBC # BLD AUTO: 4.17 MILLION/UL (ref 3.88–5.62)
SODIUM SERPL-SCNC: 147 MMOL/L (ref 136–145)
T WAVE AXIS: 45 DEGREES
VENTRICULAR RATE: 98 BPM
WBC # BLD AUTO: 24.9 THOUSAND/UL (ref 4.31–10.16)

## 2019-12-25 PROCEDURE — NC001 PR NO CHARGE: Performed by: ANESTHESIOLOGY

## 2019-12-25 PROCEDURE — 94660 CPAP INITIATION&MGMT: CPT

## 2019-12-25 PROCEDURE — 99232 SBSQ HOSP IP/OBS MODERATE 35: CPT | Performed by: INTERNAL MEDICINE

## 2019-12-25 PROCEDURE — 82948 REAGENT STRIP/BLOOD GLUCOSE: CPT

## 2019-12-25 PROCEDURE — 85027 COMPLETE CBC AUTOMATED: CPT | Performed by: PHYSICIAN ASSISTANT

## 2019-12-25 PROCEDURE — 93010 ELECTROCARDIOGRAM REPORT: CPT | Performed by: INTERNAL MEDICINE

## 2019-12-25 PROCEDURE — 99232 SBSQ HOSP IP/OBS MODERATE 35: CPT | Performed by: ANESTHESIOLOGY

## 2019-12-25 PROCEDURE — 94760 N-INVAS EAR/PLS OXIMETRY 1: CPT

## 2019-12-25 PROCEDURE — 94668 MNPJ CHEST WALL SBSQ: CPT

## 2019-12-25 PROCEDURE — 80048 BASIC METABOLIC PNL TOTAL CA: CPT | Performed by: PHYSICIAN ASSISTANT

## 2019-12-25 PROCEDURE — 83735 ASSAY OF MAGNESIUM: CPT | Performed by: PHYSICIAN ASSISTANT

## 2019-12-25 PROCEDURE — 93321 DOPPLER ECHO F-UP/LMTD STD: CPT | Performed by: INTERNAL MEDICINE

## 2019-12-25 PROCEDURE — 93308 TTE F-UP OR LMTD: CPT

## 2019-12-25 PROCEDURE — 93308 TTE F-UP OR LMTD: CPT | Performed by: INTERNAL MEDICINE

## 2019-12-25 PROCEDURE — 99024 POSTOP FOLLOW-UP VISIT: CPT | Performed by: THORACIC SURGERY (CARDIOTHORACIC VASCULAR SURGERY)

## 2019-12-25 PROCEDURE — 94664 DEMO&/EVAL PT USE INHALER: CPT

## 2019-12-25 PROCEDURE — 93325 DOPPLER ECHO COLOR FLOW MAPG: CPT | Performed by: INTERNAL MEDICINE

## 2019-12-25 PROCEDURE — 93005 ELECTROCARDIOGRAM TRACING: CPT

## 2019-12-25 RX ORDER — METOPROLOL TARTRATE 50 MG/1
50 TABLET, FILM COATED ORAL EVERY 8 HOURS
Status: DISCONTINUED | OUTPATIENT
Start: 2019-12-25 | End: 2019-12-25

## 2019-12-25 RX ORDER — FUROSEMIDE 10 MG/ML
40 INJECTION INTRAMUSCULAR; INTRAVENOUS
Status: DISCONTINUED | OUTPATIENT
Start: 2019-12-25 | End: 2019-12-28

## 2019-12-25 RX ORDER — IBUPROFEN 600 MG/1
600 TABLET ORAL EVERY 8 HOURS SCHEDULED
Status: DISCONTINUED | OUTPATIENT
Start: 2019-12-25 | End: 2019-12-29 | Stop reason: HOSPADM

## 2019-12-25 RX ORDER — METOPROLOL TARTRATE 50 MG/1
50 TABLET, FILM COATED ORAL EVERY 8 HOURS
Status: DISCONTINUED | OUTPATIENT
Start: 2019-12-25 | End: 2019-12-29 | Stop reason: HOSPADM

## 2019-12-25 RX ORDER — AMLODIPINE BESYLATE 10 MG/1
10 TABLET ORAL DAILY
Status: DISCONTINUED | OUTPATIENT
Start: 2019-12-25 | End: 2019-12-29 | Stop reason: HOSPADM

## 2019-12-25 RX ORDER — METHOCARBAMOL 500 MG/1
1000 TABLET, FILM COATED ORAL ONCE
Status: COMPLETED | OUTPATIENT
Start: 2019-12-25 | End: 2019-12-25

## 2019-12-25 RX ADMIN — FUROSEMIDE 40 MG: 10 INJECTION, SOLUTION INTRAMUSCULAR; INTRAVENOUS at 07:24

## 2019-12-25 RX ADMIN — SODIUM CHLORIDE 10 MG/HR: 0.9 INJECTION, SOLUTION INTRAVENOUS at 05:43

## 2019-12-25 RX ADMIN — LISINOPRIL 10 MG: 10 TABLET ORAL at 08:22

## 2019-12-25 RX ADMIN — KETOROLAC TROMETHAMINE 15 MG: 30 INJECTION, SOLUTION INTRAMUSCULAR at 00:00

## 2019-12-25 RX ADMIN — SODIUM CHLORIDE 4 MG/HR: 0.9 INJECTION, SOLUTION INTRAVENOUS at 21:22

## 2019-12-25 RX ADMIN — FUROSEMIDE 40 MG: 10 INJECTION, SOLUTION INTRAMUSCULAR; INTRAVENOUS at 17:00

## 2019-12-25 RX ADMIN — IBUPROFEN 600 MG: 600 TABLET, FILM COATED ORAL at 14:03

## 2019-12-25 RX ADMIN — AMLODIPINE BESYLATE 10 MG: 10 TABLET ORAL at 08:22

## 2019-12-25 RX ADMIN — IBUPROFEN 600 MG: 600 TABLET, FILM COATED ORAL at 08:26

## 2019-12-25 RX ADMIN — OXYCODONE HYDROCHLORIDE 5 MG: 5 TABLET ORAL at 14:42

## 2019-12-25 RX ADMIN — ATORVASTATIN CALCIUM 80 MG: 80 TABLET, FILM COATED ORAL at 17:00

## 2019-12-25 RX ADMIN — Medication 1 APPLICATION: at 08:23

## 2019-12-25 RX ADMIN — ACETAMINOPHEN 975 MG: 325 TABLET ORAL at 14:03

## 2019-12-25 RX ADMIN — OXYCODONE HYDROCHLORIDE 10 MG: 10 TABLET ORAL at 20:05

## 2019-12-25 RX ADMIN — PERFLUTREN 0.8 ML/MIN: 6.52 INJECTION, SUSPENSION INTRAVENOUS at 10:55

## 2019-12-25 RX ADMIN — PANTOPRAZOLE SODIUM 40 MG: 40 TABLET, DELAYED RELEASE ORAL at 05:43

## 2019-12-25 RX ADMIN — SODIUM CHLORIDE 4 MG/HR: 0.9 INJECTION, SOLUTION INTRAVENOUS at 13:03

## 2019-12-25 RX ADMIN — METHOCARBAMOL TABLETS 1000 MG: 500 TABLET, COATED ORAL at 07:24

## 2019-12-25 RX ADMIN — Medication 1 APPLICATION: at 21:22

## 2019-12-25 RX ADMIN — IBUPROFEN 600 MG: 600 TABLET, FILM COATED ORAL at 21:22

## 2019-12-25 RX ADMIN — INSULIN LISPRO 10 UNITS: 100 INJECTION, SOLUTION INTRAVENOUS; SUBCUTANEOUS at 11:10

## 2019-12-25 RX ADMIN — SODIUM CHLORIDE 10 MG/HR: 0.9 INJECTION, SOLUTION INTRAVENOUS at 08:51

## 2019-12-25 RX ADMIN — FONDAPARINUX SODIUM 2.5 MG: 2.5 INJECTION, SOLUTION SUBCUTANEOUS at 08:23

## 2019-12-25 RX ADMIN — FUROSEMIDE 40 MG: 10 INJECTION, SOLUTION INTRAMUSCULAR; INTRAVENOUS at 11:10

## 2019-12-25 RX ADMIN — AMIODARONE HYDROCHLORIDE 200 MG: 200 TABLET ORAL at 05:43

## 2019-12-25 RX ADMIN — OXYCODONE HYDROCHLORIDE 10 MG: 10 TABLET ORAL at 03:42

## 2019-12-25 RX ADMIN — ASPIRIN 325 MG ORAL TABLET 325 MG: 325 PILL ORAL at 08:22

## 2019-12-25 RX ADMIN — AMIODARONE HYDROCHLORIDE 200 MG: 200 TABLET ORAL at 21:20

## 2019-12-25 RX ADMIN — SODIUM CHLORIDE 4 UNITS/HR: 9 INJECTION, SOLUTION INTRAVENOUS at 22:29

## 2019-12-25 RX ADMIN — POLYETHYLENE GLYCOL 3350 17 G: 17 POWDER, FOR SOLUTION ORAL at 08:22

## 2019-12-25 RX ADMIN — ACETAMINOPHEN 975 MG: 325 TABLET ORAL at 21:20

## 2019-12-25 RX ADMIN — INSULIN LISPRO 10 UNITS: 100 INJECTION, SOLUTION INTRAVENOUS; SUBCUTANEOUS at 16:04

## 2019-12-25 RX ADMIN — DOCUSATE SODIUM 100 MG: 100 CAPSULE, LIQUID FILLED ORAL at 08:23

## 2019-12-25 RX ADMIN — POTASSIUM CHLORIDE 20 MEQ: 1500 TABLET, EXTENDED RELEASE ORAL at 08:22

## 2019-12-25 RX ADMIN — ACETAMINOPHEN 975 MG: 325 TABLET ORAL at 05:43

## 2019-12-25 RX ADMIN — DOCUSATE SODIUM 100 MG: 100 CAPSULE, LIQUID FILLED ORAL at 17:00

## 2019-12-25 RX ADMIN — METOPROLOL TARTRATE 50 MG: 50 TABLET, FILM COATED ORAL at 15:06

## 2019-12-25 RX ADMIN — METOPROLOL TARTRATE 50 MG: 50 TABLET, FILM COATED ORAL at 08:22

## 2019-12-25 RX ADMIN — AMIODARONE HYDROCHLORIDE 200 MG: 200 TABLET ORAL at 14:04

## 2019-12-25 NOTE — PROGRESS NOTES
Cardiology Progress Note - Shanell Desai  44 y o  male MRN: [id number]    Unit/Bed#: [facility] 413-01 Encounter: [id number]      Assessment:  Principal Problem:    NSTEMI (non-ST elevated myocardial infarction) [facility])  Active Problems:    Essential hypertension    Diabetes mellitus type 2, uncontrolled ([facility] [address] )    Snoring    Hyperlipidemia    S/P CABG x 3      Plan:  Patient is postop day two after CABG x3  He continues to complain of left-sided chest pain which is likely musculoskeletal   The pacing wires and drains are to be removed today  He continues on standard medical therapy  Likely transfer to the floor today  Subjective:   Patient seen and examined  No significant events overnight   negative  Objective:     Vitals: Blood pressure 140/99, pulse 102, temperature 97 9 °F (36 6 °C), temperature source Oral, resp  rate (!) 25, height 5' 9" (1 753 m), weight 130 kg (287 lb 0 6 oz), SpO2 92 %  , Body mass index is 42 39 kg/m² ,   Orthostatic Blood Pressures      Most Recent Value   Blood Pressure  140/99 filed at 12/25/2019 0600   Patient Position - Orthostatic VS  Lying filed at 12/25/2019 0400      ,      Intake/Output Summary (Last 24 hours) at 12/25/2019 0835  Last data filed at 12/25/2019 0600  Gross per 24 hour   Intake 3398 58 ml   Output 1785 ml   Net 1613 58 ml       No significant arrhythmias seen on telemetry review  Physical Exam:    GEN: Shanell Desai   appears well, alert and oriented x 3, pleasant and cooperative   NECK: supple, no carotid bruits, no JVD or HJR  HEART: normal rate, regular rhythm, normal S1 and S2, no murmurs, clicks, gallops or rubs   LUNGS: clear to auscultation bilaterally; no wheezes, rales, or rhonchi   ABDOMEN: normal bowel sounds, soft, no tenderness, no distention  EXTREMITIES: peripheral pulses normal; no clubbing, cyanosis, or edema  SKIN: warm and well perfused, no suspicious lesions on exposed skin    Labs & Results:    No results displayed because visit has over 200 results  Ct Chest Abdomen Pelvis Wo Contrast    Result Date: 12/21/2019  Narrative: CT CHEST, ABDOMEN AND PELVIS WITHOUT IV CONTRAST INDICATION:   cardiomyopathy  COMPARISON:  Chest radiographic series 12/18/2019 TECHNIQUE: CT examination of the chest, abdomen and pelvis was performed without intravenous contrast   Axial, sagittal, and coronal 2D reformatted images were created from the source data and submitted for interpretation  Radiation dose length product (DLP) for this visit:  1615 9 mGy-cm   This examination, like all CT scans performed in the [facility], was performed utilizing techniques to minimize radiation dose exposure, including the use of iterative  reconstruction and automated exposure control  Enteric contrast was not administered  FINDINGS: CHEST LUNGS:  Lungs are clear  There is no tracheal or endobronchial lesion  PLEURA:  Unremarkable  HEART/GREAT VESSELS:  Unremarkable for patient's age  MEDIASTINUM AND CHEVY:  Unremarkable  CHEST WALL AND LOWER NECK:   Asymmetric gynecomastia, left greater than right  No axillary lymphadenopathy  ABDOMEN LIVER/BILIARY TREE:  Liver is mildly decreased in density consistent with fatty change  No CT evidence of suspicious hepatic mass  Normal hepatic contours  No biliary dilatation  GALLBLADDER:  No calcified gallstones  No pericholecystic inflammatory change  SPLEEN:  Unremarkable  Small splenule noted  PANCREAS:  Unremarkable  ADRENAL GLANDS:  Unremarkable  KIDNEYS/URETERS:  Residual intravenous contrast in the collecting systems from recent cardiac MRI  No hydronephrosis or perinephric collections  STOMACH AND BOWEL:  Unremarkable  APPENDIX:  No findings to suggest appendicitis  ABDOMINOPELVIC CAVITY:  No ascites or free intraperitoneal air  No lymphadenopathy  VESSELS:  Unremarkable for patient's age  PELVIS REPRODUCTIVE ORGANS:  Unremarkable for patient's age   URINARY BLADDER:  The bladder is diffusely thick-walled likely sequela of underdistention  No perivesical inflammation  ABDOMINAL WALL/INGUINAL REGIONS:  Focal soft tissue emphysema in the left ventral abdominal wall, likely iatrogenic  OSSEOUS STRUCTURES:  No acute fracture or destructive osseous lesion  Impression: 1  No acute intrathoracic abnormality  2   Mild hepatic steatosis  3   Thick-walled bladder likely sequela of underdistention  No perivesical inflammation  Workstation performed: [id number]     Xr Chest Portable    Result Date: 12/24/2019  Narrative: CHEST INDICATION:   Post Open Heart Surgey  COMPARISON:  12/23/2019 EXAM PERFORMED/VIEWS:  XR CHEST PORTABLE FINDINGS:  Median sternotomy wires are present  Right jugular central venous catheter tip overlies the right atrium  Mediastinal drains are present  Cardiomediastinal silhouette is stable  The lungs are clear  No pneumothorax or pleural effusion  Osseous structures appear within normal limits for patient age  Impression: No acute cardiopulmonary disease  Workstation performed: [id number]     Xr Chest 2 Views    Result Date: 12/18/2019  Narrative: CHEST INDICATION:   Chest pain  COMPARISON:  None EXAM PERFORMED/VIEWS:  XR CHEST PA & LATERAL FINDINGS: Cardiomediastinal silhouette appears unremarkable  The lungs are clear  No pneumothorax or pleural effusion  Osseous structures appear within normal limits for patient age  Impression: No active pulmonary disease  Workstation performed: [id number]     Mri Cardiac  W Wo Contrast    Result Date: 12/22/2019  Narrative: MRI CARDIAC  W WO CONTRAST cardiomyopathy Technique: 1  3 plane SSFP localizers  2  SSFP cine imaging in long and short axis planes  3  T2 weighted DIR FSE in short axis plane  4  24 ml gadolinium DTPA power injected  5  2D inversion recovery FGRE for delayed myocardial enhancement  6  The patient tolerated the procedure well without complication   Measurements: Mikey-septal wall 10 mm Postero-lateral wall 10 mm Global LV Assessment ----------------------------------------------------------------------------------------------------                Value                        Value / Height               Value / BSA                ---------------------------------------------------------------------------------------------------- LVEDV          269 ml                       153 24 ml/m (increased)      109 01 ml/m^2  (increased)                                               [60 - 120]                   [53 - 97]                  LVESV          181 ml                       103 33 ml/m (increased)      73 51 ml/m^2  (increased)                                                [12 - 44]                    [10 - 34]                  LVSV           87 ml                        49 9 ml/m                    35 5 ml/m^2  (reduced)                                                                                [37 - 69]                  LVEF           33 %                                                                                                                                                                                     LVCO           8 5 l/min                                                 3 4 l/min/m^2  (Normal)                                                                               [>= 2 5]                   LV Mass        285 g                        162 7 g/m (increased)        115 74 g/m^2  (increased)                                                [47 - 93]                    [42 - 78]                  Global RV Assessment ----------------------------------------------------------------------------------------------------                Value                        Value / Height               Value / BSA                ---------------------------------------------------------------------------------------------------- RVEDV          133 ml                       75 77 ml/m (normal)          53 91 ml/m^2 (reduced)                                                  [73 - 141]                   [67 - 111]                 RVESV          78 ml                        44 46 ml/m (normal)          31 63 ml/m^2  (normal)                                                   [22 - 66]                    [20 - 48]                  RVSV           55 ml                        31 31 ml/m                   22 27 ml/m^2  (reduced)                                                                               [39 - 71]                  RVEF           41 %                                                                                                                                                                                     RVCO           5 3 l/min                                                 2 2 l/min/m^2        Left atrium 32 cm^2 Aortic Root 22 mm Findings:  1  Mildly enlarged left ventricle end-diastolic volume  Mildly reduced left ventricle systolic function with ejection fraction measuring 33%  Mild diffusely increased myocardial wall thickness  Severe hypokinesis/akinesis of the inferior and inferoseptal wall  2  Normal right ventricle end-diastolic volume  Mildly reduced right ventricle systolic function with ejection fraction measuring 41%  3  The aortic, mitral, and tricuspid valves open without restriction  There is mitral regurgitation, however cine MRI is inaccurate in the qualitative assessment of valvular regurgitation  4  The left atrium is moderately enlarged  The aortic root is normal in size  5  There is no evidence of myocardial edema  6  Delayed post-gadolinium imaging demonstrates subendocardial delayed myocardial enhancement up to 50% transmural thickness at the basal inferior and inferoseptal wall and mid inferior wall  Impression: Impression: 1  Moderately enlarged left ventricle size with moderately reduced left ventricle systolic function  Ejection fraction measures 33%    Mild diffuse increased myocardial wall thickness  Severe hypokinesis/akinesis at the inferior and inferoseptal wall  Subendocardial delayed myocardial enhancement up to 50% transmural thickness at the basal inferior and inferoseptal wall and mid inferior wall, consistent with ischemic cardiomyopathy in the RCA/PDA distribution, with preserved viability  2  Normal right ventricle size  Mildly reduced right ventricle systolic function with ejection fraction measuring 41%  3  Mitral regurgitation 4  Moderate left atrial enlargement Workstation performed: [id number]     Vas Carotid Complete Study    Result Date: 12/21/2019  Narrative:  [facility] REPORT CLINICAL: Indications: Patient presents for a general health evaluation secondary to future open heart surgery  Patient is asymptomatic at this time  Operative History: No prior cardiovascular surgeries Risk Factors The patient has history of HTN, Diabetes, HLD, CAD, and MI  The patient's current BMI is 40 61, Weight is 275 lb and height is 69 in  Clinical Right Pressure: 123/62 mm Hg, Left Pressure: IV site  FINDINGS:  Right        Impression  PSV  EDV (cm/s)  Direction of Flow  Ratio  Dist  ICA                 42          11                      0 42  Mid  ICA                  62          19                      0 63  Prox  ICA    1 - 49%      57          16                      0 58  Dist CCA                  64          18                            Mid CCA                   98          24                      1 64  Prox CCA                  60          15                            Ext Carotid               75          15                      0 76  Prox Vert                 40          16  Antegrade                 Subclavian               132          10                             Left         Impression  PSV  EDV (cm/s)  Direction of Flow  Ratio  Dist  ICA                 57          29                      0 73  Mid   ICA                  61          28 0 78  Prox  ICA    1 - 49%      45          21                      0 59  Dist CCA                  72          21                            Mid CCA                   78          22                      1 00  Prox CCA                  78          21                            Ext Carotid               60          12                      0 78  Prox Vert                 20           9  Antegrade                 Subclavian                81           0                               CONCLUSION:  Impression  RIGHT: There is <50% stenosis noted in the internal carotid artery  Plaque is homogenous and smooth  Vertebral artery flow is antegrade  There is no significant subclavian artery disease  LEFT: There is <50% stenosis noted in the internal carotid artery  Plaque is homogenous and smooth  Vertebral artery flow is antegrade  There is no significant subclavian artery disease  There a no prior studies for comparison  Technical findings were faxed to chart  Tech Note: Very minimal plaque identified in the bilateral internal carotid arteries  Internal carotid artery stenosis determination by consensus criteria from: Jacqueline Rojas et al  Carotid Artery Stenosis: Gray-Scale and Doppler US Diagnosis - Society of Radiologists in [address], Radiology 2003; [phone]  SIGNATURE: Electronically Signed by: Marleny Martin MD, [address] on 2019-12-21 01:50:00 PM    Vas Lower Limb Vein Mapping Bypass Graft    Result Date: 12/21/2019  Narrative:  [facility] REPORT CLINICAL: Indications: Pre-op Vein Mapping for Future Open Heart Surgery  Physician wants to evaluate for suitable conduit  Operative History: No prior cardiovascular surgeries Risk Factors The patient has history of HTN, Diabetes, HLD, CAD, and MI  The patient's current BMI is 40 61, Weight is 275 lb and height is 69 in    FINDINGS:  Segment         Right        Left                            Diameter AP  Diameter AP  GSV Inguinal 7 6          6 1  GSV Prox Thigh          2 6          2 2  GSV Mid Thigh           2 8          1 1  GSV Dist Thigh          2 9          1 6  GSV Knee                2 7          1 1  GSV Prox Calf           2 4          2 1  GSV Mid Calf            2 0          2 7  GSV Dist Calf           2 2          2 5  GSV Ankle               2 3          2 9     CONCLUSION:  Impression:  RIGHT LOWER LIMB: The great saphenous vein is patent  from the groin to the ankle  The vein is of adequate caliber and quality for graft conduit with intraluminal diameters ranging from 2 0 mm to 7 6 mm from ankle to the saphenofemoral junction  LEFT LOWER LIMB: The great saphenous vein is patent  from the groin to the ankle  The vein is of adequate caliber and quality for graft conduit with intraluminal diameters ranging from 2 1 mm to 2 9 mm from the ankle to the proximal calf  Tech note: Technical findings were faxed to chart  SIGNATURE: Electronically Signed by: Estrada Zaman MD, [address] on 2019-12-21 01:49:47 PM    Xr Chest Portable Icu    Result Date: 12/24/2019  Narrative: CHEST INDICATION:   S/P open heart  COMPARISON:  None EXAM PERFORMED/VIEWS:  XR CHEST PORTABLE ICU FINDINGS:  ET tube tip likely within 1 cm of the rossi, rossi not well demonstrated  Right IJ central line tip over the base of the right heart and should be retracted approximately 6 cm  [place] catheter tip projecting over the right main pulmonary artery  Mediastinal and left thoracic drains present  Cardiomediastinal silhouette appears unremarkable  The lungs are grossly clear  Limited inspiratory volume  No pneumothorax or pleural effusion  Osseous structures appear within normal limits for patient age  Impression: Suggest retracting right IJ central line approximate 6 cm and ET tube 1-2 cm  The study was marked in [facility] for immediate notification   Workstation performed: [id number]       EKG personally reviewed by Roni Oneill MD  Counseling / Coordination of Care  Total floor / unit time spent today 30 minutes  Greater than 50% of total time was spent with the patient and / or family counseling and / or coordination of care

## 2019-12-25 NOTE — PROGRESS NOTES
Progress Note - Mercy Rai  44 y o  male MRN: [id number]    Unit/Bed#: [facility] 413-01 Encounter: [id number]      CC: diabetes f/u    Subjective:   Mercy Rai  is a 44y o  year old male with type 2 diabetes  Patient admits to chest pain otherwise no complaints  No hypoglycemia  Patient in acute respiratory insufficiency placed on BiPAP  Objective:     Vitals: Blood pressure 111/66, pulse 82, temperature 98 °F (36 7 °C), temperature source Oral, resp  rate 18, height 5' 9" (1 753 m), weight 130 kg (287 lb 0 6 oz), SpO2 90 %  ,Body mass index is 42 39 kg/m²  Intake/Output Summary (Last 24 hours) at 12/25/2019 1030  Last data filed at 12/25/2019 1000  Gross per 24 hour   Intake 3866 49 ml   Output 2080 ml   Net 1786 49 ml       Physical Exam:  General Appearance: awake, appears stated age and cooperative  Head: Normocephalic, without obvious abnormality, atraumatic  Extremities: moves all extremities  Skin: Skin color and temperature normal    Pulm: no labored breathing    Lab, Imaging and other studies: I have personally reviewed pertinent reports  POC Glucose (mg/dl)   Date Value   12/25/2019 247 (H)   12/25/2019 264 (H)   12/25/2019 159 (H)   12/25/2019 158 (H)   12/25/2019 154 (H)   12/24/2019 163 (H)   12/24/2019 200 (H)   12/24/2019 191 (H)   12/24/2019 180 (H)   12/24/2019 225 (H)       Assessment:  diabetes with hyperglycemia   CAD    Plan:  Type 2 diabetes with hyperglycemia - most recent A1c 10 1  Patient is eating 50% of his meals  Patient currently on BiPAP with minimal food intake  I will continue insulin drip  I will continue to monitor his blood sugars and make adjustments to his insulin regimen as needed  Coronary artery disease - patient is currently postop day #2 after CABG      Portions of the record may have been created with voice recognition software

## 2019-12-25 NOTE — PROGRESS NOTES
Progress Note - Critical Care   [facility]  44 y o  male MRN: [id number]  Unit/Bed#: [facility] 413-01 Encounter: [id number]      Attending Physician: Ruben Moran MD    24 Hour Events/HPI: POD # 2 s/p CABG x 3   Yesterday started on lopressor and lisinopril, but has still required cardene intermittently  Additionally started on large bore nasal canula due to hypoxia with improvement  This morning he is complaining of cramping sensation in his ribs  Feels like he can't take a deep breath due to pain  ROS: Review of Systems  Review of systems was reviewed and negative unless stated above in HPI/24-hour events   ---------------------------------------------------------------------------------------------------------------------------------------------------------------------  Impressions:  1  CAD s/p CABG x 3  2  Ischemic cardiomyopathy  3  DM2 uncontrolled  4  HTN  5  HLD  6  Medical non-compliance  7  Marijuana use  8  Hypernatremia  9  Hyperchloremia   Plan:    Neuro:   · Pain controlled with: PRN percocet  · Regulate sleep/wake cycle  · Delirium precautions  · CAM-ICU daily  · Trend neuro exam      CV:   · Cardiac infusions: Cardene, 10 mg/hour  · Wean as able  · MAP goal > 65  · Keep Arterial line  · Rhythm: Sinus Tachycardia  · Follow rhythm on telemetry  · Lopressor 50 mg PO BID  · Epicardial pacing wires no longer required  Remove today  · Continue lisinopril 10mg   · Start norvasc 10mg and consider icnreasing dose of bblocker    Lung:   · Acute post-op pulmonary insufficiency; Requiring large bore NC @ 12LPM, secondary to pulmonary vascular congestion and poor inspiratory effort secondary to pain  Continue incentive spirometry/coughing/deep breathing exercises    Wean supplemental oxygen as tolerated for saturation > 90%  · Wean as tolerated  · Chest tube drainage diminished; D/C today      GI:   · Continue PPI for stress ulcer prophylaxis  · Continue bowel regimen  · Trend abdominal exam and bowel function    FEN:   · Diuretic plan: Lasix 80 mg IV q BID  · K-dur 40 mEQ PO q BID  · Nutrition/diet plan: Cardiac/DM  · Replete electrolytes with goals: K >4 0, Mag >2 0, and Phos >3 0    :   · Indwelling Santamaria present: yes   · D/C Santamaria  · Trend UOP and BUN/creat  · Strict I and O    ID:   · Trend temps and WBC count  · Maintain normothermia    Heme:   · Trend hgb and plts    Endo:   · Glycemic control plan: Endocrine managing     MSK/Skin:  · Mobility goal:   · PT consult: yes  · OT consult: yes  · Frequent turning and pressure off-loading  · Local wound care as needed    Disposition:  Transfer to SD level 1  ---------------------------------------------------------------------------------------------------------------------------------------------------------------------  Allergies: No Known Allergies  Medications:   Scheduled Meds:  Current Facility-Administered Medications:  acetaminophen 975 mg Oral Q8H Donna Ash PA-C    amiodarone 200 mg Oral Q8H [address] Donna Ash PA-C    aspirin 325 mg Oral Daily Corry Cueto PA-C    atorvastatin 80 mg Oral Daily With [facility] YENNY Gutierrez    bisacodyl 10 mg Rectal Daily PRN Corry Cueto PA-C    docusate sodium 100 mg Oral BID Corry Cueto PA-C    fondaparinux 2 5 mg Subcutaneous Daily Donna Ash PA-C    furosemide 40 mg Intravenous BID (diuretic) Mulu Ash PA-C    insulin regular (HumuLIN R,NovoLIN R) infusion 0 3-21 Units/hr Intravenous Titrated Corry Cueto PA-C Last Rate: 4 Units/hr (12/24/19 7797)   lisinopril 10 mg Oral Daily Donna Ash PA-C    metoprolol tartrate 50 mg Oral Q12H [address] Donna Ash PA-C    mupirocin 1 application Nasal W86N [address] Donna Ash PA-C    niCARdipine 1-15 mg/hr Intravenous Titrated Corry Cueto PA-C Last Rate: 10 mg/hr (12/25/19 9654)   ondansetron 4 mg Intravenous Q6H PRN Corry Factor, PA-MAX    oxyCODONE 10 mg Oral Q6H PRN Corry FactorYENNY    oxyCODONE 5 mg Oral Q4H PRN Mychal Muniz PA-C    pantoprazole 40 mg Oral Early Morning Mychal Cristy, PA-C    polyethylene glycol 17 g Oral Daily Carlosee Cristy, PA-C    potassium chloride 20 mEq Oral Daily Carlosee Cristy, PA-C    sodium chloride 20 mL/hr Intravenous Continuous Mychal Cristy, PA-C Last Rate: 20 mL/hr (12/24/19 0100)   temazepam 15 mg Oral HS PRN Mychal Muniz PA-C      VTE Pharmacologic Prophylaxis: Fondaparinux (Arixtra)  VTE Mechanical Prophylaxis: sequential compression device    Continuous Infusions:  insulin regular (HumuLIN R,NovoLIN R) infusion 0 3-21 Units/hr Last Rate: 4 Units/hr (12/24/19 3231)   niCARdipine 1-15 mg/hr Last Rate: 10 mg/hr (12/25/19 3839)   sodium chloride 20 mL/hr Last Rate: 20 mL/hr (12/24/19 0100)     PRN Meds:    bisacodyl 10 mg Daily PRN   ondansetron 4 mg Q6H PRN   oxyCODONE 10 mg Q6H PRN   oxyCODONE 5 mg Q4H PRN   temazepam 15 mg HS PRN     Home Medications:   Prior to Admission medications    Medication Sig Start Date End Date Taking?  Authorizing Provider   amLODIPine (NORVASC) 10 mg tablet Take 10 mg by mouth daily 12/16/16   Historical Provider, MD   hydrochlorothiazide (HYDRODIURIL) 50 mg tablet Take 50 mg by mouth every 24 hours    Historical Provider, MD   lisinopril (ZESTRIL) 40 mg tablet Take 40 mg by mouth daily    Historical Provider, MD   metFORMIN (GLUCOPHAGE) 1000 MG tablet Take 1,000 mg by mouth Every 12 hours    Historical Provider, MD   methocarbamol (ROBAXIN) 750 mg tablet Take 1 tablet (750 mg total) by mouth every 8 (eight) hours for 5 days 5/20/18 5/25/18  Brenda Hurley PA-C     ---------------------------------------------------------------------------------------------------------------------------------------------------------------------  Vitals:   Vitals:    12/25/19 0000 12/25/19 0200 12/25/19 0400 12/25/19 0600   BP: 138/81 118/62 (!) 181/88 140/99   BP Location: Right arm  Right arm    Pulse: 94 100 104 102   Resp: (!) 44 15 12 (!) 25 Temp: 97 9 °F (36 6 °C)  97 9 °F (36 6 °C)    TempSrc: Oral  Oral    SpO2: 94% 91% 94% 91%   Weight:    130 kg (287 lb 0 6 oz)   Height:         Arterial Line:  Arterial Line BP: 120/78  Arterial Line MAP (mmHg): 94 mmHg    Tele Rhythm: Sinus Tachycardia This was personally reviewed by myself  Respiratory:  SpO2: SpO2: 91 %  O2 Flow Rate (L/min): 10 L/min(HFNC)    Ventilator:  Respiratory    Lab Data (Last 4 hours)    None         O2/Vent Data (Last 4 hours)    None              Temperature: Temp (24hrs), Av 9 °F (36 6 °C), Min:97 6 °F (36 4 °C), Max:98 2 °F (36 8 °C)  Current: Temperature: 97 9 °F (36 6 °C)    Weights:   Weight (last 2 days)     Date/Time   Weight    19 0600   130 (287 04)            Body mass index is 42 39 kg/m²  Hemodynamic Monitoring:  PAP: PAP: 28/14, CVP:  , CO: CO (L/min): 9 L/min, CI: CI (L/min/m2): 3 7 L/min/m2, SVR: SVR (dyne*sec)/cm5: 710 (dyne*sec)/cm5       Intake and Outputs:    Intake/Output Summary (Last 24 hours) at 2019 0648  Last data filed at 2019 0600  Gross per 24 hour   Intake 3652 58 ml   Output 1785 ml   Net 1867 58 ml     I/O last 24 hours: In: 4193 9 [P O :240; I V :3712 2; IV Piggyback:241 7]  Out: 6531 [Urine:3210;  Chest Tube:415]    UOP: 0 5cc/kg/hour   BM: 0 in the last 24 hours    Chest tube Output:  Mediastinal tubes: 60 mL/8 hours  160 mL/24 hours   Pleural tubes: 70 mL/8 hours  120 mL/24 hours     Labs:  Results from last 7 days   Lab Units 19  0428 19  0623 19  0351  19  1557  19  0444 19  0440 19  1629   WBC Thousand/uL 24 90*  --  26 15*  --   --   --  10 07 10 32* 10 84*   HEMOGLOBIN g/dL 12 5 14 3 14 4  --  14 3  --  15 6 16 3 16 3   I STAT HEMOGLOBIN   --   --   --    < >  --    < >  --   --   --    HEMATOCRIT % 39 1 43 5 43 8  --  42 9  --  47 2 49 9* 48 2   HEMATOCRIT, ISTAT   --   --   --    < >  --    < >  --   --   --    PLATELETS Thousands/uL 216  --  269  --  238   < > 305 283 273   NEUTROS PCT %  --   --   --   --   --   --   --  57 65   MONOS PCT %  --   --   --   --   --   --   --  9 8    < > = values in this interval not displayed  Results from last 7 days   Lab Units 12/25/19 0428 12/24/19 0351 12/23/19 2014 12/23/19  1559 12/23/19  1557 12/23/19  1508 12/23/19  1420  12/18/19  1629   SODIUM mmol/L 147* 142  --   --  142  --   --    < > 135*   POTASSIUM mmol/L 4 5 4 1 4 8  --  3 7  --   --    < > 4 0   CHLORIDE mmol/L 116* 112*  --   --  112*  --   --    < > 97*   CO2 mmol/L 24 25  --   --  23  --   --    < > 29   CO2, I-STAT mmol/L  --   --   --  24  --  24 28   < >  --    BUN mg/dL 28* 18  --   --  19  --   --    < > 15   CREATININE mg/dL 0 92 0 92  --   --  1 32*  --   --    < > 1 18   CALCIUM mg/dL 8 4 8 4  --   --  8 9  --   --    < > 9 5   ALK PHOS U/L  --   --   --   --   --   --   --   --  89   ALT U/L  --   --   --   --   --   --   --   --  30   AST U/L  --   --   --   --   --   --   --   --  19   GLUCOSE, ISTAT mg/dl  --   --   --  181*  --  188* 198*   < >  --     < > = values in this interval not displayed       Baseline Creat: 0 8-1 0    Results from last 7 days   Lab Units 12/25/19 0428 12/24/19  0351 12/23/19  0444   MAGNESIUM mg/dL 2 2 2 0 2 0          Results from last 7 days   Lab Units 12/19/19  0823 12/18/19 2356 12/18/19  1629   INR   --   --  1 03   PTT seconds 37 25 30                  Results from last 7 days   Lab Units 12/24/19  0436 12/23/19 2016   PH ART  7 376 7 379   PCO2 ART mm Hg 42 3 39 2   PO2 ART mm Hg 65 2* 133 7*   HCO3 ART mmol/L 24 2 22 6   BASE EXC ART mmol/L -1 0 -2 2   ABG SOURCE   --  Line, Arterial             Micro:   Blood Culture: No results found for: BLOODCX  Urine Culture: No results found for: URINECX  Sputum Culture: No components found for: SPUTUMCX  Wound Culure: No results found for: WOUNDCULT    Diagnositic Studies:  No new    Nutrition:        Diet Orders   (From admission, onward)             Start     Ordered 12/24/19 0809  Diet Cardiovascular; Cardiac; Fluid Restriction 1800 ML, Consistent Carbohydrate Diet Level 2 (5 carb servings/75 grams CHO/meal)  Diet effective now     Question Answer Comment   Diet Type Cardiovascular    Cardiac Cardiac    Other Restriction(s): Fluid Restriction 1800 ML    Other Restriction(s): Consistent Carbohydrate Diet Level 2 (5 carb servings/75 grams CHO/meal)    RD to adjust diet per protocol? Yes        12/24/19 0810              Physical Exam   Constitutional: He is oriented to person, place, and time  He appears well-developed and well-nourished  No distress  HENT:   Head: Normocephalic and atraumatic  Eyes: Pupils are equal, round, and reactive to light  Cardiovascular: Regular rhythm and intact distal pulses  Exam reveals no gallop  No murmur heard  Incision C/D/I  Tachycardic rate  +Rub   Pulmonary/Chest: No respiratory distress  He has no wheezes  He has no rales  Poor inspiratory effort  Diminished breath sounds    Abdominal: Soft  Bowel sounds are normal  He exhibits no distension  There is no tenderness  Musculoskeletal: He exhibits edema  Neurological: He is alert and oriented to person, place, and time  Nonfocal    Skin: Skin is warm and dry  He is not diaphoretic  Vitals reviewed  Invasive lines and devices:   Invasive Devices     Central Venous Catheter Line            CVC Central Lines 12/23/19 Triple 1 day          Peripheral Intravenous Line            Peripheral IV 12/21/19 Left Forearm 3 days    Peripheral IV 12/23/19 Right Wrist 1 day          Arterial Line            Arterial Line 12/23/19 1 day          Line            Pacer Wires 1 day    Pacer Wires 1 day          Drain            Chest Tube 1 Left Pleural 32 Fr  1 day    Chest Tube 2 Posterior Mediastinal 32 Fr  1 day    Chest Tube 3 Anterior Mediastinal 32 Fr  1 day    Urethral Catheter Temperature probe;Non-latex 16 Fr  1 day ---------------------------------------------------------------------------------------------------------------------------------------------------------------------  Code Status: Level 1 - Full Code    Collaborative bedside rounds performed with cardiac surgery attending and bedside RN      SIGNATURE: Carmine Cote PA-C  DATE: December 25, 2019  TIME: 6:48 AM

## 2019-12-25 NOTE — PROGRESS NOTES
Procedure: Chest Tube Removal    12/25/19    Mediastinal CT x 2 and pleural CT x 1 d/c'd in typical fashion  Patient tolerated procedure well  Slight oozing from left most site that resolved with direct pressure, but otherwise no immediate complications  Site dressed with Acticoat dressing  Patient's nurse was made aware of removal      Procedure: Epicardial Wire Removal    12/25/19    Patient was returned to bed  EPW x 2 d/c'd in typical fashion  No immediate complications  Site dressed  Patient and nurse aware of mandatory 1 hour bedrest protocol  Vital signs ordered Q 15 minutes for one hour as per protocol      Vance Mcfarland PA-C

## 2019-12-25 NOTE — SOCIAL WORK
Pt is S/P CABG x 3 and is recommended to have in-home post-op skilled nursing care via [facility] services for his aftercare plan  CM met w/ pt to discuss recommendation  Pt is agreeable  CM provided pt w/ freedom of choice for [facility] providers  Pt requested referral to [facility]  CM made Ecin referral to [facility]  CM to follow

## 2019-12-25 NOTE — PROGRESS NOTES
Progress Note - Cardiothoracic Surgery   [facility]  44 y o  male MRN: [id number]  Unit/Bed#: [facility] 413-01 Encounter: [id number]      POD # 2 s/p CABG x 3     Pt seen/examined  Interval history and data reviewed with critical care team   Pt doing well    Complains of pain        Medications:   Scheduled Meds:    Current Facility-Administered Medications:  acetaminophen 975 mg Oral Q8H Donna Ash PA-C    amiodarone 200 mg Oral Q8H [address] Donna Ash PA-C    amLODIPine 10 mg Oral Daily Shannan Arms, YENNY    aspirin 325 mg Oral Daily Shannan Arms, YENNY    atorvastatin 80 mg Oral Daily With [facility] YENNY Gutierrez    bisacodyl 10 mg Rectal Daily PRN Shannan Arms, YENNY    docusate sodium 100 mg Oral BID Shannan Carlos, YENNY    fondaparinux 2 5 mg Subcutaneous Daily Shannan ArmsYENNY    furosemide 40 mg Intravenous TID (diuretic) Shannanrai Gonzalez PA-C    ibuprofen 600 mg Oral Q8H [address] Donna Ash PA-C    insulin regular (HumuLIN R,NovoLIN R) infusion 0 3-21 Units/hr Intravenous Titrated Shannan Gonzalez PA-C Last Rate: 4 Units/hr (12/24/19 9062)   lisinopril 10 mg Oral Daily Donna Ash PA-C    metoprolol tartrate 50 mg Oral Q8H Donna Ash PA-C    mupirocin 1 application Nasal C01Z [address] Donna Ash PA-C    niCARdipine 1-15 mg/hr Intravenous Titrated Shannan Gonzalez PA-C Last Rate: 12 mg/hr (12/25/19 0732)   ondansetron 4 mg Intravenous Q6H PRN Shannan YENNY Gonzalez    oxyCODONE 10 mg Oral Q6H PRN Shannan YENNY Gonzalez    oxyCODONE 5 mg Oral Q4H PRN Shannan YENNY Gonzalez    pantoprazole 40 mg Oral Early Morning Donna Ash PA-C    polyethylene glycol 17 g Oral Daily Donna Ash PA-C    potassium chloride 20 mEq Oral Daily Donna A Nilsa, PA-C    sodium chloride 20 mL/hr Intravenous Continuous Shannan Gonzalez PA-C Last Rate: 20 mL/hr (12/24/19 0100)   temazepam 15 mg Oral HS PRN Donna Ash PA-C      Continuous Infusions:    insulin regular (HumuLIN R,NovoLIN R) infusion 0 3-21 Units/hr Last Rate: 4 Units/hr (12/24/19 4812)   niCARdipine 1-15 mg/hr Last Rate: 12 mg/hr (12/25/19 0732)   sodium chloride 20 mL/hr Last Rate: 20 mL/hr (12/24/19 0100)     PRN Meds: bisacodyl    ondansetron    oxyCODONE    oxyCODONE    temazepam    Vitals: Blood pressure 140/99, pulse 102, temperature 97 9 °F (36 6 °C), temperature source Oral, resp  rate (!) 25, height 5' 9" (1 753 m), weight 130 kg (287 lb 0 6 oz), SpO2 92 %  ,Body mass index is 42 39 kg/m²  I/O last 24 hours: In: 3652 6 [P O :240; I V :3270 9; IV Piggyback:141 7]  Out: 7247 [Urine:1505; Chest Tube:280]  Invasive Devices     Central Venous Catheter Line            CVC Central Lines 12/23/19 Triple 1 day          Peripheral Intravenous Line            Peripheral IV 12/21/19 Left Forearm 3 days    Peripheral IV 12/23/19 Right Wrist 1 day          Arterial Line            Arterial Line 12/23/19 1 day          Line            Pacer Wires 1 day    Pacer Wires 1 day          Drain            Chest Tube 1 Left Pleural 32 Fr  1 day    Chest Tube 2 Posterior Mediastinal 32 Fr  1 day    Chest Tube 3 Anterior Mediastinal 32 Fr  1 day    Urethral Catheter Temperature probe;Non-latex 16 Fr  1 day                  Lab, Imaging and other studies:   Results from last 7 days   Lab Units 12/25/19 0428 12/24/19  0623 12/24/19  0351  12/23/19  1557  12/23/19  0444   WBC Thousand/uL 24 90*  --  26 15*  --   --   --  10 07   HEMOGLOBIN g/dL 12 5 14 3 14 4  --  14 3  --  15 6   I STAT HEMOGLOBIN   --   --   --    < >  --    < >  --    HEMATOCRIT % 39 1 43 5 43 8  --  42 9  --  47 2   HEMATOCRIT, ISTAT   --   --   --    < >  --    < >  --    PLATELETS Thousands/uL 216  --  269  --  238   < > 305    < > = values in this interval not displayed       Results from last 7 days   Lab Units 12/25/19 0428 12/24/19 0351 12/23/19  2014 12/23/19  1559 12/23/19  1557   POTASSIUM mmol/L 4 5 4 1 4 8  --  3 7   CHLORIDE mmol/L 116* 112*  --   -- 112*   CO2 mmol/L 24 25  --   --  23   CO2, I-STAT mmol/L  --   --   --  24  --    BUN mg/dL 28* 18  --   --  19   CREATININE mg/dL 0 92 0 92  --   --  1 32*   GLUCOSE, ISTAT mg/dl  --   --   --  181*  --    CALCIUM mg/dL 8 4 8 4  --   --  8 9     Results from last 7 days   Lab Units 12/19/19  0823 12/18/19  2356 12/18/19  1629   INR   --   --  1 03   PTT seconds 37 25 30     Recent Labs     12/24/19  0436   PHART 7 376   [id number] 24 2   PO2ART 65 2*   [id number] 42 3   BEART -1 0           Plan:    ANSHUL Amezcua when off of Cardene  D/CFoley  D/C chest tubes, pacing wires  Transfer to floor  Ambulate  Incentive spirometry  Diuresis  PO ASA/Statin/B blocker    Ibuprofen for pain      SIGNATURE: Kendra Gaona DO  DATE: December 25, 2019  TIME: 8:10 AM

## 2019-12-26 LAB
ANION GAP SERPL CALCULATED.3IONS-SCNC: 3 MMOL/L (ref 4–13)
BASOPHILS # BLD AUTO: 0.03 THOUSANDS/ΜL (ref 0–0.1)
BASOPHILS NFR BLD AUTO: 0 % (ref 0–1)
BUN SERPL-MCNC: 28 MG/DL (ref 5–25)
CALCIUM SERPL-MCNC: 8.4 MG/DL (ref 8.3–10.1)
CHLORIDE SERPL-SCNC: 110 MMOL/L (ref 100–108)
CO2 SERPL-SCNC: 28 MMOL/L (ref 21–32)
CREAT SERPL-MCNC: 0.94 MG/DL (ref 0.6–1.3)
EOSINOPHIL # BLD AUTO: 0.03 THOUSAND/ΜL (ref 0–0.61)
EOSINOPHIL NFR BLD AUTO: 0 % (ref 0–6)
ERYTHROCYTE [DISTWIDTH] IN BLOOD BY AUTOMATED COUNT: 11.8 % (ref 11.6–15.1)
GFR SERPL CREATININE-BSD FRML MDRD: 102 ML/MIN/1.73SQ M
GLUCOSE SERPL-MCNC: 118 MG/DL (ref 65–140)
GLUCOSE SERPL-MCNC: 135 MG/DL (ref 65–140)
GLUCOSE SERPL-MCNC: 136 MG/DL (ref 65–140)
GLUCOSE SERPL-MCNC: 138 MG/DL (ref 65–140)
GLUCOSE SERPL-MCNC: 141 MG/DL (ref 65–140)
GLUCOSE SERPL-MCNC: 144 MG/DL (ref 65–140)
GLUCOSE SERPL-MCNC: 145 MG/DL (ref 65–140)
GLUCOSE SERPL-MCNC: 146 MG/DL (ref 65–140)
GLUCOSE SERPL-MCNC: 163 MG/DL (ref 65–140)
GLUCOSE SERPL-MCNC: 165 MG/DL (ref 65–140)
GLUCOSE SERPL-MCNC: 180 MG/DL (ref 65–140)
GLUCOSE SERPL-MCNC: 181 MG/DL (ref 65–140)
GLUCOSE SERPL-MCNC: 198 MG/DL (ref 65–140)
GLUCOSE SERPL-MCNC: 255 MG/DL (ref 65–140)
HCT VFR BLD AUTO: 34.7 % (ref 36.5–49.3)
HGB BLD-MCNC: 11 G/DL (ref 12–17)
IMM GRANULOCYTES # BLD AUTO: 0.2 THOUSAND/UL (ref 0–0.2)
IMM GRANULOCYTES NFR BLD AUTO: 1 % (ref 0–2)
LYMPHOCYTES # BLD AUTO: 2.37 THOUSANDS/ΜL (ref 0.6–4.47)
LYMPHOCYTES NFR BLD AUTO: 11 % (ref 14–44)
MCH RBC QN AUTO: 30.3 PG (ref 26.8–34.3)
MCHC RBC AUTO-ENTMCNC: 31.7 G/DL (ref 31.4–37.4)
MCV RBC AUTO: 96 FL (ref 82–98)
MONOCYTES # BLD AUTO: 1.74 THOUSAND/ΜL (ref 0.17–1.22)
MONOCYTES NFR BLD AUTO: 8 % (ref 4–12)
NEUTROPHILS # BLD AUTO: 17.5 THOUSANDS/ΜL (ref 1.85–7.62)
NEUTS SEG NFR BLD AUTO: 80 % (ref 43–75)
NRBC BLD AUTO-RTO: 0 /100 WBCS
PLATELET # BLD AUTO: 242 THOUSANDS/UL (ref 149–390)
PMV BLD AUTO: 11.5 FL (ref 8.9–12.7)
POTASSIUM SERPL-SCNC: 4.4 MMOL/L (ref 3.5–5.3)
RBC # BLD AUTO: 3.63 MILLION/UL (ref 3.88–5.62)
SODIUM SERPL-SCNC: 141 MMOL/L (ref 136–145)
WBC # BLD AUTO: 21.87 THOUSAND/UL (ref 4.31–10.16)

## 2019-12-26 PROCEDURE — 99232 SBSQ HOSP IP/OBS MODERATE 35: CPT | Performed by: INTERNAL MEDICINE

## 2019-12-26 PROCEDURE — 80048 BASIC METABOLIC PNL TOTAL CA: CPT | Performed by: THORACIC SURGERY (CARDIOTHORACIC VASCULAR SURGERY)

## 2019-12-26 PROCEDURE — 97530 THERAPEUTIC ACTIVITIES: CPT

## 2019-12-26 PROCEDURE — 99024 POSTOP FOLLOW-UP VISIT: CPT | Performed by: THORACIC SURGERY (CARDIOTHORACIC VASCULAR SURGERY)

## 2019-12-26 PROCEDURE — 85025 COMPLETE CBC W/AUTO DIFF WBC: CPT | Performed by: THORACIC SURGERY (CARDIOTHORACIC VASCULAR SURGERY)

## 2019-12-26 PROCEDURE — 82948 REAGENT STRIP/BLOOD GLUCOSE: CPT

## 2019-12-26 PROCEDURE — 94660 CPAP INITIATION&MGMT: CPT

## 2019-12-26 PROCEDURE — 99232 SBSQ HOSP IP/OBS MODERATE 35: CPT | Performed by: ANESTHESIOLOGY

## 2019-12-26 PROCEDURE — 97535 SELF CARE MNGMENT TRAINING: CPT

## 2019-12-26 PROCEDURE — 94760 N-INVAS EAR/PLS OXIMETRY 1: CPT

## 2019-12-26 RX ORDER — LISINOPRIL 20 MG/1
20 TABLET ORAL ONCE
Status: COMPLETED | OUTPATIENT
Start: 2019-12-26 | End: 2019-12-26

## 2019-12-26 RX ORDER — LISINOPRIL 20 MG/1
40 TABLET ORAL DAILY
Status: DISCONTINUED | OUTPATIENT
Start: 2019-12-27 | End: 2019-12-29 | Stop reason: HOSPADM

## 2019-12-26 RX ORDER — LISINOPRIL 20 MG/1
20 TABLET ORAL DAILY
Status: DISCONTINUED | OUTPATIENT
Start: 2019-12-26 | End: 2019-12-26

## 2019-12-26 RX ADMIN — METOPROLOL TARTRATE 50 MG: 50 TABLET, FILM COATED ORAL at 16:04

## 2019-12-26 RX ADMIN — TEMAZEPAM 15 MG: 15 CAPSULE ORAL at 21:44

## 2019-12-26 RX ADMIN — SILVER NITRATE APPLICATORS 1 APPLICATOR: 25; 75 STICK TOPICAL at 16:00

## 2019-12-26 RX ADMIN — IBUPROFEN 600 MG: 600 TABLET, FILM COATED ORAL at 13:27

## 2019-12-26 RX ADMIN — INSULIN LISPRO 10 UNITS: 100 INJECTION, SOLUTION INTRAVENOUS; SUBCUTANEOUS at 08:18

## 2019-12-26 RX ADMIN — OXYCODONE HYDROCHLORIDE 5 MG: 5 TABLET ORAL at 06:14

## 2019-12-26 RX ADMIN — FUROSEMIDE 40 MG: 10 INJECTION, SOLUTION INTRAMUSCULAR; INTRAVENOUS at 17:00

## 2019-12-26 RX ADMIN — LISINOPRIL 20 MG: 20 TABLET ORAL at 12:16

## 2019-12-26 RX ADMIN — POTASSIUM CHLORIDE 20 MEQ: 1500 TABLET, EXTENDED RELEASE ORAL at 08:16

## 2019-12-26 RX ADMIN — AMIODARONE HYDROCHLORIDE 200 MG: 200 TABLET ORAL at 13:28

## 2019-12-26 RX ADMIN — SODIUM CHLORIDE 20 ML/HR: 0.45 INJECTION, SOLUTION INTRAVENOUS at 04:48

## 2019-12-26 RX ADMIN — FUROSEMIDE 40 MG: 10 INJECTION, SOLUTION INTRAMUSCULAR; INTRAVENOUS at 12:16

## 2019-12-26 RX ADMIN — AMIODARONE HYDROCHLORIDE 200 MG: 200 TABLET ORAL at 06:15

## 2019-12-26 RX ADMIN — IBUPROFEN 600 MG: 600 TABLET, FILM COATED ORAL at 06:13

## 2019-12-26 RX ADMIN — METOPROLOL TARTRATE 50 MG: 50 TABLET, FILM COATED ORAL at 06:52

## 2019-12-26 RX ADMIN — SODIUM CHLORIDE 4 UNITS/HR: 9 INJECTION, SOLUTION INTRAVENOUS at 21:39

## 2019-12-26 RX ADMIN — FUROSEMIDE 40 MG: 10 INJECTION, SOLUTION INTRAMUSCULAR; INTRAVENOUS at 06:15

## 2019-12-26 RX ADMIN — ATORVASTATIN CALCIUM 80 MG: 80 TABLET, FILM COATED ORAL at 16:04

## 2019-12-26 RX ADMIN — ACETAMINOPHEN 975 MG: 325 TABLET ORAL at 06:14

## 2019-12-26 RX ADMIN — LISINOPRIL 20 MG: 20 TABLET ORAL at 08:16

## 2019-12-26 RX ADMIN — METOPROLOL TARTRATE 50 MG: 50 TABLET, FILM COATED ORAL at 00:07

## 2019-12-26 RX ADMIN — ACETAMINOPHEN 975 MG: 325 TABLET ORAL at 13:27

## 2019-12-26 RX ADMIN — POLYETHYLENE GLYCOL 3350 17 G: 17 POWDER, FOR SOLUTION ORAL at 08:17

## 2019-12-26 RX ADMIN — PANTOPRAZOLE SODIUM 40 MG: 40 TABLET, DELAYED RELEASE ORAL at 06:13

## 2019-12-26 RX ADMIN — INSULIN LISPRO 10 UNITS: 100 INJECTION, SOLUTION INTRAVENOUS; SUBCUTANEOUS at 16:57

## 2019-12-26 RX ADMIN — DOCUSATE SODIUM 100 MG: 100 CAPSULE, LIQUID FILLED ORAL at 17:00

## 2019-12-26 RX ADMIN — OXYCODONE HYDROCHLORIDE 10 MG: 10 TABLET ORAL at 20:25

## 2019-12-26 RX ADMIN — AMLODIPINE BESYLATE 10 MG: 10 TABLET ORAL at 08:16

## 2019-12-26 RX ADMIN — ACETAMINOPHEN 975 MG: 325 TABLET ORAL at 21:38

## 2019-12-26 RX ADMIN — Medication 1 APPLICATION: at 21:38

## 2019-12-26 RX ADMIN — AMIODARONE HYDROCHLORIDE 200 MG: 200 TABLET ORAL at 21:38

## 2019-12-26 RX ADMIN — FONDAPARINUX SODIUM 2.5 MG: 2.5 INJECTION, SOLUTION SUBCUTANEOUS at 08:16

## 2019-12-26 RX ADMIN — Medication 1 APPLICATION: at 08:17

## 2019-12-26 RX ADMIN — IBUPROFEN 600 MG: 600 TABLET, FILM COATED ORAL at 21:38

## 2019-12-26 RX ADMIN — DOCUSATE SODIUM 100 MG: 100 CAPSULE, LIQUID FILLED ORAL at 08:16

## 2019-12-26 RX ADMIN — INSULIN LISPRO 10 UNITS: 100 INJECTION, SOLUTION INTRAVENOUS; SUBCUTANEOUS at 12:16

## 2019-12-26 RX ADMIN — ASPIRIN 325 MG ORAL TABLET 325 MG: 325 PILL ORAL at 08:16

## 2019-12-26 RX ADMIN — SODIUM CHLORIDE 3 MG/HR: 0.9 INJECTION, SOLUTION INTRAVENOUS at 05:52

## 2019-12-26 NOTE — PROGRESS NOTES
Progress Note - Critical Care   [facility]  44 y o  male MRN: [id number]  Unit/Bed#: [facility] 413-01 Encounter: [id number]      Attending Physician: Heriberto Vaughan MD    24 Hour Events/HPI: POD # 3 s/p CABG x 3  Yesterday lopressor increased to 50mg TID and norvasc added, intermittently required cardene overnight  Continues with chest pain and shortness of breath but reports slightly improved from yesterday  ROS: Review of Systems    ---------------------------------------------------------------------------------------------------------------------------------------------------------------------  Impressions:  1  CAD s/p CABG x 3  2  Acute pulm insufficiency   3  Ischemic cardiomyopathy  4  DM2 uncontrolled  5  HTN  6  HLD  7  Medical non-compliance  8  Hyperchloremia    Plan:    Neuro:   · Pain controlled with: ATC tylenol/advil and PRN oxycodone  · Regulate sleep/wake cycle  · Delirium precautions  · CAM-ICU daily  · Trend neuro exam      CV:   · Cardiac infusions: Cardene, 2 mg/hour  · Wean as able  · MAP goal > 65  · D/C Arterial line  · Rhythm: NSR  · Follow rhythm on telemetry  · Lopressor 50 mg PO q 8 hours  · Epicardial pacing wires out      Lung:   · Acute post-op pulmonary insufficiency; Requiring high flow via nasal cannula, secondary to pulmonary vascular congestion and poor inspiratory effort secondary to pain  Continue incentive spirometry/coughing/deep breathing exercises    Wean supplemental oxygen as tolerated for saturation > 90%  Wean FiO2 as tolerated  · Chest tubes have been discontinued      GI:   · Continue PPI for stress ulcer prophylaxis  · Continue bowel regimen  · Trend abdominal exam and bowel function    FEN:   · Diuretic plan: Lasix 40 mg IV q TID  · K-dur 20 mEQ PO q BID  · Nutrition/diet plan: Cardiac/DM   · Replete electrolytes with goals: K >4 0, Mag >2 0, and Phos >3 0    :   · Indwelling Santamaria present: no   · Trend UOP and BUN/creat  · Strict I and O    ID: · Trend temps and WBC count  · Maintain normothermia    Heme:   · Trend hgb and plts    Endo:   · Glycemic control plan: Endocrine consulted    MSK/Skin:  · Mobility goal:  · PT consult: yes  · OT consult: yes  · Frequent turning and pressure off-loading  · Local wound care as needed    Disposition:  Transfer to SD level 1  ---------------------------------------------------------------------------------------------------------------------------------------------------------------------  Allergies: No Known Allergies  Medications:   Scheduled Meds:  Current Facility-Administered Medications:  acetaminophen 975 mg Oral Q8H Donna Ash PA-C    amiodarone 200 mg Oral Q8H [address] Donna Ash PA-C    amLODIPine 10 mg Oral Daily Eddie Saenz PA-C    aspirin 325 mg Oral Daily Eddei Saenz PA-C    atorvastatin 80 mg Oral Daily With [facility] YENNY Gutierrez    bisacodyl 10 mg Rectal Daily PRN Eddie Saenz PA-C    docusate sodium 100 mg Oral BID Eddie Saenz PA-C    fondaparinux 2 5 mg Subcutaneous Daily Donna Ash PA-C    furosemide 40 mg Intravenous TID (diuretic) Eddie Saenz PA-C    ibuprofen 600 mg Oral Q8H [address] Donna Ash PA-C    insulin lispro 10 Units Subcutaneous TID With Meals Ladan Brennan MD    insulin regular (HumuLIN R,NovoLIN R) infusion 0 3-21 Units/hr Intravenous Titrated Eddie Saenz PA-C Last Rate: 4 Units/hr (12/26/19 0203)   lisinopril 10 mg Oral Daily Donna Ash PA-C    metoprolol tartrate 50 mg Oral Q8H Donna Ash PA-C    mupirocin 1 application Nasal A25X [address] Donna Ash PA-C    niCARdipine 1-15 mg/hr Intravenous Titrated Eddie Saenz PA-C Last Rate: 3 mg/hr (12/26/19 0552)   ondansetron 4 mg Intravenous Q6H PRN Eddie Saenz PA-C    oxyCODONE 10 mg Oral Q6H PRN Eddie Saenz PA-C    oxyCODONE 5 mg Oral Q4H PRN Eddie Saenz PA-C    pantoprazole 40 mg Oral Early Morning Donna Ash PA-C    polyethylene glycol 17 g Oral Daily Ole Baker PA-C    potassium chloride 20 mEq Oral Daily Ole Baker PA-C    sodium chloride 20 mL/hr Intravenous Continuous Ole Baker PA-C Last Rate: 20 mL/hr (12/26/19 0448)   temazepam 15 mg Oral HS PRN Ole Baker PA-C      VTE Pharmacologic Prophylaxis: Fondaparinux (Arixtra)  VTE Mechanical Prophylaxis: sequential compression device    Continuous Infusions:  insulin regular (HumuLIN R,NovoLIN R) infusion 0 3-21 Units/hr Last Rate: 4 Units/hr (12/26/19 0203)   niCARdipine 1-15 mg/hr Last Rate: 3 mg/hr (12/26/19 0552)   sodium chloride 20 mL/hr Last Rate: 20 mL/hr (12/26/19 0448)     PRN Meds:    bisacodyl 10 mg Daily PRN   ondansetron 4 mg Q6H PRN   oxyCODONE 10 mg Q6H PRN   oxyCODONE 5 mg Q4H PRN   temazepam 15 mg HS PRN     Home Medications:   Prior to Admission medications    Medication Sig Start Date End Date Taking?  Authorizing Provider   amLODIPine (NORVASC) 10 mg tablet Take 10 mg by mouth daily 12/16/16   Historical Provider, MD   hydrochlorothiazide (HYDRODIURIL) 50 mg tablet Take 50 mg by mouth every 24 hours    Historical Provider, MD   lisinopril (ZESTRIL) 40 mg tablet Take 40 mg by mouth daily    Historical Provider, MD   metFORMIN (GLUCOPHAGE) 1000 MG tablet Take 1,000 mg by mouth Every 12 hours    Historical Provider, MD   methocarbamol (ROBAXIN) 750 mg tablet Take 1 tablet (750 mg total) by mouth every 8 (eight) hours for 5 days 5/20/18 5/25/18  Nolvia Hurley PA-C     ---------------------------------------------------------------------------------------------------------------------------------------------------------------------  Vitals:   Vitals:    12/26/19 0300 12/26/19 0400 12/26/19 0532 12/26/19 0600   BP: 112/77 127/90     BP Location:  Right arm     Pulse: 76 82     Resp: 16 (!) 23     Temp:  98 2 °F (36 8 °C)     TempSrc:  Axillary     SpO2: 97% 95% 97%    Weight:    128 kg (282 lb 3 oz)   Height:         Arterial Line:  Arterial Line BP: 148/82  Arterial Line MAP (mmHg): 102 mmHg    Tele Rhythm: NSR This was personally reviewed by myself  Respiratory:  HFNC 75% 50LPM    Ventilator:  Respiratory    Lab Data (Last 4 hours)    None         O2/Vent Data (Last 4 hours)       0532          Non-Invasive Ventilation Mode HFNC                 Temperature: Temp (24hrs), Av 2 °F (36 8 °C), Min:98 °F (36 7 °C), Max:98 8 °F (37 1 °C)  Current: Temperature: 98 2 °F (36 8 °C)    Weights:   Weight (last 2 days)     Date/Time   Weight    19 0600   128 (282 19)    19 0600   130 (287 04)            Body mass index is 41 67 kg/m²  Intake and Outputs:    Intake/Output Summary (Last 24 hours) at 2019 0618  Last data filed at 2019 0616  Gross per 24 hour   Intake 1914 02 ml   Output 2480 ml   Net -565 98 ml     I/O last 24 hours: In: 2632 [P O :540; I V :]  Out: 7046 [Urine:3150; Chest Tube:240]    UOP: 0 9cc/kg/hour   BM: 0 in the last 24 hours      Labs:  Results from last 7 days   Lab Units 19  03519  0623 19  03519  0440   WBC Thousand/uL 21 87* 24 90*  --  26 15*   < > 10 32*   HEMOGLOBIN g/dL 11 0* 12 5 14 3 14 4   < > 16 3   I STAT HEMOGLOBIN   --   --   --   --    < >  --    HEMATOCRIT % 34 7* 39 1 43 5 43 8   < > 49 9*   HEMATOCRIT, ISTAT   --   --   --   --    < >  --    PLATELETS Thousands/uL 242 216  --  269   < > 283   NEUTROS PCT % 80*  --   --   --   --  57   MONOS PCT % 8  --   --   --   --  9    < > = values in this interval not displayed       Results from last 7 days   Lab Units 19  03519  0428 19  0351  19  1559  19  1508 12/23/19  1420   SODIUM mmol/L 141 147* 142  --   --    < >  --   --    POTASSIUM mmol/L 4 4 4 5 4 1   < >  --    < >  --   --    CHLORIDE mmol/L 110* 116* 112*  --   --    < >  --   --    CO2 mmol/L  24 25  --   --    < >  --   --    CO2, I-STAT mmol/L  --   --   --   --  24  --   28   BUN mg/dL 28* 28* 18  -- --    < >  --   --    CREATININE mg/dL 0 94 0 92 0 92  --   --    < >  --   --    CALCIUM mg/dL 8 4 8 4 8 4  --   --    < >  --   --    GLUCOSE, ISTAT mg/dl  --   --   --   --  181*  --  188* 198*    < > = values in this interval not displayed  Baseline Creat: 0 8-1 0    Results from last 7 days   Lab Units 12/25/19  0428 12/24/19  0351 12/23/19  0444   MAGNESIUM mg/dL 2 2 2 0 2 0          Results from last 7 days   Lab Units 12/19/19  0823   PTT seconds 37                  Results from last 7 days   Lab Units 12/24/19  0436 12/23/19 2016   PH ART  7 376 7 379   PCO2 ART mm Hg 42 3 39 2   PO2 ART mm Hg 65 2* 133 7*   HCO3 ART mmol/L 24 2 22 6   BASE EXC ART mmol/L -1 0 -2 2   ABG SOURCE   --  Line, Arterial                 Micro:   Blood Culture: No results found for: BLOODCX  Urine Culture: No results found for: URINECX  Sputum Culture: No components found for: SPUTUMCX  Wound Culure: No results found for: WOUNDCULT      Nutrition:        Diet Orders   (From admission, onward)             Start     Ordered    12/24/19 0809  Diet Cardiovascular; Cardiac; Fluid Restriction 1800 ML, Consistent Carbohydrate Diet Level 2 (5 carb servings/75 grams CHO/meal)  Diet effective now     Question Answer Comment   Diet Type Cardiovascular    Cardiac Cardiac    Other Restriction(s): Fluid Restriction 1800 ML    Other Restriction(s): Consistent Carbohydrate Diet Level 2 (5 carb servings/75 grams CHO/meal)    RD to adjust diet per protocol? Yes        12/24/19 0810              Physical Exam   Constitutional: He is oriented to person, place, and time  He appears well-developed and well-nourished  No distress  HENT:   Head: Normocephalic and atraumatic  Eyes: Pupils are equal, round, and reactive to light  Cardiovascular: Normal rate, regular rhythm and intact distal pulses  Exam reveals no gallop  No murmur heard  Incision C/D/I   Pulmonary/Chest: No respiratory distress  He has no wheezes  He has no rales     Poor inspiratory effort  Diminished breath sounds bilaterally    Abdominal: Soft  Bowel sounds are normal  He exhibits no distension  There is no tenderness  Musculoskeletal: He exhibits edema  Neurological: He is alert and oriented to person, place, and time  Nonfocal    Skin: Skin is warm and dry  He is not diaphoretic  Vitals reviewed  Invasive lines and devices: Invasive Devices     Central Venous Catheter Line            CVC Central Lines 12/23/19 Triple 2 days          Peripheral Intravenous Line            Peripheral IV 12/23/19 Right Wrist 2 days          Arterial Line            Arterial Line 12/23/19 2 days              ---------------------------------------------------------------------------------------------------------------------------------------------------------------------  Code Status: Level 1 - Full Code    Collaborative bedside rounds performed with cardiac surgery attending and bedside RN      SIGNATURE: Tonya Velázquez PA-C  DATE: December 26, 2019  TIME: 6:18 AM

## 2019-12-26 NOTE — UTILIZATION REVIEW
Continued Stay Review     Date: 12/24/2019                        POD # 1 s/p CABG x 3   Current Patient Class: Inpatient                   Current Level of Care: :leno; 1 stepdown     HPI:39 y o  male initially admitted on 12/19/2019   SURGERY DATE: 12/23/2019  PROCEDURE: Coronary artery bypass grafting x 3 with left internal mammary artery to left anterior descending, saphenous vein graft to obtuse marginal 3, saphenous vein graft to right posterior descending artery  ANESTHESIA: General endotracheal anesthesia with transesophageal echocardiogram guidance     Assessment/Plan: Triple CVC line  A Line   Epicardial pacing wires A/V  Chest Tubes #1,2,3 (-20cm suction)  Continue chest tubes - output remains persistently high  Incentive spirometry  Diuresis    PO ASA/Statin/B blocker        Pertinent Labs/Diagnostic Results:   Results from last 7 days   Lab Units 12/24/19  0623 12/24/19  0351 12/23/19  1559 12/23/19  1557 12/23/19  1508   12/23/19  1412   12/23/19  0444 12/21/19  0440 12/18/19  1629   WBC Thousand/uL  --  26 15*  --   --   --   --   --   --  10 07 10 32* 10 84*   HEMOGLOBIN g/dL 14 3 14 4  --  14 3  --   --   --   --  15 6 16 3 16 3   I STAT HEMOGLOBIN g/dl  --   --  13 6  --  12 6   < >  --    < >  --   --   --    HEMATOCRIT % 43 5 43 8  --  42 9  --   --   --   --  47 2 49 9* 48 2   HEMATOCRIT, ISTAT %  --   --  40  --  37   < >  --    < >  --   --   --    PLATELETS Thousands/uL  --  269  --  238  --   --  240  --  305 283 273   NEUTROS ABS Thousands/µL  --   --   --   --   --   --   --   --   --  5 99 7 30    < > = values in this interval not displayed                       Results from last 7 days   Lab Units 12/24/19  0351 12/23/19 2014 12/23/19  1559 12/23/19  1557 12/23/19  1508 12/23/19  1420 12/23/19  1405 12/23/19  1355   12/23/19  0444 12/21/19  0440 12/18/19  1629   SODIUM mmol/L 142  --   --  142  --   --   --   --   --  139 136 135*   POTASSIUM mmol/L 4 1 4 8  --  3 7  --   -- --   --   --  3 7 3 9 4 0   CHLORIDE mmol/L 112*  --   --  112*  --   --   --   --   --  109* 104 97*   CO2 mmol/L 25  --   --  23  --   --   --   --   --  25 29 29   CO2, I-STAT mmol/L  --   --  24  --  24 28 29 26   < >  --   --   --    ANION GAP mmol/L 5  --   --  7  --   --   --   --   --  5 3* 9   BUN mg/dL 18  --   --  19  --   --   --   --   --  18 21 15   CREATININE mg/dL 0 92  --   --  1 32*  --   --   --   --   --  0 82 0 91 1 18   EGFR ml/min/1 73sq m 104  --   --  67  --   --   --   --   --  111 106 77   CALCIUM mg/dL 8 4  --   --  8 9  --   --   --   --   --  9 5 9 5 9 5   CALCIUM, IONIZED, ISTAT mmol/L  --   --  1 24  --  1 26 1 05* 1 03* 1 04*   < >  --   --   --    MAGNESIUM mg/dL 2 0  --   --   --   --   --   --   --   --  2 0  --   --     < > = values in this interval not displayed            Results from last 7 days   Lab Units 12/18/19  1629   AST U/L 19   ALT U/L 30   ALK PHOS U/L 89   TOTAL PROTEIN g/dL 8 0   ALBUMIN g/dL 3 6   TOTAL BILIRUBIN mg/dL 0 62                    Results from last 7 days   Lab Units 12/24/19  1234 12/24/19  0957 12/24/19  0755 12/24/19  0609 12/24/19  0418 12/24/19  0156 12/23/19  2356 12/23/19  2215 12/23/19 2016 12/23/19  1803   POC GLUCOSE mg/dl 350* 292* 237* 152* 139 146* 182* 187* 197* 207*               Results from last 7 days   Lab Units 12/24/19  0351 12/23/19  1557 12/23/19  0444 12/21/19  0440 12/18/19  1629   GLUCOSE RANDOM mg/dL 142* 191* 113 256* 404*           Results from last 7 days   Lab Units 12/18/19  1629   HEMOGLOBIN A1C % 10 1*   EAG mg/dl 243            Results from last 7 days   Lab Units 12/24/19  0436 12/23/19  2016   PH ART   7 376 7 379   PCO2 ART mm Hg 42 3 39 2   PO2 ART mm Hg 65 2* 133 7*   HCO3 ART mmol/L 24 2 22 6   BASE EXC ART mmol/L -1 0 -2 2   O2 CONTENT ART mL/dL 19 6 20 9   O2 HGB, ARTERIAL % 91 6* 97 5*   ABG SOURCE    --  Line, Arterial                Results from last 7 days   Lab Units 12/23/19  1559 12/23/19  1508 12/23/19  1420 12/23/19  1405   12/23/19  1129   PH, FREDERICK I-STAT    --   --   --  7 354  --  7 315   PCO2, FREDERICK ISTAT mm HG  --   --   --  48 9  --  55 7*   PO2, FREDERICK ISTAT mm HG  --   --   --  49 0*  --  30 0*   HCO3, FREDERICK ISTAT mmol/L  --   --   --  27 3  --  28 4   I STAT BASE EXC mmol/L -3* -2 2 1   < > 1   I STAT O2 SAT % 96* 95* 100* 82   < > 51*   ISTAT PH ART   7 339* 7 352 7 397  --    < >  --    I STAT ART PCO2 mm HG 42 2 41 9 44 1*  --    < >  --    I STAT ART PO2 mm HG 86 0 79 0 320 0*  --    < >  --    I STAT ART HCO3 mmol/L 22 7 23 2 27 1  --    < >  --     < > = values in this interval not displayed                Results from last 7 days   Lab Units 12/19/19  0544 12/19/19  0216 12/18/19  2314 12/18/19  1958 12/18/19  1629   TROPONIN I ng/mL 2 83* 3 11* 2 57* 2 08* 1 05*           Results from last 7 days   Lab Units 12/18/19  1629   D-DIMER QUANTITATIVE ug/ml FEU 0 27             Results from last 7 days   Lab Units 12/19/19  0823 12/18/19  2356 12/18/19  1629   PROTIME seconds  --   --  13 6   INR    --   --  1 03   PTT seconds 37 25 30           Results from last 7 days   Lab Units 12/19/19  2253   HEP B S AG   Non-reactive   HEP C AB   Non-reactive   HEP B C IGM   Non-reactive   HEP B C TOTAL AB   Non-reactive           Results from last 7 days   Lab Units 12/18/19  1629   LIPASE u/L 296           Results from last 7 days   Lab Units 12/20/19  1930   CLARITY UA   Clear   COLOR UA   Dk Yellow   SPEC GRAV UA   1 035*   PH UA   5 5   GLUCOSE UA mg/dl >=1000 (1%)*   KETONES UA mg/dl Negative   BLOOD UA   Negative   PROTEIN UA mg/dl Trace*   NITRITE UA   Negative   BILIRUBIN UA   Negative   UROBILINOGEN UA E U /dl 0 2   LEUKOCYTES UA   Elevated glucose may cause decreased leukocyte values   See urine microscopic for [facility] result/*   WBC UA /hpf 4-10*   RBC UA /hpf None Seen   BACTERIA UA /hpf Occasional   EPITHELIAL CELLS WET PREP /hpf Moderate*      Vital Signs: BP 98/77   Pulse 89   Temp 97 6 °F (36 4 °C) (Probe)   Resp (!) 26   Ht 5' 9" (1 753 m)   Wt 125 kg (275 lb)   SpO2 93%   BMI 40 61 kg/m²      Medications:   Scheduled Medications:  Medications:  acetaminophen 975 mg Oral Q8H   amiodarone 200 mg Oral Q8H [facility]   aspirin 325 mg Oral Daily   atorvastatin 80 mg Oral Daily With Dinner   docusate sodium 100 mg Oral BID   fondaparinux 2 5 mg Subcutaneous Daily   furosemide 40 mg Intravenous BID (diuretic)   ketorolac 15 mg Intravenous Q8H   [START ON 12/25/2019] lisinopril 10 mg Oral Daily   metoprolol tartrate 50 mg Oral Q12H [facility]   mupirocin 1 application Nasal [id number]   pantoprazole 40 mg Oral Early Morning   polyethylene glycol 17 g Oral Daily   potassium chloride 20 mEq Oral Daily   Continuous IV Infusions:  insulin regular (HumuLIN R,NovoLIN R) infusion 0 3-21 Units/hr Intravenous Titrated   niCARdipine 1-15 mg/hr Intravenous Titrated   sodium chloride 20 mL/hr Intravenous Continuous   PRN Meds:  bisacodyl 10 mg Rectal Daily PRN   ondansetron 4 mg Intravenous Q6H PRN   oxyCODONE 10 mg Oral Q6H PRN   oxyCODONE 5 mg Oral Q4H PRN   temazepam 15 mg Oral HS PRN      Discharge Plan: TBD     Network Utilization Review Department  [email or web address]  ATTENTION: Please call with any questions or concerns to [phone] and carefully listen to the prompts so that you are directed to the right person  All voicemails are confidential   Nayla Zarate all requests for admission clinical reviews, approved or denied determinations and any other requests to dedicated fax number below belonging to the campus where the patient is receiving treatment   List of dedicated fax numbers for the Facilities:  FACILITY NAME UR FAX NUMBER   ADMISSION DENIALS (Administrative/Medical Necessity) [phone]   [address] (Maternity/NICU/Pediatrics) [phone]   Artis Iglesias [address] [facility] [place] [phone]   [address] [facility] [address] [facility] [phone]   [facility] [address] [phone]   [address] [address] [phone]

## 2019-12-26 NOTE — PROGRESS NOTES
Progress Note - Ranjit Martini  44 y o  male MRN: [id number]    Unit/Bed#: [facility] 404-01 Encounter: [id number]      CC: diabetes f/u    Subjective:   Ranjit Martini  is a 44y o  year old male with type 2 diabetes  Feels well  No complaints  No hypoglycemia  His appetite remains poor after CABG  Objective:     Vitals: Blood pressure 134/88, pulse 81, temperature 97 6 °F (36 4 °C), temperature source Oral, resp  rate (!) 11, height 5' 9" (1 753 m), weight 128 kg (282 lb 3 oz), SpO2 92 %  ,Body mass index is 41 67 kg/m²  Intake/Output Summary (Last 24 hours) at 12/26/2019 1345  Last data filed at 12/26/2019 1000  Gross per 24 hour   Intake 1909 61 ml   Output 1495 ml   Net 414 61 ml       Physical Exam:  General Appearance: awake, appears stated age and cooperative  Head: Normocephalic, without obvious abnormality, atraumatic  Extremities: moves all extremities  Skin: Skin color and temperature normal    Pulm: no labored breathing    Lab, Imaging and other studies: I have personally reviewed pertinent reports  POC Glucose (mg/dl)   Date Value   12/26/2019 181 (H)   12/26/2019 255 (H)   12/26/2019 138   12/26/2019 180 (H)   12/26/2019 163 (H)   12/26/2019 165 (H)   12/26/2019 135   12/25/2019 151 (H)   12/25/2019 154 (H)   12/25/2019 155 (H)       Assessment:  diabetes with hyperglycemia    Plan:  1  Type 2 diabetes with hyperglycemia-since his appetite remains poor, continue IV insulin  Continue mealtime insulin as well  Continue to monitor blood sugar over time and make adjustments to the regimen if necessary  2  Coronary disease status post CABG-he slowly recovering  Portions of the record may have been created with voice recognition software

## 2019-12-26 NOTE — PLAN OF CARE
Problem: OCCUPATIONAL THERAPY ADULT  Goal: Performs self-care activities at highest level of function for planned discharge setting  See evaluation for individualized goals  Description  Treatment Interventions: ADL retraining, Functional transfer training, Endurance training, Patient/family training, Equipment evaluation/education, Compensatory technique education, Continued evaluation, Cardiac education, Energy conservation, Activityengagement          See flowsheet documentation for full assessment, interventions and recommendations  Outcome: Progressing  Note:   Limitation: Decreased ADL status, Decreased endurance, Decreased high-level ADLs  Prognosis: Good  Assessment: Patient participated in Skilled OT session this date with interventions consisting of cardiac packet education, ADL re-training, functional transfers/mobility  Patient agreeable to OT treatment session, upon arrival patient was found seated OOB to Recliner  In comparison to previous session, patient with improvements in LB dressing  OT provided pt w/ Recovering After Cardiac Surgery Packet and educated pt regarding: sternal precautions, cardiac precautions, lifting restrictions, safe activity engagement, energy conservation, lifestyle modifications, stress management and cardiac rehabilitation programs  Pt's questions were addressed after discussion of the packet  Provided pt w/ contact information regarding OT department if questions arise  Patient demo good carryover of cardiac/sternal precautions after education  Patient continues to be functioning below baseline level, occupational performance remains limited secondary to factors listed above and increased risk for falls and injury  From OT standpoint, recommendation at time of d/c would be Home with family support  Pt reports he lives with his S O  and children - reports he will likely be home alone during day    Patient to benefit from continued Occupational Therapy treatment while in the hospital to address deficits as defined above and maximize level of functional independence with ADLs and functional mobility        OT Discharge Recommendation: Home with family support  OT - OK to Discharge: Yes(When medically appropriate )

## 2019-12-26 NOTE — OCCUPATIONAL THERAPY NOTE
OccupationalTherapy Progress Note     Patient Name: Gladys Polanco  Today's Date: 12/26/2019  Problem List  Principal Problem:    NSTEMI (non-ST elevated myocardial infarction) [facility])  Active Problems:    Essential hypertension    Diabetes mellitus type 2, uncontrolled ([address] )    Snoring    Hyperlipidemia    S/P CABG x 3    Acute respiratory insufficiency          12/26/19 0953   Restrictions/Precautions   Weight Bearing Precautions Per Order No   Other Precautions Cardiac/sternal;O2;Multiple lines;Telemetry; Fall Risk;Pain  (HFNC)   General   Response to Previous Treatment Patient with no complaints from previous session   Pain Assessment   Pain Assessment 0-10   Pain Score 5   Pain Type Acute pain;Surgical pain   Pain Location Incision   Hospital Pain Intervention(s) Repositioned; Ambulation/increased activity   Response to Interventions tolerated   ADL   LB Dressing Assistance 3  Moderate Assistance   LB Dressing Deficit Steadying;Supervision/safety; Increased time to complete; Don/doff L sock  (able to don/doff R sock)   Transfers   Sit to Stand 4  Minimal assistance   Additional items Assist x 1; Increased time required   Stand to Sit 5  Supervision   Additional items Increased time required   Additional Comments RW  Demo good adherence to cardiac/sternal precautions   Functional Mobility   Functional Mobility 4  Minimal assistance   Additional Comments demo ability to ambulate household distances  Pt with Spo2 95-97% on HFNC throughout  RN and PCA present for chair follow & assist with lines   Cognition   Overall Cognitive Status [facility]   Arousal/Participation Alert; Cooperative   Attention Within functional limits   Orientation Level Oriented X4   Memory Within functional limits   Following Commands Follows one step commands without difficulty   Comments pt with flat affect throughout; however motivated for therapy   Pt demo good carryover of precautions   Activity Tolerance   Activity Tolerance Patient limited by fatigue;Patient limited by pain   Medical Staff Made Aware Per RN - pt appropriate to be seen  RN present during mobility   Assessment   Assessment Patient participated in Skilled OT session this date with interventions consisting of cardiac packet education, ADL re-training, functional transfers/mobility  Patient agreeable to OT treatment session, upon arrival patient was found seated OOB to Recliner  In comparison to previous session, patient with improvements in LB dressing  OT provided pt w/ Recovering After Cardiac Surgery Packet and educated pt regarding: sternal precautions, cardiac precautions, lifting restrictions, safe activity engagement, energy conservation, lifestyle modifications, stress management and cardiac rehabilitation programs  Pt's questions were addressed after discussion of the packet  Provided pt w/ contact information regarding OT department if questions arise  Patient demo good carryover of cardiac/sternal precautions after education  Patient continues to be functioning below baseline level, occupational performance remains limited secondary to factors listed above and increased risk for falls and injury  From OT standpoint, recommendation at time of d/c would be Home with family support  Pt reports he lives with his S O  and children - reports he will likely be home alone during day  Patient to benefit from continued Occupational Therapy treatment while in the hospital to address deficits as defined above and maximize level of functional independence with ADLs and functional mobility  Plan   Treatment Interventions ADL retraining;Functional transfer training; Endurance training;Patient/family training;Cardiac education; Energy conservation   Goal Expiration Date 01/03/19   OT Treatment Day 1   OT Frequency 3-5x/wk   Recommendation   OT Discharge Recommendation Home with family support        Ady Baker OT

## 2019-12-26 NOTE — PROGRESS NOTES
General Cardiology   Progress Note -  [facility]   [facility]  44 y o  male MRN: [id number]    Unit/Bed#: [facility] 413-01 Encounter: [id number]    Assessment/ Plan:    1  Multi-vessel CAD  2  NSTEMI Type I  3  Cardiomyopathy: LVEF 29% on pre-op echo  4  HTN  5  HLD  6  Poorly controlled DM    Plan:  Pt now POD #3 CABG with LIMA to LAD, SVG to OM3, SVG rPDA  Pain in improved today  EPW and CT removed  Moving out of critical care unit  Remains on IV insulin; endo following   Maintaining SR; on oral amiodarone per protocol  On ASA, statin, BB and started on ACE  IV Lasix for post op volume overload   - Cr and K stable  Repeat echo pending for EF; will follow up; may need LifeVest on discharge  Consider Entresto but will await echo  Subjective: Pt seen for follow up  No significant events overnight  He is feeling somewhat better today  Pain is well controlled  Moving out critical care unit today  Review of Systems   Constitution: Negative for decreased appetite (appetite improving) and fever  Cardiovascular: Positive for dyspnea on exertion and leg swelling  Negative for chest pain, irregular heartbeat, orthopnea, palpitations and syncope  Respiratory: Positive for shortness of breath (improving)  Negative for cough, sleep disturbances due to breathing, sputum production and wheezing  Gastrointestinal: Negative for abdominal pain, nausea and vomiting  Genitourinary: Negative for dysuria  Neurological: Negative for dizziness and light-headedness  Psychiatric/Behavioral: Negative for altered mental status  Objective:   Vitals: Blood pressure 129/70, pulse 76, temperature 98 2 °F (36 8 °C), temperature source Axillary, resp  rate 14, height 5' 9" (1 753 m), weight 128 kg (282 lb 3 oz), SpO2 94 %  ,     Body mass index is 41 67 kg/m²  ,     Systolic (99VDQ), BRA:399 , Min:104 , CBR:618     Diastolic (61DZG), MINDA:34, Min:66, Max:94      Intake/Output Summary (Last 24 hours) at 12/26/2019 0941  Last data filed at 12/26/2019 0800  Gross per 24 hour   Intake 2272 52 ml   Output 2080 ml   Net 192 52 ml     Weight (last 2 days)     Date/Time   Weight    12/26/19 0600   128 (282 19)    12/25/19 0600   130 (287 04)            Telemetry Review: No significant arrhythmias seen on telemetry review  Sinus rhythm, no significant events    Physical Exam   Constitutional: He is oriented to person, place, and time  No distress  Pt sitting up in chair in NAD; alert and cooperative   HENT:   Head: Normocephalic and atraumatic  Cardiovascular: Normal rate, regular rhythm, S1 normal and S2 normal  Exam reveals no friction rub  No murmur heard  Mild NP LE edema  MS incision TATIANA   Pulmonary/Chest: Effort normal  He has no wheezes  He has no rales  LS decreased at bases; on HighFlow NC; resp distress   Abdominal: Soft  Musculoskeletal: He exhibits edema  He exhibits no deformity  Neurological: He is alert and oriented to person, place, and time  Skin: Skin is warm and dry  He is not diaphoretic  Psychiatric: He has a normal mood and affect  His behavior is normal    Nursing note and vitals reviewed      LABORATORY RESULTS      CBC with diff: Results from last 7 days   Lab Units 12/26/19  0356 12/25/19  0428 12/24/19  0623 12/24/19  0351 12/23/19  1559 12/23/19  1557 12/23/19  1508  12/23/19  1412  12/23/19  0444 12/21/19  0440   WBC Thousand/uL 21 87* 24 90*  --  26 15*  --   --   --   --   --   --  10 07 10 32*   HEMOGLOBIN g/dL 11 0* 12 5 14 3 14 4  --  14 3  --   --   --   --  15 6 16 3   I STAT HEMOGLOBIN g/dl  --   --   --   --  13 6  --  12 6   < >  --    < >  --   --    HEMATOCRIT % 34 7* 39 1 43 5 43 8  --  42 9  --   --   --   --  47 2 49 9*   HEMATOCRIT, ISTAT %  --   --   --   --  40  --  37   < >  --    < >  --   --    MCV fL 96 94  --  91  --   --   --   --   --   --  90 91   PLATELETS Thousands/uL 242 216  --  269  --  238  --   --  240  --  305 283   MCH pg 30 3 30 0  --  29 9 --   --   --   --   --   --  29 7 29 6   MCHC g/dL 31 7 32 0  --  32 9  --   --   --   --   --   --  33 1 32 7   RDW % 11 8 11 9  --  11 5*  --   --   --   --   --   --  11 4* 11 3*   MPV fL 11 5 11 5  --  11 7  --  10 6  --   --  10 7  --  11 1 11 1   NRBC AUTO /100 WBCs 0  --   --   --   --   --   --   --   --   --   --  0    < > = values in this interval not displayed  CMP:  Results from last 7 days   Lab Units 12/26/19  0356 12/25/19  0428 12/24/19  0351 12/23/19 2014 12/23/19  1559 12/23/19  1557 12/23/19  1508 12/23/19  1420 12/23/19  1405 12/23/19  1355 12/23/19  1315 12/23/19  1129  12/23/19  0444 12/21/19  0440   POTASSIUM mmol/L 4 4 4 5 4 1 4 8  --  3 7  --   --   --   --   --   --   --  3 7 3 9   CHLORIDE mmol/L 110* 116* 112*  --   --  112*  --   --   --   --   --   --   --  109* 104   CO2 mmol/L 28 24 25  --   --  23  --   --   --   --   --   --   --  25 29   CO2, I-STAT mmol/L  --   --   --   --  24  --  24 28 29 26 28 30   < >  --   --    BUN mg/dL 28* 28* 18  --   --  19  --   --   --   --   --   --   --  18 21   CREATININE mg/dL 0 94 0 92 0 92  --   --  1 32*  --   --   --   --   --   --   --  0 82 0 91   GLUCOSE, ISTAT mg/dl  --   --   --   --  181*  --  188* 198* 193* 195* 193* 122  --   --   --    CALCIUM mg/dL 8 4 8 4 8 4  --   --  8 9  --   --   --   --   --   --   --  9 5 9 5   EGFR ml/min/1 73sq m 102 104 104  --   --  67  --   --   --   --   --   --   --  111 106    < > = values in this interval not displayed       BMP:  Results from last 7 days   Lab Units 12/26/19  0356 12/25/19  0428 12/24/19  0351 12/23/19 2014 12/23/19  1559 12/23/19  1557 12/23/19  1508 12/23/19  1420  12/23/19  0444 12/21/19  0440   POTASSIUM mmol/L 4 4 4 5 4 1 4 8  --  3 7  --   --   --  3 7 3 9   CHLORIDE mmol/L 110* 116* 112*  --   --  112*  --   --   --  109* 104   CO2 mmol/L 28 24 25  --   --  23  --   --   --  25 29   CO2, I-STAT mmol/L  --   --   --   --  24  --  24 28   < >  --   --    BUN mg/dL 28* 28* 18  --   --  19  --   --   --  18 21   CREATININE mg/dL 0 94 0 92 0 92  --   --  1 32*  --   --   --  0 82 0 91   GLUCOSE, ISTAT mg/dl  --   --   --   --  181*  --  188* 198*   < >  --   --    CALCIUM mg/dL 8 4 8 4 8 4  --   --  8 9  --   --   --  9 5 9 5    < > = values in this interval not displayed  Results from last 7 days   Lab Units 19  0428 19  0351 19  0444   MAGNESIUM mg/dL 2 2 2 0 2 0     Lipid Profile:   No results found for: CHOL  Lab Results   Component Value Date    HDL 27 (L) 2019     Lab Results   Component Value Date    LDLCALC 113 (H) 2019     Lab Results   Component Value Date    TRIG 132 2019     Cardiac testing:   Results for orders placed during the hospital encounter of 19   Echo complete with contrast if indicated    Narrative [address] e [facility]  [facility]  [place], [address]  [phone]    Transthoracic Echocardiogram  2D, M-mode, Doppler, and Color Doppler    Study date:  19-Dec-2019    Patient: iRtika Martinez  MR number: [id number]  Account number: [de-identified]  : 1980  Age: 44 years  Gender: Male  Status: Inpatient  Location: Bedside  Height: 69 in  Weight: 276 lb  BP: 170/ 51 mmHg    Indications: Acute MI  Diagnoses: I24 9 - Acute ischemic heart disease, unspecified    Sonographer:  Jaclyn Edmond RDCS  Referring Physician:  Sheryl Kaur MD  Group:  [facility]  Interpreting Physician:  Veena Perez MD    IMPRESSIONS:  Technical quality: Fair  Technically very difficult study due to body habitus and poor echo penetration  Definity contrast was used  Cardiac rhythm: Sinus with interventricular conduction delay  1  Mildly dilated left ventricular cavity, moderate concentric left ventricular hypertrophy, severely reduced left ventricular systolic function with mild global hypokinesis with some regional wall motion variation   Although there is no  definite evidence of left ventricular mural thrombus cannot exclude early layering thrombus in the distal inferior, inferior lateral wall  Ejection fraction is determined as 29%  Grade 2 diastolic dysfunction  2  Mild left atrial cavity enlargement  3  Aortic valve sclerosis with some focal calcification, trace aortic valve regurgitation  4  Mitral valve leaflet sclerosis with some focal calcification, trace mitral valve regurgitation  5  Trace tricuspid valve regurgitation  6  No obvious pulmonary hypertension  7  No pericardial effusion  There is no previous echocardiogram available for comparison  SUMMARY    LEFT VENTRICLE:  Mildly dilated left ventricular cavity, moderate concentric left ventricular hypertrophy, severely reduced left ventricular systolic function mild global hypokinesis with some regional wall motion variation  Although there is no definite  evidence of left ventricular thrombus, cannot definitively exclude layering thrombus formation in inferior-apical and distal inferior-lateral regions  Grade 2 diastolic dysfunction  Elevated left atrial pressure  RIGHT VENTRICLE:  Normal right ventricular size and systolic function  Right ventricular systolic pressure could not be estimated due to poor Doppler signals across the tricuspid valve  LEFT ATRIUM:  Mild left atrial cavity enlargement  Intact interatrial septum  RIGHT ATRIUM:  Normal right atrial cavity size  MITRAL VALVE:  Mitral valve leaflet sclerosis with some focal calcification, adequate leaflet mobility  Trace mitral valve regurgitation  AORTIC VALVE:  Tricuspid aortic valve sclerosis and focal calcification which is most pronounced on the right coronary cusp there is adequate cuspal separation  Trace aortic valve regurgitation noted  TRICUSPID VALVE:  Trace tricuspid valve regurgitation  PULMONIC VALVE:  No obvious pulmonic valve regurgitation  AORTA:  Normal aortic root size   Proximal ascending aorta was not well visualized  IVC, HEPATIC VEINS:  Tricuspid aortic valve with mild sclerosis  No aortic valve stenosis or regurgitation  PERICARDIUM:  No pericardial effusion  HISTORY: PRIOR HISTORY: Hypertension  DM2  Obesity  PROCEDURE: The procedure was performed at the bedside  This was a routine study  The transthoracic approach was used  The study included complete 2D imaging, M-mode, complete spectral Doppler, and color Doppler  The heart rate was 92 bpm,  at the start of the study  Intravenous contrast (  6 mL of definity in NSS) was administered to opacify the left ventricle  Echocardiographic views were limited due to decreased penetration and lung interference  This was a technically  difficult study  LEFT VENTRICLE: Mildly dilated left ventricular cavity, moderate concentric left ventricular hypertrophy, severely reduced left ventricular systolic function mild global hypokinesis with some regional wall motion variation  Although there  is no definite evidence of left ventricular thrombus, cannot definitively exclude layering thrombus formation in inferior-apical and distal inferior-lateral regions  Grade 2 diastolic dysfunction  Elevated left atrial pressure  RIGHT VENTRICLE: Normal right ventricular size and systolic function  Right ventricular systolic pressure could not be estimated due to poor Doppler signals across the tricuspid valve  LEFT ATRIUM: Mild left atrial cavity enlargement  Intact interatrial septum  RIGHT ATRIUM: Normal right atrial cavity size  MITRAL VALVE: Mitral valve leaflet sclerosis with some focal calcification, adequate leaflet mobility  Trace mitral valve regurgitation  AORTIC VALVE: Tricuspid aortic valve sclerosis and focal calcification which is most pronounced on the right coronary cusp there is adequate cuspal separation  Trace aortic valve regurgitation noted  TRICUSPID VALVE: Trace tricuspid valve regurgitation      PULMONIC VALVE: No obvious pulmonic valve regurgitation  PERICARDIUM: No pericardial effusion  AORTA: Normal aortic root size  Proximal ascending aorta was not well visualized  SYSTEMIC VEINS: IVC: Tricuspid aortic valve with mild sclerosis  No aortic valve stenosis or regurgitation      SYSTEM MEASUREMENT TABLES    2D  %FS: 14 6 %  Ao Diam: 3 2 cm  EDV(Teich): 160 ml  EF Biplane: 28 5 %  EF(Teich): 30 6 %  ESV(Teich): 111 ml  IVSd: 1 6 cm  LA Diam: 4 6 cm  LAAs A4C: 23 9 cm2  LAESV A-L A4C: 86 1 ml  LAESV MOD A4C: 83 5 ml  LALs A4C: 5 6 cm  LVEDV MOD A2C: 176 1 ml  LVEDV MOD A4C: 172 9 ml  LVEDV MOD BP: 180 5 ml  LVEF MOD A2C: 26 %  LVEF MOD A4C: 28 5 %  LVESV MOD A2C: 130 3 ml  LVESV MOD A4C: 123 6 ml  LVESV MOD BP: 129 ml  LVIDd: 5 7 cm  LVIDs: 4 9 cm  LVLd A2C: 9 3 cm  LVLd A4C: 8 7 cm  LVLs A2C: 8 6 cm  LVLs A4C: 8 3 cm  LVPWd: 1 6 cm  RA Area: 12 3 cm2  RVIDd: 3 4 cm  SV MOD A2C: 45 8 ml  SV MOD A4C: 49 3 ml  SV(Teich): 49 ml    MM  TAPSE: 2 1 cm    PW  E': 0 m/s  E/E': 15 6  MV A [place]: 0 7 m/s  MV Dec [place]: 5 9 m/s2  MV DecT: 125 4 ms  MV E [place]: 0 7 m/s  MV E/A Ratio: 1 1  MV PHT: 36 4 ms  MVA By PHT: 6 1 cm2    [facility] Accredited Echocardiography Laboratory    Prepared and electronically signed by    Mitzi Velasco MD  Signed 19-Dec-2019 17:30:28       Meds/Allergies   current meds:   Current Facility-Administered Medications   Medication Dose Route Frequency    acetaminophen (TYLENOL) tablet 975 mg  975 mg Oral Q8H    amiodarone tablet 200 mg  200 mg Oral Q8H [address]    amLODIPine (NORVASC) tablet 10 mg  10 mg Oral Daily    aspirin tablet 325 mg  325 mg Oral Daily    atorvastatin (LIPITOR) tablet 80 mg  80 mg Oral Daily With Dinner    bisacodyl (DULCOLAX) rectal suppository 10 mg  10 mg Rectal Daily PRN    docusate sodium (COLACE) capsule 100 mg  100 mg Oral BID    fondaparinux (ARIXTRA) subcutaneous injection 2 5 mg  2 5 mg Subcutaneous Daily    furosemide (LASIX) injection 40 mg  40 mg Intravenous TID (diuretic)    ibuprofen (MOTRIN) tablet 600 mg  600 mg Oral Q8H [facility]    insulin lispro (HumaLOG) 100 units/mL subcutaneous injection 10 Units  10 Units Subcutaneous TID With Meals    insulin regular (HumuLIN R,NovoLIN R) 1 Units/mL in sodium chloride 0 9 % 100 mL infusion  0 3-21 Units/hr Intravenous Titrated    [START ON 12/27/2019] lisinopril (ZESTRIL) tablet 40 mg  40 mg Oral Daily    metoprolol tartrate (LOPRESSOR) tablet 50 mg  50 mg Oral Q8H    mupirocin (BACTROBAN) 2 % nasal ointment 1 application  1 application Nasal D78G [facility]    ondansetron (ZOFRAN) injection 4 mg  4 mg Intravenous Q6H PRN    oxyCODONE (ROXICODONE) IR tablet 10 mg  10 mg Oral Q6H PRN    oxyCODONE (ROXICODONE) IR tablet 5 mg  5 mg Oral Q4H PRN    pantoprazole (PROTONIX) EC tablet 40 mg  40 mg Oral Early Morning    polyethylene glycol (MIRALAX) packet 17 g  17 g Oral Daily    potassium chloride (K-DUR,KLOR-CON) CR tablet 20 mEq  20 mEq Oral Daily    sodium chloride infusion 0 45 %  20 mL/hr Intravenous Continuous    temazepam (RESTORIL) capsule 15 mg  15 mg Oral HS PRN     Medications Prior to Admission   Medication    amLODIPine (NORVASC) 10 mg tablet    hydrochlorothiazide (HYDRODIURIL) 50 mg tablet    lisinopril (ZESTRIL) 40 mg tablet    metFORMIN (GLUCOPHAGE) 1000 MG tablet    methocarbamol (ROBAXIN) 750 mg tablet       insulin regular (HumuLIN R,NovoLIN R) infusion 0 3-21 Units/hr Last Rate: 2 Units/hr (12/26/19 0800)   sodium chloride 20 mL/hr Last Rate: 20 mL/hr (12/26/19 0448)     Assessment:  Principal Problem:    NSTEMI (non-ST elevated myocardial infarction) (HCC)  Active Problems:    Essential hypertension    Diabetes mellitus type 2, uncontrolled (HCC)    Snoring    Hyperlipidemia    S/P CABG x 3    Acute respiratory insufficiency    Counseling / Coordination of Care  Total floor / unit time spent today 20 minutes    Greater than 50% of total time was spent with the patient and / or family counseling and / or coordination of care  ** Please Note: Dragon 360 Dictation voice to text software may have been used in the creation of this document   **

## 2019-12-26 NOTE — PHYSICAL THERAPY NOTE
PHYSICAL THERAPY NOTE          Patient Name: Don Skelton  Today's Date: 12/26/2019 12/26/19 5652   Pain Assessment   Pain Assessment 0-10   Pain Score 6   Pain Type Acute pain;Surgical pain   Pain Location Incision; Chest   Pain Orientation Mid   Pain Descriptors Aching;Discomfort   Pain Frequency Constant/continuous   Pain Onset Ongoing   Clinical Progression Gradually improving   Effect of Pain on Daily Activities guarding   Patient's Stated Pain Goal No pain   Hospital Pain Intervention(s) Repositioned; Ambulation/increased activity; Distraction; Emotional support   Response to Interventions comfortable   Restrictions/Precautions   Other Precautions Cardiac/sternal;Multiple lines;Telemetry;O2;Fall Risk  (HFNC)   General   Chart Reviewed Yes   Additional Pertinent History cleared for Tx session (spoke to nsg)   Response to Previous Treatment Patient with no complaints from previous session  Cognition   Overall Cognitive Status WFL   Arousal/Participation Alert; Cooperative   Attention Attends with cues to redirect   Orientation Level Oriented to person;Oriented to place;Oriented to situation   Memory Within functional limits   Following Commands Follows one step commands without difficulty   Subjective   Subjective Pt is in the chair; somewhat flat affect; agreeable to mobilize; reports he needs to go to the BR first   Transfers   Sit to Stand 4  Minimal assistance   Additional items Assist x 1;Verbal cues  (2 trials)   Stand to Sit 4  Minimal assistance   Additional items Assist x 1;Verbal cues  (2 trials)   Ambulation/Elevation   Gait pattern Excessively slow; Short stride; Inconsistent vikram   Gait Assistance 4  Minimal assist   Additional items Assist x 1;Verbal cues; Tactile cues  (stand by (A) of 2 more for multiple lines)   Assistive Device Rolling walker   Distance 2 x 200 ft w/ standing rest period in between; post 1st bout of amb, O2 sat at 97 % while remaining on HFNC   Stair Management Assistance Not tested  (not appropriate at this time)   Balance   Static Sitting Fair +   Static Standing Fair -   Ambulatory Poor +   Activity Tolerance   Activity Tolerance Patient limited by fatigue   Nurse Made Aware spoke to LEXIE Jarrett   Exercises   Balance training  1 min standing balance/tolerance while voiding   Assessment   Prognosis Good   Problem List Decreased strength;Decreased endurance; Impaired balance;Decreased mobility;Obesity;Pain   Assessment Pt demonstrated mod improvement in functional mobility skills ambulating further distances w/ rw and requiring less (A) overall, pt still exhibits mod postop discomfort and guarding w/ decreased functional endurance while remaining on HFNC --> will cont to follow to address; otherwise, anticipate pt will return home upon D/C pending progress (incl on the steps) and when medically cleared; home PT follow up is recommended; rw for amb at this time but will cont to monitor progress to a less restrictive assistive device;   Barriers to Discharge Inaccessible home environment; Other (Comment)  (? level of support available at home)   Goals   Patient Goals to keep getting better   STG Expiration Date 01/03/19   PT Treatment Day 2   Plan   Treatment/Interventions Functional transfer training; Therapeutic exercise;LE strengthening/ROM; Elevations; Endurance training;Equipment eval/education; Bed mobility;Gait training;Spoke to nursing;Spoke to case management   Progress Progressing toward goals   PT Frequency Other (Comment)  (4-6x/wk)   Recommendation   Recommendation Home PT   Equipment Recommended Walker  (at this time)       Katalina Kim, PT

## 2019-12-26 NOTE — PLAN OF CARE
Problem: PHYSICAL THERAPY ADULT  Goal: Performs mobility at highest level of function for planned discharge setting  See evaluation for individualized goals  Description  Treatment/Interventions: Functional transfer training, LE strengthening/ROM, Elevations, Therapeutic exercise, Endurance training, Patient/family training, Equipment eval/education, Bed mobility, Gait training, Spoke to nursing, OT(PT spoke to CM)  Equipment Recommended: Walker(At the moment - will monitor progress to [facility] or no AD)       See flowsheet documentation for full assessment, interventions and recommendations  Outcome: Progressing  Note:   Prognosis: Good  Problem List: Decreased strength, Decreased endurance, Impaired balance, Decreased mobility, Obesity, Pain  Assessment: Pt demonstrated mod improvement in functional mobility skills ambulating further distances w/ rw and requiring less (A) overall, pt still exhibits mod postop discomfort and guarding w/ decreased functional endurance while remaining on HFNC --> will cont to follow to address; otherwise, anticipate pt will return home upon D/C pending progress (incl on the steps) and when medically cleared; home PT follow up is recommended; rw for amb at this time but will cont to monitor progress to a less restrictive assistive device;  Barriers to Discharge: Inaccessible home environment, Other (Comment)(? level of support available at home)     Recommendation: Home PT          See flowsheet documentation for full assessment

## 2019-12-26 NOTE — PROGRESS NOTES
Progress Note - Cardiothoracic Surgery   [facility]  44 y o  male MRN: [id number]  Unit/Bed#: [facility] 413-01 Encounter: [id number]      POD # 3 s/p CABG x 3     Pt seen/examined  Interval history and data reviewed with critical care team   Pt doing well  Pain better controlled          Medications:   Scheduled Meds:    Current Facility-Administered Medications:  acetaminophen 975 mg Oral Q8H Donna Ash PA-C    amiodarone 200 mg Oral Q8H [address] Donna Ash PA-C    amLODIPine 10 mg Oral Daily Kristyn Robles PA-C    aspirin 325 mg Oral Daily Kristyn Robles PA-C    atorvastatin 80 mg Oral Daily With [facility] YENNY Gutierrez    bisacodyl 10 mg Rectal Daily PRN Kristyn Robles PA-C    docusate sodium 100 mg Oral BID Kristyn Robles PA-C    fondaparinux 2 5 mg Subcutaneous Daily Kristyn Robles PA-C    furosemide 40 mg Intravenous TID (diuretic) Kristyn Robles PA-C    ibuprofen 600 mg Oral Q8H [address] Donna Ash PA-C    insulin lispro 10 Units Subcutaneous TID With Meals Tashi Chambers MD    insulin regular (HumuLIN R,NovoLIN R) infusion 0 3-21 Units/hr Intravenous Titrated Kristyn Robles PA-C Last Rate: 6 Units/hr (12/26/19 0620)   lisinopril 20 mg Oral Daily Donna Ash PA-C    metoprolol tartrate 50 mg Oral Q8H Donna Ash PA-C    mupirocin 1 application Nasal G98H [address] Donna Ash PA-C    niCARdipine 1-15 mg/hr Intravenous Titrated Kristyn Robles PA-C Last Rate: 4 mg/hr (12/26/19 0653)   ondansetron 4 mg Intravenous Q6H PRN Kristyn Robles PA-C    oxyCODONE 10 mg Oral Q6H PRN Kristyn Robles PA-C    oxyCODONE 5 mg Oral Q4H PRN Kristyn Robles PA-C    pantoprazole 40 mg Oral Early Morning Donna Ash PA-C    polyethylene glycol 17 g Oral Daily Donna Ash PA-C    potassium chloride 20 mEq Oral Daily Donna Ash PA-C    sodium chloride 20 mL/hr Intravenous Continuous Kristyn Robles PA-C Last Rate: 20 mL/hr (12/26/19 0448)   temazepam 15 mg Oral HS PRN Jeffrey Conti PA-C      Continuous Infusions:    insulin regular (HumuLIN R,NovoLIN R) infusion 0 3-21 Units/hr Last Rate: 6 Units/hr (12/26/19 0620)   niCARdipine 1-15 mg/hr Last Rate: 4 mg/hr (12/26/19 0653)   sodium chloride 20 mL/hr Last Rate: 20 mL/hr (12/26/19 0448)     PRN Meds: bisacodyl    ondansetron    oxyCODONE    oxyCODONE    temazepam    Vitals: Blood pressure 154/94, pulse 88, temperature 98 2 °F (36 8 °C), temperature source Axillary, resp  rate 14, height 5' 9" (1 753 m), weight 128 kg (282 lb 3 oz), SpO2 97 %  ,Body mass index is 41 67 kg/m²  I/O last 24 hours: In: 2725 4 [P O :1020; I V :1705 4]  Out: 2480 [Urine:2400; Chest Tube:80]  Invasive Devices     Central Venous Catheter Line            CVC Central Lines 12/23/19 Triple 2 days          Peripheral Intravenous Line            Peripheral IV 12/23/19 Right Wrist 2 days          Arterial Line            Arterial Line 12/23/19 2 days                  Lab, Imaging and other studies:   Results from last 7 days   Lab Units 12/26/19  0356 12/25/19 0428 12/24/19 0623 12/24/19  0351   WBC Thousand/uL 21 87* 24 90*  --  26 15*   HEMOGLOBIN g/dL 11 0* 12 5 14 3 14 4   HEMATOCRIT % 34 7* 39 1 43 5 43 8   PLATELETS Thousands/uL 242 216  --  269     Results from last 7 days   Lab Units 12/26/19  0356 12/25/19  0428 12/24/19  0351  12/23/19  1559   POTASSIUM mmol/L 4 4 4 5 4 1   < >  --    CHLORIDE mmol/L 110* 116* 112*  --   --    CO2 mmol/L 28 24 25  --   --    CO2, I-STAT mmol/L  --   --   --   --  24   BUN mg/dL 28* 28* 18  --   --    CREATININE mg/dL 0 94 0 92 0 92  --   --    GLUCOSE, ISTAT mg/dl  --   --   --   --  181*   CALCIUM mg/dL 8 4 8 4 8 4  --   --     < > = values in this interval not displayed       Results from last 7 days   Lab Units 12/19/19  0823   PTT seconds 37     Recent Labs     12/24/19  0436   PHART 7 376   UGM7LSE 24 2   PO2ART 65 2*   QPA3EVV 42 3   BEART -1 0           Plan:    ANSHUL WELDON/MAX Hathaway  Transfer to floor   Ambulate  Incentive spirometry  Diuresis  PO ASA/Statin/B blocker    Ibuprofen for pain  ACEi home dose      SIGNATURE: Patrick Weiss DO  DATE: December 26, 2019  TIME: 7:37 AM

## 2019-12-27 LAB
ANION GAP SERPL CALCULATED.3IONS-SCNC: 5 MMOL/L (ref 4–13)
BASOPHILS # BLD AUTO: 0.05 THOUSANDS/ΜL (ref 0–0.1)
BASOPHILS NFR BLD AUTO: 0 % (ref 0–1)
BUN SERPL-MCNC: 34 MG/DL (ref 5–25)
CALCIUM SERPL-MCNC: 8.4 MG/DL (ref 8.3–10.1)
CHLORIDE SERPL-SCNC: 109 MMOL/L (ref 100–108)
CO2 SERPL-SCNC: 27 MMOL/L (ref 21–32)
CREAT SERPL-MCNC: 1.18 MG/DL (ref 0.6–1.3)
EOSINOPHIL # BLD AUTO: 0.16 THOUSAND/ΜL (ref 0–0.61)
EOSINOPHIL NFR BLD AUTO: 1 % (ref 0–6)
ERYTHROCYTE [DISTWIDTH] IN BLOOD BY AUTOMATED COUNT: 11.9 % (ref 11.6–15.1)
GFR SERPL CREATININE-BSD FRML MDRD: 77 ML/MIN/1.73SQ M
GLUCOSE SERPL-MCNC: 112 MG/DL (ref 65–140)
GLUCOSE SERPL-MCNC: 119 MG/DL (ref 65–140)
GLUCOSE SERPL-MCNC: 121 MG/DL (ref 65–140)
GLUCOSE SERPL-MCNC: 129 MG/DL (ref 65–140)
GLUCOSE SERPL-MCNC: 134 MG/DL (ref 65–140)
GLUCOSE SERPL-MCNC: 138 MG/DL (ref 65–140)
GLUCOSE SERPL-MCNC: 150 MG/DL (ref 65–140)
GLUCOSE SERPL-MCNC: 167 MG/DL (ref 65–140)
GLUCOSE SERPL-MCNC: 173 MG/DL (ref 65–140)
GLUCOSE SERPL-MCNC: 185 MG/DL (ref 65–140)
GLUCOSE SERPL-MCNC: 188 MG/DL (ref 65–140)
GLUCOSE SERPL-MCNC: 196 MG/DL (ref 65–140)
HCT VFR BLD AUTO: 31.8 % (ref 36.5–49.3)
HGB BLD-MCNC: 9.9 G/DL (ref 12–17)
IMM GRANULOCYTES # BLD AUTO: 0.15 THOUSAND/UL (ref 0–0.2)
IMM GRANULOCYTES NFR BLD AUTO: 1 % (ref 0–2)
LYMPHOCYTES # BLD AUTO: 2.74 THOUSANDS/ΜL (ref 0.6–4.47)
LYMPHOCYTES NFR BLD AUTO: 18 % (ref 14–44)
MCH RBC QN AUTO: 30.1 PG (ref 26.8–34.3)
MCHC RBC AUTO-ENTMCNC: 31.1 G/DL (ref 31.4–37.4)
MCV RBC AUTO: 97 FL (ref 82–98)
MONOCYTES # BLD AUTO: 1.37 THOUSAND/ΜL (ref 0.17–1.22)
MONOCYTES NFR BLD AUTO: 9 % (ref 4–12)
NEUTROPHILS # BLD AUTO: 10.72 THOUSANDS/ΜL (ref 1.85–7.62)
NEUTS SEG NFR BLD AUTO: 71 % (ref 43–75)
NRBC BLD AUTO-RTO: 0 /100 WBCS
PLATELET # BLD AUTO: 312 THOUSANDS/UL (ref 149–390)
PMV BLD AUTO: 11.4 FL (ref 8.9–12.7)
POTASSIUM SERPL-SCNC: 3.9 MMOL/L (ref 3.5–5.3)
RBC # BLD AUTO: 3.29 MILLION/UL (ref 3.88–5.62)
SODIUM SERPL-SCNC: 141 MMOL/L (ref 136–145)
WBC # BLD AUTO: 15.19 THOUSAND/UL (ref 4.31–10.16)

## 2019-12-27 PROCEDURE — 94669 MECHANICAL CHEST WALL OSCILL: CPT

## 2019-12-27 PROCEDURE — 94760 N-INVAS EAR/PLS OXIMETRY 1: CPT

## 2019-12-27 PROCEDURE — 82948 REAGENT STRIP/BLOOD GLUCOSE: CPT

## 2019-12-27 PROCEDURE — 99232 SBSQ HOSP IP/OBS MODERATE 35: CPT | Performed by: INTERNAL MEDICINE

## 2019-12-27 PROCEDURE — 85025 COMPLETE CBC W/AUTO DIFF WBC: CPT | Performed by: PHYSICIAN ASSISTANT

## 2019-12-27 PROCEDURE — 97116 GAIT TRAINING THERAPY: CPT

## 2019-12-27 PROCEDURE — 80048 BASIC METABOLIC PNL TOTAL CA: CPT | Performed by: PHYSICIAN ASSISTANT

## 2019-12-27 PROCEDURE — 97110 THERAPEUTIC EXERCISES: CPT

## 2019-12-27 PROCEDURE — 99024 POSTOP FOLLOW-UP VISIT: CPT | Performed by: THORACIC SURGERY (CARDIOTHORACIC VASCULAR SURGERY)

## 2019-12-27 RX ORDER — LANCETS
EACH MISCELLANEOUS
Qty: 120 EACH | Refills: 2 | Status: SHIPPED | OUTPATIENT
Start: 2019-12-27

## 2019-12-27 RX ORDER — LANCETS 28 GAUGE
EACH MISCELLANEOUS
Qty: 120 EACH | Refills: 2 | Status: SHIPPED | OUTPATIENT
Start: 2019-12-27 | End: 2019-12-27 | Stop reason: HOSPADM

## 2019-12-27 RX ORDER — INSULIN GLARGINE 100 [IU]/ML
50 INJECTION, SOLUTION SUBCUTANEOUS
Status: DISCONTINUED | OUTPATIENT
Start: 2019-12-27 | End: 2019-12-29 | Stop reason: HOSPADM

## 2019-12-27 RX ORDER — AMOXICILLIN 250 MG
1 CAPSULE ORAL 2 TIMES DAILY
Status: DISCONTINUED | OUTPATIENT
Start: 2019-12-27 | End: 2019-12-29 | Stop reason: HOSPADM

## 2019-12-27 RX ORDER — BLOOD-GLUCOSE METER
KIT MISCELLANEOUS
Qty: 1 EACH | Refills: 0 | Status: SHIPPED | OUTPATIENT
Start: 2019-12-27 | End: 2019-12-27 | Stop reason: HOSPADM

## 2019-12-27 RX ADMIN — FUROSEMIDE 40 MG: 10 INJECTION, SOLUTION INTRAMUSCULAR; INTRAVENOUS at 06:10

## 2019-12-27 RX ADMIN — FONDAPARINUX SODIUM 2.5 MG: 2.5 INJECTION, SOLUTION SUBCUTANEOUS at 09:09

## 2019-12-27 RX ADMIN — AMIODARONE HYDROCHLORIDE 200 MG: 200 TABLET ORAL at 14:22

## 2019-12-27 RX ADMIN — ACETAMINOPHEN 975 MG: 325 TABLET ORAL at 06:10

## 2019-12-27 RX ADMIN — SENNOSIDES AND DOCUSATE SODIUM 1 TABLET: 8.6; 5 TABLET ORAL at 18:17

## 2019-12-27 RX ADMIN — IBUPROFEN 600 MG: 600 TABLET, FILM COATED ORAL at 21:27

## 2019-12-27 RX ADMIN — PANTOPRAZOLE SODIUM 40 MG: 40 TABLET, DELAYED RELEASE ORAL at 06:10

## 2019-12-27 RX ADMIN — ACETAMINOPHEN 975 MG: 325 TABLET ORAL at 21:27

## 2019-12-27 RX ADMIN — Medication 1 APPLICATION: at 21:27

## 2019-12-27 RX ADMIN — LISINOPRIL 40 MG: 20 TABLET ORAL at 09:08

## 2019-12-27 RX ADMIN — ATORVASTATIN CALCIUM 80 MG: 80 TABLET, FILM COATED ORAL at 16:52

## 2019-12-27 RX ADMIN — INSULIN LISPRO 10 UNITS: 100 INJECTION, SOLUTION INTRAVENOUS; SUBCUTANEOUS at 12:44

## 2019-12-27 RX ADMIN — INSULIN LISPRO 10 UNITS: 100 INJECTION, SOLUTION INTRAVENOUS; SUBCUTANEOUS at 07:23

## 2019-12-27 RX ADMIN — FUROSEMIDE 40 MG: 10 INJECTION, SOLUTION INTRAMUSCULAR; INTRAVENOUS at 18:17

## 2019-12-27 RX ADMIN — IBUPROFEN 600 MG: 600 TABLET, FILM COATED ORAL at 14:22

## 2019-12-27 RX ADMIN — ASPIRIN 325 MG ORAL TABLET 325 MG: 325 PILL ORAL at 09:08

## 2019-12-27 RX ADMIN — IBUPROFEN 600 MG: 600 TABLET, FILM COATED ORAL at 06:10

## 2019-12-27 RX ADMIN — AMLODIPINE BESYLATE 10 MG: 10 TABLET ORAL at 09:08

## 2019-12-27 RX ADMIN — POTASSIUM CHLORIDE 20 MEQ: 1500 TABLET, EXTENDED RELEASE ORAL at 09:08

## 2019-12-27 RX ADMIN — FUROSEMIDE 40 MG: 10 INJECTION, SOLUTION INTRAMUSCULAR; INTRAVENOUS at 12:48

## 2019-12-27 RX ADMIN — METOPROLOL TARTRATE 50 MG: 50 TABLET, FILM COATED ORAL at 02:39

## 2019-12-27 RX ADMIN — ACETAMINOPHEN 975 MG: 325 TABLET ORAL at 14:22

## 2019-12-27 RX ADMIN — AMIODARONE HYDROCHLORIDE 200 MG: 200 TABLET ORAL at 21:27

## 2019-12-27 RX ADMIN — DOCUSATE SODIUM 100 MG: 100 CAPSULE, LIQUID FILLED ORAL at 09:08

## 2019-12-27 RX ADMIN — AMIODARONE HYDROCHLORIDE 200 MG: 200 TABLET ORAL at 06:10

## 2019-12-27 RX ADMIN — INSULIN GLARGINE 50 UNITS: 100 INJECTION, SOLUTION SUBCUTANEOUS at 22:00

## 2019-12-27 RX ADMIN — Medication 1 APPLICATION: at 09:11

## 2019-12-27 RX ADMIN — METOPROLOL TARTRATE 50 MG: 50 TABLET, FILM COATED ORAL at 09:08

## 2019-12-27 RX ADMIN — POLYETHYLENE GLYCOL 3350 17 G: 17 POWDER, FOR SOLUTION ORAL at 09:09

## 2019-12-27 RX ADMIN — METOPROLOL TARTRATE 50 MG: 50 TABLET, FILM COATED ORAL at 16:52

## 2019-12-27 NOTE — PROGRESS NOTES
Progress Note - Vito Ba  44 y o  male MRN: [id number]    Unit/Bed#: [facility] 404-01 Encounter: [id number]      CC: diabetes f/u    Subjective:   Vito Ba  is a 44y o  year old male with type 2 diabetes  Patient offers no complaints  No hypoglycemia  He says his appetite has much improved  Last night he ate 100% of his dinner, this morning more than 50% his breakfast     Objective:     Vitals: Blood pressure 133/65, pulse 89, temperature 98 3 °F (36 8 °C), temperature source Oral, resp  rate 18, height 5' 9" (1 753 m), weight 127 kg (281 lb), SpO2 95 %  ,Body mass index is 41 5 kg/m²  Intake/Output Summary (Last 24 hours) at 12/27/2019 0945  Last data filed at 12/27/2019 6332  Gross per 24 hour   Intake 516 22 ml   Output 1675 ml   Net -1158 78 ml       Physical Exam:  General Appearance: awake, appears stated age and cooperative  Head: Normocephalic, without obvious abnormality, atraumatic  Extremities: moves all extremities  Skin: Skin color and temperature normal    Pulm: no labored breathing    Lab, Imaging and other studies: I have personally reviewed pertinent reports  POC Glucose (mg/dl)   Date Value   12/27/2019 185 (H)   12/27/2019 134   12/27/2019 150 (H)   12/27/2019 121   12/26/2019 136   12/26/2019 144 (H)   12/26/2019 145 (H)   12/26/2019 198 (H)   12/26/2019 118   12/26/2019 146 (H)       Assessment:  diabetes with hyperglycemia   CAD    Plan:  Type 2 diabetes with hyperglycemia - most recent A1c 10 1  Patient is eating more than 50% of his meals  I will start patient on 40 units of Lantus in the evening and Humalog 10 units with meals  I will discontinue insulin drip 2 hours after initiation of Lantus  I will start patient on correctional insulin scale, continue diabetic diet  I will continue to monitor his blood sugars and make adjustments to his insulin regimen as needed         Coronary artery disease - patient is currently postop day #4 after CABG      Portions of the record may have been created with voice recognition software

## 2019-12-27 NOTE — PROGRESS NOTES
Pt oozy and bleeding a small amount from his middle chest tube site  YENNY Song at bedside to assess Pressure held and one silver nitrate applicator applied to site to stop bleed  Chest tube site covered with guaze and dry dressing  Will continue to monitor

## 2019-12-27 NOTE — PROGRESS NOTES
General Cardiology   Progress Note -  Team One   [facility]  44 y o  male MRN: [id number]    Unit/Bed#: [facility] 404-01 Encounter: [id number]    Assessment/ Plan:    Assessment/ Plan:     1  Multi-vessel CAD  2  NSTEMI Type I  3  Cardiomyopathy: LVEF 29% on pre-op echo; repeat echo EF 40% per cardiologist; awaiting final read in EPIC  4  HTN  5  HLD  6  Poorly controlled DM     Plan:  Pt now POD #4 CABG with LIMA to LAD, SVG to OM3, SVG rPDA  Moved out of critical care unit yesterday; he is feeling better today; pain control adequate  He is maintaining SR on oral amiodarone per protocol  On appropriate medications with ASA, statin, BB and ACE  LifeVest was being considered but repeat limited echo 12/25; reading physician reports EF 40% awaiting final read in Epic; since EF 40% will not need LifeVest    Volume status improving on IV lasix TID   - weight trending down   - + 2 2L overall but negative 1 1L past 24 hours  - Cr and K stable      Subjective: Pt seen for follow up  No events overnight  Pt is now out of the critical care unit  He reports feeling better today; incisional pain well controlled except when coughing  Still has LE swelling but improving; No chest pain or SOB with ambulation  Appetite improving  Review of Systems   Constitution: Negative for decreased appetite and fever  Cardiovascular: Negative for chest pain, dyspnea on exertion, leg swelling, orthopnea, palpitations and syncope         + incisional pain with coughing   Respiratory: Positive for cough  Negative for shortness of breath, sputum production and wheezing  Gastrointestinal: Negative for abdominal pain, nausea and vomiting  Genitourinary: Negative for dysuria  Neurological: Negative for dizziness and light-headedness  Psychiatric/Behavioral: Negative for altered mental status  Objective:   Vitals: Blood pressure 133/65, pulse 89, temperature 98 3 °F (36 8 °C), temperature source Oral, resp   rate 18, height 5' 9" (1 753 m), weight 127 kg (281 lb), SpO2 95 %  ,     Body mass index is 41 5 kg/m²  ,     Systolic (00QRW), YED:311 , Min:104 , UDR:181     Diastolic (95DAM), WXX:79, Min:65, Max:88      Intake/Output Summary (Last 24 hours) at 12/27/2019 1039  Last data filed at 12/27/2019 0839  Gross per 24 hour   Intake 492 22 ml   Output 1675 ml   Net -1182 78 ml     Weight (last 2 days)     Date/Time   Weight    12/27/19 0600   127 (281)    12/26/19 0600   128 (282 19)    12/25/19 0600   130 (287 04)            Telemetry Review:   Sinus rhythm, no significant events    Physical Exam   Constitutional: He is oriented to person, place, and time  No distress  HENT:   Head: Normocephalic and atraumatic  Cardiovascular: Normal rate, regular rhythm, S1 normal and S2 normal    No murmur heard  Mild b/l NP edema  MS incision TATIANA     Pulmonary/Chest: Effort normal  He has no wheezes  BS decreased at bases; on RA   Abdominal: Soft  Musculoskeletal: He exhibits edema  He exhibits no deformity  Neurological: He is alert and oriented to person, place, and time  Skin: Skin is warm and dry  He is not diaphoretic  Psychiatric: He has a normal mood and affect  His behavior is normal    Nursing note and vitals reviewed      LABORATORY RESULTS      CBC with diff: Results from last 7 days   Lab Units 12/27/19  0426 12/26/19  0356 12/25/19  0428 12/24/19  0623 12/24/19  0351 12/23/19  1559 12/23/19  1557  12/23/19  1412  12/23/19  0444 12/21/19  0440   WBC Thousand/uL 15 19* 21 87* 24 90*  --  26 15*  --   --   --   --   --  10 07 10 32*   HEMOGLOBIN g/dL 9 9* 11 0* 12 5 14 3 14 4  --  14 3  --   --   --  15 6 16 3   I STAT HEMOGLOBIN g/dl  --   --   --   --   --  13 6  --    < >  --    < >  --   --    HEMATOCRIT % 31 8* 34 7* 39 1 43 5 43 8  --  42 9  --   --   --  47 2 49 9*   HEMATOCRIT, ISTAT %  --   --   --   --   --  40  --    < >  --    < >  --   --    MCV fL 97 96 94  --  91  --   --   --   --   --  90 91   PLATELETS Thousands/uL 312 242 216  --  269  --  238  --  240  --  305 283   MCH pg 30 1 30 3 30 0  --  29 9  --   --   --   --   --  29 7 29 6   MCHC g/dL 31 1* 31 7 32 0  --  32 9  --   --   --   --   --  33 1 32 7   RDW % 11 9 11 8 11 9  --  11 5*  --   --   --   --   --  11 4* 11 3*   MPV fL 11 4 11 5 11 5  --  11 7  --  10 6  --  10 7  --  11 1 11 1   NRBC AUTO /100 WBCs 0 0  --   --   --   --   --   --   --   --   --  0    < > = values in this interval not displayed  CMP:  Results from last 7 days   Lab Units 12/27/19  0426 12/26/19  0356 12/25/19  0428 12/24/19  0351 12/23/19 2014 12/23/19  1559 12/23/19  1557 12/23/19  1508 12/23/19  1420 12/23/19  1405 12/23/19  1355 12/23/19  1315 12/23/19  1129  12/23/19  0444 12/21/19  0440   POTASSIUM mmol/L 3 9 4 4 4 5 4 1 4 8  --  3 7  --   --   --   --   --   --   --  3 7 3 9   CHLORIDE mmol/L 109* 110* 116* 112*  --   --  112*  --   --   --   --   --   --   --  109* 104   CO2 mmol/L 27 28 24 25  --   --  23  --   --   --   --   --   --   --  25 29   CO2, I-STAT mmol/L  --   --   --   --   --  24  --  24 28 29 26 28 30   < >  --   --    BUN mg/dL 34* 28* 28* 18  --   --  19  --   --   --   --   --   --   --  18 21   CREATININE mg/dL 1 18 0 94 0 92 0 92  --   --  1 32*  --   --   --   --   --   --   --  0 82 0 91   GLUCOSE, ISTAT mg/dl  --   --   --   --   --  181*  --  188* 198* 193* 195* 193* 122  --   --   --    CALCIUM mg/dL 8 4 8 4 8 4 8 4  --   --  8 9  --   --   --   --   --   --   --  9 5 9 5   EGFR ml/min/1 73sq m 77 102 104 104  --   --  67  --   --   --   --   --   --   --  111 106    < > = values in this interval not displayed       BMP:  Results from last 7 days   Lab Units 12/27/19  0426 12/26/19  0356 12/25/19  0428 12/24/19  0351 12/23/19 2014 12/23/19  1559 12/23/19  1557 12/23/19  1508  12/23/19  0444 12/21/19  0440   POTASSIUM mmol/L 3 9 4 4 4 5 4 1 4 8  --  3 7  --   --  3 7 3 9   CHLORIDE mmol/L 109* 110* 116* 112*  --   --  112*  --   --  109* 104 CO2 mmol/L 27 28 24 25  --   --  23  --   --  25 29   CO2, I-STAT mmol/L  --   --   --   --   --  24  --  24   < >  --   --    BUN mg/dL 34* 28* 28* 18  --   --  19  --   --  18 21   CREATININE mg/dL 1 18 0 94 0 92 0 92  --   --  1 32*  --   --  0 82 0 91   GLUCOSE, ISTAT mg/dl  --   --   --   --   --  181*  --  188*   < >  --   --    CALCIUM mg/dL 8 4 8 4 8 4 8 4  --   --  8 9  --   --  9 5 9 5    < > = values in this interval not displayed  Results from last 7 days   Lab Units 19  0428 19  0351 19  0444   MAGNESIUM mg/dL 2 2 2 0 2 0     Lipid Profile:   No results found for: CHOL  Lab Results   Component Value Date    HDL 27 (L) 2019     Lab Results   Component Value Date    LDLCALC 113 (H) 2019     Lab Results   Component Value Date    TRIG 132 2019     Cardiac testing:   Results for orders placed during the hospital encounter of 19   Echo complete with contrast if indicated    Narrative [address]  [facility]  [place], [address]  [phone]    Transthoracic Echocardiogram  2D, M-mode, Doppler, and Color Doppler    Study date:  19-Dec-2019    Patient: Chyna Holley  MR number: [id number]  Account number: [de-identified]  : 1980  Age: 44 years  Gender: Male  Status: Inpatient  Location: Bedside  Height: 69 in  Weight: 276 lb  BP: 170/ 51 mmHg    Indications: Acute MI  Diagnoses: I24 9 - Acute ischemic heart disease, unspecified    Sonographer:  Ben Perea RDCS  Referring Physician:  Jacky Vick MD  Group:  [facility]  Interpreting Physician:  Jose David Ellis MD    IMPRESSIONS:  Technical quality: Fair  Technically very difficult study due to body habitus and poor echo penetration  Definity contrast was used  Cardiac rhythm: Sinus with interventricular conduction delay      1  Mildly dilated left ventricular cavity, moderate concentric left ventricular hypertrophy, severely reduced left ventricular systolic function with mild global hypokinesis with some regional wall motion variation  Although there is no  definite evidence of left ventricular mural thrombus cannot exclude early layering thrombus in the distal inferior, inferior lateral wall  Ejection fraction is determined as 29%  Grade 2 diastolic dysfunction  2  Mild left atrial cavity enlargement  3  Aortic valve sclerosis with some focal calcification, trace aortic valve regurgitation  4  Mitral valve leaflet sclerosis with some focal calcification, trace mitral valve regurgitation  5  Trace tricuspid valve regurgitation  6  No obvious pulmonary hypertension  7  No pericardial effusion  There is no previous echocardiogram available for comparison  SUMMARY    LEFT VENTRICLE:  Mildly dilated left ventricular cavity, moderate concentric left ventricular hypertrophy, severely reduced left ventricular systolic function mild global hypokinesis with some regional wall motion variation  Although there is no definite  evidence of left ventricular thrombus, cannot definitively exclude layering thrombus formation in inferior-apical and distal inferior-lateral regions  Grade 2 diastolic dysfunction  Elevated left atrial pressure  RIGHT VENTRICLE:  Normal right ventricular size and systolic function  Right ventricular systolic pressure could not be estimated due to poor Doppler signals across the tricuspid valve  LEFT ATRIUM:  Mild left atrial cavity enlargement  Intact interatrial septum  RIGHT ATRIUM:  Normal right atrial cavity size  MITRAL VALVE:  Mitral valve leaflet sclerosis with some focal calcification, adequate leaflet mobility  Trace mitral valve regurgitation  AORTIC VALVE:  Tricuspid aortic valve sclerosis and focal calcification which is most pronounced on the right coronary cusp there is adequate cuspal separation  Trace aortic valve regurgitation noted      TRICUSPID VALVE:  Trace tricuspid valve regurgitation  PULMONIC VALVE:  No obvious pulmonic valve regurgitation  AORTA:  Normal aortic root size  Proximal ascending aorta was not well visualized  IVC, HEPATIC VEINS:  Tricuspid aortic valve with mild sclerosis  No aortic valve stenosis or regurgitation  PERICARDIUM:  No pericardial effusion  HISTORY: PRIOR HISTORY: Hypertension  DM2  Obesity  PROCEDURE: The procedure was performed at the bedside  This was a routine study  The transthoracic approach was used  The study included complete 2D imaging, M-mode, complete spectral Doppler, and color Doppler  The heart rate was 92 bpm,  at the start of the study  Intravenous contrast (  6 mL of definity in NSS) was administered to opacify the left ventricle  Echocardiographic views were limited due to decreased penetration and lung interference  This was a technically  difficult study  LEFT VENTRICLE: Mildly dilated left ventricular cavity, moderate concentric left ventricular hypertrophy, severely reduced left ventricular systolic function mild global hypokinesis with some regional wall motion variation  Although there  is no definite evidence of left ventricular thrombus, cannot definitively exclude layering thrombus formation in inferior-apical and distal inferior-lateral regions  Grade 2 diastolic dysfunction  Elevated left atrial pressure  RIGHT VENTRICLE: Normal right ventricular size and systolic function  Right ventricular systolic pressure could not be estimated due to poor Doppler signals across the tricuspid valve  LEFT ATRIUM: Mild left atrial cavity enlargement  Intact interatrial septum  RIGHT ATRIUM: Normal right atrial cavity size  MITRAL VALVE: Mitral valve leaflet sclerosis with some focal calcification, adequate leaflet mobility  Trace mitral valve regurgitation      AORTIC VALVE: Tricuspid aortic valve sclerosis and focal calcification which is most pronounced on the right coronary cusp there is adequate cuspal separation  Trace aortic valve regurgitation noted  TRICUSPID VALVE: Trace tricuspid valve regurgitation  PULMONIC VALVE: No obvious pulmonic valve regurgitation  PERICARDIUM: No pericardial effusion  AORTA: Normal aortic root size  Proximal ascending aorta was not well visualized  SYSTEMIC VEINS: IVC: Tricuspid aortic valve with mild sclerosis  No aortic valve stenosis or regurgitation      SYSTEM MEASUREMENT TABLES    2D  %FS: 14 6 %  Ao Diam: 3 2 cm  EDV(Teich): 160 ml  EF Biplane: 28 5 %  EF(Teich): 30 6 %  ESV(Teich): 111 ml  IVSd: 1 6 cm  LA Diam: 4 6 cm  LAAs A4C: 23 9 cm2  LAESV A-L A4C: 86 1 ml  LAESV MOD A4C: 83 5 ml  LALs A4C: 5 6 cm  LVEDV MOD A2C: 176 1 ml  LVEDV MOD A4C: 172 9 ml  LVEDV MOD BP: 180 5 ml  LVEF MOD A2C: 26 %  LVEF MOD A4C: 28 5 %  LVESV MOD A2C: 130 3 ml  LVESV MOD A4C: 123 6 ml  LVESV MOD BP: 129 ml  LVIDd: 5 7 cm  LVIDs: 4 9 cm  LVLd A2C: 9 3 cm  LVLd A4C: 8 7 cm  LVLs A2C: 8 6 cm  LVLs A4C: 8 3 cm  LVPWd: 1 6 cm  RA Area: 12 3 cm2  RVIDd: 3 4 cm  SV MOD A2C: 45 8 ml  SV MOD A4C: 49 3 ml  SV(Teich): 49 ml    MM  TAPSE: 2 1 cm    PW  E': 0 m/s  E/E': 15 6  MV A [place]: 0 7 m/s  MV Dec [place]: 5 9 m/s2  MV DecT: 125 4 ms  MV E [place]: 0 7 m/s  MV E/A Ratio: 1 1  MV PHT: 36 4 ms  MVA By PHT: 6 1 cm2    [facility] Accredited Echocardiography [facility]    Prepared and electronically signed by    Srinivasan Strickland MD  Signed 19-Dec-2019 17:30:28       Meds/Allergies   all current active meds have been reviewed  Medications Prior to Admission   Medication    amLODIPine (NORVASC) 10 mg tablet    hydrochlorothiazide (HYDRODIURIL) 50 mg tablet    lisinopril (ZESTRIL) 40 mg tablet    metFORMIN (GLUCOPHAGE) 1000 MG tablet    methocarbamol (ROBAXIN) 750 mg tablet       insulin regular (HumuLIN R,NovoLIN R) infusion 0 3-21 Units/hr Last Rate: 6 Units/hr (12/27/19 1843)   sodium chloride 20 mL/hr Last Rate: 20 mL/hr (12/26/19 0826)     Assessment:  Principal Problem:    NSTEMI (non-ST elevated myocardial infarction) ([facility] )  Active Problems:    Essential hypertension    Diabetes mellitus type 2, uncontrolled ([facility] )    Snoring    Hyperlipidemia    S/P CABG x 3    Acute respiratory insufficiency    Counseling / Coordination of Care  Total floor / unit time spent today 20 minutes  Greater than 50% of total time was spent with the patient and / or family counseling and / or coordination of care  ** Please Note: Dragon 360 Dictation voice to text software may have been used in the creation of this document   **

## 2019-12-27 NOTE — PROGRESS NOTES
Progress Note - Cardiothoracic Surgery   [facility]  44 y o  male MRN: [id number]  Unit/Bed#: [facility] 404-01 Encounter: [id number]  Coronary artery disease  S/P coronary artery bypass grafting; POD # 4    24 Hour Events: No events overnight  No complaints this morning  On 4LNC weaned to RA  On IV insulin  Appetite is better this morning, ate more than 50% of his breakfast   Ambulating with assistance  Pain controlled   + Flatus, No BM yet       Medications:   Scheduled Meds:  Current Facility-Administered Medications:  acetaminophen 975 mg Oral Q8H Donna Ash PA-C    amiodarone 200 mg Oral Q8H [address] Donna Ash PA-C    amLODIPine 10 mg Oral Daily Isai Kaufman PA-C    aspirin 325 mg Oral Daily Isai Kaufman, YENNY    atorvastatin 80 mg Oral Daily With [facility] FinancialYENNY    bisacodyl 10 mg Rectal Daily PRN Isai Kaufman PA-C    docusate sodium 100 mg Oral BID Isai Kaufman PA-C    fondaparinux 2 5 mg Subcutaneous Daily Isai Kaufman PA-C    furosemide 40 mg Intravenous TID (diuretic) Isai Kaufman PA-C    ibuprofen 600 mg Oral Q8H [address] Donna Ash PA-C    insulin lispro 10 Units Subcutaneous TID With Meals Kyleigh Pravin Ash PA-C    insulin regular (HumuLIN R,NovoLIN R) infusion 0 3-21 Units/hr Intravenous Titrated Isai Kaufman PA-C Last Rate: 2 Units/hr (12/27/19 0601)   lisinopril 40 mg Oral Daily Donna Ash PA-C    metoprolol tartrate 50 mg Oral Q8H Donna Ash PA-C    mupirocin 1 application Nasal U82S [address] Donna Ash PA-C    ondansetron 4 mg Intravenous Q6H PRN Isai Kaufman PA-C    oxyCODONE 10 mg Oral Q6H PRN Isai Kaufman PA-C    oxyCODONE 5 mg Oral Q4H PRN Isai Kaufman PA-C    pantoprazole 40 mg Oral Early Morning Donna Ash PA-C    polyethylene glycol 17 g Oral Daily Donna Ash PA-C    potassium chloride 20 mEq Oral Daily Donna Ash PA-C    sodium chloride 20 mL/hr Intravenous Continuous YENNY Selby Rate: 20 mL/hr (12/26/19 0448)   temazepam 15 mg Oral HS PRN Kristyn Robles PA-C      Continuous Infusions:  insulin regular (HumuLIN R,NovoLIN R) infusion 0 3-21 Units/hr Last Rate: 2 Units/hr (12/27/19 0601)   sodium chloride 20 mL/hr Last Rate: 20 mL/hr (12/26/19 0448)     PRN Meds: bisacodyl    ondansetron    oxyCODONE    oxyCODONE    temazepam    Vitals:   Vitals:    12/27/19 0000 12/27/19 0239 12/27/19 0325 12/27/19 0600   BP:  119/84 119/84    BP Location:   Right arm    Pulse:  86 76    Resp:   18    Temp:   98 4 °F (36 9 °C)    TempSrc:   Axillary    SpO2: 98%  99%    Weight:    127 kg (281 lb)   Height:           Telemetry: NSR; Heart Rate: 91      Respiratory:   SpO2: SpO2: 95 %; Room Air    Intake/Output:   I/O       12/25 0701 - 12/26 0700 12/26 0701 - 12/27 0700 12/27 0701 - 12/28 0700    P  O  1020 200     I V  (mL/kg) 1705 4 (13 3) 295 5 (2 3)     IV Piggyback       Total Intake(mL/kg) 2725 4 (21 3) 495 5 (3 9)     Urine (mL/kg/hr) 2400 (0 8) 1675 (0 5)     Chest Tube 80      Total Output 2480 1675     Net +245 4 -1179 6            Unmeasured Urine Occurrence  403 x         Weights:   Weight (last 2 days)     Date/Time   Weight    12/27/19 0600   127 (281)    12/26/19 0600   128 (282 19)    12/25/19 0600   130 (287 04)            Results:   Results from last 7 days   Lab Units 12/27/19  0426 12/26/19  0356 12/25/19  0428   WBC Thousand/uL 15 19* 21 87* 24 90*   HEMOGLOBIN g/dL 9 9* 11 0* 12 5   HEMATOCRIT % 31 8* 34 7* 39 1   PLATELETS Thousands/uL 312 242 216     Results from last 7 days   Lab Units 12/27/19  0426 12/26/19  0356 12/25/19  0428  12/23/19  1559   SODIUM mmol/L 141 141 147*   < >  --    POTASSIUM mmol/L 3 9 4 4 4 5   < >  --    CHLORIDE mmol/L 109* 110* 116*   < >  --    CO2 mmol/L 27 28 24   < >  --    CO2, I-STAT mmol/L  --   --   --   --  24   BUN mg/dL 34* 28* 28*   < >  --    CREATININE mg/dL 1 18 0 94 0 92   < >  --    GLUCOSE, ISTAT mg/dl  --   --   --   --  181*   CALCIUM mg/dL 8 4 8 4 8 4   < >  --     < > = values in this interval not displayed  Point of care glucose: 121-185    Studies:  Echo pending     Invasive Lines/Tubes:  Invasive Devices     Central Venous Catheter Line            CVC Central Lines 12/23/19 Triple 3 days          Peripheral Intravenous Line            Peripheral IV 12/23/19 Right Wrist 3 days                Physical Exam:    HEENT/NECK:  PERRLA  No jugular venous distention  Cardiac: Regular rate and rhythm  Pulmonary:  Breath sounds clear bilaterally  Abdomen:  Non-tender, Non-distended and Normal bowel sounds  Incisions: Sternum is stable  Incision is clean, dry, and intact  and Saphenectomy incison is clean, dry, and intact  Extremities: Extremities warm/dry and 1+ edema B/L  Neuro: Alert and oriented X 3  Skin: Warm/Dry, without rashes or lesions  Assessment:  Principal Problem:    NSTEMI (non-ST elevated myocardial infarction) ([facility] [address] )  Active Problems:    Essential hypertension    Diabetes mellitus type 2, uncontrolled (HCC)    Snoring    Hyperlipidemia    S/P CABG x 3    Acute respiratory insufficiency       Coronary artery disease  S/P coronary artery bypass grafting; POD # 4    Plan:    1  Cardiac:   NSR; HR/BP well-controlled  Lopressor, 50mg PO BID, lisinopril 40 mg daily and amlodipine 5 mg daily  Continue ASA and Statin therapy  Epicardial pacing wires out  Central IV access no longer required; Remove central venous catheter today  Continue DVT prophylaxis    2  Pulmonary:   Good Room air oxygen saturation; Continue incentive spirometry/Coughing/Deep breathing exercises  Chest tubes have been discontinued    3  Renal:   Intake/Output net: -1179 mL/24 hours  Continue diuresis   Lasix 40 mg IV TID  Potassium Chloride 20 mEq PO QD  Post op Creatinine stable; Follow up labs prn    4  Neuro:  Neurologically intact; No active issues  Incisional pain well-controlled; Continue prn Percocet    5   GI:  Tolerating TLC 2 3 gm sodium diet  Maintain 1800 mL daily fluid restriction   Continue stool softeners and prn suppository  Continue GI prophylaxis    6  Endo:   History of diabetes; Continue SQ insulin therapy as directed by endocrinology physician  Insulin administration teaching for home therapy ordered    7    Hematology:    Post-operative acute blood loss anemia; Hemoglobin 9 9; trend prn    8  Disposition:      Anticipate discharge to home over the weekend     VTE Pharmacologic Prophylaxis: Fondaparinux (Arixtra)  VTE Mechanical Prophylaxis: sequential compression device    Collaborative rounds completed with FATEMEH Bolanos    Plan of care discussed with bedside nurse    SIGNATURE: Antonio Mcmillan PA-C  DATE: December 27, 2019  TIME: 8:05 AM

## 2019-12-27 NOTE — PROGRESS NOTES
Insulin Education after Cardiothoracic Surgery   Pharmacy Consultation      Carmelo Pretty  is a 44 y o  male who is POD # 4 s/p coronary artery bypass grafting  The patient has received insulin education from the Pharmacy Consult service  The following person/people were educated: patient, significant other    The following information was covered   What is insulin? Who needs to take insulin? What are some complications associated hyperglycemia? Why should I always take my insulin? What are the different types of insulin that I may be prescribed? What are some complications or side effects to remember? What do I do if I am having side effects, issues, or questions about my insulin? Readiness for insulin therapy: eager  The method(s) of education included: teach back, explanation  Response to education: verbalizes understanding    Thank you for allowing us to take part in this patient's care  Pharmacy will sign-off at this point; please call if there are any questions        Latoya Serrano, PharmD, [facility] 6 Pharmacist   [phone]

## 2019-12-27 NOTE — PHYSICAL THERAPY NOTE
PHYSICAL THERAPY TREATMENT NOTE          Patient Name: [facility]  Today's Date: 12/27/2019 12/27/19 1053   Pain Assessment   Pain Assessment 0-10   Pain Score 5   Pain Type Acute pain;Surgical pain   Pain Location Chest;Incision   Pain Orientation Mid   Patient's Stated Pain Goal No pain   Hospital Pain Intervention(s) Repositioned; Ambulation/increased activity; Rest   Restrictions/Precautions   Weight Bearing Precautions Per Order No   Braces or Orthoses   (none)   Other Precautions Cardiac/sternal;Multiple lines;Telemetry; Fall Risk;Pain   General   Chart Reviewed Yes   Response to Previous Treatment Patient with no complaints from previous session  Family/Caregiver Present No   Cognition   Overall Cognitive Status WFL   Arousal/Participation Alert   Attention Attends with cues to redirect   Orientation Level Oriented X4   Memory Within functional limits   Following Commands Follows multistep commands with increased time or repetition   Comments Pt with flat affect throughout session  Subjective   Subjective Pt pleasant and agreeable to participate in therapy session  Bed Mobility   Additional Comments OOB in chair upon PT arrival   Pt left upright in bedside chair with all needs in reach at end of therapy session  Transfers   Sit to Stand 5  Supervision   Stand to Sit 5  Supervision   Additional Comments Transfers without AD     Ambulation/Elevation   Gait pattern   (slow)   Gait Assistance 5  Supervision   Assistive Device (None)   Distance 400 ft x 1   Stair Management Assistance 5  Supervision   Stair Management Technique One rail L;Foreward;Reciprocal   Number of Stairs 3   Balance   Static Sitting Good   Dynamic Sitting Fair +   Static Standing Fair   Dynamic Standing Fair -   Ambulatory Fair -   Endurance Deficit   Endurance Deficit No   Activity Tolerance   Activity Tolerance Patient tolerated treatment well   Nurse Made Aware RN cleared pt to be seen by PT   Exercises   Hip Flexion Sitting;Standing;15 reps;Bilateral  (x2)   Knee AROM Long Arc Quad Sitting;15 reps;Bilateral  (x2)   Ankle Pumps Sitting;Standing;15 reps;Bilateral  (x2)   Assessment   Prognosis Good   Assessment Pt seen for PT treatment session  Pt pleasant and agreeable to participate, however, with flat affect  Pt performed LE therex as outlined above  Pt demonstrated ability to ambulate increased distance 400 ft x 1 without AD  Pt able to negotiate 3 steps with supervision, additional steps deferred 2/2 IV lines  Pt left upright in bedside chair with all needs in reach  Pt will benefit from skilled therapy in order to address current impairments and functional limitations  PT to DC pt as pt has no further acute skilled PT needs and recommending continued ambulation with staff while in hospital and home with family support once medically cleared     Plan   Progress Progressing toward goals   Recommendation   Recommendation Home with family support;D/C PT   Equipment Recommended   (none)   PT - OK to Discharge Yes  (once medically cleared)     Amrik Quispe, PT, DPT

## 2019-12-28 PROBLEM — E66.01 CLASS 3 SEVERE OBESITY IN ADULT (HCC): Status: ACTIVE | Noted: 2019-12-28

## 2019-12-28 LAB
ANION GAP SERPL CALCULATED.3IONS-SCNC: 5 MMOL/L (ref 4–13)
BUN SERPL-MCNC: 29 MG/DL (ref 5–25)
CALCIUM SERPL-MCNC: 8.8 MG/DL (ref 8.3–10.1)
CHLORIDE SERPL-SCNC: 109 MMOL/L (ref 100–108)
CO2 SERPL-SCNC: 28 MMOL/L (ref 21–32)
CREAT SERPL-MCNC: 0.98 MG/DL (ref 0.6–1.3)
GFR SERPL CREATININE-BSD FRML MDRD: 97 ML/MIN/1.73SQ M
GLUCOSE SERPL-MCNC: 147 MG/DL (ref 65–140)
GLUCOSE SERPL-MCNC: 161 MG/DL (ref 65–140)
GLUCOSE SERPL-MCNC: 184 MG/DL (ref 65–140)
GLUCOSE SERPL-MCNC: 191 MG/DL (ref 65–140)
GLUCOSE SERPL-MCNC: 195 MG/DL (ref 65–140)
GLUCOSE SERPL-MCNC: 254 MG/DL (ref 65–140)
POTASSIUM SERPL-SCNC: 3.8 MMOL/L (ref 3.5–5.3)
SODIUM SERPL-SCNC: 142 MMOL/L (ref 136–145)

## 2019-12-28 PROCEDURE — 94660 CPAP INITIATION&MGMT: CPT

## 2019-12-28 PROCEDURE — 99232 SBSQ HOSP IP/OBS MODERATE 35: CPT | Performed by: INTERNAL MEDICINE

## 2019-12-28 PROCEDURE — 94760 N-INVAS EAR/PLS OXIMETRY 1: CPT

## 2019-12-28 PROCEDURE — 82948 REAGENT STRIP/BLOOD GLUCOSE: CPT

## 2019-12-28 PROCEDURE — 99024 POSTOP FOLLOW-UP VISIT: CPT | Performed by: THORACIC SURGERY (CARDIOTHORACIC VASCULAR SURGERY)

## 2019-12-28 PROCEDURE — 97530 THERAPEUTIC ACTIVITIES: CPT

## 2019-12-28 PROCEDURE — 80048 BASIC METABOLIC PNL TOTAL CA: CPT | Performed by: PHYSICIAN ASSISTANT

## 2019-12-28 PROCEDURE — 97535 SELF CARE MNGMENT TRAINING: CPT

## 2019-12-28 PROCEDURE — 94664 DEMO&/EVAL PT USE INHALER: CPT

## 2019-12-28 RX ORDER — FUROSEMIDE 10 MG/ML
40 INJECTION INTRAMUSCULAR; INTRAVENOUS
Status: DISCONTINUED | OUTPATIENT
Start: 2019-12-28 | End: 2019-12-29 | Stop reason: HOSPADM

## 2019-12-28 RX ORDER — POTASSIUM CHLORIDE 20 MEQ/1
20 TABLET, EXTENDED RELEASE ORAL 2 TIMES DAILY
Status: DISCONTINUED | OUTPATIENT
Start: 2019-12-28 | End: 2019-12-29 | Stop reason: HOSPADM

## 2019-12-28 RX ADMIN — LISINOPRIL 40 MG: 20 TABLET ORAL at 08:09

## 2019-12-28 RX ADMIN — FUROSEMIDE 40 MG: 10 INJECTION, SOLUTION INTRAMUSCULAR; INTRAVENOUS at 05:52

## 2019-12-28 RX ADMIN — AMIODARONE HYDROCHLORIDE 200 MG: 200 TABLET ORAL at 21:17

## 2019-12-28 RX ADMIN — INSULIN LISPRO 2 UNITS: 100 INJECTION, SOLUTION INTRAVENOUS; SUBCUTANEOUS at 21:17

## 2019-12-28 RX ADMIN — IBUPROFEN 600 MG: 600 TABLET, FILM COATED ORAL at 05:52

## 2019-12-28 RX ADMIN — INSULIN GLARGINE 50 UNITS: 100 INJECTION, SOLUTION SUBCUTANEOUS at 21:17

## 2019-12-28 RX ADMIN — INSULIN LISPRO 4 UNITS: 100 INJECTION, SOLUTION INTRAVENOUS; SUBCUTANEOUS at 16:23

## 2019-12-28 RX ADMIN — AMIODARONE HYDROCHLORIDE 200 MG: 200 TABLET ORAL at 14:49

## 2019-12-28 RX ADMIN — ASPIRIN 325 MG ORAL TABLET 325 MG: 325 PILL ORAL at 08:09

## 2019-12-28 RX ADMIN — TEMAZEPAM 15 MG: 15 CAPSULE ORAL at 21:25

## 2019-12-28 RX ADMIN — METOPROLOL TARTRATE 50 MG: 50 TABLET, FILM COATED ORAL at 08:09

## 2019-12-28 RX ADMIN — POLYETHYLENE GLYCOL 3350 17 G: 17 POWDER, FOR SOLUTION ORAL at 08:09

## 2019-12-28 RX ADMIN — AMLODIPINE BESYLATE 10 MG: 10 TABLET ORAL at 08:09

## 2019-12-28 RX ADMIN — IBUPROFEN 600 MG: 600 TABLET, FILM COATED ORAL at 21:16

## 2019-12-28 RX ADMIN — INSULIN LISPRO 4 UNITS: 100 INJECTION, SOLUTION INTRAVENOUS; SUBCUTANEOUS at 06:33

## 2019-12-28 RX ADMIN — ACETAMINOPHEN 975 MG: 325 TABLET ORAL at 05:51

## 2019-12-28 RX ADMIN — FONDAPARINUX SODIUM 2.5 MG: 2.5 INJECTION, SOLUTION SUBCUTANEOUS at 08:09

## 2019-12-28 RX ADMIN — ACETAMINOPHEN 975 MG: 325 TABLET ORAL at 14:49

## 2019-12-28 RX ADMIN — FUROSEMIDE 40 MG: 10 INJECTION, SOLUTION INTRAMUSCULAR; INTRAVENOUS at 16:24

## 2019-12-28 RX ADMIN — METOPROLOL TARTRATE 50 MG: 50 TABLET, FILM COATED ORAL at 16:23

## 2019-12-28 RX ADMIN — ACETAMINOPHEN 975 MG: 325 TABLET ORAL at 21:16

## 2019-12-28 RX ADMIN — IBUPROFEN 600 MG: 600 TABLET, FILM COATED ORAL at 14:49

## 2019-12-28 RX ADMIN — INSULIN LISPRO 8 UNITS: 100 INJECTION, SOLUTION INTRAVENOUS; SUBCUTANEOUS at 12:07

## 2019-12-28 RX ADMIN — POTASSIUM CHLORIDE 20 MEQ: 1500 TABLET, EXTENDED RELEASE ORAL at 08:09

## 2019-12-28 RX ADMIN — AMIODARONE HYDROCHLORIDE 200 MG: 200 TABLET ORAL at 05:52

## 2019-12-28 RX ADMIN — Medication 1 APPLICATION: at 08:09

## 2019-12-28 RX ADMIN — METOPROLOL TARTRATE 50 MG: 50 TABLET, FILM COATED ORAL at 00:01

## 2019-12-28 RX ADMIN — SENNOSIDES AND DOCUSATE SODIUM 1 TABLET: 8.6; 5 TABLET ORAL at 08:09

## 2019-12-28 RX ADMIN — POTASSIUM CHLORIDE 20 MEQ: 1500 TABLET, EXTENDED RELEASE ORAL at 17:38

## 2019-12-28 RX ADMIN — ATORVASTATIN CALCIUM 80 MG: 80 TABLET, FILM COATED ORAL at 16:23

## 2019-12-28 RX ADMIN — PANTOPRAZOLE SODIUM 40 MG: 40 TABLET, DELAYED RELEASE ORAL at 05:52

## 2019-12-28 RX ADMIN — OXYCODONE HYDROCHLORIDE 5 MG: 5 TABLET ORAL at 08:20

## 2019-12-28 RX ADMIN — SENNOSIDES AND DOCUSATE SODIUM 1 TABLET: 8.6; 5 TABLET ORAL at 17:38

## 2019-12-28 NOTE — PLAN OF CARE
Problem: OCCUPATIONAL THERAPY ADULT  Goal: Performs self-care activities at highest level of function for planned discharge setting  See evaluation for individualized goals  Description  Treatment Interventions: ADL retraining, Functional transfer training, Endurance training, Patient/family training, Equipment evaluation/education, Compensatory technique education, Continued evaluation, Cardiac education, Energy conservation, Activityengagement          See flowsheet documentation for full assessment, interventions and recommendations  Outcome: Completed  Note:   Limitation: Decreased ADL status, Decreased endurance, Decreased high-level ADLs  Prognosis: Good  Assessment: Pt was seen this date for OT tx session focusing on self care tasks, trafners, funciotional mobility, review of cardiac precautions, home d/c discussion planning and overall activity toelrance  Pt presents seated OOB in chiar, compeltes previously mentioned tasks at documeneted assist levels please see above  Pt goals per initial eval, pt has no immeiadte OT needs at this time  Spoke with OTR/L D/C OT        OT Discharge Recommendation: Home with family support  OT - OK to Discharge: Yes(When medically appropriate )

## 2019-12-28 NOTE — PROGRESS NOTES
Cardiology Progress Note - Melanie Padilla  44 y o  male MRN: [id number]    Unit/Bed#: [facility] 404-01 Encounter: [id number]      Assessment:  Principal Problem:    NSTEMI (non-ST elevated myocardial infarction) [facility])  Active Problems:    Essential hypertension    Diabetes mellitus type 2, uncontrolled ([address] )    Snoring    Hyperlipidemia    S/P CABG x 3    Acute respiratory insufficiency      Plan:  Patient is comfortable today  He looks improved  He is postop day five after coronary artery bypass graft surgery  He is off intravenous insulin  Telemetry demonstrates sinus rhythm  He is tentatively scheduled for discharge tomorrow  BMP today with potassium of 3 8 and creatinine of 0 98  Subjective:   Patient seen and examined  No significant events overnight   negative  Objective:     Vitals: Blood pressure 139/84, pulse 94, temperature 98 3 °F (36 8 °C), temperature source Oral, resp  rate 21, height 5' 9" (1 753 m), weight 127 kg (279 lb 15 8 oz), SpO2 94 %  , Body mass index is 41 35 kg/m² ,   Orthostatic Blood Pressures      Most Recent Value   Blood Pressure  139/84 [Map 107] filed at 12/28/2019 0015   Patient Position - Orthostatic VS  Lying filed at 12/28/2019 0727      ,      Intake/Output Summary (Last 24 hours) at 12/28/2019 0848  Last data filed at 12/28/2019 0820  Gross per 24 hour   Intake 734 1 ml   Output 1775 ml   Net -1040 9 ml       No significant arrhythmias seen on telemetry review  Physical Exam:    GEN: Melanie Padilla   appears well, alert and oriented x 3, pleasant and cooperative   NECK: supple, no carotid bruits, no JVD or HJR  HEART: normal rate, regular rhythm, normal S1 and S2, no murmurs, clicks, gallops or rubs   LUNGS: clear to auscultation bilaterally; no wheezes, rales, or rhonchi   ABDOMEN: normal bowel sounds, soft, no tenderness, no distention  EXTREMITIES: peripheral pulses normal; no clubbing, cyanosis, or edema  SKIN: warm and well perfused, no suspicious lesions on exposed skin    Labs & Results:    No results displayed because visit has over 200 results  Ct Chest Abdomen Pelvis Wo Contrast    Result Date: 12/21/2019  Narrative: CT CHEST, ABDOMEN AND PELVIS WITHOUT IV CONTRAST INDICATION:   cardiomyopathy  COMPARISON:  Chest radiographic series 12/18/2019 TECHNIQUE: CT examination of the chest, abdomen and pelvis was performed without intravenous contrast   Axial, sagittal, and coronal 2D reformatted images were created from the source data and submitted for interpretation  Radiation dose length product (DLP) for this visit:  1615 9 mGy-cm   This examination, like all CT scans performed in the [facility], was performed utilizing techniques to minimize radiation dose exposure, including the use of iterative  reconstruction and automated exposure control  Enteric contrast was not administered  FINDINGS: CHEST LUNGS:  Lungs are clear  There is no tracheal or endobronchial lesion  PLEURA:  Unremarkable  HEART/GREAT VESSELS:  Unremarkable for patient's age  MEDIASTINUM AND CHEVY:  Unremarkable  CHEST WALL AND LOWER NECK:   Asymmetric gynecomastia, left greater than right  No axillary lymphadenopathy  ABDOMEN LIVER/BILIARY TREE:  Liver is mildly decreased in density consistent with fatty change  No CT evidence of suspicious hepatic mass  Normal hepatic contours  No biliary dilatation  GALLBLADDER:  No calcified gallstones  No pericholecystic inflammatory change  SPLEEN:  Unremarkable  Small splenule noted  PANCREAS:  Unremarkable  ADRENAL GLANDS:  Unremarkable  KIDNEYS/URETERS:  Residual intravenous contrast in the collecting systems from recent cardiac MRI  No hydronephrosis or perinephric collections  STOMACH AND BOWEL:  Unremarkable  APPENDIX:  No findings to suggest appendicitis  ABDOMINOPELVIC CAVITY:  No ascites or free intraperitoneal air  No lymphadenopathy  VESSELS:  Unremarkable for patient's age   PELVIS REPRODUCTIVE ORGANS: Unremarkable for patient's age  URINARY BLADDER:  The bladder is diffusely thick-walled likely sequela of underdistention  No perivesical inflammation  ABDOMINAL WALL/INGUINAL REGIONS:  Focal soft tissue emphysema in the left ventral abdominal wall, likely iatrogenic  OSSEOUS STRUCTURES:  No acute fracture or destructive osseous lesion  Impression: 1  No acute intrathoracic abnormality  2   Mild hepatic steatosis  3   Thick-walled bladder likely sequela of underdistention  No perivesical inflammation  Workstation performed: [id number]     Xr Chest Portable    Result Date: 12/24/2019  Narrative: CHEST INDICATION:   Post Open Heart Surgey  COMPARISON:  12/23/2019 EXAM PERFORMED/VIEWS:  XR CHEST PORTABLE FINDINGS:  Median sternotomy wires are present  Right jugular central venous catheter tip overlies the right atrium  Mediastinal drains are present  Cardiomediastinal silhouette is stable  The lungs are clear  No pneumothorax or pleural effusion  Osseous structures appear within normal limits for patient age  Impression: No acute cardiopulmonary disease  Workstation performed: [id number]     Xr Chest 2 Views    Result Date: 12/18/2019  Narrative: CHEST INDICATION:   Chest pain  COMPARISON:  None EXAM PERFORMED/VIEWS:  XR CHEST PA & LATERAL FINDINGS: Cardiomediastinal silhouette appears unremarkable  The lungs are clear  No pneumothorax or pleural effusion  Osseous structures appear within normal limits for patient age  Impression: No active pulmonary disease  Workstation performed: [id number]     Mri Cardiac  W Wo Contrast    Result Date: 12/22/2019  Narrative: MRI CARDIAC  W WO CONTRAST cardiomyopathy Technique: 1  3 plane SSFP localizers  2  SSFP cine imaging in long and short axis planes  3  T2 weighted DIR FSE in short axis plane  4  24 ml gadolinium DTPA power injected  5  2D inversion recovery FGRE for delayed myocardial enhancement   6  The patient tolerated the procedure well without complication   Measurements: Mikey-septal wall 10 mm Postero-lateral wall 10 mm Global LV Assessment ----------------------------------------------------------------------------------------------------                Value                        Value / Height               Value / BSA                ---------------------------------------------------------------------------------------------------- LVEDV          269 ml                       153 24 ml/m (increased)      109 01 ml/m^2  (increased)                                               [60 - 120]                   [53 - 97]                  LVESV          181 ml                       103 33 ml/m (increased)      73 51 ml/m^2  (increased)                                                [12 - 44]                    [10 - 34]                  LVSV           87 ml                        49 9 ml/m                    35 5 ml/m^2  (reduced)                                                                                [37 - 69]                  LVEF           33 %                                                                                                                                                                                     LVCO           8 5 l/min                                                 3 4 l/min/m^2  (Normal)                                                                               [>= 2 5]                   LV Mass        285 g                        162 7 g/m (increased)        115 74 g/m^2  (increased)                                                [47 - 93]                    [42 - 78]                  Global RV Assessment ----------------------------------------------------------------------------------------------------                Value                        Value / Height               Value / BSA                ---------------------------------------------------------------------------------------------------- RVEDV 133 ml                       75 77 ml/m (normal)          53 91 ml/m^2  (reduced)                                                  [73 - 141]                   [67 - 111]                 RVESV          78 ml                        44 46 ml/m (normal)          31 63 ml/m^2  (normal)                                                   [22 - 66]                    [20 - 48]                  RVSV           55 ml                        31 31 ml/m                   22 27 ml/m^2  (reduced)                                                                               [39 - 71]                  RVEF           41 %                                                                                                                                                                                     RVCO           5 3 l/min                                                 2 2 l/min/m^2        Left atrium 32 cm^2 Aortic Root 22 mm Findings:  1  Mildly enlarged left ventricle end-diastolic volume  Mildly reduced left ventricle systolic function with ejection fraction measuring 33%  Mild diffusely increased myocardial wall thickness  Severe hypokinesis/akinesis of the inferior and inferoseptal wall  2  Normal right ventricle end-diastolic volume  Mildly reduced right ventricle systolic function with ejection fraction measuring 41%  3  The aortic, mitral, and tricuspid valves open without restriction  There is mitral regurgitation, however cine MRI is inaccurate in the qualitative assessment of valvular regurgitation  4  The left atrium is moderately enlarged  The aortic root is normal in size  5  There is no evidence of myocardial edema  6  Delayed post-gadolinium imaging demonstrates subendocardial delayed myocardial enhancement up to 50% transmural thickness at the basal inferior and inferoseptal wall and mid inferior wall  Impression: Impression: 1   Moderately enlarged left ventricle size with moderately reduced left ventricle systolic function  Ejection fraction measures 33%  Mild diffuse increased myocardial wall thickness  Severe hypokinesis/akinesis at the inferior and inferoseptal wall  Subendocardial delayed myocardial enhancement up to 50% transmural thickness at the basal inferior and inferoseptal wall and mid inferior wall, consistent with ischemic cardiomyopathy in the RCA/PDA distribution, with preserved viability  2  Normal right ventricle size  Mildly reduced right ventricle systolic function with ejection fraction measuring 41%  3  Mitral regurgitation 4  Moderate left atrial enlargement Workstation performed: [id number]     Vas Carotid Complete Study    Result Date: 12/21/2019  Narrative:  [facility] REPORT CLINICAL: Indications: Patient presents for a general health evaluation secondary to future open heart surgery  Patient is asymptomatic at this time  Operative History: No prior cardiovascular surgeries Risk Factors The patient has history of HTN, Diabetes, HLD, CAD, and MI  The patient's current BMI is 40 61, Weight is 275 lb and height is 69 in  Clinical Right Pressure: 123/62 mm Hg, Left Pressure: IV site  FINDINGS:  Right        Impression  PSV  EDV (cm/s)  Direction of Flow  Ratio  Dist  ICA                 42          11                      0 42  Mid  ICA                  62          19                      0 63  Prox   ICA    1 - 49%      57          16                      0 58  Dist CCA                  64          18                            Mid CCA                   98          24                      1 64  Prox CCA                  60          15                            Ext Carotid               75          15                      0 76  Prox Vert                 40          16  Antegrade                 Subclavian               132          10                             Left         Impression  PSV  EDV (cm/s)  Direction of Flow  Ratio  Dist  ICA                 57          29 0 73  Mid  ICA                  61          28                      0 78  Prox  ICA    1 - 49%      45          21                      0 59  Dist CCA                  72          21                            Mid CCA                   78          22                      1 00  Prox CCA                  78          21                            Ext Carotid               60          12                      0 78  Prox Vert                 20           9  Antegrade                 Subclavian                81           0                               CONCLUSION:  Impression  RIGHT: There is <50% stenosis noted in the internal carotid artery  Plaque is homogenous and smooth  Vertebral artery flow is antegrade  There is no significant subclavian artery disease  LEFT: There is <50% stenosis noted in the internal carotid artery  Plaque is homogenous and smooth  Vertebral artery flow is antegrade  There is no significant subclavian artery disease  There a no prior studies for comparison  Technical findings were faxed to chart  Tech Note: Very minimal plaque identified in the bilateral internal carotid arteries  Internal carotid artery stenosis determination by consensus criteria from: Jovi Rosario et al  Carotid Artery Stenosis: Gray-Scale and Doppler US Diagnosis - Society of Radiologists in [address] [place] [address], Radiology 2003; [id number]  SIGNATURE: Electronically Signed by: Elli Posada MD, [address] on 2019-12-21 01:50:00 PM    Vas Lower Limb Vein Mapping Bypass Graft    Result Date: 12/21/2019  Narrative:  [facility] REPORT CLINICAL: Indications: Pre-op Vein Mapping for Future Open Heart Surgery  Physician wants to evaluate for suitable conduit  Operative History: No prior cardiovascular surgeries Risk Factors The patient has history of HTN, Diabetes, HLD, CAD, and MI  The patient's current BMI is 40 61, Weight is 275 lb and height is 69 in    FINDINGS:  Segment         Right Left                            Diameter AP  Diameter AP  GSV Inguinal            7 6          6 1  GSV Prox Thigh          2 6          2 2  GSV Mid Thigh           2 8          1 1  GSV Dist Thigh          2 9          1 6  GSV Knee                2 7          1 1  GSV Prox Calf           2 4          2 1  GSV Mid Calf            2 0          2 7  GSV Dist Calf           2 2          2 5  GSV Ankle               2 3          2 9     CONCLUSION:  Impression:  RIGHT LOWER LIMB: The great saphenous vein is patent  from the groin to the ankle  The vein is of adequate caliber and quality for graft conduit with intraluminal diameters ranging from 2 0 mm to 7 6 mm from ankle to the saphenofemoral junction  LEFT LOWER LIMB: The great saphenous vein is patent  from the groin to the ankle  The vein is of adequate caliber and quality for graft conduit with intraluminal diameters ranging from 2 1 mm to 2 9 mm from the ankle to the proximal calf  Tech note: Technical findings were faxed to chart  SIGNATURE: Electronically Signed by: Leodan Crisostomo MD, [address] on 2019-12-21 01:49:47 PM    Xr Chest Portable Icu    Result Date: 12/24/2019  Narrative: CHEST INDICATION:   S/P open heart  COMPARISON:  None EXAM PERFORMED/VIEWS:  XR CHEST PORTABLE ICU FINDINGS:  ET tube tip likely within 1 cm of the rossi, rossi not well demonstrated  Right IJ central line tip over the base of the right heart and should be retracted approximately 6 cm  [place] catheter tip projecting over the right main pulmonary artery  Mediastinal and left thoracic drains present  Cardiomediastinal silhouette appears unremarkable  The lungs are grossly clear  Limited inspiratory volume  No pneumothorax or pleural effusion  Osseous structures appear within normal limits for patient age  Impression: Suggest retracting right IJ central line approximate 6 cm and ET tube 1-2 cm  The study was marked in [facility] for immediate notification   Workstation performed: [id number]       EKG personally reviewed by Jarocho Lai MD      Counseling / Coordination of Care  Total floor / unit time spent today 30 minutes  Greater than 50% of total time was spent with the patient and / or family counseling and / or coordination of care

## 2019-12-28 NOTE — PROGRESS NOTES
Progress Note - Cardiothoracic Surgery   [facility]  44 y o  male MRN: [id number]  Unit/Bed#: [facility] 404-01 Encounter: [id number]  Coronary artery disease  S/P coronary artery bypass grafting; POD # 5      24 Hour Events: weaned to RA yesterday and remains on RA  Insulin teaching done yesterday and patient states he can inject himself without issues  Tolerating diet  Pain controlled + BM yesterday  Moving around room comfortably    Transitioned off IV insulin to bolus dosing last evening       Medications:   Scheduled Meds:  Current Facility-Administered Medications:  acetaminophen 975 mg Oral Q8H Donna Ash PA-C    amiodarone 200 mg Oral Q8H [address] Donna Ash PA-C    amLODIPine 10 mg Oral Daily Jimmie Velez PA-C    aspirin 325 mg Oral Daily Jimmie Velez PA-C    atorvastatin 80 mg Oral Daily With [facility] YENNY Gutierrez    bisacodyl 10 mg Rectal Daily PRN Jimmie Velez PA-C    fondaparinux 2 5 mg Subcutaneous Daily Jimmie Velez PA-C    furosemide 40 mg Intravenous TID (diuretic) Cayla Ash PA-C    ibuprofen 600 mg Oral Q8H [address] Donna Ash PA-C    insulin glargine 50 Units Subcutaneous HS Diana Bhatia MD    insulin lispro 15 Units Subcutaneous TID With Meals Diana Bhatia MD    insulin lispro 2-12 Units Subcutaneous HS Diana Bhatia MD    insulin lispro 4-20 Units Subcutaneous TID AC Diana Bhatia MD    lisinopril 40 mg Oral Daily Jimmie Velez PA-C    metoprolol tartrate 50 mg Oral Q8H Donna Ash PA-C    mupirocin 1 application Nasal [id number] [address] Donna Ash PA-C    ondansetron 4 mg Intravenous Q6H PRN Jimmie Velez PA-C    oxyCODONE 10 mg Oral Q6H PRN Jimmie Velez PA-C    oxyCODONE 5 mg Oral Q4H PRN Jimmie Velez PA-C    pantoprazole 40 mg Oral Early Morning Donna Ash PA-C    polyethylene glycol 17 g Oral Daily Donna Ash PA-C    potassium chloride 20 mEq Oral Daily YENNY Luna-docusate sodium 1 tablet Oral BID Lore Atkins PA-C    sodium chloride 20 mL/hr Intravenous Continuous [employer], PA-C Last Rate: Stopped (12/27/19 2300)   temazepam 15 mg Oral HS PRN [employer], PA-C      Continuous Infusions:  sodium chloride 20 mL/hr Last Rate: Stopped (12/27/19 2300)     PRN Meds: bisacodyl    ondansetron    oxyCODONE    oxyCODONE    temazepam    Vitals:   Vitals:    12/27/19 2322 12/28/19 0300 12/28/19 0600 12/28/19 0727   BP: 144/86 142/88  139/84   BP Location: Left arm Left arm  Left arm   Pulse: 93 88  94   Resp: 19 22 21   Temp: 98 3 °F (36 8 °C) 98 4 °F (36 9 °C)  98 3 °F (36 8 °C)   TempSrc: Oral Oral  Oral   SpO2: 100% 97%  94%   Weight:   127 kg (279 lb 15 8 oz)    Height:           Telemetry: NSR; Heart Rate: 90    Respiratory:   SpO2: SpO2: 94 %; Room Air    Intake/Output:   I/O       12/26 0701 - 12/27 0700 12/27 0701 - 12/28 0700 12/28 0701 - 12/29 0700    P  O  200 605     I V  (mL/kg) 295 5 (2 3) 134 4 (1 1)     Total Intake(mL/kg) 495 5 (3 9) 739 4 (5 8)     Urine (mL/kg/hr) 1675 (0 5) 1775 (0 6)     Stool  0     Chest Tube       Total Output 1675 1775     Net -1179 6 -1035 6            Unmeasured Urine Occurrence 403 x      Unmeasured Stool Occurrence  1 x           Weights:   Weight (last 2 days)     Date/Time   Weight    12/28/19 0600   127 (279 98)    12/27/19 0600   127 (281)    12/26/19 0600   128 (282 19)            Results:   Results from last 7 days   Lab Units 12/27/19  0426 12/26/19  0356 12/25/19  0428   WBC Thousand/uL 15 19* 21 87* 24 90*   HEMOGLOBIN g/dL 9 9* 11 0* 12 5   HEMATOCRIT % 31 8* 34 7* 39 1   PLATELETS Thousands/uL 312 242 216     Results from last 7 days   Lab Units 12/28/19  0449 12/27/19  0426 12/26/19  0356  12/23/19  1559   SODIUM mmol/L 142 141 141   < >  --    POTASSIUM mmol/L 3 8 3 9 4 4   < >  --    CHLORIDE mmol/L 109* 109* 110*   < >  --    CO2 mmol/L 28 27 28   < >  --    CO2, I-STAT mmol/L  --   --   --   --  24   BUN mg/dL 29* 34* 28*   < > --    CREATININE mg/dL 0 98 1 18 0 94   < >  --    GLUCOSE, ISTAT mg/dl  --   --   --   --  181*   CALCIUM mg/dL 8 8 8 4 8 4   < >  --     < > = values in this interval not displayed  Point of care glucose: 119-184    nvasive Lines/Tubes:  Invasive Devices     Peripheral Intravenous Line            Peripheral IV 12/27/19 Left Forearm less than 1 day                Physical Exam:    HEENT/NECK:  PERRLA  No jugular venous distention  Cardiac: Regular rate and rhythm  Pulmonary:  Breath sounds clear bilaterally  Abdomen:  Non-tender, Non-distended and Normal bowel sounds  Incisions: Sternum is stable  Incision is clean, dry, and intact  and Saphenectomy incison is clean, dry, and intact  Extremities: Extremities warm/dry and Trace edema B/L  Neuro: Alert and oriented X 3  Skin: Warm/Dry, without rashes or lesions  Assessment:  Principal Problem:    NSTEMI (non-ST elevated myocardial infarction) ([facility] [address] )  Active Problems:    Essential hypertension    Diabetes mellitus type 2, uncontrolled (H[facility])    Snoring    Hyperlipidemia    S/P CABG x 3    Acute respiratory insufficiency       Coronary artery disease  S/P coronary artery bypass grafting; POD # 5    Plan:    1  Cardiac:   NSR; HR/BP well-controlled  Lopressor, 50mg PO BID, lisinopril 40 mg daily and amlodipine 10 mg daily  Continue ASA and Statin therapy  Epicardial pacing wires out  Patient no longer has central IV access   Continue DVT prophylaxis    2  Pulmonary:   Good Room air oxygen saturation; Continue incentive spirometry/Coughing/Deep breathing exercises  Chest tubes have been discontinued    3  Renal:   Intake/Output net: -1035 mL/24 hours  Continue diuresis   Lasix 40 mg IV BID  Potassium Chloride 20 mEq PO BID  Post op Creatinine stable; Follow up labs prn    4  Neuro:  Neurologically intact; No active issues  Incisional pain well-controlled; Continue prn Percocet    5   GI:  Tolerating TLC 2 3 gm sodium diet  Maintain 1800 mL daily fluid restriction   Continue stool softeners and prn suppository  Continue GI prophylaxis    6  Endo:   History of diabetes; Continue SQ insulin therapy as directed by endocrinology physician  Insulin administration teaching for home therapy ordered    7    Hematology:    Post-operative blood count acceptable; Trend prn    8  Disposition:      Anticipate discharge to home tomorrow     VTE Pharmacologic Prophylaxis: Fondaparinux (Arixtra)  VTE Mechanical Prophylaxis: sequential compression device    Collaborative rounds completed with FATEMEH Ashford    Plan of care discussed with bedside nurse    SIGNATURE: Lisandra Hurst PA-C  DATE: December 28, 2019  TIME: 7:31 AM

## 2019-12-28 NOTE — OCCUPATIONAL THERAPY NOTE
Occupational Therapy Treatment Note:     12/28/19 1305   Restrictions/Precautions   Weight Bearing Precautions Per Order No   Other Precautions Cardiac/sternal   Pain Assessment   Pain Score No Pain   ADL   Where Assessed Chair   UB Dressing Assistance 6  Modified independent   UB Dressing Deficit Thread RUE; Thread LUE   UB Dressing Comments seated   LB Dressing Assistance 6  Modified independent   LB Dressing Deficit Don/doff R sock; Don/doff L sock   LB Dressing Comments seated   Bed Mobility   Additional Comments OOB in chair upon presentation and at end of session   Transfers   Sit to Stand 7  Independent   Stand to Sit 7  Independent   Stand pivot 7  Independent   Additional Comments no AD   Functional Mobility   Functional Mobility 7  Independent   Additional Comments greater than house hold distances   Cognition   Overall Cognitive Status [facility]   Arousal/Participation Alert   Attention Within functional limits   Orientation Level Oriented X4   Memory Within functional limits   Following Commands Follows all commands and directions without difficulty   Comments no concerns    Activity Tolerance   Activity Tolerance Patient tolerated treatment well   Medical Staff Made Aware NSG aware   Assessment   Assessment Pt was seen this date for OT tx session focusing on self care tasks, trafners, funciotional mobility, review of cardiac precautions, home d/c discussion planning and overall activity to[place]  Pt presents seated OOB in [place] previously mentioned tasks at documeneted assist levels please see above  Pt goals per initial eval, pt has no immeiadte OT needs at this time  Spoke with OTR/L D/C OT      Plan   Treatment Interventions ADL retraining   Goal Expiration Date 01/03/20   OT Treatment Day 2   OT Frequency 3-5x/wk   Recommendation   OT Discharge Recommendation Home with family support       [place], [place]

## 2019-12-28 NOTE — PROGRESS NOTES
Pt self injected all insulin doses today without any difficulty  Pt was given the insulin flip card brochure and reviewed with this nurse  Pt was also given the insulin pens for demonstration and did teach back with same  Pt states he feels comfortable with self injections and understands the insulin pens at this time

## 2019-12-29 VITALS
SYSTOLIC BLOOD PRESSURE: 154 MMHG | OXYGEN SATURATION: 92 % | HEIGHT: 69 IN | TEMPERATURE: 98.1 F | BODY MASS INDEX: 41.14 KG/M2 | WEIGHT: 277.78 LBS | HEART RATE: 84 BPM | DIASTOLIC BLOOD PRESSURE: 81 MMHG | RESPIRATION RATE: 20 BRPM

## 2019-12-29 LAB
ANION GAP SERPL CALCULATED.3IONS-SCNC: 3 MMOL/L (ref 4–13)
BUN SERPL-MCNC: 23 MG/DL (ref 5–25)
CALCIUM SERPL-MCNC: 8.9 MG/DL (ref 8.3–10.1)
CHLORIDE SERPL-SCNC: 109 MMOL/L (ref 100–108)
CO2 SERPL-SCNC: 29 MMOL/L (ref 21–32)
CREAT SERPL-MCNC: 0.91 MG/DL (ref 0.6–1.3)
GFR SERPL CREATININE-BSD FRML MDRD: 106 ML/MIN/1.73SQ M
GLUCOSE SERPL-MCNC: 138 MG/DL (ref 65–140)
GLUCOSE SERPL-MCNC: 143 MG/DL (ref 65–140)
POTASSIUM SERPL-SCNC: 4.1 MMOL/L (ref 3.5–5.3)
SODIUM SERPL-SCNC: 141 MMOL/L (ref 136–145)

## 2019-12-29 PROCEDURE — 80048 BASIC METABOLIC PNL TOTAL CA: CPT | Performed by: PHYSICIAN ASSISTANT

## 2019-12-29 PROCEDURE — 82948 REAGENT STRIP/BLOOD GLUCOSE: CPT

## 2019-12-29 PROCEDURE — 94668 MNPJ CHEST WALL SBSQ: CPT

## 2019-12-29 PROCEDURE — 99024 POSTOP FOLLOW-UP VISIT: CPT | Performed by: PHYSICIAN ASSISTANT

## 2019-12-29 PROCEDURE — 99232 SBSQ HOSP IP/OBS MODERATE 35: CPT | Performed by: INTERNAL MEDICINE

## 2019-12-29 PROCEDURE — 99024 POSTOP FOLLOW-UP VISIT: CPT | Performed by: THORACIC SURGERY (CARDIOTHORACIC VASCULAR SURGERY)

## 2019-12-29 RX ORDER — LISINOPRIL 40 MG/1
40 TABLET ORAL DAILY
Qty: 30 TABLET | Refills: 2 | Status: SHIPPED | OUTPATIENT
Start: 2019-12-29

## 2019-12-29 RX ORDER — ATORVASTATIN CALCIUM 80 MG/1
80 TABLET, FILM COATED ORAL
Qty: 30 TABLET | Refills: 2 | Status: SHIPPED | OUTPATIENT
Start: 2019-12-29

## 2019-12-29 RX ORDER — METOPROLOL TARTRATE 50 MG/1
50 TABLET, FILM COATED ORAL EVERY 12 HOURS SCHEDULED
Qty: 60 TABLET | Refills: 2 | Status: SHIPPED | OUTPATIENT
Start: 2019-12-29 | End: 2021-04-15

## 2019-12-29 RX ORDER — OXYCODONE HYDROCHLORIDE AND ACETAMINOPHEN 5; 325 MG/1; MG/1
TABLET ORAL
Qty: 30 TABLET | Refills: 0 | Status: SHIPPED | OUTPATIENT
Start: 2019-12-29 | End: 2020-01-24

## 2019-12-29 RX ORDER — AMLODIPINE BESYLATE 10 MG/1
10 TABLET ORAL DAILY
Qty: 30 TABLET | Refills: 2 | Status: SHIPPED | OUTPATIENT
Start: 2019-12-29 | End: 2020-02-12

## 2019-12-29 RX ORDER — GLUCOSAMINE HCL/CHONDROITIN SU 500-400 MG
CAPSULE ORAL
Qty: 250 EACH | Refills: 0 | Status: SHIPPED | OUTPATIENT
Start: 2019-12-29

## 2019-12-29 RX ORDER — POTASSIUM CHLORIDE 20 MEQ/1
20 TABLET, EXTENDED RELEASE ORAL DAILY
Qty: 7 TABLET | Refills: 1 | Status: SHIPPED | OUTPATIENT
Start: 2019-12-29 | End: 2020-01-14 | Stop reason: CLARIF

## 2019-12-29 RX ORDER — OXYCODONE HYDROCHLORIDE AND ACETAMINOPHEN 5; 325 MG/1; MG/1
TABLET ORAL
Qty: 30 TABLET | Refills: 0 | Status: SHIPPED | OUTPATIENT
Start: 2019-12-29 | End: 2019-12-29 | Stop reason: SDUPTHER

## 2019-12-29 RX ORDER — INSULIN GLARGINE 100 [IU]/ML
50 INJECTION, SOLUTION SUBCUTANEOUS
Qty: 30 ML | Refills: 2 | Status: SHIPPED | OUTPATIENT
Start: 2019-12-29 | End: 2020-01-13 | Stop reason: SDUPTHER

## 2019-12-29 RX ORDER — POLYETHYLENE GLYCOL 3350 17 G/17G
17 POWDER, FOR SOLUTION ORAL DAILY
Qty: 14 EACH | Refills: 0 | Status: SHIPPED | OUTPATIENT
Start: 2019-12-30 | End: 2020-01-14 | Stop reason: CLARIF

## 2019-12-29 RX ORDER — AMOXICILLIN 250 MG
1 CAPSULE ORAL 2 TIMES DAILY PRN
Qty: 30 TABLET | Refills: 0 | COMMUNITY
Start: 2019-12-29 | End: 2020-01-14 | Stop reason: CLARIF

## 2019-12-29 RX ORDER — OMEPRAZOLE 20 MG/1
20 CAPSULE, DELAYED RELEASE ORAL DAILY
Qty: 30 CAPSULE | Refills: 0 | Status: SHIPPED | OUTPATIENT
Start: 2019-12-29 | End: 2020-01-24

## 2019-12-29 RX ORDER — TORSEMIDE 20 MG/1
20 TABLET ORAL DAILY
Qty: 7 TABLET | Refills: 1 | Status: SHIPPED | OUTPATIENT
Start: 2019-12-29 | End: 2020-01-14 | Stop reason: CLARIF

## 2019-12-29 RX ORDER — ASPIRIN 325 MG
325 TABLET ORAL DAILY
Qty: 30 TABLET | Refills: 2 | Status: SHIPPED | OUTPATIENT
Start: 2019-12-30

## 2019-12-29 RX ORDER — ACETAMINOPHEN 325 MG/1
650 TABLET ORAL EVERY 6 HOURS PRN
Qty: 30 TABLET | Refills: 0 | Status: SHIPPED | OUTPATIENT
Start: 2019-12-29 | End: 2020-01-27

## 2019-12-29 RX ADMIN — ASPIRIN 325 MG ORAL TABLET 325 MG: 325 PILL ORAL at 08:14

## 2019-12-29 RX ADMIN — POTASSIUM CHLORIDE 20 MEQ: 1500 TABLET, EXTENDED RELEASE ORAL at 08:14

## 2019-12-29 RX ADMIN — IBUPROFEN 600 MG: 600 TABLET, FILM COATED ORAL at 05:44

## 2019-12-29 RX ADMIN — METOPROLOL TARTRATE 50 MG: 50 TABLET, FILM COATED ORAL at 01:26

## 2019-12-29 RX ADMIN — ACETAMINOPHEN 975 MG: 325 TABLET ORAL at 05:44

## 2019-12-29 RX ADMIN — AMIODARONE HYDROCHLORIDE 200 MG: 200 TABLET ORAL at 05:44

## 2019-12-29 RX ADMIN — FUROSEMIDE 40 MG: 10 INJECTION, SOLUTION INTRAMUSCULAR; INTRAVENOUS at 08:15

## 2019-12-29 RX ADMIN — LISINOPRIL 40 MG: 20 TABLET ORAL at 08:14

## 2019-12-29 RX ADMIN — FONDAPARINUX SODIUM 2.5 MG: 2.5 INJECTION, SOLUTION SUBCUTANEOUS at 08:15

## 2019-12-29 RX ADMIN — METOPROLOL TARTRATE 50 MG: 50 TABLET, FILM COATED ORAL at 08:14

## 2019-12-29 RX ADMIN — SENNOSIDES AND DOCUSATE SODIUM 1 TABLET: 8.6; 5 TABLET ORAL at 08:14

## 2019-12-29 RX ADMIN — PANTOPRAZOLE SODIUM 40 MG: 40 TABLET, DELAYED RELEASE ORAL at 05:44

## 2019-12-29 RX ADMIN — AMLODIPINE BESYLATE 10 MG: 10 TABLET ORAL at 08:14

## 2019-12-29 NOTE — DISCHARGE INSTRUCTIONS
Coronary Artery Bypass Graft   WHAT YOU SHOULD KNOW:   A coronary artery bypass graft (CABG) is open heart surgery to clear blocked arteries in your heart  CABG surgery improves blood flow to your heart by bypassing (sending blood around) the blocked part of an artery  This restores blood flow to your heart and helps prevent a heart attack  AFTER YOU LEAVE:   Medicines: You may need any of the following:  · Prescription pain medicine  may be given  Ask your primary healthcare provider [facility]) how to take this medicine safely  · Antiplatelets , such as aspirin, help prevent blood clots  Take your antiplatelet medicine exactly as directed  These medicines make it more likely for you to bleed or bruise  If you are told to take aspirin, do not take acetaminophen or ibuprofen instead  · Antibiotics  help prevent an infection caused by bacteria  · Heart medicine  helps strengthen and regulate your heartbeat  · Stool softeners  make it easier for you to have a bowel movement and help prevent constipation  · Take your medicine as directed  Contact your PHP if you think your medicine is not helping or if you have side effects  Tell him if you are allergic to any medicine  Keep a list of the medicines, vitamins, and herbs you take  Include the amounts, and when and why you take them  Bring the list or the pill bottles to follow-up visits  Carry your medicine list with you in case of an emergency  Follow up with your PHP or cardiologist as directed:  Write down your questions so you remember to ask them during your visits  Cardiac rehabilitation:  Cardiac rehabilitation (rehab) is a program run by specialists who will help you safely strengthen your heart and prevent more heart disease  This plan includes exercise, relaxation, stress management, and heart-healthy nutrition  Caregivers will also check to make sure any medicines you are taking are working   The plan may also include instructions for when you can drive, return to work, and do other normal daily activities  Wound care:  Care for your wound as directed  Carefully wash the wound with soap and water  If you do not have a bandage, gently pat the incision dry with a clean towel  If you have a bandage, dry the area and put on a new, clean bandage  Change your bandage if it gets wet or dirty  Prevent another blocked artery:   · Eat heart healthy foods  You may need to eat foods that are low in salt, fat, or cholesterol  Ask your PHP for more information about a heart healthy diet  · Drink liquids as directed  Ask your PHP how much liquid to drink each day and which liquids are best for you  Liquids will help soften your bowel movements and prevent constipation  · Do not smoke  If you smoke, it is never too late to quit  Smoking will damage your heart  Ask your PHP for information if you need help quitting  · Maintain a healthy weight  Ask your PHP how much you should weigh  Ask him to help you create a weight loss plan if you are overweight  Extra weight can increase the stress on your heart  Contact your PHP or cardiologist if:   · You have a fever higher than 101°F (38 4°C)  · Your signs and symptoms return  · You have gained 2 pounds in 24 hours  · Your wound is red, swollen, or draining pus  · You feel depressed  · You have questions or concerns about your condition or care  Seek care immediately or call 911 if:   · You have a severe headache  · You have a fast heartbeat that flutters  · You have numbness or tingling in your arms or legs  · You feel like you are going to faint  · Your arm or leg feels warm, tender, and painful  It may look swollen and red  · You feel lightheaded, short of breath, and have chest pain  · You cough up blood  © 2014 [address] is for End User's use only and may not be sold, redistributed or otherwise used for commercial purposes   All illustrations and images included in CareNotes® are the copyrighted property of [employer]  or Jonathan Sheth  The above information is an  only  It is not intended as medical advice for individual conditions or treatments  Talk to your doctor, nurse or pharmacist before following any medical regimen to see if it is safe and effective for you  Sternal Precautions   WHAT YOU NEED TO KNOW:   What are sternal precautions? Sternal precautions are used to help protect your sternum (breastbone) after open chest surgery  Wires are placed during surgery to hold the sternum together as it heals  Sternal precautions help prevent the wires from cutting through the sternum  The precautions also help prevent the sternum from coming apart from an injury, and prevent pain and bleeding  You may need to use the precautions for up to 12 weeks after surgery  Your surgeon will give you specific instructions based on the type of surgery you had  It is important to follow the instructions carefully  An injury to the healing sternum can be life-threatening  What are some general sternal precautions? Start slowly and do more as you get stronger  Pain medicine might make it harder for you to know when to slow down or be careful  Stop immediately if you hear a crunch or pop in your sternum  · Protect your sternum  Hug a pillow to your chest or cross your arms over your chest when you laugh, sneeze, or cough  · Be careful when you get into or out of a chair or bed  Hug a pillow or cross your arms when you stand or sit  Do not twist as you move  Use only your legs to sit and stand  You may need to use a raised toilet seat if you have trouble standing up without using your arms  Your healthcare provider may teach you to use your elbow for support as you move from lying to sitting  · Ask when you may take a bath or shower    You may need to use a bath chair if you have trouble getting into or out of the tub  Do not use a grab bar  · Do not lift or carry anything heavier than 10 pounds  For example, a gallon of milk weighs 8 pounds  · Do not push or pull anything  Examples include a car door or a vacuum   · Do not drive while you are healing  Your surgeon will tell you when it is safe for you to start driving again  How do I care for my surgery wound? Always wash your hands before you care for your wound  Wash your wound as directed  Do not rub the wound as you wash or dry the area  Pat the area dry with a clean towel  Change the bandages as directed and when they get wet or dirty  Do not smoke:  Nicotine can damage blood vessels and make it more difficult to heal  Do not use e-cigarettes or smokeless tobacco in place of cigarettes or to help you quit  They still contain nicotine  Ask your healthcare provider for information if you currently smoke and need help quitting  Call 911 for any of the following:   · You have sudden pain in your sternum and hear a crunch or pop  · You have bleeding that does not stop even after you apply pressure for 5 minutes  When should I seek immediate care? · You hear crunching or grinding in your sternum  · You have signs of an infection, such as a fever, red or warm skin, or pus in the surgery wound  When should I contact my healthcare provider? · You continue to feel pain, even after you take your pain medicine  · You have new or worsening pain, or any pain with movement  · You have questions or concerns about your condition or care  CARE AGREEMENT:   You have the right to help plan your care  Learn about your health condition and how it may be treated  Discuss treatment options with your caregivers to decide what care you want to receive  You always have the right to refuse treatment  The above information is an  only  It is not intended as medical advice for individual conditions or treatments   Talk to your doctor, nurse or pharmacist before following any medical regimen to see if it is safe and effective for you  © 2017 [address] Information is for End User's use only and may not be sold, redistributed or otherwise used for commercial purposes  All illustrations and images included in CareNotes® are the copyrighted property of A [employer] M [employer] Inc  or Jonathan Sheth  Heart Healthy Diet   WHAT YOU NEED TO KNOW:   A heart healthy diet is an eating plan low in total fat, unhealthy fats, and sodium (salt)  A heart healthy diet helps decrease your risk for heart disease and stroke  Limit the amount of fat you eat to 25% to 35% of your total daily calories  Limit sodium to less than 2,300 mg each day  DISCHARGE INSTRUCTIONS:   Healthy fats:  Healthy fats can help improve cholesterol levels  The risk for heart disease is decreased when cholesterol levels are normal  Choose healthy fats, such as the following:  · Unsaturated fat  is found in foods such as soybean, canola, olive, corn, and safflower oils  It is also found in soft tub margarine that is made with liquid vegetable oil  · Omega-3 fat  is found in certain fish, such as salmon, tuna, and trout, and in walnuts and flaxseed  Unhealthy fats:  Unhealthy fats can cause unhealthy cholesterol levels in your blood and increase your risk of heart disease  Limit unhealthy fats, such as the following:  · Cholesterol  is found in animal foods, such as eggs and lobster, and in dairy products made from whole milk  Limit cholesterol to less than 300 milligrams (mg) each day  You may need to limit cholesterol to 200 mg each day if you have heart disease  · Saturated fat  is found in meats, such as ferguson and hamburger  It is also found in chicken or turkey skin, whole milk, and butter  Limit saturated fat to less than 7% of your total daily calories  Limit saturated fat to less than 6% if you have heart disease or are at increased risk for it  · Trans fat  is found in packaged foods, such as potato chips and cookies  It is also in hard margarine, some fried foods, and shortening  Avoid trans fats as much as possible    Heart healthy foods and drinks to include:  Ask your dietitian or healthcare provider how many servings to have from each of the following food groups:  · Grains:      ¨ Whole-wheat breads, cereals, and pastas, and brown rice    ¨ Low-fat, low-sodium crackers and chips    · Vegetables:      ¨ Broccoli, green beans, green peas, and spinach    ¨ Collards, kale, and lima beans    ¨ Carrots, sweet potatoes, tomatoes, and peppers    ¨ Canned vegetables with no salt added    · Fruits:      ¨ Bananas, peaches, pears, and pineapple    ¨ Grapes, raisins, and dates    ¨ Oranges, tangerines, grapefruit, orange juice, and grapefruit juice    ¨ Apricots, mangoes, melons, and papaya    ¨ Raspberries and strawberries    ¨ Canned fruit with no added sugar    · Low-fat dairy products:      ¨ Nonfat (skim) milk, 1% milk, and low-fat almond, cashew, or soy milks fortified with calcium    ¨ Low-fat cheese, regular or frozen yogurt, and cottage cheese    · Meats and proteins , such as lean cuts of beef and pork (loin, leg, round), skinless chicken and turkey, legumes, soy products, egg whites, and nuts  Foods and drinks to limit or avoid:  Ask your dietitian or healthcare provider about these and other foods that are high in unhealthy fat, sodium, and sugar:  · Snack or packaged foods , such as frozen dinners, cookies, macaroni and cheese, and cereals with more than 300 mg of sodium per serving    · Canned or dry mixes  for cakes, soups, sauces, or gravies    · Vegetables with added sodium , such as instant potatoes, vegetables with added sauces, or regular canned vegetables    · Other foods high in sodium , such as ketchup, barbecue sauce, salad dressing, pickles, olives, soy sauce, and miso    · High-fat dairy foods  such as whole or 2% milk, cream cheese, or sour cream, and cheeses     · High-fat protein foods  such as high-fat cuts of beef (T-bone steaks, ribs), chicken or turkey with skin, and organ meats, such as liver    · Cured or smoked meats , such as hot dogs, ferguson, and sausage    · Unhealthy fats and oils , such as butter, stick margarine, shortening, and cooking oils such as coconut or palm oil    · Food and drinks high in sugar , such as soft drinks (soda), sports drinks, sweetened tea, candy, cake, cookies, pies, and doughnuts  Other diet guidelines to follow:   · Eat more foods containing omega-3 fats  Eat fish high in omega-3 fats at least 2 times a week  · Limit alcohol  Too much alcohol can damage your heart and raise your blood pressure  Women should limit alcohol to 1 drink a day  Men should limit alcohol to 2 drinks a day  A drink of alcohol is 12 ounces of beer, 5 ounces of wine, or 1½ ounces of liquor  · Choose low-sodium foods  High-sodium foods can lead to high blood pressure  Add little or no salt to food you prepare  Use herbs and spices in place of salt  · Eat more fiber  to help lower cholesterol levels  Eat at least 5 servings of fruits and vegetables each day  Eat 3 ounces of whole-grain foods each day  Legumes (beans) are also a good source of fiber  Lifestyle guidelines:   · Do not smoke  Nicotine and other chemicals in cigarettes and cigars can cause lung and heart damage  Ask your healthcare provider for information if you currently smoke and need help to quit  E-cigarettes or smokeless tobacco still contain nicotine  Talk to your healthcare provider before you use these products  · Exercise regularly  to help you maintain a healthy weight and improve your blood pressure and cholesterol levels  Ask your healthcare provider about the best exercise plan for you  Do not start an exercise program without asking your healthcare provider     Follow up with your healthcare provider as directed:  Write down your questions so you remember to ask them during your visits  © 2017 [address] Information is for End User's use only and may not be sold, redistributed or otherwise used for commercial purposes  All illustrations and images included in CareNotes® are the copyrighted property of [employer]  or Jonathan Sheth  The above information is an  only  It is not intended as medical advice for individual conditions or treatments  Talk to your doctor, nurse or pharmacist before following any medical regimen to see if it is safe and effective for you  Narcotic Pain Management   WHAT YOU NEED TO KNOW:   It is important to manage your pain so you can rest and heal  It will also help you return to your normal activities  DISCHARGE INSTRUCTIONS:   Call 911 or have someone call 911 for any of the following:   · You are breathing slower than normal, or you have trouble breathing  · You cannot be woken  · You have a seizure  Seek care immediately if:   · Your heart is beating slower than usual     · Your heart feels like it is jumping or fluttering  · You have trouble staying awake  · You have severe muscle pain or weakness  · You see or hear things that are not real   Contact your healthcare provider if:   · You are too dizzy to stand up  · Your pain gets worse or you have new pain  · Your pain does not get better after you use your narcotic medicine  · You cannot do your usual activities because of side effects from the narcotic  · You are constipated or have abdominal pain  · You have questions or concerns about your condition or care  Follow up with your healthcare provider as directed: You may be referred to a pain specialist for more tests and treatment  Write down your questions so you remember to ask them during your visits  Narcotic safety:   · Take your medicine as directed    Ask if you need more information on how to take your medicine correctly  Follow up with your healthcare provider regularly  You may need to have your dose adjusted  Do not use narcotic medicine if you are pregnant or breastfeeding  Narcotic medicines can be transferred to your baby through your blood and breast milk  · Give your healthcare provider a list of all your medicines  Include any over-the-counter medicines, vitamins, and herbs  It can be dangerous to take narcotics with certain other medicines, such as antihistamines  · Keep your medicine in a safe place  Store your narcotic medicine in a locked cabinet to keep it away from children and others  · Do not drink alcohol while you use narcotics  Alcohol use with a narcotic medicine can make you sleepy and slow your breathing rate  You may stop breathing completely  · Do not drive or operate heavy machinery after you take narcotic medicine  Narcotic medicine can make you drowsy and make it hard to concentrate  You may injure yourself or others if you drive or operate heavy machinery while taking your medicine  Drink more liquids and eat high-fiber foods:  Liquids and fiber will help prevent constipation  Ask your healthcare provider what liquids are right for you and how much you should drink  Also ask for a list of foods that contain fiber  © 2017 [address]  Information is for End User's use only and may not be sold, redistributed or otherwise used for commercial purposes  All illustrations and images included in CareNotes® are the copyrighted property of A [employer] , [employer]  or Jonathan Sheth  The above information is an  only  It is not intended as medical advice for individual conditions or treatments  Talk to your doctor, nurse or pharmacist before following any medical regimen to see if it is safe and effective for you                    Hypoglycemia in a Person with Diabetes   WHAT YOU NEED TO KNOW:   Hypoglycemia is a serious condition that happens when your blood glucose (sugar) level drops too low  The blood sugar level is usually too high in a person with diabetes, but the level can also drop too low  It is important to follow your diabetes management plan to keep your blood sugar level steady  DISCHARGE INSTRUCTIONS:   Call 911 for any of the following:   · You have a seizure or pass out  · You feel you are going to pass out  · You have trouble thinking clearly  Seek care immediately if:  · Your blood sugar is less than 50 mg/dL and does not respond to treatment  Contact your healthcare provider if:   · You have had symptoms of low blood sugar several times  · You have questions about the amount of insulin or diabetes medicine you are taking  · You have questions or concerns about your condition or care  Medicines:   · Insulin or diabetes medicine  help to keep your blood sugar under control  · Glucagon  may be needed if you have severe hypoglycemia  · Take your medicine as directed  Contact your healthcare provider if you think your medicine is not helping or if you have side effects  Tell him or her if you are allergic to any medicine  Keep a list of the medicines, vitamins, and herbs you take  Include the amounts, and when and why you take them  Bring the list or the pill bottles to follow-up visits  Carry your medicine list with you in case of an emergency  Follow up with your healthcare provider as directed: You may need dose changes to your insulin or oral diabetes medicine if you have hypoglycemia  Write down your questions so you remember to ask them during your visits  Manage hypoglycemia:   · Check your blood sugar level right away if you have symptoms of hypoglycemia  Hypoglycemia is usually 70 mg/dL or below  Ask your healthcare provider what blood sugar level is too low for you  · If your blood sugar level is too low, eat or drink 15 grams of fast-acting carbohydrate    Examples of this amount of fast-acting carbohydrate are 4 ounces (½ cup) of fruit juice or 4 ounces of regular soda  Other examples are 2 tablespoons of raisins or 3 to 4 glucose tablets  Check your blood sugar level 15 minutes later  If the level is still low (less than 100 mg/dL), have another 15 grams of carbohydrate  When the level returns to 100 mg/dL, eat a snack or meal that contains carbohydrates  This will help prevent another drop in blood sugar  Always carefully follow your healthcare provider's instructions on how to treat low blood sugar levels  · Always carry a source of fast-acting carbohydrate  If you have symptoms of hypoglycemia and you do not have a blood glucose meter, have a source of fast-acting carbohydrate anyway  Avoid carbohydrate foods that are high in fat  The fat content may make it take longer to increase your blood sugar level  Ask your healthcare provider if you should carry a glucagon kit  Glucagon is a medicine that is injected when you develop severe hypoglycemia and become unconscious  Check the expiration date every month and replace it before it expires  · Teach others how to help you if you have symptoms of hypoglycemia  Tell them about the symptoms of hypoglycemia  Ask them to give you a source of fast-acting carbohydrate if you cannot get it yourself  Ask them to give you a glucagon injection if you have symptoms of hypoglycemia and you become unconscious or have a seizure  Ask them to call 911   This is an emergency  Tell them never to try to make you swallow anything if you faint or have a seizure  · Wear medical alert jewelry  or carry a card that says you have diabetes  Ask where to get these items  Prevent hypoglycemia:   · Take diabetes medicine as directed  Take your medicine at the right time and in the right amount  Your healthcare provider may change your blood sugar goals if you get hypoglycemia often  · Eat regular meals and snacks    Talk to your dietitian or healthcare provider about a meal plan that is right for you  Do not skip meals  · Check your blood sugar level as directed  Ask your healthcare provider what your blood sugar levels should be before and after you eat  Ask when and how often to check your blood sugar level  You may need to check at least 3 times each day  Record your blood sugar level results and take the record with you when you see your healthcare provider  Your provider may use the record to make changes to your medicine, food, or exercise schedules  · Check your blood sugar level before you exercise  Exercise can decrease your blood sugar level  If your blood sugar level is less than 100 mg/dL, have a carbohydrate snack  Examples are 4 to 6 crackers, ½ banana, 8 ounces (1 cup) of nonfat or 1% milk, or 4 ounces (½ cup) of juice  If you will exercise for more than 1 hour, you may need to check your blood sugar level every 30 minutes  Your healthcare provider may also recommend that you check your blood sugar level after exercise  · Be aware of how alcohol affects your blood sugar level  Alcohol can cause your blood sugar level to drop for up to 12 hours after drinking  Ask your healthcare provider if alcohol is safe for you  If you drink alcohol, always have a snack or meal at the same time  Women should limit alcohol to 1 drink a day  Men should limit alcohol to 2 drinks a day  A drink of alcohol is 12 ounces of beer, 5 ounces of wine, or 1½ ounces of liquor  © 2017 [address]  Information is for End User's use only and may not be sold, redistributed or otherwise used for commercial purposes  All illustrations and images included in CareNotes® are the copyrighted property of A [employer]  or Jonathan Sheth  The above information is an  only  It is not intended as medical advice for individual conditions or treatments   Talk to your doctor, nurse or pharmacist before following any medical regimen to see if it is safe and effective for you  Insulin Pens   WHAT YOU NEED TO KNOW:   An insulin pen is a device used to inject insulin  The pen contains a cartridge of insulin  The pen may be reusable or disposable  You may need a different pen for each type of insulin you use  DISCHARGE INSTRUCTIONS:   Contact your healthcare provider if:   · You feel or see hard lumps in your skin where you inject your insulin  · You think you gave yourself too much or not enough insulin  · Your injections are very painful  · You see blood or clear fluid on your injection site more than once after you inject insulin  · You have questions about how to give the injection  · You cannot afford to buy your diabetes supplies  · You have questions or concerns about your condition or care  How to get the insulin ready to use:   · Check the label and color of the insulin  Check that you have the correct type and strength of insulin  Insulin pens are available as U100 and U500 strengths  Also check the expiration date on the label  Use a new cartridge or pen if the expiration date has passed  Short or rapid-acting insulin should be clear, colorless, and free of particles or clumps  Use a new cartridge or pen if the insulin does not look right  Follow the pen 's instructions for inserting a new cartridge into a reusable pen  · Mix cloudy insulin  Gently roll the pen back and forth between the palms of your hands  Repeat this 10 times  Do not shake the pen  This can make the insulin clump together  Next, gently tip the pen up and down 10 times  Do not use the insulin if there are clumps in it after you mix it  How to get the pen ready to use:   · Remove a new pen from the refrigerator 30 minutes before you use it  Insulin should be injected at room temperature  · Wash your hands  Use soap and water or an alcohol-based hand rub  This will help decrease your risk for an infection  · Remove the cap from the pen  Wipe the needle attachment area with an alcohol swab  · Attach a needle to the pen  Remove the tab from the needle  Do not remove the outer cap on the needle  Push the needle straight onto the pen  Turn the needle clockwise until you cannot turn it more  Make sure the needle is straight  · Remove the needle caps  Remove the outer cap and save  Remove the inner cap and throw it away  · Remove air from the pen  Air may cause pain during injection  Turn the dial to 2 units  For most insulin pens, you will hear a click for each unit of insulin that you dial  Hold the pen and point the needle up  Gently tap the pen to move air bubbles to the top of the pen  Press the injection button  You should see a drop of insulin on the tip of the pen  If you do not see a drop, change the needle and repeat this step  If you do not see a drop after you repeat this step 3 times, use a new pen  · Select the correct dose on the pen  Turn the dial to the number of units you need to inject  The pointer on the side of your pen should line up with your dose  The dial can be turned in either direction to choose the correct dose  You cannot choose a dose larger than the number of units left in the cartridge  Insert another cartridge or use another disposable pen if there is not enough insulin  Instead you can inject part of your dose with the insulin that is left  Next, you can use a new cartridge or pen to inject the rest of your dose  ·        Where to inject insulin:   · You can inject insulin into your abdomen, upper arm, buttocks, hip, and the front or side of the thigh  Insulin works fastest when it is injected into the abdomen  · Do not inject insulin into areas where you have a wound or bruising  Insulin injected into wounds or bruises may not get into your body correctly  · Use a different area within the site each time you inject insulin    For example, inject insulin into different areas in your abdomen  Insulin injected into the same area can cause lumps, swelling, or thickened skin  How to inject insulin with a pen:   · Clean the skin where you will inject the insulin  You can use an alcohol pad or a cotton swab dipped in alcohol  Let the area dry before you inject  This will decrease pain  · Grab a fold of your skin  Gently pinch the skin and fat between your thumb and first finger  · Insert the needle straight into your skin  Do not hold the syringe at an angle  Make sure the needle is all the way into the skin  Let go of the pinched tissue  · Push the injection button to inject the insulin  Continue to press on the injection button  Keep the needle in place for 10 seconds  · Pull out the needle  Replace the needle cap  Press on your injection site for 5 to 10 seconds  Do not rub  This will keep insulin from leaking out  · Remove the needle from the pen  Twist the capped needle counter clockwise  Place the needle in a heavy-duty laundry detergent bottle or a metal coffee can  The container should have a cap or lid that fits securely  · Replace the pen cap  Store the pen as directed  What to do with used needles:  Ask your local waste authority if you need to follow certain rules for getting rid of your needles  Bring your used needles home with you when you travel  Pack them in a plastic or metal container with a secure lid  How to store an insulin pen:  Do not store your pen with a needle attached  Follow the storage directions on the label or package insert that came with the insulin  Unopened pens can be stored in the refrigerator until you are ready to use them  Most insulin pens can be opened and kept at room temperature  Store your pen in a cool, dry place  Do not keep your pen in direct sunlight or in your car  Throw away pens that have been frozen or exposed to temperatures above 85° F (30° C)   If you travel, keep the pen in a cool pack  Follow up with your healthcare provider as directed:  Write down your questions so you remember to ask them during your visits  © 2017 [address] [employer] Information is for End User's use only and may not be sold, redistributed or otherwise used for commercial purposes  All illustrations and images included in CareNotes® are the copyrighted property of A [employer]  or Jonathan Sheth  The above information is an  only  It is not intended as medical advice for individual conditions or treatments  Talk to your doctor, nurse or pharmacist before following any medical regimen to see if it is safe and effective for you  How to Check Your Blood Sugar   WHAT YOU NEED TO KNOW:   High blood sugar levels increase your risk for heart attack, stroke, eye problems, and kidney problems  You can decrease your risk by controlling your blood sugar levels  Low blood sugar levels can also lead to serious health problems and must be treated right away  Check your blood sugar to help you learn how food, exercise, stress, and medicines affect your levels  Keep a record of your blood sugar levels  It can be used to adjust your meal plan, exercise routine, insulin doses, or diabetes medicine if needed  DISCHARGE INSTRUCTIONS:   How to check your blood sugar level:   · Check your blood sugar level with a glucose meter  This device uses a small drop of blood to measure your blood sugar level  Some glucose meters measure a drop of blood taken from your finger using a special lancet device  Other meters will also measure a drop of blood taken from your thigh, forearm, or the palm of your hand  · Blood sugar levels change quickly after meals, after you take insulin, during exercise, and when you feel stressed or ill  It is best to use blood from your finger to check your blood sugar level during these times   Your healthcare provider will teach you how to use a glucose meter to check your blood sugar level  Ask your healthcare provider for more information about taking blood samples from areas other than your finger  When and how often to check your blood sugar level:  Ask your healthcare provider when and how often you should check your blood sugar levels  If you check your blood sugar level before a meal , it should be between 80 and 130 mg/dL  If you check your blood sugar level 2 hours after a meal , it should be less than 180 mg/dL  Ask your healthcare provider if these are good goals for you  Blood sugar levels need to be checked more often when you are sick, there is a change to your medicine, or if you change your daily routine  Test your blood sugar level if you feel like it may be too high (hyperglycemia) or too low (hypoglycemia)  Keep a record of your blood sugar levels:  Write down your blood sugar level each time you test it  Write down the date, the time of the test (including if it was before or after a meal), and the result  Write down the time you took your insulin or diabetes pills  Record the type and amount of insulin or diabetes medicine you took  Write comments about anything that may have made your blood sugar level go up or down  Your blood sugar level can be affected by exercise, eating more or less than usual, or stress  Bring this record with you to your follow-up visits  How to care for your glucose meter and test strips:  Ask your healthcare provider how and how often to check the accuracy of your meter  You may need to do the following:  · Store your equipment properly  Keep the test strips away from heat, cold, and moisture  Do not take a test strip out of the container until you are ready to use it  Put the lid back tightly on the container  Do not use test strips that are damaged, wet, or bent  · Check the expiration date  on the test strip container to be sure the test strips have not    Your blood sugar readings may be wrong if you use  test strips  Use only the type of glucose test strips that work with your glucose meter  Wear medical alert identification:  Wear medical alert jewelry or carry a card that says you have diabetes  Ask your healthcare provider where to get these items  Follow up with your healthcare provider as directed:  Write down your questions so you remember to ask them during your visits  2017 [employer]  Information is for End User's use only and may not be sold, redistributed or otherwise used for commercial purposes  All illustrations and images included in CareNotes® are the copyrighted property of [employer]  or Jonathan Sheth  The above information is an  only  It is not intended as medical advice for individual conditions or treatments  Talk to your doctor, nurse or pharmacist before following any medical regimen to see if it is safe and effective for you
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
## 2019-12-29 NOTE — PROGRESS NOTES
Cardiology Progress Note - Marjan Garza  44 y o  male MRN: [id number]    Unit/Bed#: [facility] 404-01 Encounter: [id number]      Assessment:  Principal Problem:    NSTEMI (non-ST elevated myocardial infarction) [facility])  Active Problems:    Essential hypertension    Diabetes mellitus type 2, uncontrolled ([address] )    Snoring    Hyperlipidemia    S/P CABG x 3    Acute respiratory insufficiency    Class 3 severe obesity in adult [facility])      Plan:  Patient is comfortable today  He has no chest pain or significant dyspnea  He is postop day six after coronary artery bypass graft surgery  He is in sinus rhythm on telemetry  BMP today with potassium of 4 1 and creatinine of 0 91  Patient likely to be discharged today  He will follow-up at our [facility] office postoperatively  Subjective:   Patient seen and examined  No significant events overnight   negative  Objective:     Vitals: Blood pressure 154/81, pulse 84, temperature 98 1 °F (36 7 °C), temperature source Oral, resp  rate 20, height 5' 9" (1 753 m), weight 126 kg (277 lb 12 5 oz), SpO2 92 %  , Body mass index is 41 02 kg/m² ,   Orthostatic Blood Pressures      Most Recent Value   Blood Pressure  154/81 [Map 108] filed at 12/29/2019 0657   Patient Position - Orthostatic VS  Sitting filed at 12/29/2019 0657      ,      Intake/Output Summary (Last 24 hours) at 12/29/2019 0819  Last data filed at 12/29/2019 0805  Gross per 24 hour   Intake 2060 ml   Output 1400 ml   Net 660 ml       No significant arrhythmias seen on telemetry review  Physical Exam:    GEN: Marjan Garza   appears well, alert and oriented x 3, pleasant and cooperative   NECK: supple, no carotid bruits, no JVD or HJR  HEART: normal rate, regular rhythm, normal S1 and S2, no murmurs, clicks, gallops or rubs   LUNGS: clear to auscultation bilaterally; no wheezes, rales, or rhonchi   ABDOMEN: normal bowel sounds, soft, no tenderness, no distention  EXTREMITIES: peripheral pulses normal; no clubbing, cyanosis, or edema  SKIN: warm and well perfused, no suspicious lesions on exposed skin    Labs & Results:    No results displayed because visit has over 200 results  Ct Chest Abdomen Pelvis Wo Contrast    Result Date: 12/21/2019  Narrative: CT CHEST, ABDOMEN AND PELVIS WITHOUT IV CONTRAST INDICATION:   cardiomyopathy  COMPARISON:  Chest radiographic series 12/18/2019 TECHNIQUE: CT examination of the chest, abdomen and pelvis was performed without intravenous contrast   Axial, sagittal, and coronal 2D reformatted images were created from the source data and submitted for interpretation  Radiation dose length product (DLP) for this visit:  1615 9 mGy-cm   This examination, like all CT scans performed in the [facility], was performed utilizing techniques to minimize radiation dose exposure, including the use of iterative  reconstruction and automated exposure control  Enteric contrast was not administered  FINDINGS: CHEST LUNGS:  Lungs are clear  There is no tracheal or endobronchial lesion  PLEURA:  Unremarkable  HEART/GREAT VESSELS:  Unremarkable for patient's age  MEDIASTINUM AND CHEVY:  Unremarkable  CHEST WALL AND LOWER NECK:   Asymmetric gynecomastia, left greater than right  No axillary lymphadenopathy  ABDOMEN LIVER/BILIARY TREE:  Liver is mildly decreased in density consistent with fatty change  No CT evidence of suspicious hepatic mass  Normal hepatic contours  No biliary dilatation  GALLBLADDER:  No calcified gallstones  No pericholecystic inflammatory change  SPLEEN:  Unremarkable  Small splenule noted  PANCREAS:  Unremarkable  ADRENAL GLANDS:  Unremarkable  KIDNEYS/URETERS:  Residual intravenous contrast in the collecting systems from recent cardiac MRI  No hydronephrosis or perinephric collections  STOMACH AND BOWEL:  Unremarkable  APPENDIX:  No findings to suggest appendicitis  ABDOMINOPELVIC CAVITY:  No ascites or free intraperitoneal air  No lymphadenopathy  VESSELS:  Unremarkable for patient's age  PELVIS REPRODUCTIVE ORGANS:  Unremarkable for patient's age  URINARY BLADDER:  The bladder is diffusely thick-walled likely sequela of underdistention  No perivesical inflammation  ABDOMINAL WALL/INGUINAL REGIONS:  Focal soft tissue emphysema in the left ventral abdominal wall, likely iatrogenic  OSSEOUS STRUCTURES:  No acute fracture or destructive osseous lesion  Impression: 1  No acute intrathoracic abnormality  2   Mild hepatic steatosis  3   Thick-walled bladder likely sequela of underdistention  No perivesical inflammation  Workstation performed: [id number]     Xr Chest Portable    Result Date: 12/24/2019  Narrative: CHEST INDICATION:   Post Open Heart Surgey  COMPARISON:  12/23/2019 EXAM PERFORMED/VIEWS:  XR CHEST PORTABLE FINDINGS:  Median sternotomy wires are present  Right jugular central venous catheter tip overlies the right atrium  Mediastinal drains are present  Cardiomediastinal silhouette is stable  The lungs are clear  No pneumothorax or pleural effusion  Osseous structures appear within normal limits for patient age  Impression: No acute cardiopulmonary disease  Workstation performed: [id number]     Xr Chest 2 Views    Result Date: 12/18/2019  Narrative: CHEST INDICATION:   Chest pain  COMPARISON:  None EXAM PERFORMED/VIEWS:  XR CHEST PA & LATERAL FINDINGS: Cardiomediastinal silhouette appears unremarkable  The lungs are clear  No pneumothorax or pleural effusion  Osseous structures appear within normal limits for patient age  Impression: No active pulmonary disease  Workstation performed: [id number]     Mri Cardiac  W Wo Contrast    Result Date: 12/22/2019  Narrative: MRI CARDIAC  W WO CONTRAST cardiomyopathy Technique: 1  3 plane SSFP localizers  2  SSFP cine imaging in long and short axis planes  3  T2 weighted DIR FSE in short axis plane  4  24 ml gadolinium DTPA power injected   5  2D inversion recovery FGRE for delayed myocardial enhancement  6  The patient tolerated the procedure well without complication   Measurements: Mikey-septal wall 10 mm Postero-lateral wall 10 mm Global LV Assessment ----------------------------------------------------------------------------------------------------                Value                        Value / Height               Value / BSA                ---------------------------------------------------------------------------------------------------- LVEDV          269 ml                       153 24 ml/m (increased)      109 01 ml/m^2  (increased)                                               [60 - 120]                   [53 - 97]                  LVESV          181 ml                       103 33 ml/m (increased)      73 51 ml/m^2  (increased)                                                [12 - 44]                    [10 - 34]                  LVSV           87 ml                        49 9 ml/m                    35 5 ml/m^2  (reduced)                                                                                [37 - 69]                  LVEF           33 %                                                                                                                                                                                     LVCO           8 5 l/min                                                 3 4 l/min/m^2  (Normal)                                                                               [>= 2 5]                   LV Mass        285 g                        162 7 g/m (increased)        115 74 g/m^2  (increased)                                                [47 - 93]                    [42 - 78]                  Global RV Assessment ----------------------------------------------------------------------------------------------------                Value                        Value / Height               Value / BSA ---------------------------------------------------------------------------------------------------- RVEDV          133 ml                       75 77 ml/m (normal)          53 91 ml/m^2  (reduced)                                                  [73 - 141]                   [67 - 111]                 RVESV          78 ml                        44 46 ml/m (normal)          31 63 ml/m^2  (normal)                                                   [22 - 66]                    [20 - 48]                  RVSV           55 ml                        31 31 ml/m                   22 27 ml/m^2  (reduced)                                                                               [39 - 71]                  RVEF           41 %                                                                                                                                                                                     RVCO           5 3 l/min                                                 2 2 l/min/m^2        Left atrium 32 cm^2 Aortic Root 22 mm Findings:  1  Mildly enlarged left ventricle end-diastolic volume  Mildly reduced left ventricle systolic function with ejection fraction measuring 33%  Mild diffusely increased myocardial wall thickness  Severe hypokinesis/akinesis of the inferior and inferoseptal wall  2  Normal right ventricle end-diastolic volume  Mildly reduced right ventricle systolic function with ejection fraction measuring 41%  3  The aortic, mitral, and tricuspid valves open without restriction  There is mitral regurgitation, however cine MRI is inaccurate in the qualitative assessment of valvular regurgitation  4  The left atrium is moderately enlarged  The aortic root is normal in size  5  There is no evidence of myocardial edema   6  Delayed post-gadolinium imaging demonstrates subendocardial delayed myocardial enhancement up to 50% transmural thickness at the basal inferior and inferoseptal wall and mid inferior wall      Impression: Impression: 1  Moderately enlarged left ventricle size with moderately reduced left ventricle systolic function  Ejection fraction measures 33%  Mild diffuse increased myocardial wall thickness  Severe hypokinesis/akinesis at the inferior and inferoseptal wall  Subendocardial delayed myocardial enhancement up to 50% transmural thickness at the basal inferior and inferoseptal wall and mid inferior wall, consistent with ischemic cardiomyopathy in the RCA/PDA distribution, with preserved viability  2  Normal right ventricle size  Mildly reduced right ventricle systolic function with ejection fraction measuring 41%  3  Mitral regurgitation 4  Moderate left atrial enlargement Workstation performed: [id number]     Vas Carotid Complete Study    Result Date: 12/21/2019  Narrative:  [facility] REPORT CLINICAL: Indications: Patient presents for a general health evaluation secondary to future open heart surgery  Patient is asymptomatic at this time  Operative History: No prior cardiovascular surgeries Risk Factors The patient has history of HTN, Diabetes, HLD, CAD, and MI  The patient's current BMI is 40 61, Weight is 275 lb and height is 69 in  Clinical Right Pressure: 123/62 mm Hg, Left Pressure: IV site  FINDINGS:  Right        Impression  PSV  EDV (cm/s)  Direction of Flow  Ratio  Dist  ICA                 42          11                      0 42  Mid  ICA                  62          19                      0 63  Prox   ICA    1 - 49%      57          16                      0 58  Dist CCA                  64          18                            Mid CCA                   98          24                      1 64  Prox CCA                  60          15                            Ext Carotid               75          15                      0 76  Prox Vert                 40          16  Antegrade                 Subclavian               132          10 Left         Impression  PSV  EDV (cm/s)  Direction of Flow  Ratio  Dist  ICA                 57          29                      0 73  Mid  ICA                  61          28                      0 78  Prox  ICA    1 - 49%      45          21                      0 59  Dist CCA                  72          21                            Mid CCA                   78          22                      1 00  Prox CCA                  78          21                            Ext Carotid               60          12                      0 78  Prox Vert                 20           9  Antegrade                 Subclavian                81           0                               CONCLUSION:  Impression  RIGHT: There is <50% stenosis noted in the internal carotid artery  Plaque is homogenous and smooth  Vertebral artery flow is antegrade  There is no significant subclavian artery disease  LEFT: There is <50% stenosis noted in the internal carotid artery  Plaque is homogenous and smooth  Vertebral artery flow is antegrade  There is no significant subclavian artery disease  There a no prior studies for comparison  Technical findings were faxed to chart  Tech Note: Very minimal plaque identified in the bilateral internal carotid arteries  Internal carotid artery stenosis determination by consensus criteria from: Hadley Campoverde et al  Carotid Artery Stenosis: Gray-Scale and Doppler US Diagnosis - Society of Radiologists in [address] [place] [address], Radiology 2003; 129:537-181  SIGNATURE: Electronically Signed by: Karen Mason MD, [address] on 2019-12-21 01:50:00 PM    Vas Lower Limb Vein Mapping Bypass Graft    Result Date: 12/21/2019  Narrative:  [facility] REPORT CLINICAL: Indications: Pre-op Vein Mapping for Future Open Heart Surgery  Physician wants to evaluate for suitable conduit   Operative History: No prior cardiovascular surgeries Risk Factors The patient has history of HTN, Diabetes, HLD, CAD, and MI  The patient's current BMI is 40 61, Weight is 275 lb and height is 69 in  FINDINGS:  Segment         Right        Left                            Diameter AP  Diameter AP  GSV Inguinal            7 6          6 1  GSV Prox Thigh          2 6          2 2  GSV Mid Thigh           2 8          1 1  GSV Dist Thigh          2 9          1 6  GSV Knee                2 7          1 1  GSV Prox Calf           2 4          2 1  GSV Mid Calf            2 0          2 7  GSV Dist Calf           2 2          2 5  GSV Ankle               2 3          2 9     CONCLUSION:  Impression:  RIGHT LOWER LIMB: The great saphenous vein is patent  from the groin to the ankle  The vein is of adequate caliber and quality for graft conduit with intraluminal diameters ranging from 2 0 mm to 7 6 mm from ankle to the saphenofemoral junction  LEFT LOWER LIMB: The great saphenous vein is patent  from the groin to the ankle  The vein is of adequate caliber and quality for graft conduit with intraluminal diameters ranging from 2 1 mm to 2 9 mm from the ankle to the proximal calf  Tech note: Technical findings were faxed to chart  SIGNATURE: Electronically Signed by: Eder Hollins MD, [id number]  on 2019-12-21 01:49:47 PM    Xr Chest Portable Icu    Result Date: 12/24/2019  Narrative: CHEST INDICATION:   S/P open heart  COMPARISON:  None EXAM PERFORMED/VIEWS:  XR CHEST PORTABLE ICU FINDINGS:  ET tube tip likely within 1 cm of the rossi, rossi not well demonstrated  Right IJ central line tip over the base of the right heart and should be retracted approximately 6 cm  Rickreall-Sera catheter tip projecting over the right main pulmonary artery  Mediastinal and left thoracic drains present  Cardiomediastinal silhouette appears unremarkable  The lungs are grossly clear  Limited inspiratory volume  No pneumothorax or pleural effusion  Osseous structures appear within normal limits for patient age       Impression: Suggest retracting right IJ central line approximate 6 cm and ET tube 1-2 cm  The study was marked in [facility] for immediate notification  Workstation performed: [id number]       EKG personally reviewed by Kelly Chahal MD      Counseling / Coordination of Care  Total floor / unit time spent today 30 minutes  Greater than 50% of total time was spent with the patient and / or family counseling and / or coordination of care

## 2019-12-29 NOTE — PROGRESS NOTES
Progress Note - Cardiothoracic Surgery   [facility]  44 y o  male MRN: [id number]  Unit/Bed#: [facility] 404-01 Encounter: [id number]    Coronary artery disease  S/P coronary artery bypass grafting; POD # 6      24 Hour Events: No events overnight  NO complaints  Ambulating well  Tolerating diet  Insulin injections understood  Pain controlled  +BM since surgery       Medications:   Scheduled Meds:  Current Facility-Administered Medications:  acetaminophen 975 mg Oral Q8H Donna Ash PA-C   amiodarone 200 mg Oral Q8H [facility] Donna Ash PA-C   amLODIPine 10 mg Oral Daily Jeffrey HaiYENNY berry   aspirin 325 mg Oral Daily Jeffrey Conti PA-C   atorvastatin 80 mg Oral Daily With [facility] YENNY Gutierrez   bisacodyl 10 mg Rectal Daily PRN Jeffrey Conti PA-C   fondaparinux 2 5 mg Subcutaneous Daily Jeffrey Conti PA-C   furosemide 40 mg Intravenous BID (diuretic) Galen Rollins PA-C   ibuprofen 600 mg Oral Q8H [facility] Donna Ash PA-C   insulin glargine 50 Units Subcutaneous HS Tian Landry MD   insulin lispro 15 Units Subcutaneous TID With Meals Tian Landry MD   insulin lispro 2-12 Units Subcutaneous HS Tian Landry MD   insulin lispro 4-20 Units Subcutaneous TID AC Tian Landry MD   lisinopril 40 mg Oral Daily Jeffrey Conti PA-C   metoprolol tartrate 50 mg Oral Q8H Donna Ash PA-C   ondansetron 4 mg Intravenous Q6H PRN Jeffrey HaiYENNY berry   oxyCODONE 10 mg Oral Q6H PRN Jeffrey Conti PA-C   oxyCODONE 5 mg Oral Q4H PRN Jeffrey Conti PA-C   pantoprazole 40 mg Oral Early Morning Donna Ash PA-C   polyethylene glycol 17 g Oral Daily Donna Ash PA-C   potassium chloride 20 mEq Oral BID Lizzy Kolb PA-C   senna-docusate sodium 1 tablet Oral BID Galen Rollins PA-C   temazepam 15 mg Oral HS PRN Jeffrey Conti PA-C     Continuous Infusions:   PRN Meds: bisacodyl    ondansetron    oxyCODONE    oxyCODONE    temazepam    Vitals:   Vitals: 12/29/19 0319 12/29/19 0549 12/29/19 0600 12/29/19 0657   BP: 155/85   154/81   BP Location: Right arm   Left arm   Pulse: 91   84   Resp: 20   20   Temp: 98 1 °F (36 7 °C)   98 1 °F (36 7 °C)   TempSrc: Axillary   Oral   SpO2: 97%   92%   Weight:  126 kg (278 lb 14 1 oz) 126 kg (277 lb 12 5 oz)    Height:           Telemetry: NSR; Heart Rate: 83    Respiratory:   SpO2: SpO2: 92 %; Room Air    Intake/Output:   I/O       12/27 0701 - 12/28 0700 12/28 0701 - 12/29 0700 12/29 0701 - 12/30 0700    P  O  605 1860     I V  (mL/kg) 134 4 (1 1)      Total Intake(mL/kg) 739 4 (5 8) 1860 (14 8)     Urine (mL/kg/hr) 1775 (0 6) 1900 (0 6)     Stool 0 0     Total Output 1775 1900     Net -1035 6 -40            Unmeasured Urine Occurrence  1 x     Unmeasured Stool Occurrence 1 x 1 x         Weights:   Weight (last 2 days)     Date/Time   Weight    12/29/19 0600   126 (277 78)    12/29/19 0549   126 (278 88)    12/28/19 0600   127 (279 98)    12/27/19 0600   127 (281)            Results:   Results from last 7 days   Lab Units 12/27/19  0426 12/26/19  0356 12/25/19  0428   WBC Thousand/uL 15 19* 21 87* 24 90*   HEMOGLOBIN g/dL 9 9* 11 0* 12 5   HEMATOCRIT % 31 8* 34 7* 39 1   PLATELETS Thousands/uL 312 242 216     Results from last 7 days   Lab Units 12/29/19  0440 12/28/19  0449 12/27/19  0426  12/23/19  1559   SODIUM mmol/L 141 142 141   < >  --    POTASSIUM mmol/L 4 1 3 8 3 9   < >  --    CHLORIDE mmol/L 109* 109* 109*   < >  --    CO2 mmol/L 29 28 27   < >  --    CO2, I-STAT mmol/L  --   --   --   --  24   BUN mg/dL 23 29* 34*   < >  --    CREATININE mg/dL 0 91 0 98 1 18   < >  --    GLUCOSE, ISTAT mg/dl  --   --   --   --  181*   CALCIUM mg/dL 8 9 8 8 8 4   < >  --     < > = values in this interval not displayed           Point of care glucose: 143-254    Invasive Lines/Tubes:  Invasive Devices     Peripheral Intravenous Line            Peripheral IV 12/27/19 Left Forearm 1 day                Physical Exam:    HEENT/NECK: PERRLA  No jugular venous distention  Cardiac: Regular rate and rhythm  Pulmonary:  Breath sounds clear bilaterally  Abdomen:  Non-tender, Non-distended and Normal bowel sounds  Incisions: Sternum is stable  Incision is clean, dry, and intact  and Saphenectomy incison is clean, dry, and intact  Extremities: Extremities warm/dry and 1+ edema B/L  Neuro: Alert and oriented X 3  Skin: Warm/Dry, without rashes or lesions  Assessment:  Principal Problem:    NSTEMI (non-ST elevated myocardial infarction) ([facility] [address] )  Active Problems:    Essential hypertension    Diabetes mellitus type 2, uncontrolled (HCC)    Snoring    Hyperlipidemia    S/P CABG x 3    Acute respiratory insufficiency    Class 3 severe obesity in adult [facility])       Coronary artery disease  S/P coronary artery bypass grafting; POD # 6    Plan:    1  Cardiac:   NSR; HR/BP well-controlled  Lopressor 50 mg bid, amlodipine 10 mg daily and lisinopril 40 mg daily  Continue ASA and Statin therapy  Epicardial pacing wires out  Patient no longer has central IV access   ELIOT stocking bilaterally due to trauma right foot with increased edema bilaterally  Continue DVT prophylaxis    2  Pulmonary:   Good Room air oxygen saturation; Continue incentive spirometry/Coughing/Deep breathing exercises  Chest tubes have been discontinued    3  Renal:   Intake/Output net: even mL/24 hours  Continue diuresis   Lasix 40 mg IV BID  Potassium Chloride 20 mEq PO BID  Post op Creatinine stable; Follow up labs prn    4  Neuro:  Neurologically intact; No active issues  Incisional pain well-controlled; Continue prn Percocet    5  GI:  Tolerating TLC 2 3 gm sodium diet  Maintain 1800 mL daily fluid restriction   Continue stool softeners and prn suppository  Continue GI prophylaxis    6  Endo:   History of diabetes; Continue SQ insulin therapy as directed by endocrinology physician    Insulin administration teaching for home therapy ordered    7    Hematology:    Post-operative blood count acceptable; Trend prn    8  Disposition:      Anticipate discharge to home today     VTE Pharmacologic Prophylaxis: Fondaparinux (Arixtra)  VTE Mechanical Prophylaxis: sequential compression device    Collaborative rounds completed with FATEMEH Min    Plan of care discussed with bedside nurse    SIGNATURE: Deep Ruiz PA-C  DATE: December 29, 2019  TIME: 7:44 AM

## 2020-01-03 ENCOUNTER — TELEPHONE (OUTPATIENT)
Dept: CARDIAC SURGERY | Facility: CLINIC | Age: 40
End: 2020-01-03

## 2020-01-03 NOTE — TELEPHONE ENCOUNTER
Routine post-op call placed to patient to follow-up after discharge from the hospital     Spoke to: Marija Fair [facility]  Procedure & Date: CABGx3: 12/23/19  Surgeon: Tom Holder    Pre-op wt: 275 lb(12/19)  D/C wt: 277 lb(12/29)  Current wt: 268 lb(1/1)    Fever/chills: Yes 101(1/2) 99 6(1/3)   Edema: No    Lightheadedness/dizziness:  No  Incision: Clean/dry/intact, no s/s of infection    Angina: No    GI: BM stable, vomitingx4 times 1/2/20    SOB:  Improving since d/c    Activity: Walking with no complaints     Pain: Incisional pain controlled with meds    Follow-up APPTs: PCP: 01/06/20  CARDIO: Needs to schedule   CT SURGERY: 01/24/20    Comments: [facility] called to report that patient had temp of 101 yesterday and vomitted 4 times  Did state that 2 people in his home had the GI bug  His temp today was 99 6  Per MIRNA Merrill: patient to d/c Torsemide and Potassium and to increase fluid intake to avoid dehydration  Otherwise patient is doing well has no questions/concerns  Education:  1  Daily AM weight, call for weight gain of 2 lbs or more in 24 hours  2  Take frequent short walks, increase activity as tolerated  3  Maintain sternal precautions: No strenuous activity; no lifting more than 10 lbs;  no driving  4  Continue to use Incentive Spirometer frequently throughout the day  5  Shower daily; Cleanse incisions with antibacterial soap and water  6  No ointments, powder or lotions on surgical incisions  7   Call [facility] office with questions or concerns

## 2020-01-07 RX ORDER — INSULIN ASPART 100 [IU]/ML
INJECTION, SOLUTION INTRAVENOUS; SUBCUTANEOUS
COMMUNITY
Start: 2019-12-29 | End: 2020-04-21 | Stop reason: SDUPTHER

## 2020-01-07 RX ORDER — LABETALOL 200 MG/1
TABLET, FILM COATED ORAL
COMMUNITY
Start: 2017-02-01 | End: 2020-01-14 | Stop reason: CLARIF

## 2020-01-07 RX ORDER — HYDROCHLOROTHIAZIDE 50 MG/1
1 TABLET ORAL EVERY 24 HOURS
COMMUNITY
End: 2020-01-14 | Stop reason: CLARIF

## 2020-01-13 ENCOUNTER — OFFICE VISIT (OUTPATIENT)
Dept: ENDOCRINOLOGY | Facility: CLINIC | Age: 40
End: 2020-01-13
Payer: COMMERCIAL

## 2020-01-13 VITALS
WEIGHT: 272 LBS | SYSTOLIC BLOOD PRESSURE: 100 MMHG | HEIGHT: 69 IN | BODY MASS INDEX: 40.29 KG/M2 | HEART RATE: 80 BPM | DIASTOLIC BLOOD PRESSURE: 68 MMHG

## 2020-01-13 DIAGNOSIS — E11.65 UNCONTROLLED TYPE 2 DIABETES MELLITUS WITH HYPERGLYCEMIA (HCC): ICD-10-CM

## 2020-01-13 DIAGNOSIS — Z95.1 S/P CABG X 3: ICD-10-CM

## 2020-01-13 DIAGNOSIS — I10 ESSENTIAL HYPERTENSION: ICD-10-CM

## 2020-01-13 DIAGNOSIS — Z79.4 TYPE 2 DIABETES MELLITUS WITH HYPERGLYCEMIA, WITH LONG-TERM CURRENT USE OF INSULIN (HCC): Primary | ICD-10-CM

## 2020-01-13 DIAGNOSIS — E78.2 MIXED HYPERLIPIDEMIA: ICD-10-CM

## 2020-01-13 DIAGNOSIS — E11.65 TYPE 2 DIABETES MELLITUS WITH HYPERGLYCEMIA, WITH LONG-TERM CURRENT USE OF INSULIN (HCC): Primary | ICD-10-CM

## 2020-01-13 DIAGNOSIS — E66.01 CLASS 3 SEVERE OBESITY DUE TO EXCESS CALORIES WITH SERIOUS COMORBIDITY AND BODY MASS INDEX (BMI) OF 40.0 TO 44.9 IN ADULT (HCC): ICD-10-CM

## 2020-01-13 PROCEDURE — 99214 OFFICE O/P EST MOD 30 MIN: CPT | Performed by: INTERNAL MEDICINE

## 2020-01-13 RX ORDER — INSULIN GLARGINE 100 [IU]/ML
50 INJECTION, SOLUTION SUBCUTANEOUS
Qty: 30 ML | Refills: 2 | Status: SHIPPED | OUTPATIENT
Start: 2020-01-13 | End: 2020-04-21 | Stop reason: SDUPTHER

## 2020-01-13 NOTE — PROGRESS NOTES
Poly  44 y o  male MRN: [id number]    Encounter: [id number]      Assessment/Plan     Assessment: This is a 44y o -year-old male with diabetes with hyperglycemia  Plan:    Diagnoses and all orders for this visit:    Type 2 diabetes mellitus with hyperglycemia, with long-term current use of insulin ([address] )  Hba1c  is suggestive of severe hyperglycemia  Lab Results   Component Value Date    HGBA1C 10 1 (H) 12/18/2019    Patient has type 2 diabetes for at least 5-6 years, has history of noncompliance to medications  His blood sugars are improved on current regimen, continue Lantus 50 units at bedtime, continue NovoLog 15 units with meals plus scale  Scale given to patient on instructions  Discussed to start Jardiance 10 mg daily along with metformin and insulin regimen, as it has been proven to reduce cardiovascular events by 45%  His GFR is stable  Discussed to check blood sugar 4 times daily and send log in 2 weeks to make, adjustment further, discussed the goal for blood sugar is 100 to 140 mg/dL without hypoglycemia or hypoglycemia  Will also order workup for type 1 diabetes to rule out type 1 diabetes as well as C-peptide to assess pancreatic reserve  Discussed the long-term complication of uncontrolled diabetes      -     Empagliflozin 10 MG TABS; Take 1 tablet (10 mg total) by mouth every morning  -     Ambulatory Referral to Ophthalmology; Future  -     Ambulatory referral to Podiatry; Future  -     Hemoglobin A1C; Future  -     Microalbumin / creatinine urine ratio; Future  -     Basic metabolic panel; Future  -     C-peptide; Future  -     Anti-islet cell antibody; Future  -     Glutamic acid decarboxylase;  Future    Essential hypertension    BP Readings from Last 3 Encounters:   01/13/20 100/68   12/29/19 154/81   12/19/19 151/72    Blood pressure well controlled  Continue current management  Follow-up with cardiology    Mixed hyperlipidemia  Currently on statin  Follow with Cardiology    S/P CABG x 3  Currently asymptomatic    Had CABG for non ST elevation MI  Currently followed by Cardiology    Class 3 severe obesity due to excess calories with serious comorbidity and body mass index (BMI) of 40 0 to 44 9 in adult [facility])  Educated about lifestyle modification, also discussed to see dietitian in the office, however he declined, he stated that he has already seeing dietitian at [address] trying to keep his carbohydrate at 60 g per meal  Discussed importance of exercise as tolerated  Hopefully you will be able to lose some weight after starting Jardiance   Will monitor    Raquel Ross MD        CC: Diabetes    History of Present Illness     HPI:    Melanie Padilla  is 54-year-old gentleman with past medical history of type 2 diabetes, uncontrolled, history of coronary artery disease status post CABG in December 2019 was started on insulin regimen is here for follow-up  His last A1c is 10% which is in uncontrolled range  Diabetes course over the years has been uncontrolled  He has complications of diabetes such as coronary artery disease,    His current regimen is metformin 1000 mg twice a day, Humalog 15 units 3 times daily with meals, Lantus 50 units at bedtime  He admits compliance with medications  He admits sometimes noncompliance to his diet  He has been checking blood sugar 3-4 times daily    He has history of hypertension for which she is currently taking hydrochlorothiazide 50 mg daily, lisinopril 40 mg daily, Lopressor 50 mg twice a day, labetalol 200 mg, 1 and half tablet twice a day  His hypertension is managed by his primary care physician    Reviewed basic metabolic profile, from December 29, 2019  Sodium 141, potassium 4 1 bicarb 29 anion gap 3 creatinine 0 9 glucose 138, calcium 8 9     Patient has hyperlipidemia for which currently is taking Lipitor 80 mg daily  Lab Results   Component Value Date    CREATININE 0 91 12/29/2019       Lab Results   Component Value Date    HGBA1C 10 1 (H) 12/18/2019          Review of Systems   Constitutional: Negative for activity change, diaphoresis, fatigue, fever and unexpected weight change  HENT: Negative  Eyes: Negative for visual disturbance  Respiratory: Negative for cough, chest tightness and shortness of breath  Cardiovascular: Negative for chest pain, palpitations and leg swelling  Gastrointestinal: Negative for abdominal pain, blood in stool, constipation, diarrhea, nausea and vomiting  Endocrine: Negative for cold intolerance, heat intolerance, polydipsia, polyphagia and polyuria  Genitourinary: Negative for dysuria, enuresis, frequency and urgency  Musculoskeletal: Negative for arthralgias and myalgias  Skin: Negative for pallor, rash and wound  Allergic/Immunologic: Negative  Neurological: Negative for dizziness, tremors, weakness and numbness  Hematological: Negative  Psychiatric/Behavioral: Negative  Historical Information   Past Medical History:   Diagnosis Date    JELENA [facility]acute kidney injury) ([facility] ) 12/23/2019    CAD (coronary artery disease)     Diabetes mellitus ([place] [address] )     HLD (hyperlipidemia)     Hypertension     Myocardial infarction [facility])      Past Surgical History:   Procedure Laterality Date    ANKLE SURGERY Right     CARDIAC CATHETERIZATION      CORONARY ARTERY BYPASS GRAFT N/A 12/23/2019    Procedure: CORONARY ARTERY BYPASS GRAFT (CABG) x 3; LIMA to LAD; Right EVH/SVG to OM3 and PDA;   Surgeon: Rosa Thomas MD;  Location: [facility];  Service: Cardiac Surgery    FRACTURE SURGERY       Social History   Social History     Substance and Sexual Activity   Alcohol Use Never    Frequency: Never    Binge frequency: Never     Social History     Substance and Sexual Activity   Drug Use Yes    Types: Marijuana     Social History     Tobacco Use   Smoking Status Never Smoker   Smokeless Tobacco Never Used     Family History:   Family History Problem Relation Age of Onset    Cancer Mother         unknown       Meds/Allergies   Current Outpatient Medications   Medication Sig Dispense Refill    acetaminophen (TYLENOL) 325 mg tablet Take 2 tablets (650 mg total) by mouth every 6 (six) hours as needed for mild pain 30 tablet 0    Alcohol Swabs 70 % PADS Use alcohol swab before each insulin injection and before testing your blood glucose 250 each 0    amLODIPine (NORVASC) 10 mg tablet Take 1 tablet (10 mg total) by mouth daily 30 tablet 2    aspirin 325 mg tablet Take 1 tablet (325 mg total) by mouth daily 30 tablet 2    atorvastatin (LIPITOR) 80 mg tablet Take 1 tablet (80 mg total) by mouth daily with dinner 30 tablet 2    Blood Glucose Monitoring Suppl (D-CARE GLUCOMETER) w/Device KIT by Does not apply route 4 (four) times daily (after meals and at bedtime) Test your blood glucose before each meal and at bedtime 1 kit 0    glucose blood (GLUCOSE METER TEST) test strip Test your blood glucose before each meal and at bedtime, record those numbers 120 each 2    insulin glargine (LANTUS) 100 units/mL subcutaneous injection Inject 50 Units under the skin daily at bedtime 30 mL 2    insulin lispro (HumaLOG) 100 units/mL injection Inject 15 Units under the skin 3 (three) times a day with meals 30 mL 2    Insulin Pen Needle (BD PEN NEEDLE LEONA U/F) 32G X 4 MM MISC Use a new pen needle each time you use your insulin pens before each meal and at bedtime 120 each 2    labetalol (NORMODYNE) 200 mg tablet take 1 1/2 tablets by oral route 2 times every day      Lancets (ACCU-CHEK MULTICLIX) lancets Test your blood glucose before each meal and at bedtime, record those numbers 120 each 2    lisinopril (ZESTRIL) 40 mg tablet Take 1 tablet (40 mg total) by mouth daily 30 tablet 2    metFORMIN (GLUCOPHAGE) 1000 MG tablet 1 tablet Every 12 hours      metoprolol tartrate (LOPRESSOR) 50 mg tablet Take 1 tablet (50 mg total) by mouth every 12 (twelve) hours 60 tablet 2    oxyCODONE-acetaminophen (PERCOCET) 5-325 mg per tablet Take 1 tablet every 6 hours, as needed for moderate pain or Take 2 tablets every 8 hours, as needed for severe pain  30 tablet 0    Empagliflozin 10 MG TABS Take 1 tablet (10 mg total) by mouth every morning 30 tablet 3    hydrochlorothiazide (HYDRODIURIL) 50 mg tablet 1 tablet every 24 hours      NOVOLOG FLEXPEN 100 units/mL injection pen INJECT 15 UNITS SUBCUTANEOUS 3 TIMES A DAY WITH MEALS      omeprazole (PriLOSEC) 20 mg delayed release capsule Take 1 capsule (20 mg total) by mouth daily Take 30 minutes prior to breakfast 30 capsule 0    polyethylene glycol (MIRALAX) 17 g packet Take 17 g by mouth daily Mix in 4 to 8 ounces of water, juice, tea or soda (stop with loose stools) (Patient not taking: Reported on 1/13/2020) 14 each 0    potassium chloride (K-DUR,KLOR-CON) 20 mEq tablet Take 1 tablet (20 mEq total) by mouth daily for 7 days (Patient not taking: Reported on 1/13/2020) 7 tablet 1    senna-docusate sodium (SENOKOT S) 8 6-50 mg per tablet Take 1 tablet by mouth 2 (two) times a day as needed for constipation (Patient not taking: Reported on 1/13/2020) 30 tablet 0    torsemide (DEMADEX) 20 mg tablet Take 1 tablet (20 mg total) by mouth daily for 7 days (Patient not taking: Reported on 1/13/2020) 7 tablet 1     No current facility-administered medications for this visit  No Known Allergies    Objective   Vitals: Blood pressure 100/68, pulse 80, height 5' 9" (1 753 m), weight 123 kg (272 lb)  Physical Exam   Constitutional: He is oriented to person, place, and time  He appears well-developed and well-nourished  No distress  Obese   HENT:   Head: Normocephalic and atraumatic  Eyes: Pupils are equal, round, and reactive to light  EOM are normal  Right eye exhibits no discharge  Left eye exhibits no discharge  Neck: Normal range of motion  Neck supple  No thyromegaly present     Cardiovascular: Normal rate, regular rhythm and normal heart sounds  Pulses are weak pulses  Pulses:       Dorsalis pedis pulses are 2+ on the right side, and 2+ on the left side  Posterior tibial pulses are 2+ on the right side, and 2+ on the left side  Pulmonary/Chest: Effort normal and breath sounds normal  No respiratory distress  Abdominal: Bowel sounds are normal  He exhibits no distension  Musculoskeletal: Normal range of motion  He exhibits no edema or deformity  Feet:   Right Foot:   Skin Integrity: Negative for ulcer, skin breakdown, erythema, warmth or dry skin  Left Foot:   Skin Integrity: Negative for ulcer, skin breakdown, erythema, warmth, callus or dry skin  Neurological: He is alert and oriented to person, place, and time  He displays normal reflexes  Skin: Skin is warm and dry  No erythema  Psychiatric: He has a normal mood and affect  Vitals reviewed  Diabetic Foot Exam    Patient's shoes and socks removed  Right Foot/Ankle   Right Foot Inspection  Skin Exam: skin normal and skin intact no dry skin, no warmth, no erythema, no maceration, no abnormal color, no pre-ulcer and no ulcer                          Toe Exam: ROM and strength within normal limits  Sensory   Vibration: intact    Monofilament testing: intact  Vascular    The right DP pulse is 2+  The right PT pulse is 2+  Left Foot/Ankle  Left Foot Inspection  Skin Exam: skin normal and skin intactno dry skin, no warmth, no erythema, no maceration, normal color, no pre-ulcer, no ulcer and no callus                         Toe Exam: ROM and strength within normal limits                   Sensory   Vibration: intact    Monofilament: intact  Vascular    The left DP pulse is 2+  The left PT pulse is 2+  Assign Risk Category:  No deformity present; No loss of protective sensation; Weak pulses       Risk: 1    The history was obtained from the review of the chart, patient      Lab Results:   Lab Results   Component Value Date/Time    Hemoglobin A1C 10 1 (H) 12/18/2019 04:29 PM    WBC 15 19 (H) 12/27/2019 04:26 AM    WBC 21 87 (H) 12/26/2019 03:56 AM    WBC 24 90 (H) 12/25/2019 04:28 AM    Hemoglobin 9 9 (L) 12/27/2019 04:26 AM    Hemoglobin 11 0 (L) 12/26/2019 03:56 AM    Hemoglobin 12 5 12/25/2019 04:28 AM    Hematocrit 31 8 (L) 12/27/2019 04:26 AM    Hematocrit 34 7 (L) 12/26/2019 03:56 AM    Hematocrit 39 1 12/25/2019 04:28 AM    MCV 97 12/27/2019 04:26 AM    MCV 96 12/26/2019 03:56 AM    MCV 94 12/25/2019 04:28 AM    Platelets 310 14/68/2975 04:26 AM    Platelets 000 36/31/9035 03:56 AM    Platelets 119 20/50/0385 04:28 AM    BUN 23 12/29/2019 04:40 AM    BUN 29 (H) 12/28/2019 04:49 AM    BUN 34 (H) 12/27/2019 04:26 AM    Potassium 4 1 12/29/2019 04:40 AM    Potassium 3 8 12/28/2019 04:49 AM    Potassium 3 9 12/27/2019 04:26 AM    Chloride 109 (H) 12/29/2019 04:40 AM    Chloride 109 (H) 12/28/2019 04:49 AM    Chloride 109 (H) 12/27/2019 04:26 AM    CO2 29 12/29/2019 04:40 AM    CO2 28 12/28/2019 04:49 AM    CO2 27 12/27/2019 04:26 AM    CO2, i-STAT 24 12/23/2019 03:59 PM    CO2, i-STAT 24 12/23/2019 03:08 PM    CO2, i-STAT 28 12/23/2019 02:20 PM    Creatinine 0 91 12/29/2019 04:40 AM    Creatinine 0 98 12/28/2019 04:49 AM    Creatinine 1 18 12/27/2019 04:26 AM    AST 19 12/18/2019 04:29 PM    ALT 30 12/18/2019 04:29 PM    Albumin 3 6 12/18/2019 04:29 PM    HDL, Direct 27 (L) 12/19/2019 05:44 AM    Triglycerides 132 12/19/2019 05:44 AM           Imaging Studies: I have personally reviewed pertinent reports  Portions of the record may have been created with voice recognition software  Occasional wrong word or "sound a like" substitutions may have occurred due to the inherent limitations of voice recognition software  Read the chart carefully and recognize, using context, where substitutions have occurred

## 2020-01-13 NOTE — PATIENT INSTRUCTIONS
INSULIN DOSAGE INSTRUCTIONS    Name: Melanie Padilla  : 1980  MRN #: [id number]    Your Current Insulin  and dose is: Before Breakfast Before Lunch Before Evening Meal Bedtime       Novolog Insulin   15 units      15 units    15 units     Regular, Apidra, Humalog orNovolog Sliding Scale:   <80              151-200 + 2 +2 +2    201-250 + 4 +4 +4 +   251-300 + 6 +6 +6 +   301-350 +  8 +8 +8 +   >350 +10 +10 +10 +       Lantus Insulin      50 units      Additional Instructions:   Please test your blood sugar:  _4_ Times per day  X_ Before Breakfast                _ Alternate Testing  X_ Before Lunch                _ 2 Hours After  Meal  X_ Before Evening Meal               _ 3 a m   x_ Before Bedtime Snack     Target Blood sugar range _70_to _140__  Call if your   blood sugar is less than _60_ or greater than _400__  Today's Date: 2020        Hypoglycemia instructions   Melanie Padilla   2020  [id number]    Low Blood Sugar    Steps to treat low blood sugar  1  Test blood sugar if you have symptoms of low blood sugar:   Low Blood Sugar Symptoms:  o Sweaty  o Dizzy  o Rapid heartbeat  o Shaky    o Bad mood  o Hungry      2  Treat blood sugar less than 70 with 15 grams of fast-acting carbohydrate:   Examples of 15 grams Fast-Acting Carbohydrate:  o 4 oz juice  o 4 oz regular soda  o 3-4 glucose tablets (chew)  o 3-4 hard candies (chew)              3    Wait 15 minutes and test your blood sugar again           4   If blood sugar is less than 100, repeat steps 2-3       5  When your blood sugar is 100 or more, eat a snack if it will be longer than one hour until your next meal  The snack should be 15 grams of carbohydrate and a protein:   Examples of snacks:  o ½ sandwich  o 6 crackers with cheese  o Piece of fruit with cheese or peanut butter  o 6 crackers with peanut butter

## 2020-01-14 ENCOUNTER — OFFICE VISIT (OUTPATIENT)
Dept: CARDIOLOGY CLINIC | Facility: CLINIC | Age: 40
End: 2020-01-14
Payer: COMMERCIAL

## 2020-01-14 VITALS
HEART RATE: 88 BPM | OXYGEN SATURATION: 97 % | DIASTOLIC BLOOD PRESSURE: 68 MMHG | SYSTOLIC BLOOD PRESSURE: 92 MMHG | BODY MASS INDEX: 39.86 KG/M2 | WEIGHT: 269.1 LBS | HEIGHT: 69 IN

## 2020-01-14 DIAGNOSIS — E78.5 HYPERLIPIDEMIA, UNSPECIFIED HYPERLIPIDEMIA TYPE: ICD-10-CM

## 2020-01-14 DIAGNOSIS — E66.01 CLASS 2 SEVERE OBESITY DUE TO EXCESS CALORIES WITH SERIOUS COMORBIDITY AND BODY MASS INDEX (BMI) OF 39.0 TO 39.9 IN ADULT (HCC): ICD-10-CM

## 2020-01-14 DIAGNOSIS — I10 HYPERTENSION, UNSPECIFIED TYPE: ICD-10-CM

## 2020-01-14 DIAGNOSIS — I25.5 ISCHEMIC CARDIOMYOPATHY: ICD-10-CM

## 2020-01-14 DIAGNOSIS — E11.65 UNCONTROLLED TYPE 2 DIABETES MELLITUS WITH HYPERGLYCEMIA (HCC): ICD-10-CM

## 2020-01-14 DIAGNOSIS — Z95.1 S/P CABG X 3: Primary | ICD-10-CM

## 2020-01-14 PROCEDURE — 3074F SYST BP LT 130 MM HG: CPT | Performed by: NURSE PRACTITIONER

## 2020-01-14 PROCEDURE — 99214 OFFICE O/P EST MOD 30 MIN: CPT | Performed by: NURSE PRACTITIONER

## 2020-01-14 PROCEDURE — 3078F DIAST BP <80 MM HG: CPT | Performed by: NURSE PRACTITIONER

## 2020-01-14 NOTE — PROGRESS NOTES
Cardiology Follow Up    Poly   1980  [id number]  [facility]  [address] [facility] [address]  [phone]    Hospital follow up visit     Interval History:   Mr Quyen Grant was [facility] to [facility] on 12/18 - 12/19/19 with a NSTEMI  He presented to the ED with a one week history of chest pain  On admission -170  Troponin peaked to 3 1, NSTEMI   12/19/19  LHC Showed triple vessel CAD: mid LAD 70% stenosis, mid Cx 75% stenosis, 2nd OM 90% stenosis, mid RCA 50% stenosis, distal RCA 90% stenosis, right PDA 90% stenosis  TTE showed   Mildly dilated left ventricular cavity  Severely reduced left ventricular systolic function with mild global hypokinesis with some regional wall motion variation  LVEF 30%, grade 2 DD, LVIDd 5 7cm, Mild LA   Cavity enlargement, trace aortic valve regurgitation, trace mitral valve regurgitation, trace tricuspid valve regurgitation  No obvious pulmonary hypertension  This was felt to be out of proportion to vascular disease  Lipid panel   Cholesterol 166, triglycerides 130, HDL 7,   HgbA1C 10 1  He was transferred to [facility] for CT surgery consultations  He was felt to likely have sleep apnea given obesity and snoring  HIV non reactive     Mr Juliet Damon was admitted to [facility] on 12/19 - 12/29/19 with NSTEMI  On 12/20/19 Equilla Prior underwent a cardiac MRI which showed Moderately enlarged left ventricle size with moderate induced LV systolic function  LVEF 33%  Mild diffuse increased myocardial wall thickness  Severe hypokinesis/ akinesis of the inferior inferior septal wall    Subendocardial delayed myocardial enhancement to 50% transmural thickness at the basal inferior and inferior septal wall and mid inferior wall consistent with ischemic cardiomyopathy in the RCA and PDA distribution with preserved viability  On 12/23/19 he underwent CABG x 3 LIMA to LAD, SVG to OM3, SVG to RPDA by Dr Luciana Vega  He tolerated the procedure well  IV medications weaned off  Follow up limited TTE showed LVEF improved to 40%,   Mild diffuse hypokinesis with distinct regional wall motion abnormality  Wall thickness increased  No evidence of apical thrombus  Trace TR  No Life vest needed at discharge  Discharge lab sodium 41 potassium 4 1 BUN 23 creatinine 0 91  Mr Justin De Los Santos presents to our office for a recent hospitalization follow up visit  He admits to dyspnea with moderate exertion such as walking around [facility]  He denies CP, palpitaitons, lightheadedness or dizziness  He did not bring a list of his medications to the office today  His blood sugars are running around 120 in the morning  Pat Barcenas  Did not bring a medication list with him to the office today    I have instructed him to place all medications dosage and times a day he is taking in his smart phone and bring to all his office visit        HPI:  DM2  Obesity  HTN  Medical non compliance  + Marijuana use for anxiety   Patient Active Problem List   Diagnosis    Essential hypertension    NSTEMI (non-ST elevated myocardial infarction) ([place] [address] )    Diabetes mellitus type 2, uncontrolled ([place] [address] )    Snoring    Hyperlipidemia    S/P CABG x 3    Acute respiratory insufficiency    Class 3 severe obesity in adult [facility])     Past Medical History:   Diagnosis Date    JELENA (acute kidney injury) ([place] [address] ) 12/23/2019    CAD (coronary artery disease)     Diabetes mellitus ([place] [address] )     HLD (hyperlipidemia)     Hypertension     Myocardial infarction [facility])      Social History     Socioeconomic History    Marital status: Single     Spouse name: Not on file    Number of children: Not on file    Years of education: Not on file    Highest education level: Not on file   Occupational History    Not on file   Social Needs    Financial resource strain: Not on file  Food insecurity:     Worry: Not on file     Inability: Not on file    Transportation needs:     Medical: Not on file     Non-medical: Not on file   Tobacco Use    Smoking status: Never Smoker    Smokeless tobacco: Never Used   Substance and Sexual Activity    Alcohol use: Never     Frequency: Never     Binge frequency: Never    Drug use: Yes     Types: Marijuana    Sexual activity: Yes     Partners: Female   Lifestyle    Physical activity:     Days per week: Not on file     Minutes per session: Not on file    Stress: Not on file   Relationships    Social connections:     Talks on phone: Not on file     Gets together: Not on file     Attends [employer] service: Not on file     Active member of club or organization: Not on file     Attends meetings of clubs or organizations: Not on file     Relationship status: Not on file    Intimate partner violence:     Fear of current or ex partner: Not on file     Emotionally abused: Not on file     Physically abused: Not on file     Forced sexual activity: Not on file   Other Topics Concern    Not on file   Social History Narrative    Not on file      Family History   Problem Relation Age of Onset    Cancer Mother         unknown     Past Surgical History:   Procedure Laterality Date    ANKLE SURGERY Right     CARDIAC CATHETERIZATION      CORONARY ARTERY BYPASS GRAFT N/A 12/23/2019    Procedure: CORONARY ARTERY BYPASS GRAFT (CABG) x 3; LIMA to LAD; Right EVH/SVG to OM3 and PDA;   Surgeon: Rosa Thomas MD;  [facility]: [facility];  Service: Cardiac Surgery    FRACTURE SURGERY         Current Outpatient Medications:     acetaminophen (TYLENOL) 325 mg tablet, Take 2 tablets (650 mg total) by mouth every 6 (six) hours as needed for mild pain, Disp: 30 tablet, Rfl: 0    Alcohol Swabs 70 % PADS, Use alcohol swab before each insulin injection and before testing your blood glucose, Disp: 250 each, Rfl: 0    amLODIPine (NORVASC) 10 mg tablet, Take 1 tablet (10 mg total) by mouth daily, Disp: 30 tablet, Rfl: 2    aspirin 325 mg tablet, Take 1 tablet (325 mg total) by mouth daily, Disp: 30 tablet, Rfl: 2    atorvastatin (LIPITOR) 80 mg tablet, Take 1 tablet (80 mg total) by mouth daily with dinner, Disp: 30 tablet, Rfl: 2    Blood Glucose Monitoring Suppl (D-CARE GLUCOMETER) w/Device KIT, by Does not apply route 4 (four) times daily (after meals and at bedtime) Test your blood glucose before each meal and at bedtime, Disp: 1 kit, Rfl: 0    Empagliflozin 10 MG TABS, Take 1 tablet (10 mg total) by mouth every morning, Disp: 30 tablet, Rfl: 3    glucose blood (GLUCOSE METER TEST) test strip, Test your blood glucose before each meal and at bedtime, record those numbers, Disp: 120 each, Rfl: 2    hydrochlorothiazide (HYDRODIURIL) 50 mg tablet, 1 tablet every 24 hours, Disp: , Rfl:     insulin glargine (LANTUS) 100 units/mL subcutaneous injection, Inject 50 Units under the skin daily at bedtime, Disp: 30 mL, Rfl: 2    insulin lispro (HumaLOG) 100 units/mL injection, Inject 15 Units under the skin 3 (three) times a day with meals, Disp: 30 mL, Rfl: 2    Insulin Pen Needle (BD PEN NEEDLE LEONA U/F) 32G X 4 MM MISC, Use a new pen needle each time you use your insulin pens before each meal and at bedtime, Disp: 120 each, Rfl: 2    labetalol (NORMODYNE) 200 mg tablet, take 1 1/2 tablets by oral route 2 times every day, Disp: , Rfl:     Lancets (ACCU-CHEK MULTICLIX) lancets, Test your blood glucose before each meal and at bedtime, record those numbers, Disp: 120 each, Rfl: 2    lisinopril (ZESTRIL) 40 mg tablet, Take 1 tablet (40 mg total) by mouth daily, Disp: 30 tablet, Rfl: 2    metFORMIN (GLUCOPHAGE) 1000 MG tablet, 1 tablet Every 12 hours, Disp: , Rfl:     metoprolol tartrate (LOPRESSOR) 50 mg tablet, Take 1 tablet (50 mg total) by mouth every 12 (twelve) hours, Disp: 60 tablet, Rfl: 2    NOVOLOG FLEXPEN 100 units/mL injection pen, INJECT 15 UNITS SUBCUTANEOUS 3 TIMES A DAY WITH MEALS, Disp: , Rfl:     omeprazole (PriLOSEC) 20 mg delayed release capsule, Take 1 capsule (20 mg total) by mouth daily Take 30 minutes prior to breakfast, Disp: 30 capsule, Rfl: 0    oxyCODONE-acetaminophen (PERCOCET) 5-325 mg per tablet, Take 1 tablet every 6 hours, as needed for moderate pain or Take 2 tablets every 8 hours, as needed for severe pain , Disp: 30 tablet, Rfl: 0    polyethylene glycol (MIRALAX) 17 g packet, Take 17 g by mouth daily Mix in 4 to 8 ounces of water, juice, tea or soda (stop with loose stools) (Patient not taking: Reported on 1/13/2020), Disp: 14 each, Rfl: 0    potassium chloride (K-DUR,KLOR-CON) 20 mEq tablet, Take 1 tablet (20 mEq total) by mouth daily for 7 days (Patient not taking: Reported on 1/13/2020), Disp: 7 tablet, Rfl: 1    senna-docusate sodium (SENOKOT S) 8 6-50 mg per tablet, Take 1 tablet by mouth 2 (two) times a day as needed for constipation (Patient not taking: Reported on 1/13/2020), Disp: 30 tablet, Rfl: 0    torsemide (DEMADEX) 20 mg tablet, Take 1 tablet (20 mg total) by mouth daily for 7 days (Patient not taking: Reported on 1/13/2020), Disp: 7 tablet, Rfl: 1  No Known Allergies    Labs:  No results displayed because visit has over 200 results        Admission on 12/18/2019, Discharged on 12/19/2019   Component Date Value    WBC 12/18/2019 10 84*    RBC 12/18/2019 5 40     Hemoglobin 12/18/2019 16 3     Hematocrit 12/18/2019 48 2     MCV 12/18/2019 89     MCH 12/18/2019 30 2     MCHC 12/18/2019 33 8     RDW 12/18/2019 11 3*    MPV 12/18/2019 10 6     Platelets 54/70/6147 273     nRBC 12/18/2019 0     Neutrophils Relative 12/18/2019 65     Immat GRANS % 12/18/2019 1     Lymphocytes Relative 12/18/2019 24     Monocytes Relative 12/18/2019 8     Eosinophils Relative 12/18/2019 1     Basophils Relative 12/18/2019 1     Neutrophils Absolute 12/18/2019 7 30     Immature Grans Absolute 12/18/2019 0 05     Lymphocytes Absolute 12/18/2019 2 55     Monocytes Absolute 12/18/2019 0 83     Eosinophils Absolute 12/18/2019 0 06     Basophils Absolute 12/18/2019 0 05     Lipase 12/18/2019 296     Sodium 12/18/2019 135*    Potassium 12/18/2019 4 0     Chloride 12/18/2019 97*    CO2 12/18/2019 29     ANION GAP 12/18/2019 9     BUN 12/18/2019 15     Creatinine 12/18/2019 1 18     Glucose 12/18/2019 404*    Calcium 12/18/2019 9 5     AST 12/18/2019 19     ALT 12/18/2019 30     Alkaline Phosphatase 12/18/2019 89     Total Protein 12/18/2019 8 0     Albumin 12/18/2019 3 6     Total Bilirubin 12/18/2019 0 62     eGFR 12/18/2019 77     Troponin I 12/18/2019 1 05*    D-Dimer, Quant 12/18/2019 0 27     PTT 12/18/2019 30     Protime 12/18/2019 13 6     INR 12/18/2019 1 03     Hemoglobin A1C 12/18/2019 10 1*    EAG 12/18/2019 243     Troponin I 12/18/2019 2 08*    Troponin I 12/18/2019 2 57*    POC Glucose 12/18/2019 322*    PTT 12/18/2019 25     POC Glucose 12/18/2019 272*    Troponin I 12/19/2019 3 11*    Cholesterol 12/19/2019 166     Triglycerides 12/19/2019 132     HDL, Direct 12/19/2019 27*    LDL Calculated 12/19/2019 113*    PTT 12/19/2019 37     Troponin I 12/19/2019 2 83*    POC Glucose 12/19/2019 253*    Ventricular Rate 12/18/2019 109     Atrial Rate 12/18/2019 109     LA Interval 12/18/2019 146     QRSD Interval 12/18/2019 102     QT Interval 12/18/2019 360     QTC Interval 12/18/2019 484     P Axis 12/18/2019 48     QRS Axis 12/18/2019 129     T Wave Axis 12/18/2019 -45     Ventricular Rate 12/18/2019 94     Atrial Rate 12/18/2019 94     LA Interval 12/18/2019 142     QRSD Interval 12/18/2019 106     QT Interval 12/18/2019 370     QTC Interval 12/18/2019 462     P Axis 12/18/2019 51     QRS Axis 12/18/2019 102     T Wave Axis 12/18/2019 100     POC Glucose 12/19/2019 248*    POC Glucose 12/19/2019 252*    POC Glucose 12/19/2019 212*    POC Glucose 12/20/2019 247*     Imaging: Ct Chest Abdomen Pelvis Wo Contrast    Result Date: 12/21/2019  Narrative: CT CHEST, ABDOMEN AND PELVIS WITHOUT IV CONTRAST INDICATION:   cardiomyopathy  COMPARISON:  Chest radiographic series 12/18/2019 TECHNIQUE: CT examination of the chest, abdomen and pelvis was performed without intravenous contrast   Axial, sagittal, and coronal 2D reformatted images were created from the source data and submitted for interpretation  Radiation dose length product (DLP) for this visit:  1615 9 mGy-cm   This examination, like all CT scans performed in the [facility], was performed utilizing techniques to minimize radiation dose exposure, including the use of iterative  reconstruction and automated exposure control  Enteric contrast was not administered  FINDINGS: CHEST LUNGS:  Lungs are clear  There is no tracheal or endobronchial lesion  PLEURA:  Unremarkable  HEART/GREAT VESSELS:  Unremarkable for patient's age  MEDIASTINUM AND CHEVY:  Unremarkable  CHEST WALL AND LOWER NECK:   Asymmetric gynecomastia, left greater than right  No axillary lymphadenopathy  ABDOMEN LIVER/BILIARY TREE:  Liver is mildly decreased in density consistent with fatty change  No CT evidence of suspicious hepatic mass  Normal hepatic contours  No biliary dilatation  GALLBLADDER:  No calcified gallstones  No pericholecystic inflammatory change  SPLEEN:  Unremarkable  Small splenule noted  PANCREAS:  Unremarkable  ADRENAL GLANDS:  Unremarkable  KIDNEYS/URETERS:  Residual intravenous contrast in the collecting systems from recent cardiac MRI  No hydronephrosis or perinephric collections  STOMACH AND BOWEL:  Unremarkable  APPENDIX:  No findings to suggest appendicitis  ABDOMINOPELVIC CAVITY:  No ascites or free intraperitoneal air  No lymphadenopathy  VESSELS:  Unremarkable for patient's age  PELVIS REPRODUCTIVE ORGANS:  Unremarkable for patient's age   URINARY BLADDER:  The bladder is diffusely thick-walled likely sequela of underdistention  No perivesical inflammation  ABDOMINAL WALL/INGUINAL REGIONS:  Focal soft tissue emphysema in the left ventral abdominal wall, likely iatrogenic  OSSEOUS STRUCTURES:  No acute fracture or destructive osseous lesion  Impression: 1  No acute intrathoracic abnormality  2   Mild hepatic steatosis  3   Thick-walled bladder likely sequela of underdistention  No perivesical inflammation  Workstation performed: [id number]     Xr Chest Portable    Result Date: 12/24/2019  Narrative: CHEST INDICATION:   Post Open Heart Surgey  COMPARISON:  12/23/2019 EXAM PERFORMED/VIEWS:  XR CHEST PORTABLE FINDINGS:  Median sternotomy wires are present  Right jugular central venous catheter tip overlies the right atrium  Mediastinal drains are present  Cardiomediastinal silhouette is stable  The lungs are clear  No pneumothorax or pleural effusion  Osseous structures appear within normal limits for patient age  Impression: No acute cardiopulmonary disease  Workstation performed: [id number]     Xr Chest 2 Views    Result Date: 12/18/2019  Narrative: CHEST INDICATION:   Chest pain  COMPARISON:  None EXAM PERFORMED/VIEWS:  XR CHEST PA & LATERAL FINDINGS: Cardiomediastinal silhouette appears unremarkable  The lungs are clear  No pneumothorax or pleural effusion  Osseous structures appear within normal limits for patient age  Impression: No active pulmonary disease  Workstation performed: [id number]     Mri Cardiac  W Wo Contrast    Result Date: 12/22/2019  Narrative: MRI CARDIAC  W WO CONTRAST cardiomyopathy Technique: 1  3 plane SSFP localizers  2  SSFP cine imaging in long and short axis planes  3  T2 weighted DIR FSE in short axis plane  4  24 ml gadolinium DTPA power injected  5  2D inversion recovery FGRE for delayed myocardial enhancement  6  The patient tolerated the procedure well without complication   Measurements: Mikey-septal wall 10 mm Postero-lateral wall 10 mm Global LV Assessment ----------------------------------------------------------------------------------------------------                Value                        Value / Height               Value / BSA                ---------------------------------------------------------------------------------------------------- LVEDV          269 ml                       153 24 ml/m (increased)      109 01 ml/m^2  (increased)                                               [60 - 120]                   [53 - 97]                  LVESV          181 ml                       103 33 ml/m (increased)      73 51 ml/m^2  (increased)                                                [12 - 44]                    [10 - 34]                  LVSV           87 ml                        49 9 ml/m                    35 5 ml/m^2  (reduced)                                                                                [37 - 69]                  LVEF           33 %                                                                                                                                                                                     LVCO           8 5 l/min                                                 3 4 l/min/m^2  (Normal)                                                                               [>= 2 5]                   LV Mass        285 g                        162 7 g/m (increased)        115 74 g/m^2  (increased)                                                [47 - 93]                    [42 - 78]                  Global RV Assessment ----------------------------------------------------------------------------------------------------                Value                        Value / Height               Value / BSA                ---------------------------------------------------------------------------------------------------- RVEDV          133 ml                       75 77 ml/m (normal)          53 91 ml/m^2  (reduced) Hilda Chalk - 141]                   [67 - 111]                 RVESV          78 ml                        44 46 ml/m (normal)          31 63 ml/m^2  (normal)                                                   [22 - 66]                    [20 - 48]                  RVSV           55 ml                        31 31 ml/m                   22 27 ml/m^2  (reduced)                                                                               [39 - 71]                  RVEF           41 %                                                                                                                                                                                     RVCO           5 3 l/min                                                 2 2 l/min/m^2        Left atrium 32 cm^2 Aortic Root 22 mm Findings:  1  Mildly enlarged left ventricle end-diastolic volume  Mildly reduced left ventricle systolic function with ejection fraction measuring 33%  Mild diffusely increased myocardial wall thickness  Severe hypokinesis/akinesis of the inferior and inferoseptal wall  2  Normal right ventricle end-diastolic volume  Mildly reduced right ventricle systolic function with ejection fraction measuring 41%  3  The aortic, mitral, and tricuspid valves open without restriction  There is mitral regurgitation, however cine MRI is inaccurate in the qualitative assessment of valvular regurgitation  4  The left atrium is moderately enlarged  The aortic root is normal in size  5  There is no evidence of myocardial edema  6  Delayed post-gadolinium imaging demonstrates subendocardial delayed myocardial enhancement up to 50% transmural thickness at the basal inferior and inferoseptal wall and mid inferior wall  Impression: Impression: 1  Moderately enlarged left ventricle size with moderately reduced left ventricle systolic function  Ejection fraction measures 33%    Mild diffuse increased myocardial wall thickness  Severe hypokinesis/akinesis at the inferior and inferoseptal wall  Subendocardial delayed myocardial enhancement up to 50% transmural thickness at the basal inferior and inferoseptal wall and mid inferior wall, consistent with ischemic cardiomyopathy in the RCA/PDA distribution, with preserved viability  2  Normal right ventricle size  Mildly reduced right ventricle systolic function with ejection fraction measuring 41%  3  Mitral regurgitation 4  Moderate left atrial enlargement Workstation performed: [id number]     Vas Carotid Complete Study    Result Date: 12/21/2019  Narrative:  [facility] REPORT CLINICAL: Indications: Patient presents for a general health evaluation secondary to future open heart surgery  Patient is asymptomatic at this time  Operative History: No prior cardiovascular surgeries Risk Factors The patient has history of HTN, Diabetes, HLD, CAD, and MI  The patient's current BMI is 40 61, Weight is 275 lb and height is 69 in  Clinical Right Pressure: 123/62 mm Hg, Left Pressure: IV site  FINDINGS:  Right        Impression  PSV  EDV (cm/s)  Direction of Flow  Ratio  Dist  ICA                 42          11                      0 42  Mid  ICA                  62          19                      0 63  Prox  ICA    1 - 49%      57          16                      0 58  Dist CCA                  64          18                            Mid CCA                   98          24                      1 64  Prox CCA                  60          15                            Ext Carotid               75          15                      0 76  Prox Vert                 40          16  Antegrade                 Subclavian               132          10                             Left         Impression  PSV  EDV (cm/s)  Direction of Flow  Ratio  Dist  ICA                 57          29                      0 73  Mid  ICA                  61          28                      0 78  Prox  ICA    1 - 49%      45          21                      0 59  Dist CCA                  72          21                            Mid CCA                   78          22                      1 00  Prox CCA                  78          21                            Ext Carotid               60          12                      0 78  Prox Vert                 20           9  Antegrade                 Subclavian                81           0                               CONCLUSION:  Impression  RIGHT: There is <50% stenosis noted in the internal carotid artery  Plaque is homogenous and smooth  Vertebral artery flow is antegrade  There is no significant subclavian artery disease  LEFT: There is <50% stenosis noted in the internal carotid artery  Plaque is homogenous and smooth  Vertebral artery flow is antegrade  There is no significant subclavian artery disease  There a no prior studies for comparison  Technical findings were faxed to chart  Tech Note: Very minimal plaque identified in the bilateral internal carotid arteries  Internal carotid artery stenosis determination by consensus criteria from: Eve Balderas et al  Carotid Artery Stenosis: Gray-Scale and Doppler US Diagnosis - Society of Radiologists in [address] [place] [address], Radiology 2003; [phone]  SIGNATURE: Electronically Signed by: Britney Lackey MD, [address] on 2019-12-21 01:50:00 PM    Vas Lower Limb Vein Mapping Bypass Graft    Result Date: 12/21/2019  Narrative:  [facility] REPORT CLINICAL: Indications: Pre-op Vein Mapping for Future Open Heart Surgery  Physician wants to evaluate for suitable conduit  Operative History: No prior cardiovascular surgeries Risk Factors The patient has history of HTN, Diabetes, HLD, CAD, and MI  The patient's current BMI is 40 61, Weight is 275 lb and height is 69 in    FINDINGS:  Segment         Right        Left                            Diameter AP  Diameter AP  GSV Inguinal            7 6 6  1  GSV Prox Thigh          2 6          2 2  GSV Mid Thigh           2 8          1 1  GSV Dist Thigh          2 9          1 6  GSV Knee                2 7          1 1  GSV Prox Calf           2 4          2 1  GSV Mid Calf            2 0          2 7  GSV Dist Calf           2 2          2 5  GSV Ankle               2 3          2 9     CONCLUSION:  Impression:  RIGHT LOWER LIMB: The great saphenous vein is patent  from the groin to the ankle  The vein is of adequate caliber and quality for graft conduit with intraluminal diameters ranging from 2 0 mm to 7 6 mm from ankle to the saphenofemoral junction  LEFT LOWER LIMB: The great saphenous vein is patent  from the groin to the ankle  The vein is of adequate caliber and quality for graft conduit with intraluminal diameters ranging from 2 1 mm to 2 9 mm from the ankle to the proximal calf  Tech note: Technical findings were faxed to chart  SIGNATURE: Electronically Signed by: Chance Posadas MD, [address] on 2019-12-21 01:49:47 PM    Xr Chest Portable Icu    Result Date: 12/24/2019  Narrative: CHEST INDICATION:   S/P open heart  COMPARISON:  None EXAM PERFORMED/VIEWS:  XR CHEST PORTABLE ICU FINDINGS:  ET tube tip likely within 1 cm of the rossi, rossi not well demonstrated  Right IJ central line tip over the base of the right heart and should be retracted approximately 6 cm  Cecil-Sera catheter tip projecting over the right main pulmonary artery  Mediastinal and left thoracic drains present  Cardiomediastinal silhouette appears unremarkable  The lungs are grossly clear  Limited inspiratory volume  No pneumothorax or pleural effusion  Osseous structures appear within normal limits for patient age  Impression: Suggest retracting right IJ central line approximate 6 cm and ET tube 1-2 cm  The study was marked in [facility] for immediate notification   Workstation performed: [id number]       Review of Systems:  Review of Systems    Physical Exam:  Physical Exam Constitutional: He is oriented to person, place, and time  He appears well-developed and well-nourished  HENT:   Head: Normocephalic  Eyes: Pupils are equal, round, and reactive to light  Neck: Normal range of motion  Neck supple  Cardiovascular: Normal rate, regular rhythm and normal heart sounds  Pulmonary/Chest: Effort normal    Diminished breath sound in 1/2 left lung field    Abdominal: Soft  Bowel sounds are normal    obese   Musculoskeletal: Normal range of motion  He exhibits edema  Trace oswaldo LE edema  Neurological: He is alert and oriented to person, place, and time  Skin: Skin is warm and dry  Capillary refill takes less than 2 seconds  Sternal incision appears intact approximated scabbed healing without erythema or ecchymosis    Right knee inner aspect incision line appear  Intact approximated scabbed healing without erythema or ecchymosis   Vitals reviewed  Discussion/Summary:  1  12/23/19 Sp CABG x 3 LIMA to LAD, SVG to OM3, SVG to RPDA by Dr Harshad Carlisle  Continue aspirin 325 mg daily, metoprolol tartrate 50 mg q 12 hours, lisinopril 40 mg daily, torsemide 20 mg daily, K-Dur 20 mEq daily diminished breath sounds left lung field chest x-ray rule out pleural effusion  2  ICM LVEF 30% improved to 40% post CABG,  Continue on metoprolol tartrate 50 mg q 12 hours, lisinopril 40 mg daily,  Torsemide 20 mg daily K-Dur 20 mEq daily  Maintain a 2 gram daily sodium diet and 1500 ml daily fluid restriction  Check daily weights  If you gain 3 pounds in one day, 5 pounds in one week, or experience worsening shortness of breath or increasing lower leg swelling  Please call the heart failure office at [phone]  Please bring a  list of your current medications and daily weights to the office visit  3  DM2 HgbA1C 10 0-  Continue on metformin 1000 mg q 12 hours, insulin  4   Hypertension- /78 controlled on  Metoprolol tartrate 50 mg q 12 hours, lisinopril 40 mg daily,  Norvasc 10 mg daily,  Torsemide 20 mg daily,  K-Dur 20 mEq daily  5  Hyperlipidemia- 12/19/19  Cholesterol 166, triglycerides 132, HDL 27,  continue on Lipitor 80mg daily  6  Obesity- BMI 39 7, hold off on JANKI evaluation at this time  Fabien Willingham typically sleeps on his stomach, at this time  Finding it difficult to sleep throughout the night  7   Marijuana use for anxiety

## 2020-01-14 NOTE — PATIENT INSTRUCTIONS
Maintain a 2 gram daily sodium diet and 1500 ml to 2 liters daily fluid restriction  Check daily weights  If you gain 3 pounds in one day, 5 pounds in one week, or experience worsening shortness of breath or increasing lower leg swelling  Please call the heart failure office at [phone]    Please bring a  list of your current medications and daily weights to the office visit

## 2020-01-24 ENCOUNTER — OFFICE VISIT (OUTPATIENT)
Dept: CARDIAC SURGERY | Facility: CLINIC | Age: 40
End: 2020-01-24

## 2020-01-24 VITALS
RESPIRATION RATE: 14 BRPM | WEIGHT: 267 LBS | OXYGEN SATURATION: 97 % | BODY MASS INDEX: 39.55 KG/M2 | HEART RATE: 87 BPM | TEMPERATURE: 97.6 F | HEIGHT: 69 IN | SYSTOLIC BLOOD PRESSURE: 134 MMHG | DIASTOLIC BLOOD PRESSURE: 84 MMHG

## 2020-01-24 DIAGNOSIS — Z48.89 ENCOUNTER FOR POST SURGICAL WOUND CHECK: Primary | ICD-10-CM

## 2020-01-24 DIAGNOSIS — T81.32XA STERNAL WOUND DEHISCENCE, INITIAL ENCOUNTER: ICD-10-CM

## 2020-01-24 DIAGNOSIS — I25.10 CORONARY ARTERY DISEASE INVOLVING NATIVE CORONARY ARTERY OF NATIVE HEART WITHOUT ANGINA PECTORIS: ICD-10-CM

## 2020-01-24 PROCEDURE — 99024 POSTOP FOLLOW-UP VISIT: CPT | Performed by: PHYSICIAN ASSISTANT

## 2020-01-24 RX ORDER — CEPHALEXIN 500 MG/1
500 CAPSULE ORAL EVERY 12 HOURS SCHEDULED
Qty: 20 CAPSULE | Refills: 0 | Status: SHIPPED | OUTPATIENT
Start: 2020-01-24 | End: 2020-02-03

## 2020-01-24 NOTE — PROGRESS NOTES
Procedure: S/P coronary artery bypass grafting x 3, performed on 12/23/19    History: Edilberto Diaz  is a 44y o  year old male who presents to our office today for routine follow up care from coronary artery bypass grafting x 3  Since he has been home, he reports he has been doing well  He has followed up with his PCP, Heart Failure and Cardiology postoperatively  Unfortunately, on Tuesday 1/21, the patient reports his sternal incision opened up and has been draining cloudy drainage  He denies chest pain, SOB, fevers, chills, nausea or vomiting  He denies any problems with his EVH incision  He states he saw a diabetes nurse educator yesterday, who called our office to report the wound dehiscence  He had his routine appointment scheduled for today, so he was instructed to follow-up  He reports his blood sugars typically average in the 180's  He checks his BS 3-4 times daily  He was prescribed Jardiance, but has not started this medication yet  Vital Signs:   Vitals:    01/24/20 1000 01/24/20 1015   BP: 134/88 134/84   BP Location: Left arm Right arm   Cuff Size: Adult Adult   Pulse: 87    Resp: 14    Temp: 97 6 °F (36 4 °C)    TempSrc: Oral    SpO2: 97%    Weight: 121 kg (267 lb)    Height: 5' 9" (1 753 m)        Home Medications:   Prior to Admission medications    Medication Sig Start Date End Date Taking?  Authorizing Provider   acetaminophen (TYLENOL) 325 mg tablet Take 2 tablets (650 mg total) by mouth every 6 (six) hours as needed for mild pain 12/29/19  Yes Mandi Hunter PA-C   Alcohol Swabs 70 % PADS Use alcohol swab before each insulin injection and before testing your blood glucose 12/29/19  Yes Kay Atkins PA-C   amLODIPine (NORVASC) 10 mg tablet Take 1 tablet (10 mg total) by mouth daily 12/29/19  Yes Mandi Hunter PA-C   aspirin 325 mg tablet Take 1 tablet (325 mg total) by mouth daily 12/30/19  Yes Mandi Hunter PA-C   atorvastatin (LIPITOR) 80 mg tablet Take 1 tablet (80 mg total) by mouth daily with dinner 12/29/19  Yes Laron Buckley PA-C   Blood Glucose Monitoring Suppl (D-CARE GLUCOMETER) w/Device KIT by Does not apply route 4 (four) times daily (after meals and at bedtime) Test your blood glucose before each meal and at bedtime 12/27/19  Yes Rosendo Atkins PA-C   Empagliflozin 10 MG TABS Take 1 tablet (10 mg total) by mouth every morning 1/13/20  Yes Natalia Vale MD   glucose blood (GLUCOSE METER TEST) test strip Test your blood glucose before each meal and at bedtime, record those numbers 12/27/19  Yes Laron Buckley PA-C   insulin glargine (LANTUS) 100 units/mL subcutaneous injection Inject 50 Units under the skin daily at bedtime 1/13/20  Yes Natalia Vale MD   insulin lispro (HumaLOG) 100 units/mL injection Inject 15 Units under the skin 3 (three) times a day with meals 1/13/20  Yes Natalia Vale MD   Insulin Pen Needle (BD PEN NEEDLE LEONA U/F) 32G X 4 MM MISC Use a new pen needle each time you use your insulin pens before each meal and at bedtime 1/13/20  Yes Natalia Vale MD   Lancets (Arnold Le) lancets Test your blood glucose before each meal and at bedtime, record those numbers 12/27/19  Yes Laron Buckley PA-C   lisinopril (ZESTRIL) 40 mg tablet Take 1 tablet (40 mg total) by mouth daily 12/29/19  Yes Laron Buckley PA-C   metFORMIN (GLUCOPHAGE) 1000 MG tablet 1 tablet Every 12 hours   Yes Historical Provider, MD   metoprolol tartrate (LOPRESSOR) 50 mg tablet Take 1 tablet (50 mg total) by mouth every 12 (twelve) hours 12/29/19  Yes Laron Buckley PA-C   NOVOLOG FLEXPEN 100 units/mL injection pen INJECT 15 UNITS SUBCUTANEOUS 3 TIMES A DAY WITH MEALS 12/29/19  Yes Historical Provider, MD   cephalexin (KEFLEX) 500 mg capsule Take 1 capsule (500 mg total) by mouth every 12 (twelve) hours for 10 days 1/24/20 2/3/20  Nick Weiner PA-C   omeprazole (PriLOSEC) 20 mg delayed release capsule Take 1 capsule (20 mg total) by mouth daily Take 30 minutes prior to breakfast  Patient not taking: Reported on 1/24/2020 12/29/19 1/24/20  Laron Buckley PA-C   oxyCODONE-acetaminophen (PERCOCET) 5-325 mg per tablet Take 1 tablet every 6 hours, as needed for moderate pain or Take 2 tablets every 8 hours, as needed for severe pain  Patient not taking: Reported on 1/24/2020 12/29/19 1/24/20  Laron Buckley PA-C       Physical Exam:    HEENT/NECK:  PERRLA  No jugular venous distention  Cardiac: Regular rate and rhythm and No murmurs/rubs/gallops  Pulmonary:  Breath sounds clear bilaterally and No rales/rhonchi/wheezes  Abdomen:  Non-tender, Non-distended and Normal bowel sounds  Incisions: Distal 2/3 of sternal incision dehisced, approx 0 5-1cm wide  Area of tracking mid incision approx 0 5cm deep  Trace purulence noted, malodorous  No surrounding erythema  Right EVH incision slightly superficially dehisced, but intact deeper  No erythema or drainage noted  Appears to be healing well  Extremities: Extremities warm/dry, Radial pulses 2+ bilaterally and No edema B/L  Neuro: Alert and oriented X 3  Sensation is grossly intact  No focal deficits  Skin: Warm/Dry, without rashes or lesions  Lab Results:               Invalid input(s): LABGLOM      Lab Results   Component Value Date    HGBA1C 10 1 (H) 12/18/2019     Lab Results   Component Value Date    TROPONINI 2 83 (H) 12/19/2019         Assessment: Coronary artery disease, Superficial sternal wound  S/P coronary artery bypass grafting x 3 12/23/19    Plan:     [facility]  has a superficial sternal wound infection  His incision was debrided and explored in the office  Iodoform packing was placed and covered with a DSD  We have instructed the patient and his significant other to do BID packing/dressing changes  They were educated on how to do the packing and were provided the materials and supplies needed to do this   We have placed a referral for VNA for wound care and prescribed Keflex 500mg BID x 10 days  He is to follow-up with our [facility] in 1 week  They were instructed to contact our office immediately if his wound worsens, he develops fevers/chills, or any other signs of worsening infection  At this point I have NOT cleared them to begin outpatient cardiac rehabilitation  [facility]  has been cleared to resume driving at this point  I asked that them do so in progressive increments  He is to continue the ongoing lifting restrictions of 10 pounds  [facility]  has already been evaluated by their primary care physician cardiologist for ongoing medical care  [facility]  was comfortable with our recommendations and their questions were answered to their satisfaction      Routine referral to gastroenterology for colonoscopy screening was not indicated, as the patient is less than 48years old    Poonam Lopez  01/24/20

## 2020-01-24 NOTE — LETTER
January 24, 2020     Gala Whittington MD  [address]  [id number]    Patient: Eder Birch  YOB: 1980   Date of Visit: 1/24/2020       Dear Dr Sorin Wyatt:    Thank you for referring Keith Doan to me for evaluation  Below are my notes for this consultation  If you have questions, please do not hesitate to call me  I look forward to following your patient along with you  Sincerely,        Dhaval Love MD        CC: DO Ramon Giron MD  [place], [place]  1/24/2020 11:12 AM  Attested  Procedure: S/P coronary artery bypass grafting x 3, performed on 12/23/19    History: Eder Birch  is a 44y o  year old male who presents to our office today for routine follow up care from coronary artery bypass grafting x 3  Since he has been home, he reports he has been doing well  He has followed up with his PCP, Heart Failure and Cardiology postoperatively  Unfortunately, on Tuesday 1/21, the patient reports his sternal incision opened up and has been draining cloudy drainage  He denies chest pain, SOB, fevers, chills, nausea or vomiting  He denies any problems with his EVH incision  He states he saw a diabetes nurse educator yesterday, who called our office to report the wound dehiscence  He had his routine appointment scheduled for today, so he was instructed to follow-up  He reports his blood sugars typically average in the 180's  He checks his BS 3-4 times daily  He was prescribed Jardiance, but has not started this medication yet  Vital Signs:   Vitals:    01/24/20 1000 01/24/20 1015   BP: 134/88 134/84   BP Location: Left arm Right arm   Cuff Size: Adult Adult   Pulse: 87    Resp: 14    Temp: 97 6 °F (36 4 °C)    TempSrc: Oral    SpO2: 97%    Weight: 121 kg (267 lb)    Height: 5' 9" (1 753 m)        Home Medications:   Prior to Admission medications    Medication Sig Start Date End Date Taking?  Authorizing Provider   acetaminophen (TYLENOL) 325 mg tablet Take 2 tablets (650 mg total) by mouth every 6 (six) hours as needed for mild pain 12/29/19  Yes Solomon Bernabe PA-C   Alcohol Swabs 70 % PADS Use alcohol swab before each insulin injection and before testing your blood glucose 12/29/19  Yes Roman Collet Lugiano, PA-C   amLODIPine (NORVASC) 10 mg tablet Take 1 tablet (10 mg total) by mouth daily 12/29/19  Yes Solomon Bernabe PA-C   aspirin 325 mg tablet Take 1 tablet (325 mg total) by mouth daily 12/30/19  Yes Solomon Bernabe PA-C   atorvastatin (LIPITOR) 80 mg tablet Take 1 tablet (80 mg total) by mouth daily with dinner 12/29/19  Yes Solomon Bernabe PA-C   Blood Glucose Monitoring Suppl (D-CARE GLUCOMETER) w/Device KIT by Does not apply route 4 (four) times daily (after meals and at bedtime) Test your blood glucose before each meal and at bedtime 12/27/19  Yes Roman Collet Lugiano, PA-C   Empagliflozin 10 MG TABS Take 1 tablet (10 mg total) by mouth every morning 1/13/20  Yes Carissa Kathelen MD   glucose blood (GLUCOSE METER TEST) test strip Test your blood glucose before each meal and at bedtime, record those numbers 12/27/19  Yes Solomon Bernabe PA-C   insulin glargine (LANTUS) 100 units/mL subcutaneous injection Inject 50 Units under the skin daily at bedtime 1/13/20  Yes Carissa Kathleen MD   insulin lispro (HumaLOG) 100 units/mL injection Inject 15 Units under the skin 3 (three) times a day with meals 1/13/20  Yes Carissa Kathleen MD   Insulin Pen Needle (BD PEN NEEDLE LEONA U/F) 32G X 4 MM MISC Use a new pen needle each time you use your insulin pens before each meal and at bedtime 1/13/20  Yes Carissa Kathleen MD   Lancets (ACCU-CHEK MULTICLIX) lancets Test your blood glucose before each meal and at bedtime, record those numbers 12/27/19  Yes Solomon Bernabe PA-C   lisinopril (ZESTRIL) 40 mg tablet Take 1 tablet (40 mg total) by mouth daily 12/29/19  Yes Solomon Bernabe PA-C   metFORMIN (GLUCOPHAGE) 1000 MG tablet 1 tablet Every 12 hours   Yes Historical Provider, MD   metoprolol tartrate (LOPRESSOR) 50 mg tablet Take 1 tablet (50 mg total) by mouth every 12 (twelve) hours 12/29/19  Yes Debbie Atkins PA-C   NOVOLOG FLEXPEN 100 units/mL injection pen INJECT 15 UNITS SUBCUTANEOUS 3 TIMES A DAY WITH MEALS 12/29/19  Yes Historical Provider, MD   cephalexin (KEFLEX) 500 mg capsule Take 1 capsule (500 mg total) by mouth every 12 (twelve) hours for 10 days 1/24/20 2/3/20  Nick Weiner PA-C   omeprazole (PriLOSEC) 20 mg delayed release capsule Take 1 capsule (20 mg total) by mouth daily Take 30 minutes prior to breakfast  Patient not taking: Reported on 1/24/2020 12/29/19 1/24/20  Laron Buckley PA-C   oxyCODONE-acetaminophen (PERCOCET) 5-325 mg per tablet Take 1 tablet every 6 hours, as needed for moderate pain or Take 2 tablets every 8 hours, as needed for severe pain  Patient not taking: Reported on 1/24/2020 12/29/19 1/24/20  Laron Buckley PA-C       Physical Exam:    HEENT/NECK:  PERRLA  No jugular venous distention  Cardiac: Regular rate and rhythm and No murmurs/rubs/gallops  Pulmonary:  Breath sounds clear bilaterally and No rales/rhonchi/wheezes  Abdomen:  Non-tender, Non-distended and Normal bowel sounds  Incisions: Distal 2/3 of sternal incision dehisced, approx 0 5-1cm wide  Area of tracking mid incision approx 0 5cm deep  Trace purulence noted, malodorous  No surrounding erythema  Right EVH incision slightly superficially dehisced, but intact deeper  No erythema or drainage noted  Appears to be healing well  Extremities: Extremities warm/dry, Radial pulses 2+ bilaterally and No edema B/L  Neuro: Alert and oriented X 3  Sensation is grossly intact  No focal deficits  Skin: Warm/Dry, without rashes or lesions      Lab Results:               Invalid input(s): LABGLOM      Lab Results   Component Value Date    HGBA1C 10 1 (H) 12/18/2019     Lab Results   Component Value Date    TROPONINI 2 83 (H) 12/19/2019         Assessment: Coronary artery disease, Superficial sternal wound  S/P coronary artery bypass grafting x 3 12/23/19    Plan:     [facility]  has a superficial sternal wound infection  His incision was debrided and explored in the office  Iodoform packing was placed and covered with a DSD  We have instructed the patient and his significant other to do BID packing/dressing changes  They were educated on how to do the packing and were provided the materials and supplies needed to do this  We have placed a referral for VNA for wound care and prescribed Keflex 500mg BID x 10 days  He is to follow-up with our [facility] in 1 week  They were instructed to contact our office immediately if his wound worsens, he develops fevers/chills, or any other signs of worsening infection  At this point I have NOT cleared them to begin outpatient cardiac rehabilitation  [facility]  has been cleared to resume driving at this point  I asked that them do so in progressive increments  He is to continue the ongoing lifting restrictions of 10 pounds  [facility]  has already been evaluated by their primary care physician cardiologist for ongoing medical care  [facility]  was comfortable with our recommendations and their questions were answered to their satisfaction  Routine referral to gastroenterology for colonoscopy screening was not indicated, as the patient is less than 48years old    Marlen Corral [place]  01/24/20  Attestation signed by Federico Guerrero MD at 1/24/2020 11:26 AM:  Pt seen and examined with PA  I agree with the above assessment and plan  Bandar's wound opened up and reportedly drained pus three days ago  He did not notify us of a problem  His visiting nurse did contact us yesterday  The superficial layer of the inferior 2/3 of his incision is open    There is some eschar and fibrinous debris which were debrided today   I probed the wound and found a 1 cm long section in the middle of the wound that tunnels approximately 1 cm deep  I was able to express only a single drop of purulence from the wound  I was not able to express any other fluid  There is no erythema around the wound  The wound was packed with an iodoform packing strip and dressed with plain gauze  BID wound care instructions were given  Home wound care visits will be set up  He was advised to wash with antiseptic sponges daily  He was urged to NOTIFY US if the wound is getting worse  He was also urged to stop smoking  He was also urged to work on getting his blood sugar under better control  His preoperative A1C was 10 and his sugars have been running in the 180's at home  He'll see us weekly until the wound improves or deteriorates  If it deteriorates, he'll need to be admitted and undergo operative exploration, debridement, and possibly more  He is not cleared to start Cardiac Rehab        Mateo Geronimo MD  DATE: January 24, 2020  TIME: 11:20 AM

## 2020-01-27 ENCOUNTER — DOCUMENTATION (OUTPATIENT)
Dept: ENDOCRINOLOGY | Facility: CLINIC | Age: 40
End: 2020-01-27

## 2020-01-27 ENCOUNTER — OFFICE VISIT (OUTPATIENT)
Dept: INTERNAL MEDICINE CLINIC | Facility: CLINIC | Age: 40
End: 2020-01-27
Payer: COMMERCIAL

## 2020-01-27 VITALS
RESPIRATION RATE: 18 BRPM | HEIGHT: 69 IN | BODY MASS INDEX: 39.25 KG/M2 | OXYGEN SATURATION: 98 % | TEMPERATURE: 97.5 F | HEART RATE: 95 BPM | DIASTOLIC BLOOD PRESSURE: 90 MMHG | WEIGHT: 265 LBS | SYSTOLIC BLOOD PRESSURE: 130 MMHG

## 2020-01-27 DIAGNOSIS — E78.5 HYPERLIPIDEMIA, UNSPECIFIED HYPERLIPIDEMIA TYPE: ICD-10-CM

## 2020-01-27 DIAGNOSIS — F32.9 REACTIVE DEPRESSION: ICD-10-CM

## 2020-01-27 DIAGNOSIS — I10 ESSENTIAL HYPERTENSION: ICD-10-CM

## 2020-01-27 DIAGNOSIS — Z95.1 S/P CABG X 3: ICD-10-CM

## 2020-01-27 DIAGNOSIS — E66.01 CLASS 3 SEVERE OBESITY DUE TO EXCESS CALORIES WITH SERIOUS COMORBIDITY AND BODY MASS INDEX (BMI) OF 40.0 TO 44.9 IN ADULT (HCC): ICD-10-CM

## 2020-01-27 DIAGNOSIS — R06.83 SNORING: ICD-10-CM

## 2020-01-27 DIAGNOSIS — E11.65 UNCONTROLLED TYPE 2 DIABETES MELLITUS WITH HYPERGLYCEMIA (HCC): Primary | ICD-10-CM

## 2020-01-27 PROCEDURE — 99204 OFFICE O/P NEW MOD 45 MIN: CPT | Performed by: INTERNAL MEDICINE

## 2020-01-27 NOTE — PROGRESS NOTES
pts wife monica called  jardiance is not covered  Asked that she call ins to find out what is covered    She will call back with that info

## 2020-01-27 NOTE — PROGRESS NOTES
BMI Counseling: Body mass index is 39 13 kg/m²  The BMI is above normal  Nutrition recommendations include encouraging healthy choices of fruits and vegetables, consuming healthier snacks and limiting drinks that contain sugar  Assessment/Plan:  1  Uncontrolled type 2 diabetes mellitus with hyperglycemia [facility])  Assessment & Plan:    Lab Results   Component Value Date    HGBA1C 10 1 (H) 12/18/2019   We discussed how important it is to get his blood sugars under control for long-term blood complications and the showed him to prevent wound infection  He does me that he is working with some dietitian at [facility] who has plans to start him on an insulin pump  He does not know if he sees an endocrinologist there  We discussed that this would likely be done with an endocrinologist but may take some time  Does follow-up with Endocrinology at [facility]  We discussed how important it is to his right his blood sugars in bring to the next office visit for dose adjustment of insulin  2  Essential hypertension  Assessment & Plan:  Improved on recheck, continue current dose      3  Hyperlipidemia, unspecified hyperlipidemia type    4  S/P CABG x 3  Assessment & Plan:  Continue follow-up with Cardiothoracic surgery  He notes that he has been going to the gym  Advised him to contact the surgeon's before he starts any kind of exercise  5  Class 3 severe obesity due to excess calories with serious comorbidity and body mass index (BMI) of 40 0 to 44 9 in adult [facility])  Assessment & Plan:  Discussed about improving dietary habits  Once cleared for surgery incomplete cardiac rehab, he can engage in physical activities as well  6  Snoring  Assessment & Plan:  Declines a sleep study at this time      7  Reactive depression  Assessment & Plan:  Declines any treatment  Continue to support    Follow-up in 2 weeks    Declined influenza and pneumonia vaccination despite counseling      Subjective: Chief Complaint   Patient presents with    [facility]        Patient ID: Ariana Vaz  is a 44 y o  male  He comes in to [facility] as a new patient  He recently and coronary artery disease removed  He has been diabetic worsening use and his which can with Endocrinology  He was seen by cardiothoracic surgery for follow-up due to wound drainage and infection  He notes that his blood sugars in but now but he is unable to give any exam readings  He does not remember any of his medications since he notes that his poor wound gives him a his hands  He notes that he has not missed any of his medication doses recently  Does feel depressed since the surgery but does not want to take any medications  The following portions of the patient's history were reviewed and updated as appropriate: current medications, past medical history, past social history and past surgical history      PHQ-9 Depression Screening    PHQ-9:    Frequency of the following problems over the past two weeks:       Little interest or pleasure in doing things:  0 - not at all  Feeling down, depressed, or hopeless:  0 - not at all  PHQ-2 Score:  0           Current Outpatient Medications:     Alcohol Swabs 70 % PADS, Use alcohol swab before each insulin injection and before testing your blood glucose, Disp: 250 each, Rfl: 0    amLODIPine (NORVASC) 10 mg tablet, Take 1 tablet (10 mg total) by mouth daily, Disp: 30 tablet, Rfl: 2    aspirin 325 mg tablet, Take 1 tablet (325 mg total) by mouth daily, Disp: 30 tablet, Rfl: 2    atorvastatin (LIPITOR) 80 mg tablet, Take 1 tablet (80 mg total) by mouth daily with dinner, Disp: 30 tablet, Rfl: 2    cephalexin (KEFLEX) 500 mg capsule, Take 1 capsule (500 mg total) by mouth every 12 (twelve) hours for 10 days, Disp: 20 capsule, Rfl: 0    glucose blood (GLUCOSE METER TEST) test strip, Test your blood glucose before each meal and at bedtime, record those numbers, Disp: 120 each, Rfl: 2    insulin glargine (LANTUS) 100 units/mL subcutaneous injection, Inject 50 Units under the skin daily at bedtime, Disp: 30 mL, Rfl: 2    Insulin Pen Needle (BD PEN NEEDLE LEONA U/F) 32G X 4 MM MISC, Use a new pen needle each time you use your insulin pens before each meal and at bedtime, Disp: 120 each, Rfl: 2    Lancets (ACCU-CHEK MULTICLIX) lancets, Test your blood glucose before each meal and at bedtime, record those numbers, Disp: 120 each, Rfl: 2    lisinopril (ZESTRIL) 40 mg tablet, Take 1 tablet (40 mg total) by mouth daily, Disp: 30 tablet, Rfl: 2    metFORMIN (GLUCOPHAGE) 1000 MG tablet, 1 tablet Every 12 hours, Disp: , Rfl:     metoprolol tartrate (LOPRESSOR) 50 mg tablet, Take 1 tablet (50 mg total) by mouth every 12 (twelve) hours, Disp: 60 tablet, Rfl: 2    NOVOLOG FLEXPEN 100 units/mL injection pen, INJECT 15 UNITS SUBCUTANEOUS 3 TIMES A DAY WITH MEALS, Disp: , Rfl:     Review of Systems   Constitutional: Positive for fatigue  Negative for fever and unexpected weight change  HENT: Negative for ear pain, hearing loss and sore throat  Eyes: Negative for pain and discharge  Respiratory: Negative for cough, chest tightness and shortness of breath  Cardiovascular: Negative for chest pain and palpitations  Gastrointestinal: Negative for abdominal pain, blood in stool, constipation, diarrhea and nausea  Genitourinary: Negative for dysuria, frequency and hematuria  Musculoskeletal: Negative for arthralgias and joint swelling  Skin: Positive for wound  Negative for rash  Allergic/Immunologic: Negative for immunocompromised state  Neurological: Negative for dizziness and headaches  Hematological: Negative for adenopathy  Psychiatric/Behavioral: Positive for dysphoric mood and sleep disturbance  Negative for confusion           Objective:  /90   Pulse 95   Temp 97 5 °F (36 4 °C) (Tympanic)   Resp 18   Ht 5' 9" (1 753 m)   Wt 120 kg (265 lb)   SpO2 98%   BMI 39 13 kg/m²      Physical Exam   Constitutional: He appears well-developed and well-nourished  HENT:   Head: Normocephalic and atraumatic  Right Ear: Tympanic membrane normal    Left Ear: Tympanic membrane normal    Nose: Nose normal    Mouth/Throat: Oropharynx is clear and moist  No posterior oropharyngeal edema or posterior oropharyngeal erythema  Eyes: Pupils are equal, round, and reactive to light  Conjunctivae are normal  Right eye exhibits no discharge  Left eye exhibits no discharge  Neck: Normal range of motion  Neck supple  No thyromegaly present  Cardiovascular: Normal rate, regular rhythm, S1 normal, S2 normal and normal heart sounds  PMI is not displaced  No murmur heard  Pulmonary/Chest: Effort normal and breath sounds normal  No accessory muscle usage  No apnea  No respiratory distress  He has no rhonchi  He has no rales  Abdominal: Soft  Normal appearance and bowel sounds are normal  He exhibits no shifting dullness  There is no hepatosplenomegaly  There is no tenderness  There is no rebound and no CVA tenderness  Musculoskeletal: Normal range of motion  He exhibits no edema or tenderness  Lymphadenopathy:     He has no cervical adenopathy  Neurological: He is alert  Skin: Skin is warm and intact  No rash noted  Psychiatric: His speech is normal  He exhibits a depressed mood  Nursing note and vitals reviewed          Recent Results (from the past 1008 hour(s))   ECG 12 lead    Collection Time: 12/18/19  3:29 PM   Result Value Ref Range    Ventricular Rate 109 BPM    Atrial Rate 109 BPM    CO Interval 146 ms    QRSD Interval 102 ms    QT Interval 360 ms    QTC Interval 484 ms    P Axis 48 degrees    QRS Axis 129 degrees    T Wave Axis -45 degrees   CBC and differential    Collection Time: 12/18/19  4:29 PM   Result Value Ref Range    WBC 10 84 (H) 4 31 - 10 16 Thousand/uL    RBC 5 40 3 88 - 5 62 Million/uL    Hemoglobin 16 3 12 0 - 17 0 g/dL    Hematocrit 48 2 36 5 - 49 3 %    MCV 89 82 - 98 fL    MCH 30 2 26 8 - 34 3 pg    MCHC 33 8 31 4 - 37 4 g/dL    RDW 11 3 (L) 11 6 - 15 1 %    MPV 10 6 8 9 - 12 7 fL    Platelets 230 502 - 877 Thousands/uL    nRBC 0 /100 WBCs    Neutrophils Relative 65 43 - 75 %    Immat GRANS % 1 0 - 2 %    Lymphocytes Relative 24 14 - 44 %    Monocytes Relative 8 4 - 12 %    Eosinophils Relative 1 0 - 6 %    Basophils Relative 1 0 - 1 %    Neutrophils Absolute 7 30 1 85 - 7 62 Thousands/µL    Immature Grans Absolute 0 05 0 00 - 0 20 Thousand/uL    Lymphocytes Absolute 2 55 0 60 - 4 47 Thousands/µL    Monocytes Absolute 0 83 0 17 - 1 22 Thousand/µL    Eosinophils Absolute 0 06 0 00 - 0 61 Thousand/µL    Basophils Absolute 0 05 0 00 - 0 10 Thousands/µL   Lipase    Collection Time: 12/18/19  4:29 PM   Result Value Ref Range    Lipase 296 73 - 393 u/L   Comprehensive metabolic panel    Collection Time: 12/18/19  4:29 PM   Result Value Ref Range    Sodium 135 (L) 136 - 145 mmol/L    Potassium 4 0 3 5 - 5 3 mmol/L    Chloride 97 (L) 100 - 108 mmol/L    CO2 29 21 - 32 mmol/L    ANION GAP 9 4 - 13 mmol/L    BUN 15 5 - 25 mg/dL    Creatinine 1 18 0 60 - 1 30 mg/dL    Glucose 404 (H) 65 - 140 mg/dL    Calcium 9 5 8 3 - 10 1 mg/dL    AST 19 5 - 45 U/L    ALT 30 12 - 78 U/L    Alkaline Phosphatase 89 46 - 116 U/L    Total Protein 8 0 6 4 - 8 2 g/dL    Albumin 3 6 3 5 - 5 0 g/dL    Total Bilirubin 0 62 0 20 - 1 00 mg/dL    eGFR 77 ml/min/1 73sq m   Troponin I    Collection Time: 12/18/19  4:29 PM   Result Value Ref Range    Troponin I 1 05 (H) <=0 04 ng/mL   D-dimer, quantitative    Collection Time: 12/18/19  4:29 PM   Result Value Ref Range    D-Dimer, Quant 0 27 <0 50 ug/ml FEU   APTT    Collection Time: 12/18/19  4:29 PM   Result Value Ref Range    PTT 30 23 - 37 seconds   Protime-INR    Collection Time: 12/18/19  4:29 PM   Result Value Ref Range    Protime 13 6 11 6 - 14 5 seconds    INR 1 03 0 84 - 1 19   Hemoglobin A1c w/EAG Estimation (Orders if not completed within the last 90 days)    Collection Time: 12/18/19  4:29 PM   Result Value Ref Range    Hemoglobin A1C 10 1 (H) 4 2 - 6 3 %     mg/dl   Fingerstick Glucose (POCT)    Collection Time: 12/18/19  7:49 PM   Result Value Ref Range    POC Glucose 322 (H) 65 - 140 mg/dl   Troponin I    Collection Time: 12/18/19  7:58 PM   Result Value Ref Range    Troponin I 2 08 (H) <=0 04 ng/mL   Fingerstick Glucose (POCT)    Collection Time: 12/18/19  9:59 PM   Result Value Ref Range    POC Glucose 272 (H) 65 - 140 mg/dl   ECG 12 lead    Collection Time: 12/18/19 11:09 PM   Result Value Ref Range    Ventricular Rate 94 BPM    Atrial Rate 94 BPM    NE Interval 142 ms    QRSD Interval 106 ms    QT Interval 370 ms    QTC Interval 462 ms    P Axis 51 degrees    QRS Axis 102 degrees    T Wave Axis 100 degrees   Troponin I    Collection Time: 12/18/19 11:14 PM   Result Value Ref Range    Troponin I 2 57 (H) <=0 04 ng/mL   APTT    Collection Time: 12/18/19 11:56 PM   Result Value Ref Range    PTT 25 23 - 37 seconds   Troponin I    Collection Time: 12/19/19  2:16 AM   Result Value Ref Range    Troponin I 3 11 (H) <=0 04 ng/mL   Lipid Panel with Direct LDL reflex    Collection Time: 12/19/19  5:44 AM   Result Value Ref Range    Cholesterol 166 50 - 200 mg/dL    Triglycerides 132 <=150 mg/dL    HDL, Direct 27 (L) >=40 mg/dL    LDL Calculated 113 (H) 0 - 100 mg/dL   Troponin I    Collection Time: 12/19/19  5:44 AM   Result Value Ref Range    Troponin I 2 83 (H) <=0 04 ng/mL   Fingerstick Glucose (POCT)    Collection Time: 12/19/19  8:03 AM   Result Value Ref Range    POC Glucose 253 (H) 65 - 140 mg/dl   APTT    Collection Time: 12/19/19  8:23 AM   Result Value Ref Range    PTT 37 23 - 37 seconds   Fingerstick Glucose (POCT)    Collection Time: 12/19/19 11:54 AM   Result Value Ref Range    POC Glucose 248 (H) 65 - 140 mg/dl   Fingerstick Glucose (POCT)    Collection Time: 12/19/19  4:56 PM   Result Value Ref Range    POC Glucose 252 (H) 65 - 140 mg/dl   Fingerstick Glucose (POCT)    Collection Time: 12/19/19 10:07 PM   Result Value Ref Range    POC Glucose 212 (H) 65 - 140 mg/dl   HIV 1/2 AG-AB combo    Collection Time: 12/19/19 10:53 PM   Result Value Ref Range    HIV-1/HIV-2 Ab Non-Reactive Non-Reactive   Chronic Hepatitis Panel    Collection Time: 12/19/19 10:53 PM   Result Value Ref Range    Hepatitis B Surface Ag Non-reactive Non-reactive, NonReactive - Confirmed    Hepatitis C Ab Non-reactive Non-reactive    Hep B C IgM Non-reactive Non-reactive    Hep B Core Total Ab Non-reactive Non-reactive   Fingerstick Glucose (POCT)    Collection Time: 12/20/19  5:57 AM   Result Value Ref Range    POC Glucose 247 (H) 65 - 140 mg/dl   Fingerstick Glucose (POCT)    Collection Time: 12/20/19 11:07 AM   Result Value Ref Range    POC Glucose 346 (H) 65 - 140 mg/dl   Fingerstick Glucose (POCT)    Collection Time: 12/20/19  4:12 PM   Result Value Ref Range    POC Glucose 198 (H) 65 - 140 mg/dl   UA w Reflex to Microscopic w Reflex to Culture    Collection Time: 12/20/19  7:30 PM   Result Value Ref Range    Color, UA Dk Yellow     Clarity, UA Clear     Specific Gravity, UA 1 035 (H) 1 003 - 1 030    pH, UA 5 5 4 5, 5 0, 5 5, 6 0, 6 5, 7 0, 7 5, 8 0    Leukocytes, UA (A) Negative     Elevated glucose may cause decreased leukocyte values   See urine microscopic for [facility] result/    Nitrite, UA Negative Negative    Protein, UA Trace (A) Negative mg/dl    Glucose, UA >=1000 (1%) (A) Negative mg/dl    Ketones, UA Negative Negative mg/dl    Urobilinogen, UA 0 2 0 2, 1 0 E U /dl E U /dl    Bilirubin, UA Negative Negative    Blood, UA Negative Negative   Urine Microscopic    Collection Time: 12/20/19  7:30 PM   Result Value Ref Range    RBC, UA None Seen None Seen, 0-5 /hpf    WBC, UA 4-10 (A) None Seen, 0-5, 5-55, 5-65 /hpf    Epithelial Cells Moderate (A) None Seen, Occasional /hpf    Bacteria, UA Occasional None Seen, Occasional /hpf    Hyaline Casts, UA 3-5 (A) None Seen /lpf Fingerstick Glucose (POCT)    Collection Time: 12/20/19  9:03 PM   Result Value Ref Range    POC Glucose 219 (H) 65 - 140 mg/dl   Basic metabolic panel    Collection Time: 12/21/19  4:40 AM   Result Value Ref Range    Sodium 136 136 - 145 mmol/L    Potassium 3 9 3 5 - 5 3 mmol/L    Chloride 104 100 - 108 mmol/L    CO2 29 21 - 32 mmol/L    ANION GAP 3 (L) 4 - 13 mmol/L    BUN 21 5 - 25 mg/dL    Creatinine 0 91 0 60 - 1 30 mg/dL    Glucose 256 (H) 65 - 140 mg/dL    Calcium 9 5 8 3 - 10 1 mg/dL    eGFR 106 ml/min/1 73sq m   CBC and differential    Collection Time: 12/21/19  4:40 AM   Result Value Ref Range    WBC 10 32 (H) 4 31 - 10 16 Thousand/uL    RBC 5 51 3 88 - 5 62 Million/uL    Hemoglobin 16 3 12 0 - 17 0 g/dL    Hematocrit 49 9 (H) 36 5 - 49 3 %    MCV 91 82 - 98 fL    MCH 29 6 26 8 - 34 3 pg    MCHC 32 7 31 4 - 37 4 g/dL    RDW 11 3 (L) 11 6 - 15 1 %    MPV 11 1 8 9 - 12 7 fL    Platelets 668 032 - 918 Thousands/uL    nRBC 0 /100 WBCs    Neutrophils Relative 57 43 - 75 %    Immat GRANS % 1 0 - 2 %    Lymphocytes Relative 30 14 - 44 %    Monocytes Relative 9 4 - 12 %    Eosinophils Relative 2 0 - 6 %    Basophils Relative 1 0 - 1 %    Neutrophils Absolute 5 99 1 85 - 7 62 Thousands/µL    Immature Grans Absolute 0 06 0 00 - 0 20 Thousand/uL    Lymphocytes Absolute 3 12 0 60 - 4 47 Thousands/µL    Monocytes Absolute 0 90 0 17 - 1 22 Thousand/µL    Eosinophils Absolute 0 19 0 00 - 0 61 Thousand/µL    Basophils Absolute 0 06 0 00 - 0 10 Thousands/µL   Fingerstick Glucose (POCT)    Collection Time: 12/21/19  6:12 AM   Result Value Ref Range    POC Glucose 232 (H) 65 - 140 mg/dl   MRSA culture    Collection Time: 12/21/19  8:25 AM   Result Value Ref Range    MRSA Culture Only       No Methicillin Resistant Stapylococcus aureus (MRSA) after 24 hours   Fingerstick Glucose (POCT)    Collection Time: 12/21/19 10:55 AM   Result Value Ref Range    POC Glucose 236 (H) 65 - 140 mg/dl   Fingerstick Glucose (POCT) Collection Time: 12/21/19  3:46 PM   Result Value Ref Range    POC Glucose 171 (H) 65 - 140 mg/dl   ECG 12 lead    Collection Time: 12/21/19  7:37 PM   Result Value Ref Range    Ventricular Rate 91 BPM    Atrial Rate 91 BPM    OR Interval 134 ms    QRSD Interval 104 ms    QT Interval 370 ms    QTC Interval 455 ms    P Axis -2 degrees    QRS Axis 92 degrees    T Wave Axis 223 degrees   Fingerstick Glucose (POCT)    Collection Time: 12/21/19  9:19 PM   Result Value Ref Range    POC Glucose 272 (H) 65 - 140 mg/dl   Fingerstick Glucose (POCT)    Collection Time: 12/22/19  5:54 AM   Result Value Ref Range    POC Glucose 200 (H) 65 - 140 mg/dl   Fingerstick Glucose (POCT)    Collection Time: 12/22/19 11:06 AM   Result Value Ref Range    POC Glucose 219 (H) 65 - 140 mg/dl   Type and screen    Collection Time: 12/22/19 11:12 AM   Result Value Ref Range    ABO Grouping A     Rh Factor Positive     Antibody Screen Negative     Specimen Expiration Date [id number]    ABO/Rh    Collection Time: 12/22/19 11:12 AM   Result Value Ref Range    ABO Grouping A     Rh Factor Positive    Fingerstick Glucose (POCT)    Collection Time: 12/22/19  4:00 PM   Result Value Ref Range    POC Glucose 146 (H) 65 - 140 mg/dl   Fingerstick Glucose (POCT)    Collection Time: 12/22/19  8:36 PM   Result Value Ref Range    POC Glucose 128 65 - 140 mg/dl   Fingerstick Glucose (POCT)    Collection Time: 12/22/19  9:56 PM   Result Value Ref Range    POC Glucose 130 65 - 140 mg/dl   Fingerstick Glucose (POCT)    Collection Time: 12/23/19 12:11 AM   Result Value Ref Range    POC Glucose 166 (H) 65 - 140 mg/dl   Fingerstick Glucose (POCT)    Collection Time: 12/23/19  2:04 AM   Result Value Ref Range    POC Glucose 146 (H) 65 - 140 mg/dl   CBC    Collection Time: 12/23/19  4:44 AM   Result Value Ref Range    WBC 10 07 4 31 - 10 16 Thousand/uL    RBC 5 26 3 88 - 5 62 Million/uL    Hemoglobin 15 6 12 0 - 17 0 g/dL    Hematocrit 47 2 36 5 - 49 3 %    MCV 90 82 - 98 fL    MCH 29 7 26 8 - 34 3 pg    MCHC 33 1 31 4 - 37 4 g/dL    RDW 11 4 (L) 11 6 - 15 1 %    Platelets 458 233 - 423 Thousands/uL    MPV 11 1 8 9 - 12 7 fL   Basic metabolic panel    Collection Time: 12/23/19  4:44 AM   Result Value Ref Range    Sodium 139 136 - 145 mmol/L    Potassium 3 7 3 5 - 5 3 mmol/L    Chloride 109 (H) 100 - 108 mmol/L    CO2 25 21 - 32 mmol/L    ANION GAP 5 4 - 13 mmol/L    BUN 18 5 - 25 mg/dL    Creatinine 0 82 0 60 - 1 30 mg/dL    Glucose 113 65 - 140 mg/dL    Calcium 9 5 8 3 - 10 1 mg/dL    eGFR 111 ml/min/1 73sq m   Fingerstick Glucose (POCT)    Collection Time: 12/23/19  4:44 AM   Result Value Ref Range    POC Glucose 113 65 - 140 mg/dl   Magnesium    Collection Time: 12/23/19  4:44 AM   Result Value Ref Range    Magnesium 2 0 1 6 - 2 6 mg/dL   Fingerstick Glucose (POCT)    Collection Time: 12/23/19  5:57 AM   Result Value Ref Range    POC Glucose 114 65 - 140 mg/dl   Fingerstick Glucose (POCT)    Collection Time: 12/23/19  7:55 AM   Result Value Ref Range    POC Glucose 136 65 - 140 mg/dl   Fingerstick Glucose (POCT)    Collection Time: 12/23/19 10:04 AM   Result Value Ref Range    POC Glucose 120 65 - 140 mg/dl   POCT Blood Gas (CG8+)    Collection Time: 12/23/19 11:29 AM   Result Value Ref Range    ph, Larry ISTAT 7 315 7 300 - 7 400    pCO2, Larry i-STAT 55 7 (H) 42 0 - 50 0 mm HG    pO2, Larry i-STAT 30 0 (L) 35 0 - 45 0 mm HG    BE, i-STAT 1 -2 - 3 mmol/L    HCO3, Larry i-STAT 28 4 24 0 - 30 0 mmol/L    CO2, i-STAT 30 21 - 32 mmol/L    O2 Sat, i-STAT 51 (L) 60 - 85 %    SODIUM, I-STAT 141 136 - 145 mmol/l    Potassium, i-STAT 5 0 3 5 - 5 3 mmol/L    Calcium, Ionized i-STAT 1 26 1 12 - 1 32 mmol/L    Hct, i-STAT 45 36 5 - 49 3 %    Hgb, i-STAT 15 3 12 0 - 17 0 g/dl    Glucose, i-STAT 122 65 - 140 mg/dl    Specimen Type VENOUS    POCT activated clotting time    Collection Time: 12/23/19 11:29 AM   Result Value Ref Range    Activated Clotting Time, i-STAT 115 89 - 137 sec    Specimen Type VENOUS    POCT activated clotting time    Collection Time: 12/23/19  1:14 PM   Result Value Ref Range    Activated Clotting Time, i-STAT 632 (H) 89 - 137 sec    Specimen Type ARTERIAL    POCT Blood Gas (CG8+)    Collection Time: 12/23/19  1:15 PM   Result Value Ref Range    pH, Art i-STAT 7 306 (L) 7 350 - 7 450    pCO2, Art i-STAT 52 1 (H) 36 0 - 44 0 mm HG    pO2, ART i-STAT 185 0 (H) 75 0 - 129 0 mm HG    BE, i-STAT -1 -2 - 3 mmol/L    HCO3, Art i-STAT 26 0 22 0 - 28 0 mmol/L    CO2, i-STAT 28 21 - 32 mmol/L    O2 Sat, i-STAT 99 (H) 60 - 85 %    SODIUM, I-STAT 141 136 - 145 mmol/l    Potassium, i-STAT 5 5 (H) 3 5 - 5 3 mmol/L    Calcium, Ionized i-STAT 1 17 1 12 - 1 32 mmol/L    Hct, i-STAT 43 36 5 - 49 3 %    Hgb, i-STAT 14 6 12 0 - 17 0 g/dl    Glucose, i-STAT 193 (H) 65 - 140 mg/dl    Specimen Type ARTERIAL    POCT activated clotting time    Collection Time: 12/23/19  1:18 PM   Result Value Ref Range    Activated Clotting Time, i-STAT 633 (H) 89 - 137 sec    Specimen Type ARTERIAL    POCT activated clotting time    Collection Time: 12/23/19  1:54 PM   Result Value Ref Range    Activated Clotting Time, i-STAT 503 (H) 89 - 137 sec    Specimen Type ARTERIAL    POCT Blood Gas (CG8+)    Collection Time: 12/23/19  1:55 PM   Result Value Ref Range    pH, Art i-STAT 7 308 (L) 7 350 - 7 450    pCO2, Art i-STAT 49 7 (H) 36 0 - 44 0 mm HG    pO2, ART i-STAT 329 0 (H) 75 0 - 129 0 mm HG    BE, i-STAT -2 -2 - 3 mmol/L    HCO3, Art i-STAT 24 9 22 0 - 28 0 mmol/L    CO2, i-STAT 26 21 - 32 mmol/L    O2 Sat, i-STAT 100 (H) 60 - 85 %    SODIUM, I-STAT 134 (L) 136 - 145 mmol/l    Potassium, i-STAT 5 8 (H) 3 5 - 5 3 mmol/L    Calcium, Ionized i-STAT 1 04 (L) 1 12 - 1 32 mmol/L    Hct, i-STAT 33 (L) 36 5 - 49 3 %    Hgb, i-STAT 11 2 (L) 12 0 - 17 0 g/dl    Glucose, i-STAT 195 (H) 65 - 140 mg/dl    Specimen Type ARTERIAL    POCT activated clotting time    Collection Time: 12/23/19  2:04 PM   Result Value Ref Range    Activated Clotting Time, i-STAT 479 (H) 89 - 137 sec    Specimen Type VENOUS    POCT Blood Gas (CG8+)    Collection Time: 12/23/19  2:05 PM   Result Value Ref Range    ph, Larry ISTAT 7 354 7 300 - 7 400    pCO2, Larry i-STAT 48 9 42 0 - 50 0 mm HG    pO2, Larry i-STAT 49 0 (H) 35 0 - 45 0 mm HG    BE, i-STAT 1 -2 - 3 mmol/L    HCO3, Larry i-STAT 27 3 24 0 - 30 0 mmol/L    CO2, i-STAT 29 21 - 32 mmol/L    O2 Sat, i-STAT 82 60 - 85 %    SODIUM, I-STAT 137 136 - 145 mmol/l    Potassium, i-STAT 5 5 (H) 3 5 - 5 3 mmol/L    Calcium, Ionized i-STAT 1 03 (L) 1 12 - 1 32 mmol/L    Hct, i-STAT 32 (L) 36 5 - 49 3 %    Hgb, i-STAT 10 9 (L) 12 0 - 17 0 g/dl    Glucose, i-STAT 193 (H) 65 - 140 mg/dl    Specimen Type VENOUS    Platelet count    Collection Time: 12/23/19  2:12 PM   Result Value Ref Range    Platelets 966 189 - 832 Thousands/uL    MPV 10 7 8 9 - 12 7 fL   POCT activated clotting time    Collection Time: 12/23/19  2:19 PM   Result Value Ref Range    Activated Clotting Time, i-STAT 508 (H) 89 - 137 sec    Specimen Type ARTERIAL    POCT Blood Gas (CG8+)    Collection Time: 12/23/19  2:20 PM   Result Value Ref Range    pH, Art i-STAT 7 397 7 350 - 7 450    pCO2, Art i-STAT 44 1 (H) 36 0 - 44 0 mm HG    pO2, ART i-STAT 320 0 (H) 75 0 - 129 0 mm HG    BE, i-STAT 2 -2 - 3 mmol/L    HCO3, Art i-STAT 27 1 22 0 - 28 0 mmol/L    CO2, i-STAT 28 21 - 32 mmol/L    O2 Sat, i-STAT 100 (H) 60 - 85 %    SODIUM, I-STAT 136 136 - 145 mmol/l    Potassium, i-STAT 5 6 (H) 3 5 - 5 3 mmol/L    Calcium, Ionized i-STAT 1 05 (L) 1 12 - 1 32 mmol/L    Hct, i-STAT 33 (L) 36 5 - 49 3 %    Hgb, i-STAT 11 2 (L) 12 0 - 17 0 g/dl    Glucose, i-STAT 198 (H) 65 - 140 mg/dl    Specimen Type ARTERIAL    POCT activated clotting time    Collection Time: 12/23/19  3:07 PM   Result Value Ref Range    Activated Clotting Time, i-STAT 115 89 - 137 sec    Specimen Type ARTERIAL    POCT Blood Gas (CG8+)    Collection Time: 12/23/19  3:08 PM   Result Value Ref Range    pH, Art i-STAT 7 352 7 350 - 7 450    pCO2, Art i-STAT 41 9 36 0 - 44 0 mm HG    pO2, ART i-STAT 79 0 75 0 - 129 0 mm HG    BE, i-STAT -2 -2 - 3 mmol/L    HCO3, Art i-STAT 23 2 22 0 - 28 0 mmol/L    CO2, i-STAT 24 21 - 32 mmol/L    O2 Sat, i-STAT 95 (H) 60 - 85 %    SODIUM, I-STAT 139 136 - 145 mmol/l    Potassium, i-STAT 4 1 3 5 - 5 3 mmol/L    Calcium, Ionized i-STAT 1 26 1 12 - 1 32 mmol/L    Hct, i-STAT 37 36 5 - 49 3 %    Hgb, i-STAT 12 6 12 0 - 17 0 g/dl    Glucose, i-STAT 188 (H) 65 - 140 mg/dl    Specimen Type ARTERIAL    ECG 12 lead    Collection Time: 12/23/19  3:56 PM   Result Value Ref Range    Ventricular Rate 96 BPM    Atrial Rate 96 BPM    NC Interval 150 ms    QRSD Interval 113 ms    QT Interval 321 ms    QTC Interval 406 ms    P [place] 64 degrees    QRS Axis 134 degrees    T Wave Axis -21 degrees   Platelets Count - If Chest Tube Losses > 200 mL/hr in First Hour Postop    Collection Time: 12/23/19  3:57 PM   Result Value Ref Range    Platelets 876 946 - 184 Thousands/uL    MPV 10 6 8 9 - 12 7 fL   Basic metabolic panel    Collection Time: 12/23/19  3:57 PM   Result Value Ref Range    Sodium 142 136 - 145 mmol/L    Potassium 3 7 3 5 - 5 3 mmol/L    Chloride 112 (H) 100 - 108 mmol/L    CO2 23 21 - 32 mmol/L    ANION GAP 7 4 - 13 mmol/L    BUN 19 5 - 25 mg/dL    Creatinine 1 32 (H) 0 60 - 1 30 mg/dL    Glucose 191 (H) 65 - 140 mg/dL    Calcium 8 9 8 3 - 10 1 mg/dL    eGFR 67 ml/min/1 73sq m   Hemoglobin and hematocrit, blood    Collection Time: 12/23/19  3:57 PM   Result Value Ref Range    Hemoglobin 14 3 12 0 - 17 0 g/dL    Hematocrit 42 9 36 5 - 49 3 %   Fingerstick Glucose (POCT)    Collection Time: 12/23/19  3:58 PM   Result Value Ref Range    POC Glucose 196 (H) 65 - 140 mg/dl   POCT Blood Gas (CG8+)    Collection Time: 12/23/19  3:59 PM   Result Value Ref Range    pH, Art i-STAT 7 339 (L) 7 350 - 7 450    pCO2, Art i-STAT 42 2 36 0 - 44 0 mm HG    pO2, ART i-STAT 86 0 75 0 - 129 0 mm HG    BE, i-STAT -3 (L) -2 - 3 mmol/L    HCO3, Art i-STAT 22 7 22 0 - 28 0 mmol/L    CO2, i-STAT 24 21 - 32 mmol/L    O2 Sat, i-STAT 96 (H) 60 - 85 %    SODIUM, I-STAT 140 136 - 145 mmol/l    Potassium, i-STAT 3 6 3 5 - 5 3 mmol/L    Calcium, Ionized i-STAT 1 24 1 12 - 1 32 mmol/L    Hct, i-STAT 40 36 5 - 49 3 %    Hgb, i-STAT 13 6 12 0 - 17 0 g/dl    Glucose, i-STAT 181 (H) 65 - 140 mg/dl    POC FIO2 60 L    Specimen Type ARTERIAL     Delivery System [facility]     Respiratory Rate 140     Vent Type 8    Fingerstick Glucose (POCT)    Collection Time: 12/23/19  6:03 PM   Result Value Ref Range    POC Glucose 207 (H) 65 - 140 mg/dl   Potassium    Collection Time: 12/23/19  8:14 PM   Result Value Ref Range    Potassium 4 8 3 5 - 5 3 mmol/L   Blood gas, arterial    Collection Time: 12/23/19  8:16 PM   Result Value Ref Range    pH, Arterial 7 379 7 350 - 7 450    pCO2, Arterial 39 2 36 0 - 44 0 mm Hg    pO2, Arterial 133 7 (H) 75 0 - 129 0 mm Hg    HCO3, Arterial 22 6 22 0 - 28 0 mmol/L    Base Excess, Arterial -2 2 mmol/L    O2 Content, Arterial 20 9 16 0 - 23 0 mL/dL    O2 HGB,Arterial  97 5 (H) 94 0 - 97 0 %    SOURCE Line, Arterial     MARS TEST No     VENT- PS PS     PS PEEP 8/5     PS VENT FIO2 40    Fingerstick Glucose (POCT)    Collection Time: 12/23/19  8:16 PM   Result Value Ref Range    POC Glucose 197 (H) 65 - 140 mg/dl   Fingerstick Glucose (POCT)    Collection Time: 12/23/19 10:15 PM   Result Value Ref Range    POC Glucose 187 (H) 65 - 140 mg/dl   Fingerstick Glucose (POCT)    Collection Time: 12/23/19 11:56 PM   Result Value Ref Range    POC Glucose 182 (H) 65 - 140 mg/dl   ECG 12 lead    Collection Time: 12/24/19 12:52 AM   Result Value Ref Range    Ventricular Rate 106 BPM    Atrial Rate 106 BPM    WI Interval 138 ms    QRSD Interval 108 ms    QT Interval 404 ms    QTC Interval 537 ms    P Axis 61 degrees    QRS Axis 130 degrees    T Wave Axis -19 degrees   ECG 12 lead    Collection Time: 12/24/19  1:04 AM   Result Value Ref Range    Ventricular Rate 105 BPM    Atrial Rate 105 BPM    AK Interval 146 ms    QRSD Interval 104 ms    QT Interval 350 ms    QTC Interval 463 ms    P [place] 57 degrees    QRS Axis 81 degrees    T Wave Axis 11 degrees   ECG 12 lead    Collection Time: 12/24/19  1:43 AM   Result Value Ref Range    Ventricular Rate 85 BPM    Atrial Rate 85 BPM    AK Interval 146 ms    QRSD Interval 117 ms    QT Interval 354 ms    QTC Interval 421 ms    P [place] 59 degrees    QRS Axis 19 degrees    T Wave Axis 57 degrees   Fingerstick Glucose (POCT)    Collection Time: 12/24/19  1:56 AM   Result Value Ref Range    POC Glucose 146 (H) 65 - 140 mg/dl   Basic metabolic panel    Collection Time: 12/24/19  3:51 AM   Result Value Ref Range    Sodium 142 136 - 145 mmol/L    Potassium 4 1 3 5 - 5 3 mmol/L    Chloride 112 (H) 100 - 108 mmol/L    CO2 25 21 - 32 mmol/L    ANION GAP 5 4 - 13 mmol/L    BUN 18 5 - 25 mg/dL    Creatinine 0 92 0 60 - 1 30 mg/dL    Glucose 142 (H) 65 - 140 mg/dL    Calcium 8 4 8 3 - 10 1 mg/dL    eGFR 104 ml/min/1 73sq m   Magnesium    Collection Time: 12/24/19  3:51 AM   Result Value Ref Range    Magnesium 2 0 1 6 - 2 6 mg/dL   CBC-AM POD #1    Collection Time: 12/24/19  3:51 AM   Result Value Ref Range    WBC 26 15 (H) 4 31 - 10 16 Thousand/uL    RBC 4 81 3 88 - 5 62 Million/uL    Hemoglobin 14 4 12 0 - 17 0 g/dL    Hematocrit 43 8 36 5 - 49 3 %    MCV 91 82 - 98 fL    MCH 29 9 26 8 - 34 3 pg    MCHC 32 9 31 4 - 37 4 g/dL    RDW 11 5 (L) 11 6 - 15 1 %    Platelets 635 441 - 965 Thousands/uL    MPV 11 7 8 9 - 12 7 fL   Fingerstick Glucose (POCT)    Collection Time: 12/24/19  4:18 AM   Result Value Ref Range    POC Glucose 139 65 - 140 mg/dl   Blood gas, arterial -AM POD #1 as needed if:On mechanical ventilation, non-invasive ventilation, or oxygen greater than 6 liters      Collection Time: 12/24/19  4:36 AM   Result Value Ref Range    pH, Arterial 7 376 7 350 - 7 450    pCO2, Arterial 42 3 36 0 - 44 0 mm Hg    pO2, Arterial 65 2 (L) 75 0 - 129 0 mm Hg    HCO3, Arterial 24 2 22 0 - 28 0 mmol/L    Base Excess, Arterial -1 0 mmol/L    O2 Content, Arterial 19 6 16 0 - 23 0 mL/dL    O2 HGB,Arterial  91 6 (L) 94 0 - 97 0 %    MARS TEST No     Nasal Cannula 6    Fingerstick Glucose (POCT)    Collection Time: 12/24/19  6:09 AM   Result Value Ref Range    POC Glucose 152 (H) 65 - 140 mg/dl   ECG 12 lead    Collection Time: 12/24/19  6:16 AM   Result Value Ref Range    Ventricular Rate 108 BPM    Atrial Rate 108 BPM    WI Interval 138 ms    QRSD Interval 104 ms    QT Interval 367 ms    QTC Interval 492 ms    P [place] 49 degrees    QRS Axis -51 degrees    T Wave Axis 19 degrees   Hemoglobin and hematocrit, blood    Collection Time: 12/24/19  6:23 AM   Result Value Ref Range    Hemoglobin 14 3 12 0 - 17 0 g/dL    Hematocrit 43 5 36 5 - 49 3 %   Prepare Leukoreduced RBC:Has consent been obtained?  Yes, 4 Units, Leukoreduced    Collection Time: 12/24/19  7:35 AM   Result Value Ref Range    Unit Product Code W4466I58     Unit Number [id number]     Unit ABO A     Unit RH POS     Crossmatch Compatible     Unit Dispense Status Return to [facility]     Unit Product Code C8163I25     Unit Number [id number]     Unit ABO A     Unit [facility] POS     Crossmatch Compatible     Unit Dispense Status Return to [facility]     Unit Product Code E9355N13     Unit Number [id number]     Unit ABO A     Unit RH POS     Crossmatch Compatible     Unit Dispense Status Return to [facility]     Unit Product Code Q5716O31     Unit Number [id number]     Unit ABO A     Unit RH POS     Crossmatch Compatible     Unit Dispense Status Return to Inv    Fingerstick Glucose (POCT)    Collection Time: 12/24/19  7:55 AM   Result Value Ref Range    POC Glucose 237 (H) 65 - 140 mg/dl   Fingerstick Glucose (POCT)    Collection Time: 12/24/19  9:57 AM   Result Value Ref Range    POC Glucose 292 (H) 65 - 140 mg/dl   Fingerstick Glucose (POCT)    Collection Time: 12/24/19 12:34 PM   Result Value Ref Range    POC Glucose 350 (H) 65 - 140 mg/dl   Fingerstick Glucose (POCT)    Collection Time: 12/24/19  2:03 PM   Result Value Ref Range    POC Glucose 372 (H) 65 - 140 mg/dl   Fingerstick Glucose (POCT)    Collection Time: 12/24/19  2:07 PM   Result Value Ref Range    POC Glucose 353 (H) 65 - 140 mg/dl   Fingerstick Glucose (POCT)    Collection Time: 12/24/19  3:48 PM   Result Value Ref Range    POC Glucose 405 (H) 65 - 140 mg/dl   Fingerstick Glucose (POCT)    Collection Time: 12/24/19  4:57 PM   Result Value Ref Range    POC Glucose 283 (H) 65 - 140 mg/dl   Fingerstick Glucose (POCT)    Collection Time: 12/24/19  5:50 PM   Result Value Ref Range    POC Glucose 225 (H) 65 - 140 mg/dl   Fingerstick Glucose (POCT)    Collection Time: 12/24/19  6:56 PM   Result Value Ref Range    POC Glucose 180 (H) 65 - 140 mg/dl   Fingerstick Glucose (POCT)    Collection Time: 12/24/19  8:27 PM   Result Value Ref Range    POC Glucose 191 (H) 65 - 140 mg/dl   Fingerstick Glucose (POCT)    Collection Time: 12/24/19 10:03 PM   Result Value Ref Range    POC Glucose 200 (H) 65 - 140 mg/dl   Fingerstick Glucose (POCT)    Collection Time: 12/24/19 11:53 PM   Result Value Ref Range    POC Glucose 163 (H) 65 - 140 mg/dl   Fingerstick Glucose (POCT)    Collection Time: 12/25/19  1:52 AM   Result Value Ref Range    POC Glucose 154 (H) 65 - 140 mg/dl   Fingerstick Glucose (POCT)    Collection Time: 12/25/19  4:26 AM   Result Value Ref Range    POC Glucose 158 (H) 65 - 140 mg/dl   Basic metabolic panel    Collection Time: 12/25/19  4:28 AM   Result Value Ref Range    Sodium 147 (H) 136 - 145 mmol/L    Potassium 4 5 3 5 - 5 3 mmol/L    Chloride 116 (H) 100 - 108 mmol/L    CO2 24 21 - 32 mmol/L    ANION GAP 7 4 - 13 mmol/L    BUN 28 (H) 5 - 25 mg/dL    Creatinine 0 92 0 60 - 1 30 mg/dL    Glucose 170 (H) 65 - 140 mg/dL    Calcium 8 4 8 3 - 10 1 mg/dL    eGFR 104 ml/min/1 73sq m   Magnesium    Collection Time: 12/25/19  4:28 AM   Result Value Ref Range    Magnesium 2 2 1 6 - 2 6 mg/dL   CBC (With Platelets)    Collection Time: 12/25/19  4:28 AM   Result Value Ref Range    WBC 24 90 (H) 4 31 - 10 16 Thousand/uL    RBC 4 17 3 88 - 5 62 Million/uL    Hemoglobin 12 5 12 0 - 17 0 g/dL    Hematocrit 39 1 36 5 - 49 3 %    MCV 94 82 - 98 fL    MCH 30 0 26 8 - 34 3 pg    MCHC 32 0 31 4 - 37 4 g/dL    RDW 11 9 11 6 - 15 1 %    Platelets 065 965 - 038 Thousands/uL    MPV 11 5 8 9 - 12 7 fL   ECG 12 lead    Collection Time: 12/25/19  5:17 AM   Result Value Ref Range    Ventricular Rate 98 BPM    Atrial Rate 98 BPM    VA Interval 146 ms    QRSD Interval 104 ms    QT Interval 358 ms    QTC Interval 458 ms    P Axis 50 degrees    QRS Axis 29 degrees    T Wave Axis 45 degrees   Fingerstick Glucose (POCT)    Collection Time: 12/25/19  5:41 AM   Result Value Ref Range    POC Glucose 159 (H) 65 - 140 mg/dl   Fingerstick Glucose (POCT)    Collection Time: 12/25/19  7:59 AM   Result Value Ref Range    POC Glucose 264 (H) 65 - 140 mg/dl   Fingerstick Glucose (POCT)    Collection Time: 12/25/19  9:57 AM   Result Value Ref Range    POC Glucose 247 (H) 65 - 140 mg/dl   Fingerstick Glucose (POCT)    Collection Time: 12/25/19 12:00 PM   Result Value Ref Range    POC Glucose 174 (H) 65 - 140 mg/dl   Fingerstick Glucose (POCT)    Collection Time: 12/25/19  1:56 PM   Result Value Ref Range    POC Glucose 159 (H) 65 - 140 mg/dl   Fingerstick Glucose (POCT)    Collection Time: 12/25/19  4:23 PM   Result Value Ref Range    POC Glucose 168 (H) 65 - 140 mg/dl   Fingerstick Glucose (POCT)    Collection Time: 12/25/19  6:16 PM   Result Value Ref Range    POC Glucose 155 (H) 65 - 140 mg/dl   Fingerstick Glucose (POCT)    Collection Time: 12/25/19  7:57 PM   Result Value Ref Range    POC Glucose 154 (H) 65 - 140 mg/dl   Fingerstick Glucose (POCT)    Collection Time: 12/25/19  9:55 PM   Result Value Ref Range    POC Glucose 151 (H) 65 - 140 mg/dl   Fingerstick Glucose (POCT) Collection Time: 12/26/19 12:06 AM   Result Value Ref Range    POC Glucose 135 65 - 140 mg/dl   Fingerstick Glucose (POCT)    Collection Time: 12/26/19  2:02 AM   Result Value Ref Range    POC Glucose 165 (H) 65 - 140 mg/dl   Fingerstick Glucose (POCT)    Collection Time: 12/26/19  3:54 AM   Result Value Ref Range    POC Glucose 163 (H) 65 - 140 mg/dl   Basic metabolic panel    Collection Time: 12/26/19  3:56 AM   Result Value Ref Range    Sodium 141 136 - 145 mmol/L    Potassium 4 4 3 5 - 5 3 mmol/L    Chloride 110 (H) 100 - 108 mmol/L    CO2 28 21 - 32 mmol/L    ANION GAP 3 (L) 4 - 13 mmol/L    BUN 28 (H) 5 - 25 mg/dL    Creatinine 0 94 0 60 - 1 30 mg/dL    Glucose 141 (H) 65 - 140 mg/dL    Calcium 8 4 8 3 - 10 1 mg/dL    eGFR 102 ml/min/1 73sq m   CBC and differential    Collection Time: 12/26/19  3:56 AM   Result Value Ref Range    WBC 21 87 (H) 4 31 - 10 16 Thousand/uL    RBC 3 63 (L) 3 88 - 5 62 Million/uL    Hemoglobin 11 0 (L) 12 0 - 17 0 g/dL    Hematocrit 34 7 (L) 36 5 - 49 3 %    MCV 96 82 - 98 fL    MCH 30 3 26 8 - 34 3 pg    MCHC 31 7 31 4 - 37 4 g/dL    RDW 11 8 11 6 - 15 1 %    MPV 11 5 8 9 - 12 7 fL    Platelets 447 614 - 528 Thousands/uL    nRBC 0 /100 WBCs    Neutrophils Relative 80 (H) 43 - 75 %    Immat GRANS % 1 0 - 2 %    Lymphocytes Relative 11 (L) 14 - 44 %    Monocytes Relative 8 4 - 12 %    Eosinophils Relative 0 0 - 6 %    Basophils Relative 0 0 - 1 %    Neutrophils Absolute 17 50 (H) 1 85 - 7 62 Thousands/µL    Immature Grans Absolute 0 20 0 00 - 0 20 Thousand/uL    Lymphocytes Absolute 2 37 0 60 - 4 47 Thousands/µL    Monocytes Absolute 1 74 (H) 0 17 - 1 22 Thousand/µL    Eosinophils Absolute 0 03 0 00 - 0 61 Thousand/µL    Basophils Absolute 0 03 0 00 - 0 10 Thousands/µL   Fingerstick Glucose (POCT)    Collection Time: 12/26/19  6:20 AM   Result Value Ref Range    POC Glucose 180 (H) 65 - 140 mg/dl   Fingerstick Glucose (POCT)    Collection Time: 12/26/19  7:58 AM   Result Value Ref Range    POC Glucose 138 65 - 140 mg/dl   Fingerstick Glucose (POCT)    Collection Time: 12/26/19 10:08 AM   Result Value Ref Range    POC Glucose 255 (H) 65 - 140 mg/dl   Fingerstick Glucose (POCT)    Collection Time: 12/26/19 12:03 PM   Result Value Ref Range    POC Glucose 181 (H) 65 - 140 mg/dl   Fingerstick Glucose (POCT)    Collection Time: 12/26/19  2:08 PM   Result Value Ref Range    POC Glucose 146 (H) 65 - 140 mg/dl   Fingerstick Glucose (POCT)    Collection Time: 12/26/19  4:01 PM   Result Value Ref Range    POC Glucose 118 65 - 140 mg/dl   Fingerstick Glucose (POCT)    Collection Time: 12/26/19  6:16 PM   Result Value Ref Range    POC Glucose 198 (H) 65 - 140 mg/dl   Fingerstick Glucose (POCT)    Collection Time: 12/26/19  8:10 PM   Result Value Ref Range    POC Glucose 145 (H) 65 - 140 mg/dl   Fingerstick Glucose (POCT)    Collection Time: 12/26/19  9:47 PM   Result Value Ref Range    POC Glucose 144 (H) 65 - 140 mg/dl   Fingerstick Glucose (POCT)    Collection Time: 12/26/19 11:55 PM   Result Value Ref Range    POC Glucose 136 65 - 140 mg/dl   Fingerstick Glucose (POCT)    Collection Time: 12/27/19  2:08 AM   Result Value Ref Range    POC Glucose 121 65 - 140 mg/dl   Fingerstick Glucose (POCT)    Collection Time: 12/27/19  4:16 AM   Result Value Ref Range    POC Glucose 150 (H) 65 - 140 mg/dl   Basic metabolic panel    Collection Time: 12/27/19  4:26 AM   Result Value Ref Range    Sodium 141 136 - 145 mmol/L    Potassium 3 9 3 5 - 5 3 mmol/L    Chloride 109 (H) 100 - 108 mmol/L    CO2 27 21 - 32 mmol/L    ANION GAP 5 4 - 13 mmol/L    BUN 34 (H) 5 - 25 mg/dL    Creatinine 1 18 0 60 - 1 30 mg/dL    Glucose 129 65 - 140 mg/dL    Calcium 8 4 8 3 - 10 1 mg/dL    eGFR 77 ml/min/1 73sq m   CBC and differential    Collection Time: 12/27/19  4:26 AM   Result Value Ref Range    WBC 15 19 (H) 4 31 - 10 16 Thousand/uL    RBC 3 29 (L) 3 88 - 5 62 Million/uL    Hemoglobin 9 9 (L) 12 0 - 17 0 g/dL    Hematocrit 31 8 (L) 36 5 - 49 3 %    MCV 97 82 - 98 fL    MCH 30 1 26 8 - 34 3 pg    MCHC 31 1 (L) 31 4 - 37 4 g/dL    RDW 11 9 11 6 - 15 1 %    MPV 11 4 8 9 - 12 7 fL    Platelets 926 154 - 684 Thousands/uL    nRBC 0 /100 WBCs    Neutrophils Relative 71 43 - 75 %    Immat GRANS % 1 0 - 2 %    Lymphocytes Relative 18 14 - 44 %    Monocytes Relative 9 4 - 12 %    Eosinophils Relative 1 0 - 6 %    Basophils Relative 0 0 - 1 %    Neutrophils Absolute 10 72 (H) 1 85 - 7 62 Thousands/µL    Immature Grans Absolute 0 15 0 00 - 0 20 Thousand/uL    Lymphocytes Absolute 2 74 0 60 - 4 47 Thousands/µL    Monocytes Absolute 1 37 (H) 0 17 - 1 22 Thousand/µL    Eosinophils Absolute 0 16 0 00 - 0 61 Thousand/µL    Basophils Absolute 0 05 0 00 - 0 10 Thousands/µL   Fingerstick Glucose (POCT)    Collection Time: 12/27/19  6:06 AM   Result Value Ref Range    POC Glucose 134 65 - 140 mg/dl   Fingerstick Glucose (POCT)    Collection Time: 12/27/19  8:02 AM   Result Value Ref Range    POC Glucose 185 (H) 65 - 140 mg/dl   Fingerstick Glucose (POCT)    Collection Time: 12/27/19 10:01 AM   Result Value Ref Range    POC Glucose 188 (H) 65 - 140 mg/dl   Fingerstick Glucose (POCT)    Collection Time: 12/27/19 11:52 AM   Result Value Ref Range    POC Glucose 138 65 - 140 mg/dl   Fingerstick Glucose (POCT)    Collection Time: 12/27/19  2:09 PM   Result Value Ref Range    POC Glucose 173 (H) 65 - 140 mg/dl   Fingerstick Glucose (POCT)    Collection Time: 12/27/19  4:08 PM   Result Value Ref Range    POC Glucose 112 65 - 140 mg/dl   Fingerstick Glucose (POCT)    Collection Time: 12/27/19  6:06 PM   Result Value Ref Range    POC Glucose 196 (H) 65 - 140 mg/dl   Fingerstick Glucose (POCT)    Collection Time: 12/27/19  8:10 PM   Result Value Ref Range    POC Glucose 167 (H) 65 - 140 mg/dl   Fingerstick Glucose (POCT)    Collection Time: 12/27/19 10:03 PM   Result Value Ref Range    POC Glucose 119 65 - 140 mg/dl   Fingerstick Glucose (POCT)    Collection Time: 12/28/19  1:57 AM   Result Value Ref Range    POC Glucose 147 (H) 65 - 140 mg/dl   Basic metabolic panel    Collection Time: 12/28/19  4:49 AM   Result Value Ref Range    Sodium 142 136 - 145 mmol/L    Potassium 3 8 3 5 - 5 3 mmol/L    Chloride 109 (H) 100 - 108 mmol/L    CO2 28 21 - 32 mmol/L    ANION GAP 5 4 - 13 mmol/L    BUN 29 (H) 5 - 25 mg/dL    Creatinine 0 98 0 60 - 1 30 mg/dL    Glucose 161 (H) 65 - 140 mg/dL    Calcium 8 8 8 3 - 10 1 mg/dL    eGFR 97 ml/min/1 73sq m   Fingerstick Glucose (POCT)    Collection Time: 12/28/19  6:18 AM   Result Value Ref Range    POC Glucose 184 (H) 65 - 140 mg/dl   Fingerstick Glucose (POCT)    Collection Time: 12/28/19 11:06 AM   Result Value Ref Range    POC Glucose 254 (H) 65 - 140 mg/dl   Fingerstick Glucose (POCT)    Collection Time: 12/28/19  4:01 PM   Result Value Ref Range    POC Glucose 195 (H) 65 - 140 mg/dl   Fingerstick Glucose (POCT)    Collection Time: 12/28/19  9:03 PM   Result Value Ref Range    POC Glucose 191 (H) 65 - 140 mg/dl   Basic metabolic panel    Collection Time: 12/29/19  4:40 AM   Result Value Ref Range    Sodium 141 136 - 145 mmol/L    Potassium 4 1 3 5 - 5 3 mmol/L    Chloride 109 (H) 100 - 108 mmol/L    CO2 29 21 - 32 mmol/L    ANION GAP 3 (L) 4 - 13 mmol/L    BUN 23 5 - 25 mg/dL    Creatinine 0 91 0 60 - 1 30 mg/dL    Glucose 138 65 - 140 mg/dL    Calcium 8 9 8 3 - 10 1 mg/dL    eGFR 106 ml/min/1 73sq m   Fingerstick Glucose (POCT)    Collection Time: 12/29/19  5:47 AM   Result Value Ref Range    POC Glucose 143 (H) 65 - 140 mg/dl   ]    Procedure: Ct Chest Abdomen Pelvis Wo Contrast    Result Date: 12/21/2019  Narrative: CT CHEST, ABDOMEN AND PELVIS WITHOUT IV CONTRAST INDICATION:   cardiomyopathy   COMPARISON:  Chest radiographic series 12/18/2019 TECHNIQUE: CT examination of the chest, abdomen and pelvis was performed without intravenous contrast   Axial, sagittal, and coronal 2D reformatted images were created from the source data and submitted for interpretation  Radiation dose length product (DLP) for this visit:  1615 9 mGy-cm   This examination, like all CT scans performed in the [facility], was performed utilizing techniques to minimize radiation dose exposure, including the use of iterative  reconstruction and automated exposure control  Enteric contrast was not administered  FINDINGS: CHEST LUNGS:  Lungs are clear  There is no tracheal or endobronchial lesion  PLEURA:  Unremarkable  HEART/GREAT VESSELS:  Unremarkable for patient's age  MEDIASTINUM AND CHEVY:  Unremarkable  CHEST WALL AND LOWER NECK:   Asymmetric gynecomastia, left greater than right  No axillary lymphadenopathy  ABDOMEN LIVER/BILIARY TREE:  Liver is mildly decreased in density consistent with fatty change  No CT evidence of suspicious hepatic mass  Normal hepatic contours  No biliary dilatation  GALLBLADDER:  No calcified gallstones  No pericholecystic inflammatory change  SPLEEN:  Unremarkable  Small splenule noted  PANCREAS:  Unremarkable  ADRENAL GLANDS:  Unremarkable  KIDNEYS/URETERS:  Residual intravenous contrast in the collecting systems from recent cardiac MRI  No hydronephrosis or perinephric collections  STOMACH AND BOWEL:  Unremarkable  APPENDIX:  No findings to suggest appendicitis  ABDOMINOPELVIC CAVITY:  No ascites or free intraperitoneal air  No lymphadenopathy  VESSELS:  Unremarkable for patient's age  PELVIS REPRODUCTIVE ORGANS:  Unremarkable for patient's age  URINARY BLADDER:  The bladder is diffusely thick-walled likely sequela of underdistention  No perivesical inflammation  ABDOMINAL WALL/INGUINAL REGIONS:  Focal soft tissue emphysema in the left ventral abdominal wall, likely iatrogenic  OSSEOUS STRUCTURES:  No acute fracture or destructive osseous lesion  Impression: 1  No acute intrathoracic abnormality  2   Mild hepatic steatosis  3   Thick-walled bladder likely sequela of underdistention    No perivesical inflammation  Workstation performed: [id number]     Procedure: Xr Chest Portable    Result Date: 12/24/2019  Narrative: CHEST INDICATION:   Post Open Heart Surgey  COMPARISON:  12/23/2019 EXAM PERFORMED/VIEWS:  XR CHEST PORTABLE FINDINGS:  Median sternotomy wires are present  Right jugular central venous catheter tip overlies the right atrium  Mediastinal drains are present  Cardiomediastinal silhouette is stable  The lungs are clear  No pneumothorax or pleural effusion  Osseous structures appear within normal limits for patient age  Impression: No acute cardiopulmonary disease  Workstation performed: [id number]     Procedure: Xr Chest 2 Views    Result Date: 12/18/2019  Narrative: CHEST INDICATION:   Chest pain  COMPARISON:  None EXAM PERFORMED/VIEWS:  XR CHEST PA & LATERAL FINDINGS: Cardiomediastinal silhouette appears unremarkable  The lungs are clear  No pneumothorax or pleural effusion  Osseous structures appear within normal limits for patient age  Impression: No active pulmonary disease  Workstation performed: [id number]     Procedure: Mri Cardiac  W Wo Contrast    Result Date: 12/22/2019  Narrative: MRI CARDIAC  W WO CONTRAST cardiomyopathy Technique: 1  3 plane SSFP localizers  2  SSFP cine imaging in long and short axis planes  3  T2 weighted DIR FSE in short axis plane  4  24 ml gadolinium DTPA power injected  5  2D inversion recovery FGRE for delayed myocardial enhancement  6  The patient tolerated the procedure well without complication   Measurements: Mikey-septal wall 10 mm Postero-lateral wall 10 mm Global LV Assessment ----------------------------------------------------------------------------------------------------                Value                        Value / Height               Value / BSA                ---------------------------------------------------------------------------------------------------- LVEDV          269 ml                       153 24 ml/m (increased) 109 01 ml/m^2  (increased)                                               [60 - 120]                   [53 - 97]                  LVESV          181 ml                       103 33 ml/m (increased)      73 51 ml/m^2  (increased)                                                [12 - 44]                    [10 - 34]                  LVSV           87 ml                        49 9 ml/m                    35 5 ml/m^2  (reduced)                                                                                [37 - 69]                  LVEF           33 %                                                                                                                                                                                     LVCO           8 5 l/min                                                 3 4 l/min/m^2  (Normal)                                                                               [>= 2 5]                   LV Mass        285 g                        162 7 g/m (increased)        115 74 g/m^2  (increased)                                                [47 - 93]                    [42 - 78]                  Global RV Assessment ----------------------------------------------------------------------------------------------------                Value                        Value / Height               Value / BSA                ---------------------------------------------------------------------------------------------------- RVEDV          133 ml                       75 77 ml/m (normal)          53 91 ml/m^2  (reduced)                                                  [73 - 141]                   [67 - 111]                 RVESV          78 ml                        44 46 ml/m (normal)          31 63 ml/m^2  (normal)                                                   [22 - 66]                    [20 - 48]                  RVSV           55 ml                        31 31 ml/m                   22 27 ml/m^2  (reduced)                                                                               [39 - 71]                  RVEF           41 %                                                                                                                                                                                     RVCO           5 3 l/min                                                 2 2 l/min/m^2        Left atrium 32 cm^2 Aortic Root 22 mm Findings:  1  Mildly enlarged left ventricle end-diastolic volume  Mildly reduced left ventricle systolic function with ejection fraction measuring 33%  Mild diffusely increased myocardial wall thickness  Severe hypokinesis/akinesis of the inferior and inferoseptal wall  2  Normal right ventricle end-diastolic volume  Mildly reduced right ventricle systolic function with ejection fraction measuring 41%  3  The aortic, mitral, and tricuspid valves open without restriction  There is mitral regurgitation, however cine MRI is inaccurate in the qualitative assessment of valvular regurgitation  4  The left atrium is moderately enlarged  The aortic root is normal in size  5  There is no evidence of myocardial edema  6  Delayed post-gadolinium imaging demonstrates subendocardial delayed myocardial enhancement up to 50% transmural thickness at the basal inferior and inferoseptal wall and mid inferior wall  Impression: Impression: 1  Moderately enlarged left ventricle size with moderately reduced left ventricle systolic function  Ejection fraction measures 33%  Mild diffuse increased myocardial wall thickness  Severe hypokinesis/akinesis at the inferior and inferoseptal wall  Subendocardial delayed myocardial enhancement up to 50% transmural thickness at the basal inferior and inferoseptal wall and mid inferior wall, consistent with ischemic cardiomyopathy in the RCA/PDA distribution, with preserved viability  2  Normal right ventricle size    Mildly reduced right ventricle systolic function with ejection fraction measuring 41%  3  Mitral regurgitation 4  Moderate left atrial enlargement Workstation performed: [id number]     Procedure: Vas Carotid Complete Study    Result Date: 12/21/2019  Narrative:  [facility] REPORT [facility]INICAL: Indications: Patient presents for a general health evaluation secondary to future open heart surgery  Patient is asymptomatic at this time  Operative History: No prior cardiovascular surgeries Risk Factors The patient has history of HTN, Diabetes, HLD, CAD, and MI  The patient's current BMI is 40 61, Weight is 275 lb and height is 69 in  Clinical Right Pressure: 123/62 mm Hg, Left Pressure: IV site  FINDINGS:  Right        Impression  PSV  EDV (cm/s)  Direction of Flow  Ratio  Dist  ICA                 42          11                      0 42  Mid  ICA                  62          19                      0 63  Prox  ICA    1 - 49%      57          16                      0 58  Dist CCA                  64          18                            Mid CCA                   98          24                      1 64  Prox CCA                  60          15                            Ext Carotid               75          15                      0 76  Prox Vert                 40          16  Antegrade                 Subclavian               132          10                             Left         Impression  PSV  EDV (cm/s)  Direction of Flow  Ratio  Dist  ICA                 57          29                      0 73  Mid  ICA                  61          28                      0 78  Prox   ICA    1 - 49%      45          21                      0 59  Dist CCA                  72          21                            Mid CCA                   78          22                      1 00  Prox CCA                  78          21                            Ext Carotid               60          12                      0 78  Prox Vert                 20 9  Antegrade                 Subclavian                81           0                               CONCLUSION:  Impression  RIGHT: There is <50% stenosis noted in the internal carotid artery  Plaque is homogenous and smooth  Vertebral artery flow is antegrade  There is no significant subclavian artery disease  LEFT: There is <50% stenosis noted in the internal carotid artery  Plaque is homogenous and smooth  Vertebral artery flow is antegrade  There is no significant subclavian artery disease  There a no prior studies for comparison  Technical findings were faxed to chart  Tech Note: Very minimal plaque identified in the bilateral internal carotid arteries  Internal carotid artery stenosis determination by consensus criteria from: Jey Martínez et al  Carotid Artery Stenosis: Gray-Scale and Doppler US Diagnosis - Society of Radiologists in [facility] [address], Radiology 2003; [id number]  SIGNATURE: Electronically Signed by: Sanaz Maria MD, [address] on 2019-12-21 01:50:00 PM    Procedure: Vas Lower Limb Vein Mapping Bypass Graft    Result Date: 12/21/2019  Narrative:  [facility] REPORT CLINICAL: Indications: Pre-op Vein Mapping for Future Open Heart Surgery  Physician wants to evaluate for suitable conduit  Operative History: No prior cardiovascular surgeries Risk Factors The patient has history of HTN, Diabetes, HLD, CAD, and MI  The patient's current BMI is 40 61, Weight is 275 lb and height is 69 in    FINDINGS:  Segment         Right        Left                            Diameter AP  Diameter AP  GSV Inguinal            7 6          6 1  GSV Prox Thigh          2 6          2 2  GSV Mid Thigh           2 8          1 1  GSV Dist Thigh          2 9          1 6  GSV Knee                2 7          1 1  GSV Prox Calf           2 4          2 1  GSV Mid Calf            2 0          2 7  GSV Dist Calf           2 2          2 5  GSV Ankle               2 3          2 9 CONCLUSION:  Impression:  RIGHT LOWER LIMB: The great saphenous vein is patent  from the groin to the ankle  The vein is of adequate caliber and quality for graft conduit with intraluminal diameters ranging from 2 0 mm to 7 6 mm from ankle to the saphenofemoral junction  LEFT LOWER LIMB: The great saphenous vein is patent  from the groin to the ankle  The vein is of adequate caliber and quality for graft conduit with intraluminal diameters ranging from 2 1 mm to 2 9 mm from the ankle to the proximal calf  Tech note: Technical findings were faxed to chart  SIGNATURE: Electronically Signed by: Michael Jolley MD, [address] on 2019-12-21 01:49:47 PM    Procedure: Xr Chest Portable Icu    Result Date: 12/24/2019  Narrative: CHEST INDICATION:   S/P open heart  COMPARISON:  None EXAM PERFORMED/VIEWS:  XR CHEST PORTABLE ICU FINDINGS:  ET tube tip likely within 1 cm of the rossi, rossi not well demonstrated  Right IJ central line tip over the base of the right heart and should be retracted approximately 6 cm  [place] catheter tip projecting over the right main pulmonary artery  Mediastinal and left thoracic drains present  Cardiomediastinal silhouette appears unremarkable  The lungs are grossly clear  Limited inspiratory volume  No pneumothorax or pleural effusion  Osseous structures appear within normal limits for patient age  Impression: Suggest retracting right IJ central line approximate 6 cm and ET tube 1-2 cm  The study was marked in [facility] for immediate notification   Workstation performed: [id number]

## 2020-01-28 ENCOUNTER — TELEPHONE (OUTPATIENT)
Dept: CARDIAC REHAB | Age: 40
End: 2020-01-28

## 2020-01-28 DIAGNOSIS — E11.65 UNCONTROLLED TYPE 2 DIABETES MELLITUS WITH HYPERGLYCEMIA (HCC): Primary | ICD-10-CM

## 2020-01-28 PROBLEM — F32.9 REACTIVE DEPRESSION: Status: ACTIVE | Noted: 2020-01-28

## 2020-01-28 NOTE — ASSESSMENT & PLAN NOTE
Discussed about improving dietary habits  Once cleared for surgery incomplete cardiac rehab, he can engage in physical activities as well

## 2020-01-28 NOTE — ASSESSMENT & PLAN NOTE
Continue follow-up with Cardiothoracic surgery  He notes that he has been going to the gym  Advised him to contact the surgeon's before he starts any kind of exercise

## 2020-01-28 NOTE — ASSESSMENT & PLAN NOTE
Lab Results   Component Value Date    HGBA1C 10 1 (H) 12/18/2019   We discussed how important it is to get his blood sugars under control for long-term blood complications and the showed him to prevent wound infection  He does me that he is working with some dietitian at [facility] who has plans to start him on an insulin pump  He does not know if he sees an endocrinologist there  We discussed that this would likely be done with an endocrinologist but may take some time  Does follow-up with Endocrinology at [facility]  We discussed how important it is to his right his blood sugars in bring to the next office visit for dose adjustment of insulin

## 2020-01-29 DIAGNOSIS — T81.31XA DEHISCENCE OF OPERATIVE WOUND, INITIAL ENCOUNTER: Primary | ICD-10-CM

## 2020-01-30 ENCOUNTER — TELEPHONE (OUTPATIENT)
Dept: ENDOCRINOLOGY | Facility: CLINIC | Age: 40
End: 2020-01-30

## 2020-01-30 NOTE — TELEPHONE ENCOUNTER
Tonya from the [facility] called  She needs to collaborate with you regarding this pt  She needs to speak with you [phone]  She said she tried to go thru Epic to send you a message but it did not work

## 2020-01-31 NOTE — TELEPHONE ENCOUNTER
Spoke with diabetes educator  She stated that his blood sugars are improving and now he is using continues glucose monitor sensor  Discussed that she should send the download to us so we can make adjustment  Also discussed that patient need to do blood work as ordered    Cris Escalante MD

## 2020-02-04 ENCOUNTER — OFFICE VISIT (OUTPATIENT)
Dept: CARDIAC SURGERY | Facility: CLINIC | Age: 40
End: 2020-02-04

## 2020-02-04 VITALS
BODY MASS INDEX: 38.95 KG/M2 | HEART RATE: 88 BPM | HEIGHT: 69 IN | RESPIRATION RATE: 14 BRPM | TEMPERATURE: 97.5 F | OXYGEN SATURATION: 98 % | WEIGHT: 263 LBS | SYSTOLIC BLOOD PRESSURE: 116 MMHG | DIASTOLIC BLOOD PRESSURE: 82 MMHG

## 2020-02-04 DIAGNOSIS — T81.89XD DELAYED SURGICAL WOUND HEALING, SUBSEQUENT ENCOUNTER: ICD-10-CM

## 2020-02-04 DIAGNOSIS — Z48.89 ENCOUNTER FOR POST SURGICAL WOUND CHECK: Primary | ICD-10-CM

## 2020-02-04 PROBLEM — T81.89XA DELAYED SURGICAL WOUND HEALING: Status: ACTIVE | Noted: 2020-02-04

## 2020-02-04 PROCEDURE — 3074F SYST BP LT 130 MM HG: CPT | Performed by: NURSE PRACTITIONER

## 2020-02-04 PROCEDURE — 1111F DSCHRG MED/CURRENT MED MERGE: CPT | Performed by: NURSE PRACTITIONER

## 2020-02-04 PROCEDURE — 3079F DIAST BP 80-89 MM HG: CPT | Performed by: NURSE PRACTITIONER

## 2020-02-04 PROCEDURE — 99024 POSTOP FOLLOW-UP VISIT: CPT | Performed by: NURSE PRACTITIONER

## 2020-02-04 PROCEDURE — 3008F BODY MASS INDEX DOCD: CPT | Performed by: NURSE PRACTITIONER

## 2020-02-04 NOTE — PROGRESS NOTES
POST OP FOLLOW UP VISIT-WOUND CHECK    Procedure: S/P coronary artery bypass grafting x3, performed on 12/23/19    History: Don Skelton  is a 44y o  year old male who presents to our office today for follow up chest incision check  He presented to our office on 1/24/20 for his routine 1 month post op visit  Att hat visit his midline chest skin incision was noted to be dehisced superficially  Patient at that time reported the incision had opened up just 3 days earlier and was draining cloudy drainage  He was afebrile and reported his blood sugars running consistently in the 180's  His wound was examined and probed  Wound was packed with iodofrom packing and covered with DSD  He was prescribed Keflex 500 mg BID x 10 days  [facility] was involved in patients home care  Patient returns today for wound check (chest incision)  He is 6 weeks s/p CABG  He completed his course of antibiotics  [facility] called last week to report the wound was healing and she was unable to insert packing  Wound care was then changed to saline wet to dry guaze applied to incision, cover with DSD  His wife is assisting with dressing changes  Patient has remained Afebrile  He denies any redness surrounding the open incision  He denies odor or purulence  Wife confirms the deeper area being packed healed  Patient reports his blood sugars are still running up to 180's but FBS in AM is improved  Vital Signs:   Vitals:    02/04/20 1300 02/04/20 1329   BP: 116/76 116/82   BP Location: Left arm Right arm   Cuff Size: Adult Adult   Pulse: 88    Resp: 14    Temp: 97 5 °F (36 4 °C)    TempSrc: Oral    SpO2: 98%    Weight: 119 kg (263 lb)    Height: 5' 9" (1 753 m)        Home Medications:   Prior to Admission medications    Medication Sig Start Date End Date Taking?  Authorizing Provider   Alcohol Swabs 70 % PADS Use alcohol swab before each insulin injection and before testing your blood glucose 12/29/19  Yes Solomon Bernabe PA-C amLODIPine (NORVASC) 10 mg tablet Take 1 tablet (10 mg total) by mouth daily 12/29/19  Yes Torri Robles PA-C   aspirin 325 mg tablet Take 1 tablet (325 mg total) by mouth daily 12/30/19  Yes Torri Robles PA-C   atorvastatin (LIPITOR) 80 mg tablet Take 1 tablet (80 mg total) by mouth daily with dinner 12/29/19  Yes Torri Robles PA-C   Empagliflozin (JARDIANCE) 10 MG TABS Take 1 tablet (10 mg total) by mouth every morning 1/28/20  Yes Sylvia Spears MD   glucose blood (GLUCOSE METER TEST) test strip Test your blood glucose before each meal and at bedtime, record those numbers 12/27/19  Yes Torri Robles PA-C   insulin glargine (LANTUS) 100 units/mL subcutaneous injection Inject 50 Units under the skin daily at bedtime 1/13/20  Yes Sylvia Spears MD   Insulin Pen Needle (BD PEN NEEDLE LEONA U/F) 32G X 4 MM MISC Use a new pen needle each time you use your insulin pens before each meal and at bedtime 1/13/20  Yes Sylvia Spears MD   Lancets ([place]) lancets Test your blood glucose before each meal and at bedtime, record those numbers 12/27/19  Yes Torri Robles PA-C   lisinopril (ZESTRIL) 40 mg tablet Take 1 tablet (40 mg total) by mouth daily 12/29/19  Yes Torri Robles PA-C   metFORMIN (GLUCOPHAGE) 1000 MG tablet 1 tablet Every 12 hours   Yes Historical Provider, MD   metoprolol tartrate (LOPRESSOR) 50 mg tablet Take 1 tablet (50 mg total) by mouth every 12 (twelve) hours 12/29/19  Yes Torri Robles PA-C   NOVOLOG FLEXPEN 100 units/mL injection pen INJECT 15 UNITS SUBCUTANEOUS 3 TIMES A DAY WITH MEALS 12/29/19  Yes Historical Provider, MD   cephalexin (KEFLEX) 500 mg capsule Take 1 capsule (500 mg total) by mouth every 12 (twelve) hours for 10 days 1/24/20 2/3/20  Marlen Corral PA-C       Physical Exam:  General: Alert, oriented, well developed, no acute distress  HEENT/NECK:  PERRLA  No jugular venous distention      Cardiac:Regular rate and rhythm, No murmurs rubs or gallops  Pulmonary:Breath sounds clear bilaterally  Abdomen:  Non-tender, Non-distended  Positive bowel sounds  Upper extremities: 2+ radial pulses; brisk capillary refill  Lower extremities: Extremities warm/dry  PT/DP pules 2+ bilaterally  No edema B/L  Incisions: Chest incision with 2 open areas:  Mid portion 5 5cm long x 1 5 cm wide  Lower area 3 5 cm long x 1 0 cm wide  Shallow wound base with red granulation tissue, no tunneling  No drainage  No odor, no eschar  Sternum stable  Musculoskeletal: MAEE, stable gait  Neuro: Alert and oriented X 3  Sensation is grossly intact  No focal deficits  Skin: Warm/Dry, without rashes or lesions  Lab Results:     Lab Results   Component Value Date    HGBA1C 10 1 (H) 12/18/2019     Lab Results   Component Value Date    TROPONINI 2 83 (H) 12/19/2019       I  Assessment: CAD s/p CABG x 3; 6 weeks ago with altered/delayed wound healing in setting of poorly controlled DM2  Plan:     [facility]  was treated with antibiotics for a superficial sternal wound infection  The wound remains open but appears to be healing well with no focal signs of infection and presence of healthy granulation tissue  He has completed a course of antibiotics  He will continue with saline wet to dry gauze covered with DSD  Tight blood sugar control was emphasized  He is scheduled for consultation with the wound center this Friday 2/7/20 for additional wound care options  We will see him back the following Friday 2/14/20 for wound check  He knows to contact our office with any questions, issues or change in wound appearance  [facility]  and his wife were comfortable with my recommendations and their questions were answered to their satisfaction      Routine referral to gastroenterology for colonoscopy screening was not indicated, as the patient is less than 48years old    [address]  2/4/20

## 2020-02-07 ENCOUNTER — APPOINTMENT (OUTPATIENT)
Dept: WOUND CARE | Facility: HOSPITAL | Age: 40
End: 2020-02-07
Payer: COMMERCIAL

## 2020-02-07 PROCEDURE — 99212 OFFICE O/P EST SF 10 MIN: CPT

## 2020-02-12 ENCOUNTER — OFFICE VISIT (OUTPATIENT)
Dept: CARDIOLOGY CLINIC | Facility: CLINIC | Age: 40
End: 2020-02-12
Payer: COMMERCIAL

## 2020-02-12 VITALS
WEIGHT: 264 LBS | SYSTOLIC BLOOD PRESSURE: 140 MMHG | HEIGHT: 69 IN | HEART RATE: 84 BPM | DIASTOLIC BLOOD PRESSURE: 100 MMHG | BODY MASS INDEX: 39.1 KG/M2

## 2020-02-12 DIAGNOSIS — E11.65 UNCONTROLLED TYPE 2 DIABETES MELLITUS WITH HYPERGLYCEMIA (HCC): ICD-10-CM

## 2020-02-12 DIAGNOSIS — Z95.1 S/P CABG X 3: ICD-10-CM

## 2020-02-12 PROCEDURE — 3080F DIAST BP >= 90 MM HG: CPT | Performed by: INTERNAL MEDICINE

## 2020-02-12 PROCEDURE — 4010F ACE/ARB THERAPY RXD/TAKEN: CPT | Performed by: INTERNAL MEDICINE

## 2020-02-12 PROCEDURE — 1111F DSCHRG MED/CURRENT MED MERGE: CPT | Performed by: INTERNAL MEDICINE

## 2020-02-12 PROCEDURE — 99214 OFFICE O/P EST MOD 30 MIN: CPT | Performed by: INTERNAL MEDICINE

## 2020-02-12 PROCEDURE — 1036F TOBACCO NON-USER: CPT | Performed by: INTERNAL MEDICINE

## 2020-02-12 PROCEDURE — 3077F SYST BP >= 140 MM HG: CPT | Performed by: INTERNAL MEDICINE

## 2020-02-12 PROCEDURE — 3008F BODY MASS INDEX DOCD: CPT | Performed by: INTERNAL MEDICINE

## 2020-02-12 RX ORDER — AMLODIPINE BESYLATE 10 MG/1
10 TABLET ORAL DAILY
Qty: 90 TABLET | Refills: 4 | Status: SHIPPED | OUTPATIENT
Start: 2020-02-12

## 2020-02-12 NOTE — PROGRESS NOTES
Cardiology Follow Up        [place]       1980      [id number]      Discussion/Summary:    1  CAD status post CABG x3 12/23/2019 with LIMA to LAD, SVG to OM3, SVG to RPDA  2  Sternotomy wound infection, status post antibiotic treatment  3  Ischemic cardiomyopathy, ejection fraction 40%  4  Hypertension  5  Diabetes  6  Dyslipidemia    · Seems like a slowly progressing and improving since his surgery  No symptoms of angina  Continue aspirin, high-dose atorvastatin  · Blood pressure mildly elevated  Encouraged him to monitor blood pressures at home  A sample blood pressure log was given to him  He has not been taking amlodipine, but refilled his medications this morning which is yet to be picked up  Continue the same, along with lisinopril, metoprolol  Would start spironolactone if need additional blood pressure control  · Blood work has been ordered for him before today's visit which she has yet to get in addition to his chest x-ray to rule out pleural effusions as does have some exertional dyspnea  · Will recheck lipid panel with next set of blood work      Follow-up in 6 weeks      Interval History: This is a 80-year-old male with hypertension, dyslipidemia, diabetes, and obesity who presented to the [place] [address] 12/19/2019 with type 1 NSTEMI  Coronary angiography 12/19/2018 revealed 60-70% hemodynamically significant LAD stenosis, 75% distal OM 2 stenosis, distal 90-95% RCA stenosis  Ejection fraction was 30-35%  Cardiac MRI 12/20/2019 revealed ejection fraction 33% with mildly increased wall thickness, akinesis of the inferior and inferoseptal walls  On 12/23/2019, he underwent CABG x3 with LIMA to LAD, SVG to OM3, SVG to RPDA  Postoperative echocardiogram demonstrated ejection fraction 40%  He was discharged on postoperative day 6  He was seen by our nurse practitioner 01/14/2020    At that time he had complaints of exertional dyspnea  Torsemide 20 mg daily was continued her chest x-ray was ordered, but not completed by the patient  He was seen by CT surgery 01/24/2020 and noted to have opening of his wound reportedly draining pus  A single drop of purulence was expressed from the wound, and it was repacked  Was seen back in the office 02/04/2020 with no focal signs of infection, and in a well-healing wound  There was healthy granulation tissue  He completed a course of antibiotics  He presents today for follow-up  He has an unusual sensation on the right side of his chest   He has right thigh pain as well since his surgery  He has exertional dyspnea which is stable  He denies any abnormal bleeding or bruising, lower extremity edema, dizziness, palpitations             Vitals:  Vitals:    02/12/20 1644   Weight: 120 kg (264 lb)   Height: 5' 9" (1 753 m)         Past Medical History:   Diagnosis Date    JELENA [facility]acute kidney injury) ([facility] [place] [facility] ) 12/23/2019    CAD (coronary artery disease)     Diabetes mellitus ([place] [address] )     HLD (hyperlipidemia)     Hypertension     Myocardial infarction ([place] [address] )      Social History     Socioeconomic History    Marital status: Single     Spouse name: Not on file    Number of children: Not on file    Years of education: Not on file    Highest education level: Not on file   Occupational History    Not on file   Social Needs    Financial resource strain: Not on file    Food insecurity:     Worry: Not on file     Inability: Not on file    Transportation needs:     Medical: Not on file     Non-medical: Not on file   Tobacco Use    Smoking status: Never Smoker    Smokeless tobacco: Never Used   Substance and Sexual Activity    Alcohol use: Never     Frequency: Never     Binge frequency: Never    Drug use: Yes     Types: Marijuana    Sexual activity: Yes     Partners: Female   Lifestyle    Physical activity:     Days per week: Not on file     Minutes per session: Not on file    Stress: Not on file   Relationships    Social connections:     Talks on phone: Not on file     Gets together: Not on file     Attends [employer] service: Not on file     Active member of club or organization: Not on file     Attends meetings of clubs or organizations: Not on file     Relationship status: Not on file    Intimate partner violence:     Fear of current or ex partner: Not on file     Emotionally abused: Not on file     Physically abused: Not on file     Forced sexual activity: Not on file   Other Topics Concern    Not on file   Social History Narrative    Not on file      Family History   Problem Relation Age of Onset    Cancer Mother         unknown     Past Surgical History:   Procedure Laterality Date    ANKLE SURGERY Right     CARDIAC CATHETERIZATION      CORONARY ARTERY BYPASS GRAFT N/A 12/23/2019    Procedure: CORONARY ARTERY BYPASS GRAFT (CABG) x 3; LIMA to LAD; Right EVH/SVG to OM3 and PDA;   Surgeon: Stella Samuel MD;  Location: [facility];  Service: Cardiac Surgery    FRACTURE SURGERY         Current Outpatient Medications:     amLODIPine (NORVASC) 10 mg tablet, Take 1 tablet (10 mg total) by mouth daily, Disp: 30 tablet, Rfl: 2    aspirin 325 mg tablet, Take 1 tablet (325 mg total) by mouth daily, Disp: 30 tablet, Rfl: 2    atorvastatin (LIPITOR) 80 mg tablet, Take 1 tablet (80 mg total) by mouth daily with dinner, Disp: 30 tablet, Rfl: 2    Empagliflozin (JARDIANCE) 10 MG TABS, Take 1 tablet (10 mg total) by mouth every morning, Disp: , Rfl:     insulin glargine (LANTUS) 100 units/mL subcutaneous injection, Inject 50 Units under the skin daily at bedtime, Disp: 30 mL, Rfl: 2    lisinopril (ZESTRIL) 40 mg tablet, Take 1 tablet (40 mg total) by mouth daily, Disp: 30 tablet, Rfl: 2    metFORMIN (GLUCOPHAGE) 1000 MG tablet, 1 tablet Every 12 hours, Disp: , Rfl:     metoprolol tartrate (LOPRESSOR) 50 mg tablet, Take 1 tablet (50 mg total) by mouth every 12 (twelve) hours, Disp: 60 tablet, Rfl: 2    NOVOLOG FLEXPEN 100 units/mL injection pen, INJECT 15 UNITS SUBCUTANEOUS 3 TIMES A DAY WITH MEALS, Disp: , Rfl:     Alcohol Swabs 70 % PADS, Use alcohol swab before each insulin injection and before testing your blood glucose, Disp: 250 each, Rfl: 0    glucose blood (GLUCOSE METER TEST) test strip, Test your blood glucose before each meal and at bedtime, record those numbers, Disp: 120 each, Rfl: 2    Insulin Pen Needle (BD PEN NEEDLE LEONA U/F) 32G X 4 MM MISC, Use a new pen needle each time you use your insulin pens before each meal and at bedtime, Disp: 120 each, Rfl: 2    Lancets (ACCU-CHEK MULTICLIX) lancets, Test your blood glucose before each meal and at bedtime, record those numbers, Disp: 120 each, Rfl: 2        Review of Systems:  Review of Systems   Respiratory: Positive for chest tightness and shortness of breath  Cardiovascular: Negative  Musculoskeletal:        Right thigh pain   All other systems reviewed and are negative  Physical Exam:  Physical Exam   Constitutional: He is oriented to person, place, and time  He appears well-developed and well-nourished  No distress  HENT:   Head: Normocephalic and atraumatic  Eyes: Pupils are equal, round, and reactive to light  EOM are normal  Right eye exhibits no discharge  No scleral icterus  Neck: Normal range of motion  Neck supple  No thyromegaly present  Cardiovascular: Normal rate, regular rhythm and normal heart sounds  Exam reveals no gallop and no friction rub  No murmur heard  Pulmonary/Chest: Effort normal and breath sounds normal    Abdominal: He exhibits no distension  There is no tenderness  There is no rebound and no guarding  Musculoskeletal: Normal range of motion  He exhibits no edema  Neurological: He is alert and oriented to person, place, and time  Coordination normal    Skin: Skin is warm and dry  No rash noted  He is not diaphoretic  No erythema  No pallor     Psychiatric: He has a normal mood and affect  His behavior is normal  Judgment and thought content normal        This note was completed in part utilizing [employer] Direct Software  Grammatical errors, random word insertions, spelling mistakes, and incomplete sentences can be an occasional consequence of this system secondary to software limitations, ambient noise, and hardware issues  If you have any questions or concerns about the content, text, or information contained within the body of this dictation, please contact the provider for clarification

## 2020-02-14 ENCOUNTER — OFFICE VISIT (OUTPATIENT)
Dept: CARDIAC SURGERY | Facility: CLINIC | Age: 40
End: 2020-02-14

## 2020-02-14 VITALS
OXYGEN SATURATION: 98 % | RESPIRATION RATE: 14 BRPM | HEIGHT: 69 IN | BODY MASS INDEX: 39.4 KG/M2 | HEART RATE: 98 BPM | SYSTOLIC BLOOD PRESSURE: 132 MMHG | TEMPERATURE: 97 F | DIASTOLIC BLOOD PRESSURE: 84 MMHG | WEIGHT: 266 LBS

## 2020-02-14 DIAGNOSIS — Z48.89 ENCOUNTER FOR POST SURGICAL WOUND CHECK: Primary | ICD-10-CM

## 2020-02-14 PROCEDURE — 99024 POSTOP FOLLOW-UP VISIT: CPT | Performed by: NURSE PRACTITIONER

## 2020-02-14 PROCEDURE — 3008F BODY MASS INDEX DOCD: CPT | Performed by: NURSE PRACTITIONER

## 2020-02-14 PROCEDURE — 3075F SYST BP GE 130 - 139MM HG: CPT | Performed by: NURSE PRACTITIONER

## 2020-02-14 PROCEDURE — 3079F DIAST BP 80-89 MM HG: CPT | Performed by: NURSE PRACTITIONER

## 2020-02-14 PROCEDURE — 1111F DSCHRG MED/CURRENT MED MERGE: CPT | Performed by: NURSE PRACTITIONER

## 2020-02-14 NOTE — PROGRESS NOTES
POST OP FOLLOW UP VISIT-WOUND CHECK    Procedure: S/P coronary artery bypass grafting    History: Mohamud Ruby  is a 44y o  year old male who presents to our office today for follow up chest incision check  He was seen two weeks ago for superficial dehiscence of the skin edges of his chest incision with delayed healing  He completed antibiotics  He was seen by the wound center who has him applying silver dressing  Today patient states he is feeling well  No fevers  No redness, pain or drainage from his incision  He has been working on improving his blood sugar management  He has been walking on the treadmill    Vital Signs:   Vitals:    02/14/20 1000 02/14/20 1035   BP: 136/84 132/84   BP Location: Left arm Right arm   Cuff Size: Adult Adult   Pulse: 98    Resp: 14    Temp: (!) 97 °F (36 1 °C)    TempSrc: Tympanic    SpO2: 98%    Weight: 121 kg (266 lb)    Height: 5' 9" (1 753 m)        Home Medications:   Prior to Admission medications    Medication Sig Start Date End Date Taking?  Authorizing Provider   Alcohol Swabs 70 % PADS Use alcohol swab before each insulin injection and before testing your blood glucose 12/29/19  Yes Kristopher Atkins PA-C   amLODIPine (NORVASC) 10 mg tablet Take 1 tablet (10 mg total) by mouth daily 2/12/20  Yes Kelvin Piper DO   aspirin 325 mg tablet Take 1 tablet (325 mg total) by mouth daily 12/30/19  Yes Gato Lombardo PA-C   atorvastatin (LIPITOR) 80 mg tablet Take 1 tablet (80 mg total) by mouth daily with dinner 12/29/19  Yes Kristopher Atkins PA-C   Empagliflozin (JARDIANCE) 10 MG TABS Take 1 tablet (10 mg total) by mouth every morning 1/28/20  Yes Lori Goode MD   glucose blood (GLUCOSE METER TEST) test strip Test your blood glucose before each meal and at bedtime, record those numbers 12/27/19  Yes Gato Lombardo PA-C   insulin glargine (LANTUS) 100 units/mL subcutaneous injection Inject 50 Units under the skin daily at bedtime 1/13/20  Yes Guicho Pereira MD   Insulin Pen Needle (BD PEN NEEDLE LEONA U/F) 32G X 4 MM MISC Use a new pen needle each time you use your insulin pens before each meal and at bedtime 1/13/20  Yes Guicho Pereira MD   Lancets (Duran Curtis) lancets Test your blood glucose before each meal and at bedtime, record those numbers 12/27/19  Yes Rebeca Lombard, PA-C   lisinopril (ZESTRIL) 40 mg tablet Take 1 tablet (40 mg total) by mouth daily 12/29/19  Yes Rebeca Lombard, PA-C   metFORMIN (GLUCOPHAGE) 1000 MG tablet 1 tablet Every 12 hours   Yes Historical Provider, MD   metoprolol tartrate (LOPRESSOR) 50 mg tablet Take 1 tablet (50 mg total) by mouth every 12 (twelve) hours 12/29/19  Yes Rebeca Lombard, PA-C   NOVOLOG FLEXPEN 100 units/mL injection pen INJECT 15 UNITS SUBCUTANEOUS 3 TIMES A DAY WITH MEALS 12/29/19  Yes Historical Provider, MD       Physical Exam:  General: Alert, oriented, well developed, no acute distress  HEENT/NECK:  PERRLA  No jugular venous distention  Cardiac:Regular rate and rhythm, No  Murmurs rubs or gallops  Pulmonary:Breath sounds clear bilaterally  Abdomen:  Non-tender, Non-distended  Positive bowel sounds  Upper extremities: 2+ radial pulses; brisk capillary refill  Lower extremities: Extremities warm/dry  PT/DP pules 2+ bilaterally  No edema B/L  Incisions: Sternum is stable  Incision is clean, dry, and intact- one small open area mid chest incision, no erythema, drainage or pain   Saphenectomy incision is clean, dry, and intact  Musculoskeletal: MAEE, stable gait  Neuro: Alert and oriented X 3  Sensation is grossly intact  No focal deficits  Skin: Warm/Dry, without rashes or lesions  Lab Results:     Lab Results   Component Value Date    HGBA1C 10 1 (H) 12/18/2019     Lab Results   Component Value Date    TROPONINI 2 83 (H) 12/19/2019       Assessment: Coronary artery disease  S/P coronary artery bypass grafting;   Her for chest incision assessment    Plan: Mercy Rai  continues to make good progress in his recovery following coronary artery bypass grafting  He is now 7 weeks post op  Chest incision is healing well with only one small open area with no focal signs of infection  He will continue his wound care per the wound center and see us back in two weeks for check up  I suspect the incision will be completed healed by then  Mercy Rai  was comfortable with our recommendations and his questions were answered to his satisfaction      Routine referral to gastroenterology for colonoscopy screening was not indicated, as the patient is less than 48years old    Georjean Snellen, [address] [place] [address]  2/14/20

## 2020-02-14 NOTE — LETTER
February 14, 2020     Vladimir Mcintyre, [address]  [address] [facility] [address]    Patient: Erna Keller  YOB: 1980   Date of Visit: 2/14/2020       Dear Dr Mohit Barrett:    Thank you for referring Jennifer Hart to me for evaluation  Below are my notes for this consultation  If you have questions, please do not hesitate to call me  I look forward to following your patient along with you  Sincerely,        Marianna Torres MD        CC: No Recipients  MIRNA Nice  2/14/2020 10:56 AM  Attested   POST OP FOLLOW UP VISIT-WOUND CHECK    Procedure: S/P coronary artery bypass grafting    History: Erna Keller  is a 44y o  year old male who presents to our office today for follow up chest incision check  He was seen two weeks ago for superficial dehiscence of the skin edges of his chest incision with delayed healing  He completed antibiotics  He was seen by the wound center who has him applying silver dressing  Today patient states he is feeling well  No fevers  No redness, pain or drainage from his incision  He has been working on improving his blood sugar management  He has been walking on the treadmill    Vital Signs:   Vitals:    02/14/20 1000 02/14/20 1035   BP: 136/84 132/84   BP Location: Left arm Right arm   Cuff Size: Adult Adult   Pulse: 98    Resp: 14    Temp: (!) 97 °F (36 1 °C)    TempSrc: Tympanic    SpO2: 98%    Weight: 121 kg (266 lb)    Height: 5' 9" (1 753 m)        Home Medications:   Prior to Admission medications    Medication Sig Start Date End Date Taking?  Authorizing Provider   Alcohol Swabs 70 % PADS Use alcohol swab before each insulin injection and before testing your blood glucose 12/29/19  Yes Sharan Atkins PA-C   amLODIPine (NORVASC) 10 mg tablet Take 1 tablet (10 mg total) by mouth daily 2/12/20  Yes Rujul Waleska Salmons, DO   aspirin 325 mg tablet Take 1 tablet (325 mg total) by mouth daily 12/30/19  Yes Sury Corona PA-C   atorvastatin (LIPITOR) 80 mg tablet Take 1 tablet (80 mg total) by mouth daily with dinner 12/29/19  Yes Dejuan Naik PA-C   Empagliflozin (JARDIANCE) 10 MG TABS Take 1 tablet (10 mg total) by mouth every morning 1/28/20  Yes Red Barron MD   glucose blood (GLUCOSE METER TEST) test strip Test your blood glucose before each meal and at bedtime, record those numbers 12/27/19  Yes Dejuan Naik PA-C   insulin glargine (LANTUS) 100 units/mL subcutaneous injection Inject 50 Units under the skin daily at bedtime 1/13/20  Yes Red Barron MD   Insulin Pen Needle (BD PEN NEEDLE LEONA U/F) 32G X 4 MM MISC Use a new pen needle each time you use your insulin pens before each meal and at bedtime 1/13/20  Yes Red Barron MD   Lancets (Amanda Rucks) lancets Test your blood glucose before each meal and at bedtime, record those numbers 12/27/19  Yes Dejuan Naik PA-C   lisinopril (ZESTRIL) 40 mg tablet Take 1 tablet (40 mg total) by mouth daily 12/29/19  Yes Dejuan Naik PA-C   metFORMIN (GLUCOPHAGE) 1000 MG tablet 1 tablet Every 12 hours   Yes Historical Provider, MD   metoprolol tartrate (LOPRESSOR) 50 mg tablet Take 1 tablet (50 mg total) by mouth every 12 (twelve) hours 12/29/19  Yes Dejuan Naik PA-C   NOVOLOG FLEXPEN 100 units/mL injection pen INJECT 15 UNITS SUBCUTANEOUS 3 TIMES A DAY WITH MEALS 12/29/19  Yes Historical Provider, MD       Physical Exam:  General: Alert, oriented, well developed, no acute distress  HEENT/NECK:  PERRLA  No jugular venous distention  Cardiac:Regular rate and rhythm, No  Murmurs rubs or gallops  Pulmonary:Breath sounds clear bilaterally  Abdomen:  Non-tender, Non-distended  Positive bowel sounds  Upper extremities: 2+ radial pulses; brisk capillary refill  Lower extremities: Extremities warm/dry  PT/DP pules 2+ bilaterally  No edema B/L  Incisions: Sternum is stable    Incision is clean, dry, and intact- one small open area mid chest incision, no erythema, drainage or pain   Saphenectomy incision is clean, dry, and intact  Musculoskeletal: MAEE, stable gait  Neuro: Alert and oriented X 3  Sensation is grossly intact  No focal deficits  Skin: Warm/Dry, without rashes or lesions  Lab Results:     Lab Results   Component Value Date    HGBA1C 10 1 (H) 12/18/2019     Lab Results   Component Value Date    TROPONINI 2 83 (H) 12/19/2019       Assessment: Coronary artery disease  S/P coronary artery bypass grafting; Her for chest incision assessment    Plan:     Chelo Coles  continues to make good progress in his recovery following coronary artery bypass grafting  He is now 7 weeks post op  Chest incision is healing well with only one small open area with no focal signs of infection  He will continue his wound care per the wound center and see us back in two weeks for check up  I suspect the incision will be completed healed by then  Chelo Coles  was comfortable with our recommendations and his questions were answered to his satisfaction  Routine referral to gastroenterology for colonoscopy screening was not indicated, as the patient is less than 48years old    MIRNA Truong  2/14/20  Attestation signed by Stella Samuel MD at 2/14/2020 11:09 AM:  Pt seen and examined with MIRNA  I agree with the above assessment and plan  Incision improving as detailed above  He states he is working hard to keep his blood sugar under control  Wound shows no evidence of infection or drainage  No tunneling  Just superficial granulating closure by secondary intention at this point  We'll see him again in 2 weeks  Patient smelled of marijuana in the office        Guzman Narvaez MD  DATE: February 14, 2020  TIME: 11:06 AM

## 2020-02-21 ENCOUNTER — APPOINTMENT (OUTPATIENT)
Dept: LAB | Facility: HOSPITAL | Age: 40
End: 2020-02-21
Attending: INTERNAL MEDICINE
Payer: COMMERCIAL

## 2020-02-21 ENCOUNTER — APPOINTMENT (OUTPATIENT)
Dept: WOUND CARE | Facility: HOSPITAL | Age: 40
End: 2020-02-21
Payer: COMMERCIAL

## 2020-02-21 DIAGNOSIS — Z95.1 S/P CABG X 3: ICD-10-CM

## 2020-02-21 LAB
CHOLEST SERPL-MCNC: 95 MG/DL (ref 50–200)
HDLC SERPL-MCNC: 30 MG/DL
LDLC SERPL CALC-MCNC: 47 MG/DL (ref 0–100)
TRIGL SERPL-MCNC: 92 MG/DL

## 2020-02-21 PROCEDURE — 36415 COLL VENOUS BLD VENIPUNCTURE: CPT

## 2020-02-21 PROCEDURE — 80061 LIPID PANEL: CPT

## 2020-02-28 ENCOUNTER — OFFICE VISIT (OUTPATIENT)
Dept: CARDIAC SURGERY | Facility: CLINIC | Age: 40
End: 2020-02-28

## 2020-02-28 VITALS
WEIGHT: 270 LBS | HEIGHT: 69 IN | TEMPERATURE: 98.3 F | HEART RATE: 88 BPM | RESPIRATION RATE: 14 BRPM | OXYGEN SATURATION: 98 % | DIASTOLIC BLOOD PRESSURE: 98 MMHG | SYSTOLIC BLOOD PRESSURE: 150 MMHG | BODY MASS INDEX: 39.99 KG/M2

## 2020-02-28 DIAGNOSIS — Z48.89 ENCOUNTER FOR POST SURGICAL WOUND CHECK: Primary | ICD-10-CM

## 2020-02-28 PROCEDURE — 3077F SYST BP >= 140 MM HG: CPT | Performed by: NURSE PRACTITIONER

## 2020-02-28 PROCEDURE — 99024 POSTOP FOLLOW-UP VISIT: CPT | Performed by: NURSE PRACTITIONER

## 2020-02-28 PROCEDURE — 3080F DIAST BP >= 90 MM HG: CPT | Performed by: NURSE PRACTITIONER

## 2020-02-28 PROCEDURE — 3008F BODY MASS INDEX DOCD: CPT | Performed by: NURSE PRACTITIONER

## 2020-02-28 NOTE — LETTER
February 28, 2020     Patient: Chelo Coles  YOB: 1980   Date of Visit: 2/28/2020       To Whom it May Concern:    Lucy Herrera is under my professional care  He was seen in my office on 2/28/2020  He may return to work on Monday March 16, 2020 with no restrictions  If you have any questions or concerns, please don't hesitate to call           Sincerely,          MIRNA Truong        CC: No Recipients

## 2020-02-28 NOTE — PROGRESS NOTES
POST OP FOLLOW UP VISIT    Procedure: S/P coronary artery bypass graftingx3, performed on 12/23/19    History: Debby Orourke  is a 44y o  year old male who presents today for follow up incision check  He is now 10 weeks s/p CABG surgery  He developed superficial skin incision dehiscence with delayed wound healing and has been followed over the past month  Today he offers no complaints  He denies fever, redness, pain, swelling or drainage from his chest incision  He states he is feeling better and is having minimal chest wall soreness  He is concerned about his BP, today it is 150/102  He is on Amlodipine 10 mg daily, Metoprolol 50 mg BID and Lisinopril 40 mg daily  He denies angina, SOB , headache or lightheadedness  Vital Signs:   Vitals:    02/28/20 1000 02/28/20 1025   BP: (!) 150/102 150/98   BP Location: Left arm Right arm   Cuff Size: Adult Adult   Pulse: 88    Resp: 14    Temp: 98 3 °F (36 8 °C)    TempSrc: Oral    SpO2: 98%    Weight: 122 kg (270 lb)    Height: 5' 9" (1 753 m)        Home Medications:   Prior to Admission medications    Medication Sig Start Date End Date Taking?  Authorizing Provider   Alcohol Swabs 70 % PADS Use alcohol swab before each insulin injection and before testing your blood glucose 12/29/19  Yes Dimas Atkins PA-C   amLODIPine (NORVASC) 10 mg tablet Take 1 tablet (10 mg total) by mouth daily 2/12/20  Yes Rujucrissy Randy Patches, DO   aspirin 325 mg tablet Take 1 tablet (325 mg total) by mouth daily 12/30/19  Yes Taina Yan PA-C   atorvastatin (LIPITOR) 80 mg tablet Take 1 tablet (80 mg total) by mouth daily with dinner 12/29/19  Yes Dimas Atkins PA-C   Empagliflozin (JARDIANCE) 10 MG TABS Take 1 tablet (10 mg total) by mouth every morning 1/28/20  Yes Archie Pereira MD   glucose blood (GLUCOSE METER TEST) test strip Test your blood glucose before each meal and at bedtime, record those numbers 12/27/19  Yes Taina Yan PA-C   insulin glargine (LANTUS) 100 units/mL subcutaneous injection Inject 50 Units under the skin daily at bedtime 1/13/20  Yes Lucinda Son MD   Insulin Pen Needle (BD PEN NEEDLE LEONA U/F) 32G X 4 MM MISC Use a new pen needle each time you use your insulin pens before each meal and at bedtime 1/13/20  Yes Lucinda Son MD   Lancets (Chuck Congo) lancets Test your blood glucose before each meal and at bedtime, record those numbers 12/27/19  Yes Michell Nam PA-C   lisinopril (ZESTRIL) 40 mg tablet Take 1 tablet (40 mg total) by mouth daily 12/29/19  Yes Michell Nam PA-C   metFORMIN (GLUCOPHAGE) 1000 MG tablet 1 tablet Every 12 hours   Yes Historical Provider, MD   metoprolol tartrate (LOPRESSOR) 50 mg tablet Take 1 tablet (50 mg total) by mouth every 12 (twelve) hours 12/29/19  Yes Michell Nam PA-C   NOVOLOG FLEXPEN 100 units/mL injection pen INJECT 15 UNITS SUBCUTANEOUS 3 TIMES A DAY WITH MEALS 12/29/19  Yes Historical Provider, MD       Physical Exam:  General: Alert, oriented, well developed, no acute distress  HEENT/NECK:  PERRLA  No jugular venous distention  Cardiac:Regular rate and rhythm, No murmurs rubs or gallops  Pulmonary:Breath sounds clear bilaterally  Abdomen:  Non-tender, Non-distended  Positive bowel sounds  Upper extremities: 2+ radial pulses; brisk capillary refill  Lower extremities: Extremities warm/dry  PT/DP pules 2+ bilaterally  No edema B/L  Incisions: Sternum is stable  Incision is clean, dry, and intact  Saphenectomy incision is clean, dry, and intact  Musculoskeletal: MAEE, stable gait  Neuro: Alert and oriented X 3  Sensation is grossly intact  No focal deficits  Skin: Warm/Dry, without rashes or lesions      Lab Results:       Lab Results   Component Value Date    HGBA1C 10 1 (H) 12/18/2019     Lab Results   Component Value Date    TROPONINI 2 83 (H) 12/19/2019       Assessment: CAD s/p CABG x 3, 10 weeks ago with altered/delayed wound healing in the setting of poorly controlled DM2  Plan:     Bulmaro Benítez  continues to make good progress in his recovery following coronary artery bypass grafting  He is now 10 weeks post op and his midline chest incision has completely healed  His sternum is stable  He may resume to cardiac rehab  He may return to work, provided he maintain a lifting restriction of no more than 25 lb until March 16, 2020 at which time he will be 12 weeks post op and will have no further activity restrictions  He has an appt on Monday 3/2/20 with his PCP and I advised him to address his BP concerns with that provider and/or with his cardiologist as they will be managing him long term  I do not want to make medication changes today since we are discharging him from our care today  He no longer has surgical issues  I encouraged regular exercise with a goal of weight loss which will improve his BP and blood sugar  Bulmaro Benítez  was comfortable with our recommendations and his questions were answered to his satisfaction      Routine referral to gastroenterology for colonoscopy screening was not indicated, as the patient is less than 48years old    Erasto Salas [place]  2/28/20  10:58AM

## 2020-03-27 ENCOUNTER — TELEPHONE (OUTPATIENT)
Dept: CARDIOLOGY CLINIC | Facility: CLINIC | Age: 40
End: 2020-03-27

## 2020-03-27 NOTE — TELEPHONE ENCOUNTER
Due to the current pandemic of COVID-19, patient was given the option to par take in a video/telephone call, which was accepted by the patient  Patient was informed via telephone,the following: There is a possibility of a $27 00 fee to participate in this Virtual Visit  This is depending on your insurance company if they will cover that cost      Patients preferred phone number to contact is [phone]

## 2020-04-20 ENCOUNTER — LAB (OUTPATIENT)
Dept: LAB | Facility: HOSPITAL | Age: 40
End: 2020-04-20
Attending: INTERNAL MEDICINE
Payer: COMMERCIAL

## 2020-04-20 DIAGNOSIS — Z79.4 TYPE 2 DIABETES MELLITUS WITH HYPERGLYCEMIA, WITH LONG-TERM CURRENT USE OF INSULIN (HCC): ICD-10-CM

## 2020-04-20 DIAGNOSIS — E11.65 TYPE 2 DIABETES MELLITUS WITH HYPERGLYCEMIA, WITH LONG-TERM CURRENT USE OF INSULIN (HCC): ICD-10-CM

## 2020-04-20 LAB
ANION GAP SERPL CALCULATED.3IONS-SCNC: 8 MMOL/L (ref 4–13)
BUN SERPL-MCNC: 16 MG/DL (ref 5–25)
CALCIUM SERPL-MCNC: 9.5 MG/DL (ref 8.3–10.1)
CHLORIDE SERPL-SCNC: 101 MMOL/L (ref 100–108)
CO2 SERPL-SCNC: 28 MMOL/L (ref 21–32)
CREAT SERPL-MCNC: 0.95 MG/DL (ref 0.6–1.3)
CREAT UR-MCNC: 69.3 MG/DL
EST. AVERAGE GLUCOSE BLD GHB EST-MCNC: 137 MG/DL
GFR SERPL CREATININE-BSD FRML MDRD: 100 ML/MIN/1.73SQ M
GLUCOSE SERPL-MCNC: 226 MG/DL (ref 65–140)
HBA1C MFR BLD: 6.4 %
MICROALBUMIN UR-MCNC: 14.1 MG/L (ref 0–20)
MICROALBUMIN/CREAT 24H UR: 20 MG/G CREATININE (ref 0–30)
POTASSIUM SERPL-SCNC: 4.2 MMOL/L (ref 3.5–5.3)
SODIUM SERPL-SCNC: 137 MMOL/L (ref 136–145)

## 2020-04-20 PROCEDURE — 83036 HEMOGLOBIN GLYCOSYLATED A1C: CPT

## 2020-04-20 PROCEDURE — 82570 ASSAY OF URINE CREATININE: CPT

## 2020-04-20 PROCEDURE — 83519 RIA NONANTIBODY: CPT

## 2020-04-20 PROCEDURE — 3044F HG A1C LEVEL LT 7.0%: CPT | Performed by: INTERNAL MEDICINE

## 2020-04-20 PROCEDURE — 82043 UR ALBUMIN QUANTITATIVE: CPT

## 2020-04-20 PROCEDURE — 84681 ASSAY OF C-PEPTIDE: CPT

## 2020-04-20 PROCEDURE — 86341 ISLET CELL ANTIBODY: CPT

## 2020-04-20 PROCEDURE — 80048 BASIC METABOLIC PNL TOTAL CA: CPT

## 2020-04-20 PROCEDURE — 3061F NEG MICROALBUMINURIA REV: CPT | Performed by: INTERNAL MEDICINE

## 2020-04-20 PROCEDURE — 36415 COLL VENOUS BLD VENIPUNCTURE: CPT

## 2020-04-21 ENCOUNTER — TELEPHONE (OUTPATIENT)
Dept: OTHER | Facility: HOSPITAL | Age: 40
End: 2020-04-21

## 2020-04-21 ENCOUNTER — TELEMEDICINE (OUTPATIENT)
Dept: ENDOCRINOLOGY | Facility: CLINIC | Age: 40
End: 2020-04-21
Payer: COMMERCIAL

## 2020-04-21 DIAGNOSIS — E55.9 VITAMIN D DEFICIENCY: ICD-10-CM

## 2020-04-21 DIAGNOSIS — I10 ESSENTIAL HYPERTENSION: ICD-10-CM

## 2020-04-21 DIAGNOSIS — E78.2 MIXED HYPERLIPIDEMIA: ICD-10-CM

## 2020-04-21 DIAGNOSIS — E66.01 CLASS 3 SEVERE OBESITY DUE TO EXCESS CALORIES WITH SERIOUS COMORBIDITY AND BODY MASS INDEX (BMI) OF 40.0 TO 44.9 IN ADULT (HCC): ICD-10-CM

## 2020-04-21 DIAGNOSIS — Z95.1 S/P CABG X 3: ICD-10-CM

## 2020-04-21 DIAGNOSIS — E11.65 UNCONTROLLED TYPE 2 DIABETES MELLITUS WITH HYPERGLYCEMIA (HCC): Primary | ICD-10-CM

## 2020-04-21 LAB — C PEPTIDE SERPL-MCNC: 6.6 NG/ML (ref 1.1–4.4)

## 2020-04-21 PROCEDURE — 99214 OFFICE O/P EST MOD 30 MIN: CPT | Performed by: INTERNAL MEDICINE

## 2020-04-21 RX ORDER — INSULIN ASPART 100 [IU]/ML
INJECTION, SOLUTION INTRAVENOUS; SUBCUTANEOUS
Refills: 0
Start: 2020-04-21 | End: 2020-07-28

## 2020-04-21 RX ORDER — ACETAMINOPHEN 160 MG
TABLET,DISINTEGRATING ORAL
Refills: 0
Start: 2020-04-21 | End: 2020-08-11

## 2020-04-21 RX ORDER — INSULIN GLARGINE 100 [IU]/ML
INJECTION, SOLUTION SUBCUTANEOUS
Qty: 30 ML | Refills: 0
Start: 2020-04-21 | End: 2020-11-05 | Stop reason: SDUPTHER

## 2020-04-22 LAB
GAD65 AB SER-ACNC: <5 U/ML (ref 0–5)
PANC ISLET CELL AB TITR SER: NEGATIVE {TITER}

## 2020-04-27 ENCOUNTER — TELEMEDICINE (OUTPATIENT)
Dept: DIABETES SERVICES | Facility: CLINIC | Age: 40
End: 2020-04-27
Payer: COMMERCIAL

## 2020-04-27 DIAGNOSIS — E11.65 UNCONTROLLED TYPE 2 DIABETES MELLITUS WITH HYPERGLYCEMIA (HCC): Primary | ICD-10-CM

## 2020-04-27 PROCEDURE — 99214 OFFICE O/P EST MOD 30 MIN: CPT | Performed by: DIETITIAN, REGISTERED

## 2020-07-28 ENCOUNTER — OFFICE VISIT (OUTPATIENT)
Dept: ENDOCRINOLOGY | Facility: CLINIC | Age: 40
End: 2020-07-28
Payer: COMMERCIAL

## 2020-07-28 VITALS
DIASTOLIC BLOOD PRESSURE: 90 MMHG | SYSTOLIC BLOOD PRESSURE: 132 MMHG | TEMPERATURE: 97.9 F | BODY MASS INDEX: 37.51 KG/M2 | WEIGHT: 254 LBS | HEART RATE: 102 BPM

## 2020-07-28 DIAGNOSIS — I10 ESSENTIAL HYPERTENSION: ICD-10-CM

## 2020-07-28 DIAGNOSIS — E78.2 MIXED HYPERLIPIDEMIA: ICD-10-CM

## 2020-07-28 DIAGNOSIS — E11.65 UNCONTROLLED TYPE 2 DIABETES MELLITUS WITH HYPERGLYCEMIA (HCC): Primary | ICD-10-CM

## 2020-07-28 PROCEDURE — 3075F SYST BP GE 130 - 139MM HG: CPT | Performed by: PHYSICIAN ASSISTANT

## 2020-07-28 PROCEDURE — 3044F HG A1C LEVEL LT 7.0%: CPT | Performed by: PHYSICIAN ASSISTANT

## 2020-07-28 PROCEDURE — 1036F TOBACCO NON-USER: CPT | Performed by: PHYSICIAN ASSISTANT

## 2020-07-28 PROCEDURE — 99214 OFFICE O/P EST MOD 30 MIN: CPT | Performed by: PHYSICIAN ASSISTANT

## 2020-07-28 PROCEDURE — 3080F DIAST BP >= 90 MM HG: CPT | Performed by: PHYSICIAN ASSISTANT

## 2020-07-28 RX ORDER — INSULIN ASPART 100 [IU]/ML
INJECTION, SOLUTION INTRAVENOUS; SUBCUTANEOUS
Refills: 0
Start: 2020-07-28 | End: 2020-11-05 | Stop reason: ALTCHOICE

## 2020-07-28 RX ORDER — FLASH GLUCOSE SENSOR
KIT MISCELLANEOUS
Qty: 2 EACH | Refills: 5 | Status: SHIPPED | OUTPATIENT
Start: 2020-07-28 | End: 2021-12-07 | Stop reason: SDUPTHER

## 2020-07-28 RX ORDER — FLASH GLUCOSE SENSOR
KIT MISCELLANEOUS
COMMUNITY
End: 2020-07-28 | Stop reason: SDUPTHER

## 2020-07-28 NOTE — PROGRESS NOTES
Patient Progress Note      CC: DM2      Referring Provider  No referring provider defined for this encounter  History of Present Illness:   Sandy Cox  is a 44 y o  male with a history of type 2 diabetes with long term use of insulin  Diabetes course has been stable  Complications of DM: CAD  Denies recent illness or hospitalizations  Denies any issues with his current regimen  Home glucose monitoring: are performed regularly, 2-3 times a day    He does not have meter or log today  He was using freestyle paulino but his sensor kept inaccurately reading low  He did call customer service  He has a new script to  at the pharmacy  He reports range of 130-160 mg/dl  Current regimen: Lantus 30 units QHS, Novolog 8 units TID with meals plus scale (he is mostly using Novolog with dinner only), Jardiance 25 mg daily, Ozempic 0 5 mg weekly, metformin 1000 mg BID  compliant most of the time, denies any side effects from medications  Injects in: abdomen  Rotates sites: Yes  Hypoglycemic episodes: No, rare  H/o of hypoglycemia causing hospitalization or Intervention such as glucagon injection  or ambulance call :  No  Hypoglycemia symptoms: none  Treatment of hypoglycemia: discussed treatment     Medic alert tag: recommended: Yes    Diabetes education: Yes  Diet: 3 meals per day, 1-2 snacks per day  Timing of meals is predictable  Diabetic diet compliance:  compliant most of the time  Activity: Daily activity is predictable: Yes  He tries to walk daily  Ophthamology: he thinks he may be due for one  Podiatry: foot exam UTD, Jan 2020    Has hypertension: on ACE inhibitor/ARB, compliant most of the time  Has hyperlipidemia: on statin - tolerating well, no myalgias  compliant most of the time, denies any side effects from medications    Thyroid disorders: No  History of pancreatitis: No    Patient Active Problem List   Diagnosis    Essential hypertension    NSTEMI (non-ST elevated myocardial infarction) ([place] [address] )    Diabetes mellitus type 2, uncontrolled ([place] [address] )    Snoring    Hyperlipidemia    S/P CABG x 3    Acute respiratory insufficiency    Class 3 severe obesity in adult [facility])    Reactive depression    Rupture of operation wound    Delayed surgical wound healing    Encounter for post surgical wound check      Past Medical History:   Diagnosis Date    JELENA (acute kidney injury) ([place] [address] ) 12/23/2019    CAD (coronary artery disease)     Diabetes mellitus ([place] [address] )     HLD (hyperlipidemia)     Hypertension     Myocardial infarction [facility])       Past Surgical History:   Procedure Laterality Date    ANKLE SURGERY Right     CARDIAC CATHETERIZATION      CORONARY ARTERY BYPASS GRAFT N/A 12/23/2019    Procedure: CORONARY ARTERY BYPASS GRAFT (CABG) x 3; LIMA to LAD; Right EVH/SVG to OM3 and PDA;   Surgeon: Ankush Alicia MD;  [facility];  Service: Cardiac Surgery    FRACTURE SURGERY        Family History   Problem Relation Age of Onset    Cancer Mother         unknown     Social History     Tobacco Use    Smoking status: Never Smoker    Smokeless tobacco: Never Used   Substance Use Topics    Alcohol use: Yes     Frequency: Monthly or less     Binge frequency: Less than monthly     Comment: occasioanl     No Known Allergies      Current Outpatient Medications:     Alcohol Swabs 70 % PADS, Use alcohol swab before each insulin injection and before testing your blood glucose, Disp: 250 each, Rfl: 0    amLODIPine (NORVASC) 10 mg tablet, Take 1 tablet (10 mg total) by mouth daily, Disp: 90 tablet, Rfl: 4    aspirin 325 mg tablet, Take 1 tablet (325 mg total) by mouth daily (Patient taking differently: Take 81 mg by mouth daily ), Disp: 30 tablet, Rfl: 2    atorvastatin (LIPITOR) 80 mg tablet, Take 1 tablet (80 mg total) by mouth daily with dinner, Disp: 30 tablet, Rfl: 2    Continuous Blood Gluc Sensor (FREESTYLE LAURE 14 DAY SENSOR) [facility], Apply sensor every 14 days to check BG at least 4 times a day, Disp: 2 each, Rfl: 5    Empagliflozin (Jardiance) 25 MG TABS, Take 1 tablet (25 mg total) by mouth every morning, Disp: 30 tablet, Rfl: 4    glucose blood (GLUCOSE METER TEST) test strip, Test your blood glucose before each meal and at bedtime, record those numbers, Disp: 120 each, Rfl: 2    insulin glargine (LANTUS) 100 units/mL subcutaneous injection, Inject 30 units at bedtime, Disp: 30 mL, Rfl: 0    Insulin Pen Needle (BD PEN NEEDLE LEONA U/F) 32G X 4 MM MISC, Use a new pen needle each time you use your insulin pens before each meal and at bedtime, Disp: 120 each, Rfl: 2    Lancets (ACCU-CHEK MULTICLIX) lancets, Test your blood glucose before each meal and at bedtime, record those numbers, Disp: 120 each, Rfl: 2    lisinopril (ZESTRIL) 40 mg tablet, Take 1 tablet (40 mg total) by mouth daily, Disp: 30 tablet, Rfl: 2    metFORMIN (GLUCOPHAGE) 1000 MG tablet, 1 tablet Every 12 hours, Disp: , Rfl:     metoprolol tartrate (LOPRESSOR) 50 mg tablet, Take 1 tablet (50 mg total) by mouth every 12 (twelve) hours, Disp: 60 tablet, Rfl: 2    NOVOLOG FLEXPEN 100 units/mL SOPN, Inject 8 units with dinner plus scale , Disp: , Rfl: 0    Semaglutide,0 25 or 0 5MG/DOS, (Ozempic, 0 25 or 0 5 MG/DOSE,) 2 MG/1 5ML SOPN, Inject 0 5 mg once a week, Disp: 4 pen, Rfl: 3    Cholecalciferol (VITAMIN D3) 50 MCG (2000 UT) capsule, Take 1 capsules daily (Patient not taking: Reported on 7/28/2020), Disp: , Rfl: 0  Review of Systems   Constitutional: Negative for activity change, appetite change, fatigue and unexpected weight change  HENT: Positive for trouble swallowing (occasional, he states its been occurring after his CABG)  Eyes: Negative for visual disturbance  Respiratory: Negative for shortness of breath  Cardiovascular: Negative for chest pain and palpitations  Gastrointestinal: Negative for constipation and diarrhea  Endocrine: Negative for polydipsia and polyuria  Musculoskeletal: Negative  Skin: Negative for wound  Neurological: Positive for numbness (left foot)  Psychiatric/Behavioral: Negative  Physical Exam:  Body mass index is 37 51 kg/m²  /90   Pulse 102   Temp 97 9 °F (36 6 °C)   Wt 115 kg (254 lb)   BMI 37 51 kg/m²    Wt Readings from Last 3 Encounters:   07/28/20 115 kg (254 lb)   02/28/20 122 kg (270 lb)   02/14/20 121 kg (266 lb)       Physical Exam   Constitutional: He appears well-developed and well-nourished  HENT:   Head: Normocephalic  Eyes: Pupils are equal, round, and reactive to light  EOM are normal  No scleral icterus  Neck: Neck supple  No thyromegaly present  Cardiovascular: Normal rate and regular rhythm  No murmur heard  Pulses:       Radial pulses are 2+ on the right side, and 2+ on the left side  Pulmonary/Chest: Effort normal and breath sounds normal  No respiratory distress  He has no wheezes  Neurological: He is alert  He has normal reflexes  Skin: Skin is warm and dry  Psychiatric: He has a normal mood and affect  Nursing note and vitals reviewed  Patient's shoes and socks were not removed  Labs:   Component      Latest Ref Rng & Units 4/20/2020   Sodium      136 - 145 mmol/L 137   Potassium      3 5 - 5 3 mmol/L 4 2   Chloride      100 - 108 mmol/L 101   CO2      21 - 32 mmol/L 28   Anion Gap      4 - 13 mmol/L 8   BUN      5 - 25 mg/dL 16   Creatinine      0 60 - 1 30 mg/dL 0 95   Glucose, Random      65 - 140 mg/dL 226 (H)   Calcium      8 3 - 10 1 mg/dL 9 5   eGFR      ml/min/1 73sq m 100   EXT Creatinine Urine      mg/dL 69 3   MICROALBUM ,U,RANDOM      0 0 - 20 0 mg/L 14 1   MICROALBUMIN/CREATININE RATIO      0 - 30 mg/g creatinine 20   Hemoglobin A1C      Normal 3 8-5 6%; PreDiabetic 5 7-6 4%;  Diabetic >=6 5%; Glycemic control for adults with diabetes <7 0% % 6 4 (H)   eAG, EST AVG Glucose      mg/dl 137   C-PEPTIDE      1 1 - 4 4 ng/mL 6 6 (H)   ISLET CELL ANTIBODY      Neg:<1:1 Negative   Glutaminc Acid Decarboxylase 65 Ab      0 0 - 5 0 U/mL <5 0     Plan:    Diagnoses and all orders for this visit:    Uncontrolled type 2 diabetes mellitus with hyperglycemia ([address] )  HGA1C 6 4%  Due now  Treatment regimen: unable to make changes as patient did not bring log  Advised to start using CGM and download again in 2 weeks  Patient using Novolog only once a day with dinner  Continue Lantus, Jardiance, metformin, Ozempic at current doses  Discussed intensive insulin regimen does increase risk for hypoglycemia  Episodes of hypoglycemia can lead to permanent disability and death  Discussed risks/complications associated with uncontrolled diabetes  Advised to adhere to diabetic diet, and recommended staying active/exercising routinely as tolerated  Keep carbohydrates consistent to limit blood glucose fluctuations  Advised to call if blood sugars less than 70 mg/dl or over 300 mg/dl  Check blood glucose 3+ times a day  Discussed symptoms and treatment of hypoglycemia  Discussed use of CGM to collect additional blood glucose data to reveal trends and patterns that can be used to optimize treatment plan  Recommended routine follow-up with podiatry and ophthalmology  Send log in 2 weeks  Ordered blood work to complete prior to next visit  -     Hemoglobin A1C; Future  -     Basic metabolic panel; Future  -     Continuous Blood Gluc Sensor (StreetfaireHDSTYLE LAURE 14 DAY SENSOR) MISC; Apply sensor every 14 days to check BG at least 4 times a day  -     NOVOLOG FLEXPEN 100 units/mL SOPN; Inject 8 units with dinner plus scale  -     Ambulatory Referral to Ophthalmology; Future    Essential hypertension  BP elevated  Advised patient to monitor BP at home and f/u with PCP / cardiology if BP remaining at or above 140/90  Has f/u with cardiology in 2 weeks  For now continue current treatment   -     Basic metabolic panel;  Future    Mixed hyperlipidemia  LDL previously 47  Continue statin therapy      Discussed with the patient diagnosis and treatment and all questions fully answered  He will call me if any problems arise  Counseled patient on diagnostic results, prognosis, risk and benefit of treatment options, instruction for management, importance of treatment compliance, risk factor reduction and impressions        Yue Donovan PA-C

## 2020-07-28 NOTE — PATIENT INSTRUCTIONS
Hypoglycemia instructions   Tommye Lunch   7/28/2020  [id number]    Low Blood Sugar    Steps to treat low blood sugar  1  Test blood sugar if you have symptoms of low blood sugar:   Low Blood Sugar Symptoms:  o Sweaty  o Dizzy  o Rapid heartbeat  o Shaky  o Bad mood  o Hungry      2  Treat blood sugar less than 70 with 15 grams of fast-acting carbohydrate:   Examples of 15 grams Fast-Acting Carbohydrate:  o 4 oz juice  o 4 oz regular soda  o 3-4 glucose tablets (chew)  o 3-4 hard candies (chew)          3  Wait 15 minutes and test your blood sugar again     4   If blood sugar is less than 100, repeat steps 2-3     5  When your blood sugar is 100 or more, eat a snack if it will be longer than one hour until your next meal  The snack should be 15 grams of carbohydrate and a protein:   Examples of snacks:  o ½ sandwich  o 6 crackers with cheese  o Piece of fruit with cheese or peanut butter  o 6 crackers with peanut butter

## 2020-08-11 ENCOUNTER — OFFICE VISIT (OUTPATIENT)
Dept: CARDIOLOGY CLINIC | Facility: CLINIC | Age: 40
End: 2020-08-11
Payer: COMMERCIAL

## 2020-08-11 ENCOUNTER — TRANSCRIBE ORDERS (OUTPATIENT)
Dept: ADMINISTRATIVE | Facility: HOSPITAL | Age: 40
End: 2020-08-11

## 2020-08-11 VITALS
RESPIRATION RATE: 16 BRPM | HEIGHT: 69 IN | DIASTOLIC BLOOD PRESSURE: 88 MMHG | WEIGHT: 253.6 LBS | TEMPERATURE: 98.1 F | SYSTOLIC BLOOD PRESSURE: 118 MMHG | HEART RATE: 88 BPM | BODY MASS INDEX: 37.56 KG/M2

## 2020-08-11 DIAGNOSIS — I10 BENIGN ESSENTIAL HTN: ICD-10-CM

## 2020-08-11 DIAGNOSIS — E78.5 DYSLIPIDEMIA: ICD-10-CM

## 2020-08-11 DIAGNOSIS — I25.10 CORONARY ARTERY DISEASE INVOLVING NATIVE CORONARY ARTERY OF NATIVE HEART WITHOUT ANGINA PECTORIS: Primary | ICD-10-CM

## 2020-08-11 PROCEDURE — 1036F TOBACCO NON-USER: CPT | Performed by: INTERNAL MEDICINE

## 2020-08-11 PROCEDURE — 99214 OFFICE O/P EST MOD 30 MIN: CPT | Performed by: INTERNAL MEDICINE

## 2020-08-11 PROCEDURE — 3079F DIAST BP 80-89 MM HG: CPT | Performed by: INTERNAL MEDICINE

## 2020-08-11 PROCEDURE — 3044F HG A1C LEVEL LT 7.0%: CPT | Performed by: INTERNAL MEDICINE

## 2020-08-11 PROCEDURE — 3008F BODY MASS INDEX DOCD: CPT | Performed by: INTERNAL MEDICINE

## 2020-08-11 PROCEDURE — 3074F SYST BP LT 130 MM HG: CPT | Performed by: INTERNAL MEDICINE

## 2020-08-11 NOTE — PROGRESS NOTES
Cardiology   MD Zelalem Kearns MD Ather Mansoor, MD Margarite Gurney, DO, Jay Savage DO, [facility] [place]  -------------------------------------------------------------------  [facility], [address] [address]  [place]  [phone]                           [place] Hemp                    1980                   [id number]          Assessment/Plan:    1  CAD status post CABG x3 12/23/2019 with LIMA to LAD, SVG to OM3, SVG to RPDA  2  Sternotomy wound infection, status post antibiotic treatment  3  Ischemic cardiomyopathy, ejection fraction 40%  4  Hypertension  5  Diabetes  6  Dyslipidemia    · No symptoms of angina, continue aspirin, high-dose atorvastatin  · Blood pressure well controlled, continue amlodipine, lisinopril  · Usual lipid panel per PCP  Prior lipid panel reviewed, well controlled  Continue high-dose atorvastatin      Follow-up in 6 months      Interval History: This is a 27-year-old male with hypertension, dyslipidemia, diabetes, and obesity who presented to the [facility] [place] [facility]/19/2019 with type 1 NSTEMI  Coronary angiography 12/19/2018 revealed 60-70% hemodynamically significant LAD stenosis, 75% distal OM 2 stenosis, distal 90-95% RCA stenosis  Ejection fraction was 30-35%  Cardiac MRI 12/20/2019 revealed ejection fraction 33% with mildly increased wall thickness, akinesis of the inferior and inferoseptal walls  On 12/23/2019, he underwent CABG x3 with LIMA to LAD, SVG to OM3, SVG to RPDA  Postoperative echocardiogram demonstrated ejection fraction 40%  He was discharged on postoperative day 6       He was seen by our nurse practitioner 01/14/2020  At that time he had complaints of exertional dyspnea  Torsemide 20 mg daily was continued her chest x-ray was ordered, but not completed by the patient    He was seen by CT surgery 01/24/2020 and noted to have opening of his wound reportedly draining pus  A single drop of purulence was expressed from the wound, and it was repacked  Was seen back in the office 02/04/2020 with no focal signs of infection, and in a well-healing wound  There was healthy granulation tissue  He completed a course of antibiotics  He presents today for follow-up with no complaints  He continues to smoke marijuana denies tobacco or cigarette use  He has lost weight since earlier this year by eating healthier  He does not exercise regularly  He denies chest pain, shortness of breath, dizziness, palpitations, lower extremity edema           Vitals:  Vitals:    08/11/20 1102   BP: 118/88   Pulse: 88   Resp: 16   Temp: 98 1 °F (36 7 °C)   Weight: 115 kg (253 lb 9 6 oz)   Height: 5' 9" (1 753 m)         Past Medical History:   Diagnosis Date    JELENA (acute kidney injury) ([place] [address] ) 12/23/2019    CAD (coronary artery disease)     Diabetes mellitus ([place] [address] )     HLD (hyperlipidemia)     Hypertension     Myocardial infarction ([place] [address] )      Social History     Socioeconomic History    Marital status: Single     Spouse name: Not on file    Number of children: Not on file    Years of education: Not on file    Highest education level: Not on file   Occupational History    Not on file   Social Needs    Financial resource strain: Not on file    Food insecurity     Worry: Not on file     Inability: Not on file   [employer] needs     Medical: Not on file     Non-medical: Not on file   Tobacco Use    Smoking status: Never Smoker    Smokeless tobacco: Never Used   Substance and Sexual Activity    Alcohol use: Yes     Frequency: Monthly or less     Binge frequency: Less than monthly     Comment: occasioanl    Drug use: Yes     Types: Marijuana    Sexual activity: Yes     Partners: Female   Lifestyle    Physical activity     Days per week: Not on file     Minutes per session: Not on file    Stress: Not on file   Relationships    Social connections     Talks on phone: Not on file     Gets together: Not on file     Attends Mandaeism service: Not on file     Active member of club or organization: Not on file     Attends meetings of clubs or organizations: Not on file     Relationship status: Not on file    Intimate partner violence     Fear of current or ex partner: Not on file     Emotionally abused: Not on file     Physically abused: Not on file     Forced sexual activity: Not on file   Other Topics Concern    Not on file   Social History Narrative    Not on file      Family History   Problem Relation Age of Onset    Cancer Mother         unknown     Past Surgical History:   Procedure Laterality Date    ANKLE SURGERY Right     CARDIAC CATHETERIZATION      CORONARY ARTERY BYPASS GRAFT N/A 12/23/2019    Procedure: CORONARY ARTERY BYPASS GRAFT (CABG) x 3; LIMA to LAD; Right EVH/SVG to OM3 and PDA;   Surgeon: Imelda Colmenares MD;  [facility];  Service: Cardiac Surgery    FRACTURE SURGERY         Current Outpatient Medications:     Alcohol Swabs 70 % PADS, Use alcohol swab before each insulin injection and before testing your blood glucose, Disp: 250 each, Rfl: 0    amLODIPine (NORVASC) 10 mg tablet, Take 1 tablet (10 mg total) by mouth daily, Disp: 90 tablet, Rfl: 4    aspirin 325 mg tablet, Take 1 tablet (325 mg total) by mouth daily (Patient taking differently: Take 81 mg by mouth daily ), Disp: 30 tablet, Rfl: 2    atorvastatin (LIPITOR) 80 mg tablet, Take 1 tablet (80 mg total) by mouth daily with dinner, Disp: 30 tablet, Rfl: 2    Continuous Blood Gluc Sensor (FREESTYLE LAURE 14 DAY SENSOR) [facility], Apply sensor every 14 days to check BG at least 4 times a day, Disp: 2 each, Rfl: 5    Empagliflozin (Jardiance) 25 MG TABS, Take 1 tablet (25 mg total) by mouth every morning, Disp: 30 tablet, Rfl: 4    glucose blood (GLUCOSE METER TEST) test strip, Test your blood glucose before each meal and at bedtime, record those numbers, Disp: 120 each, Rfl: 2    insulin glargine (LANTUS) 100 units/mL subcutaneous injection, Inject 30 units at bedtime, Disp: 30 mL, Rfl: 0    Insulin Pen Needle (BD PEN NEEDLE LEONA U/F) 32G X 4 MM MISC, Use a new pen needle each time you use your insulin pens before each meal and at bedtime, Disp: 120 each, Rfl: 2    Lancets (ACCU-CHEK MULTICLIX) lancets, Test your blood glucose before each meal and at bedtime, record those numbers, Disp: 120 each, Rfl: 2    lisinopril (ZESTRIL) 40 mg tablet, Take 1 tablet (40 mg total) by mouth daily, Disp: 30 tablet, Rfl: 2    metFORMIN (GLUCOPHAGE) 1000 MG tablet, 1 tablet Every 12 hours, Disp: , Rfl:     NOVOLOG FLEXPEN 100 units/mL SOPN, Inject 8 units with dinner plus scale , Disp: , Rfl: 0    Semaglutide,0 25 or 0 5MG/DOS, (Ozempic, 0 25 or 0 5 MG/DOSE,) 2 MG/1 5ML SOPN, Inject 0 5 mg once a week, Disp: 4 pen, Rfl: 3    metoprolol tartrate (LOPRESSOR) 50 mg tablet, Take 1 tablet (50 mg total) by mouth every 12 (twelve) hours, Disp: 60 tablet, Rfl: 2        Review of Systems:  Review of Systems   Respiratory: Negative  Cardiovascular: Negative  All other systems reviewed and are negative  Physical Exam:  Physical Exam  Constitutional:       General: He is not in acute distress  Appearance: He is well-developed  He is not diaphoretic  HENT:      Head: Normocephalic and atraumatic  Eyes:      General: No scleral icterus  Right eye: No discharge  Pupils: Pupils are equal, round, and reactive to light  Neck:      Musculoskeletal: Normal range of motion and neck supple  Thyroid: No thyromegaly  Cardiovascular:      Rate and Rhythm: Normal rate and regular rhythm  Heart sounds: Normal heart sounds  No murmur  No friction rub  No gallop  Pulmonary:      Effort: Pulmonary effort is normal       Breath sounds: Normal breath sounds  Abdominal:      General: There is no distension  Tenderness: There is no abdominal tenderness   There is no guarding or rebound  Musculoskeletal: Normal range of motion  Skin:     General: Skin is warm and dry  Coloration: Skin is not pale  Findings: No erythema or rash  Neurological:      Mental Status: He is alert and oriented to person, place, and time  Coordination: Coordination normal    Psychiatric:         Behavior: Behavior normal          Thought Content: Thought content normal          Judgment: Judgment normal          This note was completed in part utilizing [employer] Software  Grammatical errors, random word insertions, spelling mistakes, and incomplete sentences can be an occasional consequence of this system secondary to software limitations, ambient noise, and hardware issues  If you have any questions or concerns about the content, text, or information contained within the body of this dictation, please contact the provider for clarification

## 2020-08-12 DIAGNOSIS — E11.65 UNCONTROLLED TYPE 2 DIABETES MELLITUS WITH HYPERGLYCEMIA (HCC): Primary | ICD-10-CM

## 2020-08-12 RX ORDER — DULAGLUTIDE 0.75 MG/.5ML
0.75 INJECTION, SOLUTION SUBCUTANEOUS WEEKLY
Qty: 4 PEN | Refills: 2 | Status: SHIPPED | OUTPATIENT
Start: 2020-08-12 | End: 2020-11-05 | Stop reason: DRUGHIGH

## 2020-08-12 NOTE — TELEPHONE ENCOUNTER
Please make RX ready for Trulicity, 7 77 mg weekly , please inform pt that Trulicity is covered ( ozempic is not covered)   He will need teaching as well   Please make Rx ready     Shon Johnson MD

## 2020-08-27 LAB — HBA1C MFR BLD HPLC: 5.8 %

## 2020-11-05 ENCOUNTER — OFFICE VISIT (OUTPATIENT)
Dept: ENDOCRINOLOGY | Facility: CLINIC | Age: 40
End: 2020-11-05
Payer: COMMERCIAL

## 2020-11-05 VITALS
SYSTOLIC BLOOD PRESSURE: 136 MMHG | BODY MASS INDEX: 39.49 KG/M2 | TEMPERATURE: 97.5 F | DIASTOLIC BLOOD PRESSURE: 80 MMHG | WEIGHT: 267.4 LBS | HEART RATE: 76 BPM

## 2020-11-05 DIAGNOSIS — E11.40 TYPE 2 DIABETES, CONTROLLED, WITH NEUROPATHY (HCC): Primary | ICD-10-CM

## 2020-11-05 DIAGNOSIS — E11.65 UNCONTROLLED TYPE 2 DIABETES MELLITUS WITH HYPERGLYCEMIA (HCC): ICD-10-CM

## 2020-11-05 DIAGNOSIS — E78.2 MIXED HYPERLIPIDEMIA: ICD-10-CM

## 2020-11-05 DIAGNOSIS — Z95.1 S/P CABG X 3: ICD-10-CM

## 2020-11-05 DIAGNOSIS — Z79.4 TYPE 2 DIABETES MELLITUS WITH HYPERGLYCEMIA, WITH LONG-TERM CURRENT USE OF INSULIN (HCC): ICD-10-CM

## 2020-11-05 DIAGNOSIS — E11.65 TYPE 2 DIABETES MELLITUS WITH HYPERGLYCEMIA, WITH LONG-TERM CURRENT USE OF INSULIN (HCC): ICD-10-CM

## 2020-11-05 PROCEDURE — 95251 CONT GLUC MNTR ANALYSIS I&R: CPT | Performed by: INTERNAL MEDICINE

## 2020-11-05 PROCEDURE — 3075F SYST BP GE 130 - 139MM HG: CPT | Performed by: INTERNAL MEDICINE

## 2020-11-05 PROCEDURE — 3079F DIAST BP 80-89 MM HG: CPT | Performed by: INTERNAL MEDICINE

## 2020-11-05 PROCEDURE — 99214 OFFICE O/P EST MOD 30 MIN: CPT | Performed by: INTERNAL MEDICINE

## 2020-11-05 PROCEDURE — 1036F TOBACCO NON-USER: CPT | Performed by: INTERNAL MEDICINE

## 2020-11-05 RX ORDER — DULAGLUTIDE 1.5 MG/.5ML
1.5 INJECTION, SOLUTION SUBCUTANEOUS WEEKLY
Qty: 4 PEN | Refills: 3 | Status: SHIPPED | OUTPATIENT
Start: 2020-11-05 | End: 2021-03-18

## 2020-11-05 RX ORDER — INSULIN GLARGINE 100 [IU]/ML
INJECTION, SOLUTION SUBCUTANEOUS
Qty: 30 ML | Refills: 0
Start: 2020-11-05

## 2020-11-05 RX ORDER — MECOBAL/LEVOMEFOLAT CA/B6 PHOS 2-3-35 MG
TABLET ORAL
Qty: 60 TABLET | Refills: 3 | Status: SHIPPED | OUTPATIENT
Start: 2020-11-05

## 2020-11-07 DIAGNOSIS — E11.65 UNCONTROLLED TYPE 2 DIABETES MELLITUS WITH HYPERGLYCEMIA (HCC): ICD-10-CM

## 2020-11-07 DIAGNOSIS — Z95.1 S/P CABG X 3: ICD-10-CM

## 2020-11-08 RX ORDER — EMPAGLIFLOZIN 25 MG/1
TABLET, FILM COATED ORAL
Qty: 30 TABLET | Refills: 4 | Status: SHIPPED | OUTPATIENT
Start: 2020-11-08 | End: 2021-06-14

## 2021-02-08 ENCOUNTER — TELEPHONE (OUTPATIENT)
Dept: ENDOCRINOLOGY | Facility: CLINIC | Age: 41
End: 2021-02-08

## 2021-02-12 ENCOUNTER — LAB (OUTPATIENT)
Dept: LAB | Facility: HOSPITAL | Age: 41
End: 2021-02-12
Attending: INTERNAL MEDICINE
Payer: COMMERCIAL

## 2021-02-12 DIAGNOSIS — E55.9 VITAMIN D DEFICIENCY: ICD-10-CM

## 2021-02-12 DIAGNOSIS — E78.2 MIXED HYPERLIPIDEMIA: ICD-10-CM

## 2021-02-12 DIAGNOSIS — E11.40 TYPE 2 DIABETES, CONTROLLED, WITH NEUROPATHY (HCC): ICD-10-CM

## 2021-02-12 LAB
25(OH)D3 SERPL-MCNC: 17.4 NG/ML (ref 30–100)
ANION GAP SERPL CALCULATED.3IONS-SCNC: 7 MMOL/L (ref 4–13)
BUN SERPL-MCNC: 10 MG/DL (ref 5–25)
CALCIUM SERPL-MCNC: 9.5 MG/DL (ref 8.3–10.1)
CHLORIDE SERPL-SCNC: 105 MMOL/L (ref 100–108)
CHOLEST SERPL-MCNC: 210 MG/DL (ref 50–200)
CO2 SERPL-SCNC: 28 MMOL/L (ref 21–32)
CREAT SERPL-MCNC: 1 MG/DL (ref 0.6–1.3)
CREAT UR-MCNC: 208 MG/DL
EST. AVERAGE GLUCOSE BLD GHB EST-MCNC: 123 MG/DL
GFR SERPL CREATININE-BSD FRML MDRD: 94 ML/MIN/1.73SQ M
GLUCOSE SERPL-MCNC: 146 MG/DL (ref 65–140)
HBA1C MFR BLD: 5.9 %
HDLC SERPL-MCNC: 34 MG/DL
LDLC SERPL CALC-MCNC: 150 MG/DL (ref 0–100)
MICROALBUMIN UR-MCNC: 14.1 MG/L (ref 0–20)
MICROALBUMIN/CREAT 24H UR: 7 MG/G CREATININE (ref 0–30)
NONHDLC SERPL-MCNC: 176 MG/DL
POTASSIUM SERPL-SCNC: 4.1 MMOL/L (ref 3.5–5.3)
SODIUM SERPL-SCNC: 140 MMOL/L (ref 136–145)
TRIGL SERPL-MCNC: 132 MG/DL

## 2021-02-12 PROCEDURE — 82306 VITAMIN D 25 HYDROXY: CPT

## 2021-02-12 PROCEDURE — 3061F NEG MICROALBUMINURIA REV: CPT | Performed by: INTERNAL MEDICINE

## 2021-02-12 PROCEDURE — 82043 UR ALBUMIN QUANTITATIVE: CPT

## 2021-02-12 PROCEDURE — 83036 HEMOGLOBIN GLYCOSYLATED A1C: CPT

## 2021-02-12 PROCEDURE — 36415 COLL VENOUS BLD VENIPUNCTURE: CPT

## 2021-02-12 PROCEDURE — 3044F HG A1C LEVEL LT 7.0%: CPT | Performed by: INTERNAL MEDICINE

## 2021-02-12 PROCEDURE — 82570 ASSAY OF URINE CREATININE: CPT

## 2021-02-12 PROCEDURE — 80061 LIPID PANEL: CPT

## 2021-02-12 PROCEDURE — 80048 BASIC METABOLIC PNL TOTAL CA: CPT

## 2021-02-17 ENCOUNTER — OFFICE VISIT (OUTPATIENT)
Dept: CARDIOLOGY CLINIC | Facility: CLINIC | Age: 41
End: 2021-02-17
Payer: COMMERCIAL

## 2021-02-17 VITALS
RESPIRATION RATE: 16 BRPM | WEIGHT: 264.6 LBS | DIASTOLIC BLOOD PRESSURE: 92 MMHG | TEMPERATURE: 97.2 F | HEART RATE: 89 BPM | SYSTOLIC BLOOD PRESSURE: 142 MMHG | HEIGHT: 69 IN | BODY MASS INDEX: 39.19 KG/M2

## 2021-02-17 DIAGNOSIS — Z95.1 S/P CABG X 3: ICD-10-CM

## 2021-02-17 DIAGNOSIS — E78.5 DYSLIPIDEMIA: ICD-10-CM

## 2021-02-17 DIAGNOSIS — I10 ESSENTIAL HYPERTENSION: Primary | ICD-10-CM

## 2021-02-17 DIAGNOSIS — I25.10 CORONARY ARTERY DISEASE INVOLVING NATIVE CORONARY ARTERY OF NATIVE HEART WITHOUT ANGINA PECTORIS: ICD-10-CM

## 2021-02-17 PROCEDURE — 3077F SYST BP >= 140 MM HG: CPT | Performed by: INTERNAL MEDICINE

## 2021-02-17 PROCEDURE — 3080F DIAST BP >= 90 MM HG: CPT | Performed by: INTERNAL MEDICINE

## 2021-02-17 PROCEDURE — 1036F TOBACCO NON-USER: CPT | Performed by: INTERNAL MEDICINE

## 2021-02-17 PROCEDURE — 99214 OFFICE O/P EST MOD 30 MIN: CPT | Performed by: INTERNAL MEDICINE

## 2021-02-17 PROCEDURE — 3008F BODY MASS INDEX DOCD: CPT | Performed by: INTERNAL MEDICINE

## 2021-02-17 PROCEDURE — 93000 ELECTROCARDIOGRAM COMPLETE: CPT | Performed by: INTERNAL MEDICINE

## 2021-02-17 RX ORDER — DULOXETIN HYDROCHLORIDE 30 MG/1
CAPSULE, DELAYED RELEASE ORAL
COMMUNITY
Start: 2021-02-09

## 2021-02-17 RX ORDER — TRAZODONE HYDROCHLORIDE 50 MG/1
TABLET ORAL
COMMUNITY
Start: 2020-12-04

## 2021-02-17 NOTE — PROGRESS NOTES
Cardiology   MD Henry Phillips MD Ather Mansoor, MD Marshell Joseph, DO, Joshua Darnell DO, Rain Cross DO, [facility] [place]  -------------------------------------------------------------------  [facility], [place] [address]  [place]  [phone]                        [place]                      1980                     [id number]          Assessment/Plan:     1  CAD status post CABG x3 12/23/2019 with LIMA to LAD, SVG to OM3, SVG to RPDA  2  Sternotomy wound infection, status post antibiotic treatment  3  Ischemic cardiomyopathy, ejection fraction 40%  4  Hypertension  5  Diabetes  6  Dyslipidemia     · No symptoms of angina, exercising regularly without exertional chest discomfort  Continue aspirin  · Prior lipid panel reviewed with significant increase in LDL cholesterol  mg/dL  He states he is taking his atorvastatin, but upon repeated questioning expresses some level of uncertainty  I requested that he go home and check to be sure that he is taking this regularly in that he has this medication at home  I will check a TSH to be sure this is stable and recheck his lipid panel in 1 month before a re-evaluation in 6 weeks    · Patient given blank blood pressure she to log his blood pressures on at home as today's blood pressure reading is suboptimal           Follow-up in 6 weeks        Interval History:      This is a 60-year-old male with hypertension, dyslipidemia, diabetes, and obesity who presented to the [facility] 12/19/2019 with type 1 NSTEMI   Coronary angiography 12/19/2018 revealed 60-70% hemodynamically significant LAD stenosis, 75% distal OM 2 stenosis, distal 90-95% RCA stenosis   Ejection fraction was 30-35%   Cardiac MRI 12/20/2019 revealed ejection fraction 33% with mildly increased wall thickness, akinesis of the inferior and inferoseptal walls   On 12/23/2019, he underwent CABG x3 with LIMA to LAD, SVG to OM3, SVG to RPDA   Postoperative echocardiogram demonstrated ejection fraction 40%    He was discharged on postoperative day 6       He was seen by our nurse practitioner 01/14/2020  Mani Miramontes that time he had complaints of exertional dyspnea   Torsemide 20 mg daily was continued her chest x-ray was ordered, but not completed by the patient  [facility] was seen by CT surgery 01/24/2020 and noted to have opening of his wound reportedly draining pus   A single drop of purulence was expressed from the wound, and it was repacked   Was seen back in the office 02/04/2020 with no focal signs of infection, and in a well-healing wound   There was healthy granulation tissue  He completed a course of antibiotics  Paulino Guzman presents today for follow up with no complaints  He denies exertional chest pain, shortness of breath, dizziness, palpitations, lower extremity edema  He states he has been compliant with his medications, and after repeated questioning, feels he is taking his atorvastatin regularly  His prior lipid panel did reveal a significant increase in his LDL cholesterol from 47 mg/dL up to 150 mg/dL               Vitals:  Vitals:    02/17/21 1128   Resp: 16   Temp: (!) 97 2 °F (36 2 °C)   Weight: 120 kg (264 lb 9 6 oz)   Height: 5' 9" (1 753 m)         Past Medical History:   Diagnosis Date    JELENA (acute kidney injury) ([facility] ) 12/23/2019    CAD (coronary artery disease)     Diabetes mellitus ([facility] )     HLD (hyperlipidemia)     Hypertension     Myocardial infarction ([facility] )      Social History     Socioeconomic History    Marital status: Single     Spouse name: Not on file    Number of children: Not on file    Years of education: Not on file    Highest education level: Not on file   Occupational History    Not on file   Social Needs    Financial resource strain: Not on file    Food insecurity     Worry: Not on file     Inability: Not on file    Transportation needs     Medical: Not on file     Non-medical: Not on file   Tobacco Use    Smoking status: Never Smoker    Smokeless tobacco: Never Used   Substance and Sexual Activity    Alcohol use: Yes     Frequency: Monthly or less     Binge frequency: Less than monthly     Comment: occasioanl    Drug use: Yes     Types: Marijuana    Sexual activity: Yes     Partners: Female   Lifestyle    Physical activity     Days per week: Not on file     Minutes per session: Not on file    Stress: Not on file   Relationships    Social connections     Talks on phone: Not on file     Gets together: Not on file     Attends Mandaen service: Not on file     Active member of club or organization: Not on file     Attends meetings of clubs or organizations: Not on file     Relationship status: Not on file    Intimate partner violence     Fear of current or ex partner: Not on file     Emotionally abused: Not on file     Physically abused: Not on file     Forced sexual activity: Not on file   Other Topics Concern    Not on file   Social History Narrative    Not on file      Family History   Problem Relation Age of Onset    Cancer Mother         unknown     Past Surgical History:   Procedure Laterality Date    ANKLE SURGERY Right     CARDIAC CATHETERIZATION      CORONARY ARTERY BYPASS GRAFT N/A 12/23/2019    Procedure: CORONARY ARTERY BYPASS GRAFT (CABG) x 3; LIMA to LAD; Right EVH/SVG to OM3 and PDA;   Surgeon: Amanda Goldstein MD;  [facility];  Service: Cardiac Surgery    FRACTURE SURGERY         Current Outpatient Medications:     amLODIPine (NORVASC) 10 mg tablet, Take 1 tablet (10 mg total) by mouth daily, Disp: 90 tablet, Rfl: 4    aspirin 325 mg tablet, Take 1 tablet (325 mg total) by mouth daily (Patient taking differently: Take 81 mg by mouth daily ), Disp: 30 tablet, Rfl: 2    atorvastatin (LIPITOR) 80 mg tablet, Take 1 tablet (80 mg total) by mouth daily with dinner, Disp: 30 tablet, Rfl: 2    Continuous Blood Gluc Sensor (FREESTYLE LAURE 14 DAY SENSOR) [facility], Apply sensor every 14 days to check BG at least 4 times a day, Disp: 2 each, Rfl: 5    Dulaglutide (Trulicity) 1 5 JQ/0 3ZR SOPN, Inject 0 5 mL (1 5 mg total) under the skin once a week, Disp: 4 pen, Rfl: 3    glucose blood (GLUCOSE METER TEST) test strip, Test your blood glucose before each meal and at bedtime, record those numbers, Disp: 120 each, Rfl: 2    insulin glargine (LANTUS) 100 units/mL subcutaneous injection, Inject 15  units at bedtime, Disp: 30 mL, Rfl: 0    Insulin Pen Needle (BD PEN NEEDLE LEONA U/F) 32G X 4 MM MISC, Use a new pen needle each time you use your insulin pens before each meal and at bedtime, Disp: 120 each, Rfl: 2    Jardiance 25 MG TABS, TAKE 1 TABLET BY MOUTH EVERY DAY IN THE MORNING, Disp: 30 tablet, Rfl: 4    L-Methylfolate-B6-B12 3-35-2 MG TABS, Take one tablet twice a day, Disp: 60 tablet, Rfl: 3    Lancets (ACCU-CHEK MULTICLIX) lancets, Test your blood glucose before each meal and at bedtime, record those numbers, Disp: 120 each, Rfl: 2    lisinopril (ZESTRIL) 40 mg tablet, Take 1 tablet (40 mg total) by mouth daily, Disp: 30 tablet, Rfl: 2    metFORMIN (GLUCOPHAGE) 1000 MG tablet, 1 tablet Every 12 hours, Disp: , Rfl:     metoprolol tartrate (LOPRESSOR) 50 mg tablet, Take 1 tablet (50 mg total) by mouth every 12 (twelve) hours, Disp: 60 tablet, Rfl: 2    Alcohol Swabs 70 % PADS, Use alcohol swab before each insulin injection and before testing your blood glucose, Disp: 250 each, Rfl: 0    DULoxetine (CYMBALTA) 30 mg delayed release capsule, , Disp: , Rfl:     traZODone (DESYREL) 50 mg tablet, TAKE 1 TABLET BY MOUTH EVERY DAY AT NIGHT, Disp: , Rfl:         Review of Systems:  Review of Systems   Respiratory: Negative  Cardiovascular: Negative  All other systems reviewed and are negative  Physical Exam:  Physical Exam  Constitutional:       General: He is not in acute distress       Appearance: He is well-developed  He is not diaphoretic  HENT:      Head: Normocephalic and atraumatic  Eyes:      General: No scleral icterus  Right eye: No discharge  Pupils: Pupils are equal, round, and reactive to light  Neck:      Musculoskeletal: Normal range of motion and neck supple  Thyroid: No thyromegaly  Cardiovascular:      Rate and Rhythm: Normal rate and regular rhythm  Heart sounds: Normal heart sounds  No murmur  No friction rub  No gallop  Pulmonary:      Effort: Pulmonary effort is normal       Breath sounds: Normal breath sounds  Abdominal:      General: There is no distension  Tenderness: There is no abdominal tenderness  There is no guarding or rebound  Musculoskeletal: Normal range of motion  Skin:     General: Skin is warm and dry  Coloration: Skin is not pale  Findings: No erythema or rash  Neurological:      Mental Status: He is alert and oriented to person, place, and time  Coordination: Coordination normal    Psychiatric:         Behavior: Behavior normal          Thought Content: Thought content normal          Judgment: Judgment normal          This note was completed in part utilizing M-Modal Fluency Direct Software  Grammatical errors, random word insertions, spelling mistakes, and incomplete sentences can be an occasional consequence of this system secondary to software limitations, ambient noise, and hardware issues  If you have any questions or concerns about the content, text, or information contained within the body of this dictation, please contact the provider for clarification

## 2021-03-18 DIAGNOSIS — E11.65 TYPE 2 DIABETES MELLITUS WITH HYPERGLYCEMIA, WITH LONG-TERM CURRENT USE OF INSULIN (HCC): ICD-10-CM

## 2021-03-18 DIAGNOSIS — Z79.4 TYPE 2 DIABETES MELLITUS WITH HYPERGLYCEMIA, WITH LONG-TERM CURRENT USE OF INSULIN (HCC): ICD-10-CM

## 2021-03-18 RX ORDER — DULAGLUTIDE 1.5 MG/.5ML
1.5 INJECTION, SOLUTION SUBCUTANEOUS WEEKLY
Qty: 4 PEN | Refills: 3 | Status: SHIPPED | OUTPATIENT
Start: 2021-03-18 | End: 2021-08-18

## 2021-04-14 ENCOUNTER — APPOINTMENT (OUTPATIENT)
Dept: LAB | Facility: HOSPITAL | Age: 41
End: 2021-04-14
Attending: INTERNAL MEDICINE
Payer: COMMERCIAL

## 2021-04-14 DIAGNOSIS — E78.5 DYSLIPIDEMIA: ICD-10-CM

## 2021-04-14 LAB
CHOLEST SERPL-MCNC: 109 MG/DL (ref 50–200)
HDLC SERPL-MCNC: 32 MG/DL
LDLC SERPL CALC-MCNC: 38 MG/DL (ref 0–100)
TRIGL SERPL-MCNC: 194 MG/DL
TSH SERPL DL<=0.05 MIU/L-ACNC: 1.69 UIU/ML (ref 0.36–3.74)

## 2021-04-14 PROCEDURE — 80061 LIPID PANEL: CPT

## 2021-04-14 PROCEDURE — 84443 ASSAY THYROID STIM HORMONE: CPT | Performed by: INTERNAL MEDICINE

## 2021-04-14 PROCEDURE — 36415 COLL VENOUS BLD VENIPUNCTURE: CPT

## 2021-04-15 ENCOUNTER — OFFICE VISIT (OUTPATIENT)
Dept: CARDIOLOGY CLINIC | Facility: CLINIC | Age: 41
End: 2021-04-15
Payer: COMMERCIAL

## 2021-04-15 VITALS
BODY MASS INDEX: 40.05 KG/M2 | SYSTOLIC BLOOD PRESSURE: 146 MMHG | HEIGHT: 69 IN | WEIGHT: 270.4 LBS | DIASTOLIC BLOOD PRESSURE: 80 MMHG | OXYGEN SATURATION: 97 % | HEART RATE: 90 BPM

## 2021-04-15 DIAGNOSIS — Z95.1 S/P CABG X 3: Primary | ICD-10-CM

## 2021-04-15 DIAGNOSIS — I25.10 CORONARY ARTERY DISEASE INVOLVING NATIVE CORONARY ARTERY OF NATIVE HEART WITHOUT ANGINA PECTORIS: ICD-10-CM

## 2021-04-15 DIAGNOSIS — I10 BENIGN ESSENTIAL HTN: ICD-10-CM

## 2021-04-15 DIAGNOSIS — E78.5 DYSLIPIDEMIA: ICD-10-CM

## 2021-04-15 PROCEDURE — 3077F SYST BP >= 140 MM HG: CPT | Performed by: INTERNAL MEDICINE

## 2021-04-15 PROCEDURE — 1036F TOBACCO NON-USER: CPT | Performed by: INTERNAL MEDICINE

## 2021-04-15 PROCEDURE — 3008F BODY MASS INDEX DOCD: CPT | Performed by: INTERNAL MEDICINE

## 2021-04-15 PROCEDURE — 3079F DIAST BP 80-89 MM HG: CPT | Performed by: INTERNAL MEDICINE

## 2021-04-15 PROCEDURE — 99214 OFFICE O/P EST MOD 30 MIN: CPT | Performed by: INTERNAL MEDICINE

## 2021-04-15 RX ORDER — LABETALOL 200 MG/1
200 TABLET, FILM COATED ORAL 2 TIMES DAILY
Qty: 60 TABLET | Refills: 5 | Status: SHIPPED | OUTPATIENT
Start: 2021-04-15

## 2021-04-15 NOTE — PROGRESS NOTES
Cardiology   MD Carlos Fisher MD Ather Mansoor, MD Kirt Loosen, DO, Noe Lambert DO, Hazel Jung DO, [facility] [place]  -------------------------------------------------------------------  [facility], [place] [address]  [place]  [phone]                        Mirza Sancheznic                      1980                     [id number]          Assessment/Plan:     1  CAD status post CABG x3 12/23/2019 with LIMA to LAD, SVG to OM3, SVG to RPDA  2  Sternotomy wound infection, status post antibiotic treatment  3  Ischemic cardiomyopathy, ejection fraction 40%  4  Hypertension  5  Diabetes  6  Dyslipidemia       · No symptoms of angina, exercising regularly without exertional chest discomfort  Continue aspirin  · Yesterday's lipid panel reviewed, significant improvement in LDL cholesterol  Suspect he was not compliant before with atorvastatin  Continue the same   TSH within normal limits  · Blood pressure elevated on today's visit  Will change metoprolol to labetalol for better blood pressure control  Advised him to monitor blood pressures on a daily basis and call me in 2 weeks if his systolic blood pressure remains greater than 130 mm Hg         Follow-up in 4 months          Interval History:      This is a 36year-old male with hypertension, dyslipidemia, diabetes, and obesity who [facility] 12/19/2019 with type 1 NSTEMI   Coronary angiography 12/19/2018 revealed 60-70% hemodynamically significant LAD stenosis, 75% distal OM 2 stenosis, distal 90-95% RCA stenosis   Ejection fraction was 30-35%   Cardiac MRI 12/20/2019 revealed ejection fraction 33% with mildly increased wall thickness, akinesis of the inferior and inferoseptal walls   On 12/23/2019, he underwent CABG x3 with LIMA to LAD, SVG to OM3, SVG to RPDA   Postoperative echocardiogram demonstrated ejection fraction 40%    He was discharged on postoperative day 6       He was seen by our nurse practitioner 01/14/2020  Maria Luisa Becker that time he had complaints of exertional dyspnea   Torsemide 20 mg daily was continued her chest x-ray was ordered, but not completed by the patient  [facility] was seen by CT surgery 01/24/2020 and noted to have opening of his wound reportedly draining pus   A single drop of purulence was expressed from the wound, and it was repacked   Was seen back in the office 02/04/2020 with no focal signs of infection, and in a well-healing wound   There was healthy granulation tissue  He completed a course of antibiotics  He presents today for follow-up with no complaints  He denies exertional chest pain, shortness of breath, dizziness, palpitations, lower extremity edema  He has been active, and exercising regularly and feels well           Vitals:  Vitals:    04/15/21 0954   BP: 146/80   Pulse: 90   SpO2: 97%   Weight: 123 kg (270 lb 6 4 oz)   Height: 5' 9" (1 753 m)         Past Medical History:   Diagnosis Date    [facility] ) 12/23/2019    CAD (coronary artery disease)     Diabetes mellitus ([facility] )     HLD (hyperlipidemia)     Hypertension     Myocardial infarction ([facility] )      Social History     Socioeconomic History    Marital status: Single     Spouse name: Not on file    Number of children: Not on file    Years of education: Not on file    Highest education level: Not on file   Occupational History    Not on file   Social Needs    Financial resource strain: Not on file    Food insecurity     Worry: Not on file     Inability: Not on file   [employer] needs     Medical: Not on file     Non-medical: Not on file   Tobacco Use    Smoking status: Never Smoker    Smokeless tobacco: Never Used   Substance and Sexual Activity    Alcohol use: Yes     Frequency: Monthly or less     Binge frequency: Less than monthly     Comment: occasioanl    Drug use: Yes     Types: Marijuana    Sexual activity: Yes     Partners: Female   Lifestyle    Physical activity     Days per week: Not on file     Minutes per session: Not on file    Stress: Not on file   Relationships    Social connections     Talks on phone: Not on file     Gets together: Not on file     Attends Voodoo service: Not on file     Active member of club or organization: Not on file     Attends meetings of clubs or organizations: Not on file     Relationship status: Not on file    Intimate partner violence     Fear of current or ex partner: Not on file     Emotionally abused: Not on file     Physically abused: Not on file     Forced sexual activity: Not on file   Other Topics Concern    Not on file   Social History Narrative    Not on file      Family History   Problem Relation Age of Onset    Cancer Mother         unknown     Past Surgical History:   Procedure Laterality Date    ANKLE SURGERY Right     CARDIAC CATHETERIZATION      CORONARY ARTERY BYPASS GRAFT N/A 12/23/2019    Procedure: CORONARY ARTERY BYPASS GRAFT (CABG) x 3; LIMA to LAD; Right EVH/SVG to OM3 and PDA;   Surgeon: Sorin Osullivan MD;  [facility];  Service: Cardiac Surgery    FRACTURE SURGERY         Current Outpatient Medications:     Alcohol Swabs 70 % PADS, Use alcohol swab before each insulin injection and before testing your blood glucose, Disp: 250 each, Rfl: 0    amLODIPine (NORVASC) 10 mg tablet, Take 1 tablet (10 mg total) by mouth daily, Disp: 90 tablet, Rfl: 4    aspirin 325 mg tablet, Take 1 tablet (325 mg total) by mouth daily (Patient taking differently: Take 81 mg by mouth daily ), Disp: 30 tablet, Rfl: 2    atorvastatin (LIPITOR) 80 mg tablet, Take 1 tablet (80 mg total) by mouth daily with dinner, Disp: 30 tablet, Rfl: 2    Continuous Blood Gluc Sensor (FREESTYLE LAURE 14 DAY SENSOR) [facility], Apply sensor every 14 days to check BG at least 4 times a day, Disp: 2 each, Rfl: 5    DULoxetine (CYMBALTA) 30 mg delayed release capsule, , Disp: , Rfl:     glucose blood (GLUCOSE METER TEST) test strip, Test your blood glucose before each meal and at bedtime, record those numbers, Disp: 120 each, Rfl: 2    insulin glargine (LANTUS) 100 units/mL subcutaneous injection, Inject 15  units at bedtime, Disp: 30 mL, Rfl: 0    Insulin Pen Needle (BD PEN NEEDLE LEONA U/F) 32G X 4 MM MISC, Use a new pen needle each time you use your insulin pens before each meal and at bedtime, Disp: 120 each, Rfl: 2    Jardiance 25 MG TABS, TAKE 1 TABLET BY MOUTH EVERY DAY IN THE MORNING, Disp: 30 tablet, Rfl: 4    L-Methylfolate-B6-B12 3-35-2 MG TABS, Take one tablet twice a day, Disp: 60 tablet, Rfl: 3    Lancets (ACCU-CHEK MULTICLIX) lancets, Test your blood glucose before each meal and at bedtime, record those numbers, Disp: 120 each, Rfl: 2    lisinopril (ZESTRIL) 40 mg tablet, Take 1 tablet (40 mg total) by mouth daily, Disp: 30 tablet, Rfl: 2    metFORMIN (GLUCOPHAGE) 1000 MG tablet, 1 tablet Every 12 hours, Disp: , Rfl:     metoprolol tartrate (LOPRESSOR) 50 mg tablet, Take 1 tablet (50 mg total) by mouth every 12 (twelve) hours, Disp: 60 tablet, Rfl: 2    traZODone (DESYREL) 50 mg tablet, TAKE 1 TABLET BY MOUTH EVERY DAY AT NIGHT, Disp: , Rfl:     Trulicity 1 5 MS/5 4RN SOPN, INJECT 0 5 ML (1 5 MG TOTAL) UNDER THE SKIN ONCE A WEEK, Disp: 4 pen, Rfl: 3        Review of Systems:  Review of Systems   Respiratory: Negative  Cardiovascular: Negative  All other systems reviewed and are negative  Physical Exam:  Physical Exam  Constitutional:       General: He is not in acute distress  Appearance: He is well-developed  He is not diaphoretic  HENT:      Head: Normocephalic and atraumatic  Eyes:      General: No scleral icterus  Right eye: No discharge  Pupils: Pupils are equal, round, and reactive to light  Neck:      Musculoskeletal: Normal range of motion and neck supple  Thyroid: No thyromegaly     Cardiovascular:      Rate and Rhythm: Normal rate and regular rhythm  Heart sounds: Normal heart sounds  No murmur  No friction rub  No gallop  Pulmonary:      Effort: Pulmonary effort is normal       Breath sounds: Normal breath sounds  Abdominal:      General: There is no distension  Tenderness: There is no abdominal tenderness  There is no guarding or rebound  Musculoskeletal: Normal range of motion  Skin:     General: Skin is warm and dry  Coloration: Skin is not pale  Findings: No erythema or rash  Neurological:      Mental Status: He is alert and oriented to person, place, and time  Coordination: Coordination normal    Psychiatric:         Behavior: Behavior normal          Thought Content: Thought content normal          Judgment: Judgment normal          This note was completed in part utilizing YPlan Direct Software  Grammatical errors, random word insertions, spelling mistakes, and incomplete sentences can be an occasional consequence of this system secondary to software limitations, ambient noise, and hardware issues  If you have any questions or concerns about the content, text, or information contained within the body of this dictation, please contact the provider for clarification

## 2021-04-15 NOTE — PATIENT INSTRUCTIONS
Stop metoprolol  Start labetalol 200 mg twice daily  Keep checking her blood pressure once daily for the next 2 weeks, and blood pressure still over 130/85, call me

## 2021-04-27 ENCOUNTER — TELEPHONE (OUTPATIENT)
Dept: ENDOCRINOLOGY | Facility: CLINIC | Age: 41
End: 2021-04-27

## 2021-06-12 ENCOUNTER — TELEPHONE (OUTPATIENT)
Dept: ENDOCRINOLOGY | Facility: CLINIC | Age: 41
End: 2021-06-12

## 2021-06-12 DIAGNOSIS — Z95.1 S/P CABG X 3: ICD-10-CM

## 2021-06-12 DIAGNOSIS — E11.65 UNCONTROLLED TYPE 2 DIABETES MELLITUS WITH HYPERGLYCEMIA (HCC): ICD-10-CM

## 2021-06-14 RX ORDER — DAPAGLIFLOZIN 10 MG/1
10 TABLET, FILM COATED ORAL DAILY
Qty: 30 TABLET | Refills: 4 | Status: SHIPPED | OUTPATIENT
Start: 2021-06-14 | End: 2021-08-26 | Stop reason: ALTCHOICE

## 2021-06-15 ENCOUNTER — TELEPHONE (OUTPATIENT)
Dept: ENDOCRINOLOGY | Facility: CLINIC | Age: 41
End: 2021-06-15

## 2021-06-15 NOTE — TELEPHONE ENCOUNTER
Can you check which is alternative or we will have to prior autherization     Diego Nichols MD
I have sent Rx for farxiga, please start PA     Thanks     Jamie Skelton MD
PA submitted for farxiga via covermymeds awaiting response
Received notice from insurance Jardiance prior auth valid til 6/14/22- faxed notice to pharmacy
See media 5/26/21
none

## 2021-06-15 NOTE — TELEPHONE ENCOUNTER
Lyndsey Cevallos called  [phone]   asking if we want pt on  jardiance or farxiga  Pt is supposed to be on jardiance  It has been approved till jun/14/22  Melanie Lawler Spoke with [employer] the order for the [employer] was withdrawn, per the nurse  Pt is to be on jardiance we will get a confirmation regarding this

## 2021-08-19 ENCOUNTER — TELEPHONE (OUTPATIENT)
Dept: ENDOCRINOLOGY | Facility: CLINIC | Age: 41
End: 2021-08-19

## 2021-08-19 NOTE — TELEPHONE ENCOUNTER
Received notice trulicity needs PA   Please schedule for ov and he can  sample at office to get to appointment date

## 2021-08-25 ENCOUNTER — OFFICE VISIT (OUTPATIENT)
Dept: CARDIOLOGY CLINIC | Facility: CLINIC | Age: 41
End: 2021-08-25
Payer: COMMERCIAL

## 2021-08-25 VITALS
WEIGHT: 276 LBS | HEIGHT: 69 IN | HEART RATE: 91 BPM | SYSTOLIC BLOOD PRESSURE: 160 MMHG | BODY MASS INDEX: 40.88 KG/M2 | DIASTOLIC BLOOD PRESSURE: 118 MMHG

## 2021-08-25 DIAGNOSIS — Z95.1 S/P CABG X 3: ICD-10-CM

## 2021-08-25 DIAGNOSIS — I10 ESSENTIAL HYPERTENSION: Primary | ICD-10-CM

## 2021-08-25 DIAGNOSIS — E78.5 DYSLIPIDEMIA: ICD-10-CM

## 2021-08-25 PROCEDURE — 3008F BODY MASS INDEX DOCD: CPT | Performed by: INTERNAL MEDICINE

## 2021-08-25 PROCEDURE — 99214 OFFICE O/P EST MOD 30 MIN: CPT | Performed by: INTERNAL MEDICINE

## 2021-08-25 NOTE — PROGRESS NOTES
Cardiology   MD Alma Jones MD Ather Mansoor, MD  [place] DO Arlin, Celeste Gonzalez DO, Sol King DO, [facility] [place]  -------------------------------------------------------------------  [facility], [address] [address]  [place]  [phone]                        Keriyas Rai                      1980                     [id number]          Assessment/Plan:     1  CAD status post CABG x3 12/23/2019 with LIMA to LAD, SVG to OM3, SVG to RPDA  2  Sternotomy wound infection, status post antibiotic treatment  3  Ischemic cardiomyopathy, ejection fraction 40%  4  Hypertension  5  Diabetes  6  Dyslipidemia        · Patient without symptoms of angina, continue aspirin  · Blood pressure elevated on today's visit which she attributes to high salt in his breakfast this morning  Continue amlodipine, labetalol, lisinopril  He was given a blank blood pressure she to log his blood pressures on at home  If continues to remain elevated, will start spironolactone and monitor renal function and potassium cautiously   · Continue high-dose atorvastatin for dyslipidemia and CAD  · He has mild exertional dyspnea  If continued symptoms despite blood pressure control, will recheck echocardiogram and check NT proBNP             Follow-up in 1 month           Interval History:      This is a 39year-old male with hypertension, dyslipidemia, diabetes, and obesity who presented to the [facility]  12/19/2019 with type 1 NSTEMI   Coronary angiography 12/19/2018 revealed 60-70% hemodynamically significant LAD stenosis, 75% distal OM 2 stenosis, distal 90-95% RCA stenosis   Ejection fraction was 30-35%   Cardiac MRI 12/20/2019 revealed ejection fraction 33% with mildly increased wall thickness, akinesis of the inferior and inferoseptal walls   On 12/23/2019, he underwent CABG x3 with LIMA to LAD, SVG to OM3, SVG to RPDA   Postoperative echocardiogram demonstrated ejection fraction 40%    He was discharged on postoperative day 6       He was seen by our nurse practitioner 01/14/2020  [place] that time he had complaints of exertional dyspnea   Torsemide 20 mg daily was continued her chest x-ray was ordered, but not completed by the patient  Carolina Tafoya was seen by CT surgery 01/24/2020 and noted to have opening of his wound reportedly draining pus   A single drop of purulence was expressed from the wound, and it was repacked   Was seen back in the office 02/04/2020 with no focal signs of infection, and in a well-healing wound   There was healthy granulation tissue  He completed a course of antibiotics  He presents today for follow-up with no complaints  He states he had a very salty meal for breakfast this morning and attributes this to his hypertension today  He does not check his blood pressures at home although does on a blood pressure monitor  From a symptomatic standpoint he feels fatigued, admits to mild exertional dyspnea which has been chronic  He feels an occasional pulling sensation in his chest with positional changes                 Vitals:  Vitals:    08/25/21 1015   BP: (!) 160/118   BP Location: Left arm   Patient Position: Sitting   Cuff Size: Adult   Pulse: 91   Weight: 125 kg (276 lb)   Height: 5' 9" (1 753 m)         Past Medical History:   Diagnosis Date    [facility]NA [facility]) ([facility] ) 12/23/2019    CAD (coronary artery disease)     Diabetes mellitus ([place] [address] )     HLD (hyperlipidemia)     Hypertension     Myocardial infarction ([place] [address] )      Social History     Socioeconomic History    Marital status: Single     Spouse name: Not on file    Number of children: Not on file    Years of education: Not on file    Highest education level: Not on file   Occupational History    Not on file   Tobacco Use    Smoking status: Never Smoker    Smokeless tobacco: Never Used   Substance and Sexual Activity    Alcohol use:  Yes Comment: occasioanl    Drug use: Yes     Types: Marijuana    Sexual activity: Yes     Partners: Female   Other Topics Concern    Not on file   Social History Narrative    Not on file     Social Determinants of Health     Financial Resource Strain:     Difficulty of Paying Living Expenses:    Food Insecurity:     Worried About Running Out of Food in the Last Year:     [address] in the Last Year:    Transportation Needs:     Lack of Transportation (Medical):  Lack of Transportation (Non-Medical):    Physical Activity:     Days of Exercise per Week:     Minutes of Exercise per Session:    Stress:     Feeling of Stress :    Social Connections:     Frequency of Communication with Friends and Family:     Frequency of Social Gatherings with Friends and Family:     Attends Holiness Services:     Active Member of Clubs or Organizations:     Attends Club or Organization Meetings:     Marital Status:    Intimate Partner Violence:     Fear of Current or Ex-Partner:     Emotionally Abused:     Physically Abused:     Sexually Abused:       Family History   Problem Relation Age of Onset    Cancer Mother         unknown     Past Surgical History:   Procedure Laterality Date    ANKLE SURGERY Right     CARDIAC CATHETERIZATION      CORONARY ARTERY BYPASS GRAFT N/A 12/23/2019    Procedure: CORONARY ARTERY BYPASS GRAFT (CABG) x 3; LIMA to LAD; Right EVH/SVG to OM3 and PDA;   Surgeon: Marybeth Vu MD;  [facility]: [facility];  Service: Cardiac Surgery    FRACTURE SURGERY         Current Outpatient Medications:     Alcohol Swabs 70 % PADS, Use alcohol swab before each insulin injection and before testing your blood glucose, Disp: 250 each, Rfl: 0    amLODIPine (NORVASC) 10 mg tablet, Take 1 tablet (10 mg total) by mouth daily, Disp: 90 tablet, Rfl: 4    aspirin 325 mg tablet, Take 1 tablet (325 mg total) by mouth daily (Patient taking differently: Take 81 mg by mouth daily ), Disp: 30 tablet, Rfl: 2    atorvastatin (LIPITOR) 80 mg tablet, Take 1 tablet (80 mg total) by mouth daily with dinner, Disp: 30 tablet, Rfl: 2    Dapagliflozin Propanediol (Farxiga) 10 MG TABS, Take 1 tablet (10 mg total) by mouth daily, Disp: 30 tablet, Rfl: 4    DULoxetine (CYMBALTA) 30 mg delayed release capsule, , Disp: , Rfl:     glucose blood (GLUCOSE METER TEST) test strip, Test your blood glucose before each meal and at bedtime, record those numbers, Disp: 120 each, Rfl: 2    insulin glargine (LANTUS) 100 units/mL subcutaneous injection, Inject 15  units at bedtime, Disp: 30 mL, Rfl: 0    Insulin Pen Needle (BD PEN NEEDLE LEONA U/F) 32G X 4 MM MISC, Use a new pen needle each time you use your insulin pens before each meal and at bedtime, Disp: 120 each, Rfl: 2    labetalol (NORMODYNE) 200 mg tablet, Take 1 tablet (200 mg total) by mouth 2 (two) times a day, Disp: 60 tablet, Rfl: 5    Lancets (ACCU-CHEK MULTICLIX) lancets, Test your blood glucose before each meal and at bedtime, record those numbers, Disp: 120 each, Rfl: 2    lisinopril (ZESTRIL) 40 mg tablet, Take 1 tablet (40 mg total) by mouth daily, Disp: 30 tablet, Rfl: 2    metFORMIN (GLUCOPHAGE) 1000 MG tablet, 1 tablet Every 12 hours, Disp: , Rfl:     traZODone (DESYREL) 50 mg tablet, TAKE 1 TABLET BY MOUTH EVERY DAY AT NIGHT, Disp: , Rfl:     Trulicity 1 5 LK/7 3WH SOPN, INJECT 0 5 ML (1 5 MG TOTAL) UNDER THE SKIN ONCE A WEEK, Disp: 2 mL, Rfl: 3    Continuous Blood Gluc Sensor (FREESTYLE LAURE 14 DAY SENSOR) [facility], Apply sensor every 14 days to check BG at least 4 times a day (Patient not taking: Reported on 8/25/2021), Disp: 2 each, Rfl: 5    L-Methylfolate-B6-B12 3-35-2 MG TABS, Take one tablet twice a day (Patient not taking: Reported on 8/25/2021), Disp: 60 tablet, Rfl: 3        Review of Systems:  Review of Systems   Respiratory: Negative  Cardiovascular: Negative  All other systems reviewed and are negative          Physical Exam:  Physical Exam  Constitutional:       General: He is not in acute distress  Appearance: He is well-developed  He is not diaphoretic  HENT:      Head: Normocephalic and atraumatic  Eyes:      General: No scleral icterus  Right eye: No discharge  Pupils: Pupils are equal, round, and reactive to light  Neck:      Thyroid: No thyromegaly  Cardiovascular:      Rate and Rhythm: Normal rate and regular rhythm  Heart sounds: Normal heart sounds  No murmur heard  No friction rub  No gallop  Pulmonary:      Effort: Pulmonary effort is normal       Breath sounds: Normal breath sounds  Abdominal:      General: There is no distension  Tenderness: There is no abdominal tenderness  There is no guarding or rebound  Musculoskeletal:         General: Normal range of motion  Cervical back: Normal range of motion and neck supple  Skin:     General: Skin is warm and dry  Coloration: Skin is not pale  Findings: No erythema or rash  Neurological:      Mental Status: He is alert and oriented to person, place, and time  Coordination: Coordination normal    Psychiatric:         Behavior: Behavior normal          Thought Content: Thought content normal          Judgment: Judgment normal          This note was completed in part utilizing [employer] Software  Grammatical errors, random word insertions, spelling mistakes, and incomplete sentences can be an occasional consequence of this system secondary to software limitations, ambient noise, and hardware issues  If you have any questions or concerns about the content, text, or information contained within the body of this dictation, please contact the provider for clarification

## 2021-08-26 ENCOUNTER — OFFICE VISIT (OUTPATIENT)
Dept: ENDOCRINOLOGY | Facility: CLINIC | Age: 41
End: 2021-08-26
Payer: COMMERCIAL

## 2021-08-26 ENCOUNTER — TELEPHONE (OUTPATIENT)
Dept: ENDOCRINOLOGY | Facility: CLINIC | Age: 41
End: 2021-08-26

## 2021-08-26 VITALS
HEART RATE: 100 BPM | TEMPERATURE: 98 F | SYSTOLIC BLOOD PRESSURE: 138 MMHG | WEIGHT: 275 LBS | DIASTOLIC BLOOD PRESSURE: 76 MMHG | BODY MASS INDEX: 40.61 KG/M2

## 2021-08-26 DIAGNOSIS — E11.65 UNCONTROLLED TYPE 2 DIABETES MELLITUS WITH HYPERGLYCEMIA (HCC): Primary | ICD-10-CM

## 2021-08-26 DIAGNOSIS — I10 ESSENTIAL HYPERTENSION: ICD-10-CM

## 2021-08-26 DIAGNOSIS — E66.01 CLASS 3 SEVERE OBESITY DUE TO EXCESS CALORIES WITH SERIOUS COMORBIDITY AND BODY MASS INDEX (BMI) OF 40.0 TO 44.9 IN ADULT (HCC): ICD-10-CM

## 2021-08-26 DIAGNOSIS — E78.2 MIXED HYPERLIPIDEMIA: ICD-10-CM

## 2021-08-26 DIAGNOSIS — Z95.1 S/P CABG X 3: ICD-10-CM

## 2021-08-26 PROCEDURE — 99214 OFFICE O/P EST MOD 30 MIN: CPT | Performed by: INTERNAL MEDICINE

## 2021-08-26 PROCEDURE — 3078F DIAST BP <80 MM HG: CPT | Performed by: INTERNAL MEDICINE

## 2021-08-26 PROCEDURE — 3075F SYST BP GE 130 - 139MM HG: CPT | Performed by: INTERNAL MEDICINE

## 2021-08-26 PROCEDURE — 1036F TOBACCO NON-USER: CPT | Performed by: INTERNAL MEDICINE

## 2021-08-26 RX ORDER — EMPAGLIFLOZIN 25 MG/1
25 TABLET, FILM COATED ORAL EVERY MORNING
Qty: 30 TABLET | Refills: 5 | Status: SHIPPED | OUTPATIENT
Start: 2021-08-26

## 2021-08-26 NOTE — PROGRESS NOTES
Poly  39 y o  male MRN: [id number]    Encounter: [id number]      Assessment/Plan     Assessment: This is a 39y o -year-old male with diabetes with hyperglycemia  Plan:    Diagnoses and all orders for this visit:    Uncontrolled type 2 diabetes mellitus with hyperglycemia ([facility] )  Lab Results   Component Value Date    HGBA1C 5 9 (H) 02/12/2021    A1c is 5 9 in February 2021  discussed importance of checking blood sugars 2-3 times daily and sending log to us  also patient did not finish blood work before this visit  discussed to get blood work done as soon as possible  based on his history of coronary artery disease and CABG he would benefit from Victoza injection daily, as it has been shown to prevent major atherosclerotic events such as heart attack  And stroke  patient is agreeable to switch to Victoza, will send the prescription for Victoza will start with 0 6 mg daily dose for 2 weeks and then increase to 1 2 mg daily  will also discontinue Trulicity  discussed importance of dietary modification and lifestyle modifications  discussed importance of follow-up appointment with us on regular basis for proper management of disease   -     liraglutide (VICTOZA) injection; Inject 0 1 mL (0 6 mg total) under the skin daily  -     Hemoglobin A1C; Future  -     Comprehensive metabolic panel Lab Collect; Future  -     Microalbumin / creatinine urine ratio; Future  -     Hemoglobin A1C; Future  -     Basic metabolic panel; Future  -     Empagliflozin (Jardiance) 25 MG TABS; Take 1 tablet (25 mg total) by mouth every morning    Essential hypertension   blood pressure well controlled  continue current management      Mixed hyperlipidemia   continue statins  S/P CABG x 3   follow with Cardiology  Class 3 severe obesity due to excess calories with serious comorbidity and body mass index (BMI) of 40 0 to 44 9 in [facility])   educated about dietary modification and importance of weight loss         CC: Diabetes    History of Present Illness     HPI:    Kavita Edwards  is  49-year-old gentleman with medical history of type 2 diabetes with long-term insulin use, complications such as coronary artery disease is here for follow-up  He denies any recent illnesses or hospitalizations  He has history of CABG in December 2019    he is not checking his blood sugars, for quite some time  he is also not using continues glucose monitor sensor, for some time  He is agreeable to use it now  Discussed to start using continues glucose monitor sensor and bring the sensor in 2 weeks  his currently taking Lantus 15 units at bedtime, Jardiance 25 mg in the morning, he takes Trulicity 1 5 mg weekly  Results for Mary Kay Ojeda (MRN [id number]) as of 8/26/2021 08:41   Ref  Range 2/12/2021 09:52 4/14/2021 09:40   Sodium Latest Ref Range: 136 - 145 mmol/L 140    Potassium Latest Ref Range: 3 5 - 5 3 mmol/L 4 1    Chloride Latest Ref Range: 100 - 108 mmol/L 105    CO2 Latest Ref Range: 21 - 32 mmol/L 28    Anion Gap Latest Ref Range: 4 - 13 mmol/L 7    BUN Latest Ref Range: 5 - 25 mg/dL 10    Creatinine Latest Ref Range: 0 60 - 1 30 mg/dL 1 00    Glucose, Random Latest Ref Range: 65 - 140 mg/dL 146 (H)    Calcium Latest Ref Range: 8 3 - 10 1 mg/dL 9 5    eGFR Latest Units: ml/min/1 73sq m 94    Cholesterol Latest Ref Range: 50 - 200 mg/dL 210 (H) 109   Triglycerides Latest Ref Range: <=150 mg/dL 132 194 (H)   HDL Latest Ref Range: >=40 mg/dL 34 (L) 32 (L)   Non-HDL Cholesterol Latest Units: mg/dl 176    LDL Calculated Latest Ref Range: 0 - 100 mg/dL 150 (H) 38   Vit D, 25-Hydroxy Latest Ref Range: 30 0 - 100 0 ng/mL 17 4 (L)        Lab Results   Component Value Date    HGBA1C 5 9 (H) 02/12/2021       Review of Systems   Constitutional: Positive for unexpected weight change  Negative for activity change, diaphoresis, fatigue and fever  HENT: Negative  Eyes: Negative for visual disturbance  Respiratory: Negative for cough, chest tightness and shortness of breath  Cardiovascular: Negative for chest pain, palpitations and leg swelling  Gastrointestinal: Negative for abdominal pain, blood in stool, constipation, diarrhea, nausea and vomiting  Endocrine: Negative for cold intolerance, heat intolerance, polydipsia, polyphagia and polyuria  Genitourinary: Negative for dysuria, enuresis, frequency and urgency  Musculoskeletal: Negative for arthralgias and myalgias  Skin: Negative for pallor, rash and wound  Allergic/Immunologic: Negative  Neurological: Negative for dizziness, tremors, weakness and numbness  Hematological: Negative  Psychiatric/Behavioral: Negative  Historical Information   Past Medical History:   Diagnosis Date    [facility] ) 12/23/2019    CAD (coronary artery disease)     Diabetes mellitus ([facility] )     HLD (hyperlipidemia)     Hypertension     Myocardial infarction [facility])      Past Surgical History:   Procedure Laterality Date    ANKLE SURGERY Right     CARDIAC CATHETERIZATION      CORONARY ARTERY BYPASS GRAFT N/A 12/23/2019    Procedure: CORONARY ARTERY BYPASS GRAFT (CABG) x 3; LIMA to LAD; Right EVH/SVG to OM3 and PDA;   Surgeon: Doroteo Denny MD;  Location:  MAIN OR;  Service: Cardiac Surgery    FRACTURE SURGERY       Social History   Social History     Substance and Sexual Activity   Alcohol Use Yes    Comment: occasioanl     Social History     Substance and Sexual Activity   Drug Use Yes    Types: Marijuana     Social History     Tobacco Use   Smoking Status Never Smoker   Smokeless Tobacco Never Used     Family History:   Family History   Problem Relation Age of Onset    Cancer Mother         unknown       Meds/Allergies   Current Outpatient Medications   Medication Sig Dispense Refill    Alcohol Swabs 70 % PADS Use alcohol swab before each insulin injection and before testing your blood glucose 250 each 0    amLODIPine (NORVASC) 10 mg tablet Take 1 tablet (10 mg total) by mouth daily 90 tablet 4    aspirin 325 mg tablet Take 1 tablet (325 mg total) by mouth daily (Patient taking differently: Take 81 mg by mouth daily ) 30 tablet 2    atorvastatin (LIPITOR) 80 mg tablet Take 1 tablet (80 mg total) by mouth daily with dinner 30 tablet 2    DULoxetine (CYMBALTA) 30 mg delayed release capsule       glucose blood (GLUCOSE METER TEST) test strip Test your blood glucose before each meal and at bedtime, record those numbers 120 each 2    insulin glargine (LANTUS) 100 units/mL subcutaneous injection Inject 15  units at bedtime 30 mL 0    Insulin Pen Needle (BD PEN NEEDLE LEONA U/F) 32G X 4 MM MISC Use a new pen needle each time you use your insulin pens before each meal and at bedtime 120 each 2    labetalol (NORMODYNE) 200 mg tablet Take 1 tablet (200 mg total) by mouth 2 (two) times a day 60 tablet 5    Lancets (ACCU-CHEK MULTICLIX) lancets Test your blood glucose before each meal and at bedtime, record those numbers 120 each 2    lisinopril (ZESTRIL) 40 mg tablet Take 1 tablet (40 mg total) by mouth daily 30 tablet 2    metFORMIN (GLUCOPHAGE) 1000 MG tablet 1 tablet Every 12 hours      traZODone (DESYREL) 50 mg tablet TAKE 1 TABLET BY MOUTH EVERY DAY AT NIGHT      Continuous Blood Gluc Sensor (FREESTYLE LAURE 14 DAY SENSOR) MISC Apply sensor every 14 days to check BG at least 4 times a day (Patient not taking: Reported on 8/25/2021) 2 each 5    Empagliflozin (Jardiance) 25 MG TABS Take 1 tablet (25 mg total) by mouth every morning 30 tablet 5    L-Methylfolate-B6-B12 3-35-2 MG TABS Take one tablet twice a day (Patient not taking: Reported on 8/25/2021) 60 tablet 3    liraglutide (VICTOZA) injection Inject 0 1 mL (0 6 mg total) under the skin daily 6 mL 4     No current facility-administered medications for this visit       No Known Allergies    Objective   Vitals: Blood pressure 138/76, pulse 100, temperature 98 °F (36 7 °C), weight 125 kg (275 lb)  Physical Exam  Vitals reviewed  Constitutional:       General: He is not in acute distress  Appearance: He is well-developed  HENT:      Head: Normocephalic and atraumatic  Eyes:      Pupils: Pupils are equal, round, and reactive to light  Neck:      Thyroid: No thyromegaly  Cardiovascular:      Rate and Rhythm: Normal rate and regular rhythm  Heart sounds: Normal heart sounds  Pulmonary:      Effort: Pulmonary effort is normal  No respiratory distress  Breath sounds: Normal breath sounds  Musculoskeletal:         General: No deformity  Normal range of motion  Cervical back: Normal range of motion and neck supple  Skin:     General: Skin is warm and dry  Findings: No erythema  Neurological:      Mental Status: He is alert and oriented to person, place, and time  The history was obtained from the review of the chart, patient  Lab Results:   Lab Results   Component Value Date/Time    Hemoglobin A1C 5 9 (H) 02/12/2021 09:52 AM    BUN 10 02/12/2021 09:52 AM    Potassium 4 1 02/12/2021 09:52 AM    Chloride 105 02/12/2021 09:52 AM    CO2 28 02/12/2021 09:52 AM    Creatinine 1 00 02/12/2021 09:52 AM    HDL, Direct 32 (L) 04/14/2021 09:40 AM    HDL, Direct 34 (L) 02/12/2021 09:52 AM    Triglycerides 194 (H) 04/14/2021 09:40 AM    Triglycerides 132 02/12/2021 09:52 AM           Imaging Studies: I have personally reviewed pertinent reports  Portions of the record may have been created with voice recognition software  Occasional wrong word or "sound a like" substitutions may have occurred due to the inherent limitations of voice recognition software  Read the chart carefully and recognize, using context, where substitutions have occurred

## 2021-08-26 NOTE — TELEPHONE ENCOUNTER
PA submitted for victoza on covermymeds   Awaiting response will need to send ov notes at a later date due to ov note unfinished

## 2021-12-07 ENCOUNTER — OFFICE VISIT (OUTPATIENT)
Dept: ENDOCRINOLOGY | Facility: CLINIC | Age: 41
End: 2021-12-07
Payer: COMMERCIAL

## 2021-12-07 VITALS
SYSTOLIC BLOOD PRESSURE: 170 MMHG | HEART RATE: 72 BPM | HEIGHT: 69 IN | BODY MASS INDEX: 42.36 KG/M2 | DIASTOLIC BLOOD PRESSURE: 98 MMHG | WEIGHT: 286 LBS | TEMPERATURE: 97.6 F

## 2021-12-07 DIAGNOSIS — I10 ESSENTIAL HYPERTENSION: ICD-10-CM

## 2021-12-07 DIAGNOSIS — E78.2 MIXED HYPERLIPIDEMIA: ICD-10-CM

## 2021-12-07 DIAGNOSIS — Z79.4 TYPE 2 DIABETES MELLITUS WITH HYPERGLYCEMIA, WITH LONG-TERM CURRENT USE OF INSULIN (HCC): Primary | ICD-10-CM

## 2021-12-07 DIAGNOSIS — E11.65 TYPE 2 DIABETES MELLITUS WITH HYPERGLYCEMIA, WITH LONG-TERM CURRENT USE OF INSULIN (HCC): Primary | ICD-10-CM

## 2021-12-07 PROCEDURE — 99214 OFFICE O/P EST MOD 30 MIN: CPT | Performed by: PHYSICIAN ASSISTANT

## 2021-12-07 PROCEDURE — 3080F DIAST BP >= 90 MM HG: CPT | Performed by: PHYSICIAN ASSISTANT

## 2021-12-07 PROCEDURE — 3077F SYST BP >= 140 MM HG: CPT | Performed by: PHYSICIAN ASSISTANT

## 2021-12-07 PROCEDURE — 1036F TOBACCO NON-USER: CPT | Performed by: PHYSICIAN ASSISTANT

## 2021-12-07 PROCEDURE — 3008F BODY MASS INDEX DOCD: CPT | Performed by: PHYSICIAN ASSISTANT

## 2021-12-07 RX ORDER — FLASH GLUCOSE SENSOR
KIT MISCELLANEOUS
Qty: 2 EACH | Refills: 5 | Status: SHIPPED | OUTPATIENT
Start: 2021-12-07

## 2022-04-08 ENCOUNTER — TELEPHONE (OUTPATIENT)
Dept: ENDOCRINOLOGY | Facility: CLINIC | Age: 42
End: 2022-04-08

## 2022-08-22 ENCOUNTER — TELEPHONE (OUTPATIENT)
Dept: ENDOCRINOLOGY | Facility: CLINIC | Age: 42
End: 2022-08-22

## (undated) DEVICE — SUT PROLENE 4-0 BB 36 IN 8581H

## (undated) DEVICE — INTENDED FOR TISSUE SEPARATION, AND OTHER PROCEDURES THAT REQUIRE A SHARP SURGICAL BLADE TO PUNCTURE OR CUT.: Brand: BARD-PARKER ® CARBON RIB-BACK BLADES

## (undated) DEVICE — 28 FR RIGHT ANGLE – SOFT PVC CATHETER: Brand: PVC THORACIC CATHETERS

## (undated) DEVICE — BLANKET HYPOTHERMIA ADULT GAYMAR

## (undated) DEVICE — SUT SILK 2-0 SH CR/8 18 IN C012D

## (undated) DEVICE — 32 FR RIGHT ANGLE – SOFT PVC CATHETER: Brand: PVC THORACIC CATHETERS

## (undated) DEVICE — ALCON OPHTHALMIC KNIFE 15 °: Brand: ALCON

## (undated) DEVICE — GLOVE SRG BIOGEL ECLIPSE 8

## (undated) DEVICE — SUCTION CATH 18 FR

## (undated) DEVICE — SILVER-COATED ANTIBACTERIAL BARRIER DRESSING: Brand: ACTICOAT SURGIC 10X25CM 5PK US

## (undated) DEVICE — SUT MONOCRYL 4-0 PS-2 18 IN Y496G

## (undated) DEVICE — LIGACLIP MCA MULTIPLE CLIP APPLIERS, 20 MEDIUM CLIPS: Brand: LIGACLIP

## (undated) DEVICE — SYRINGE 50ML LL

## (undated) DEVICE — SUT SILK 2 60 IN SA8H

## (undated) DEVICE — SUT PDS PLUS 1 CTB 36 IN PDPB359T

## (undated) DEVICE — ELECTRODE BLADE E-Z CLEAN 4IN -0014A

## (undated) DEVICE — SUT PROLENE 7-0 BV-1/BV-1 24 IN 8304H

## (undated) DEVICE — VASOVIEW HEMOPRO 2: Brand: VASOVIEW HEMOPRO 2

## (undated) DEVICE — ADHESIVE SKIN HIGH VISCOSITY EXOFIN 1ML

## (undated) DEVICE — HEMOCLIP CARTRIDGE LRG

## (undated) DEVICE — AORTIC PUNCH 5.2 MM DISP

## (undated) DEVICE — PENCIL ELECTROSURG E-Z CLEAN -0035H

## (undated) DEVICE — SILVER-COATED ANTIBACTERIAL BARRIER DRESSING: Brand: ACTICOAT SURGIC 10X12CM 5PK US

## (undated) DEVICE — DRESSING ALLEVYN LIFE HEEL 25 X 25.2CM

## (undated) DEVICE — OASIS DRAIN, DUAL, IN-LINE, ATS COMPATIBLE: Brand: OASIS

## (undated) DEVICE — LIGACLIP MCA MULTIPLE CLIP APPLIERS, 20 SMALL CLIPS: Brand: LIGACLIP

## (undated) DEVICE — Device: Brand: RETRACT-O-TAPE 18G X 30.5CM BLUNT NEEDLE

## (undated) DEVICE — BARD® PARSONNET™ VASCULAR PROBE 1.0 MM X 45 CM: Brand: BARD® PARSONNET

## (undated) DEVICE — STERNAL WIRE

## (undated) DEVICE — PLEDGET CARDIO PTFE 9.5 X 4.8 SOFT LF (6EA/PK)

## (undated) DEVICE — SUT MONOCRYL PLUS 3-0 PS-2 27 IN MCP427H

## (undated) DEVICE — GLOVE INDICATOR PI UNDERGLOVE SZ 8 BLUE

## (undated) DEVICE — CATH STRAIGHT RED RUBBER 20 FR

## (undated) DEVICE — SUT ETHIBOND 2-0 SH/SH 36 IN X523H

## (undated) DEVICE — DRESSING ALLEVYN LIFE SACRAL 6.75 X 6.5 IN

## (undated) DEVICE — RECIP.STERNUM SAW BLADE 34/7.5/0.7MM: Brand: AESCULAP

## (undated) DEVICE — TRAY FOLEY 16FR SURESTEP TEMP SENS URIMETER STAT LOK

## (undated) DEVICE — SUT PROLENE 5-0 C-1/C-1 36 IN 8321H

## (undated) DEVICE — 3000CC GUARDIAN II: Brand: GUARDIAN

## (undated) DEVICE — 32 FR STRAIGHT – SOFT PVC CATHETER: Brand: PVC THORACIC CATHETERS

## (undated) DEVICE — ANTIBACTERIAL UNDYED BRAIDED (POLYGLACTIN 910), SYNTHETIC ABSORBABLE SUTURE: Brand: COATED VICRYL

## (undated) DEVICE — LIGHT HANDLE COVER SLEEVE DISP BLUE STELLAR

## (undated) DEVICE — INTENDED FOR TISSUE SEPARATION, AND OTHER PROCEDURES THAT REQUIRE A SHARP SURGICAL BLADE TO PUNCTURE OR CUT.: Brand: BARD-PARKER SAFETY BLADES SIZE 15, STERILE

## (undated) DEVICE — EVERGRIP INSERT SET 86MM: Brand: FOGARTY EVERGRIP

## (undated) DEVICE — ACE WRAP 6 IN UNSTERILE

## (undated) DEVICE — BONE WAX WHITE: Brand: BONE WAX WHITE

## (undated) DEVICE — BLADE BEAVER MINI SZ 69

## (undated) DEVICE — SUT VICRYL PLUS 1 CTB-1 36 IN VCPB947H

## (undated) DEVICE — TUBING INSUFFLATION SET ISO CONNECTOR

## (undated) DEVICE — PACK CABG PBDS

## (undated) DEVICE — SUT PROLENE 7-0 BV175-8/BV175-8 24 IN EPM8747

## (undated) DEVICE — SUT ETHIBOND 2-0 SH-1/SH-1 30 IN X763H

## (undated) DEVICE — GAUZE SPONGES,16 PLY: Brand: CURITY

## (undated) DEVICE — THERMOFLECT BLANKET, L, 25EA                               TS THERMOFLECT BLANKET, 48" X 84", SILVER, 5/BG, 5 BG/CS NW: Brand: THERMOFLECT

## (undated) DEVICE — SUT SILK 0 CT-1 30 IN 424H

## (undated) DEVICE — 40601 PROLONGED POSITIONING SYSTEM: Brand: 40601 PROLONGED POSITIONING SYSTEM